# Patient Record
Sex: FEMALE | Race: WHITE | NOT HISPANIC OR LATINO
[De-identification: names, ages, dates, MRNs, and addresses within clinical notes are randomized per-mention and may not be internally consistent; named-entity substitution may affect disease eponyms.]

---

## 2017-01-05 ENCOUNTER — APPOINTMENT (OUTPATIENT)
Dept: CARDIOLOGY | Facility: CLINIC | Age: 72
End: 2017-01-05

## 2017-01-05 VITALS
DIASTOLIC BLOOD PRESSURE: 90 MMHG | RESPIRATION RATE: 14 BRPM | SYSTOLIC BLOOD PRESSURE: 150 MMHG | HEART RATE: 62 BPM | OXYGEN SATURATION: 98 %

## 2017-01-05 VITALS — RESPIRATION RATE: 14 BRPM | SYSTOLIC BLOOD PRESSURE: 138 MMHG | HEART RATE: 64 BPM | DIASTOLIC BLOOD PRESSURE: 82 MMHG

## 2017-01-09 ENCOUNTER — RX RENEWAL (OUTPATIENT)
Age: 72
End: 2017-01-09

## 2017-01-19 ENCOUNTER — APPOINTMENT (OUTPATIENT)
Dept: CARDIOLOGY | Facility: CLINIC | Age: 72
End: 2017-01-19

## 2017-01-19 VITALS — SYSTOLIC BLOOD PRESSURE: 128 MMHG | RESPIRATION RATE: 14 BRPM | HEART RATE: 70 BPM | DIASTOLIC BLOOD PRESSURE: 80 MMHG

## 2017-01-19 VITALS — HEART RATE: 71 BPM | DIASTOLIC BLOOD PRESSURE: 85 MMHG | SYSTOLIC BLOOD PRESSURE: 125 MMHG | OXYGEN SATURATION: 98 %

## 2017-01-23 ENCOUNTER — APPOINTMENT (OUTPATIENT)
Dept: CARDIOLOGY | Facility: CLINIC | Age: 72
End: 2017-01-23

## 2017-03-09 ENCOUNTER — NON-APPOINTMENT (OUTPATIENT)
Age: 72
End: 2017-03-09

## 2017-03-09 ENCOUNTER — APPOINTMENT (OUTPATIENT)
Dept: CARDIOLOGY | Facility: CLINIC | Age: 72
End: 2017-03-09

## 2017-03-09 ENCOUNTER — CLINICAL ADVICE (OUTPATIENT)
Age: 72
End: 2017-03-09

## 2017-03-09 VITALS
DIASTOLIC BLOOD PRESSURE: 89 MMHG | OXYGEN SATURATION: 97 % | HEART RATE: 69 BPM | BODY MASS INDEX: 39.27 KG/M2 | SYSTOLIC BLOOD PRESSURE: 151 MMHG | HEIGHT: 60 IN | WEIGHT: 200 LBS

## 2017-03-20 ENCOUNTER — APPOINTMENT (OUTPATIENT)
Dept: CARDIOLOGY | Facility: CLINIC | Age: 72
End: 2017-03-20

## 2017-03-23 ENCOUNTER — APPOINTMENT (OUTPATIENT)
Dept: CARDIOLOGY | Facility: CLINIC | Age: 72
End: 2017-03-23

## 2017-03-23 VITALS
SYSTOLIC BLOOD PRESSURE: 162 MMHG | BODY MASS INDEX: 40.05 KG/M2 | OXYGEN SATURATION: 98 % | DIASTOLIC BLOOD PRESSURE: 96 MMHG | HEART RATE: 65 BPM | WEIGHT: 204 LBS | HEIGHT: 60 IN

## 2017-03-28 ENCOUNTER — FORM ENCOUNTER (OUTPATIENT)
Age: 72
End: 2017-03-28

## 2017-03-28 LAB
ANION GAP SERPL CALC-SCNC: 14 MMOL/L
BUN SERPL-MCNC: 22 MG/DL
CALCIUM SERPL-MCNC: 9.9 MG/DL
CHLORIDE SERPL-SCNC: 103 MMOL/L
CO2 SERPL-SCNC: 27 MMOL/L
CREAT SERPL-MCNC: 0.8 MG/DL
GLUCOSE SERPL-MCNC: 150 MG/DL
POTASSIUM SERPL-SCNC: 4.4 MMOL/L
SODIUM SERPL-SCNC: 144 MMOL/L

## 2017-03-29 ENCOUNTER — OUTPATIENT (OUTPATIENT)
Dept: OUTPATIENT SERVICES | Facility: HOSPITAL | Age: 72
LOS: 1 days | End: 2017-03-29
Payer: MEDICARE

## 2017-03-29 ENCOUNTER — APPOINTMENT (OUTPATIENT)
Dept: MRI IMAGING | Facility: HOSPITAL | Age: 72
End: 2017-03-29

## 2017-03-29 DIAGNOSIS — Z90.5 ACQUIRED ABSENCE OF KIDNEY: Chronic | ICD-10-CM

## 2017-03-29 DIAGNOSIS — Z98.89 OTHER SPECIFIED POSTPROCEDURAL STATES: Chronic | ICD-10-CM

## 2017-03-29 DIAGNOSIS — C64.2 MALIGNANT NEOPLASM OF LEFT KIDNEY, EXCEPT RENAL PELVIS: ICD-10-CM

## 2017-03-29 DIAGNOSIS — R19.05 PERIUMBILIC SWELLING, MASS OR LUMP: ICD-10-CM

## 2017-03-29 DIAGNOSIS — Z90.721 ACQUIRED ABSENCE OF OVARIES, UNILATERAL: Chronic | ICD-10-CM

## 2017-03-29 PROCEDURE — 71046 X-RAY EXAM CHEST 2 VIEWS: CPT

## 2017-03-29 PROCEDURE — 74183 MRI ABD W/O CNTR FLWD CNTR: CPT

## 2017-03-29 PROCEDURE — 74183 MRI ABD W/O CNTR FLWD CNTR: CPT | Mod: 26

## 2017-03-29 PROCEDURE — 71020: CPT | Mod: 26

## 2017-03-30 ENCOUNTER — APPOINTMENT (OUTPATIENT)
Dept: CARDIOLOGY | Facility: CLINIC | Age: 72
End: 2017-03-30

## 2017-03-30 VITALS
RESPIRATION RATE: 14 BRPM | HEART RATE: 78 BPM | OXYGEN SATURATION: 100 % | DIASTOLIC BLOOD PRESSURE: 82 MMHG | SYSTOLIC BLOOD PRESSURE: 140 MMHG

## 2017-03-30 VITALS — HEART RATE: 82 BPM | OXYGEN SATURATION: 100 % | SYSTOLIC BLOOD PRESSURE: 160 MMHG | DIASTOLIC BLOOD PRESSURE: 80 MMHG

## 2017-04-03 ENCOUNTER — RX RENEWAL (OUTPATIENT)
Age: 72
End: 2017-04-03

## 2017-04-11 ENCOUNTER — MEDICATION RENEWAL (OUTPATIENT)
Age: 72
End: 2017-04-11

## 2017-04-20 ENCOUNTER — APPOINTMENT (OUTPATIENT)
Dept: CARDIOLOGY | Facility: CLINIC | Age: 72
End: 2017-04-20

## 2017-04-20 VITALS — DIASTOLIC BLOOD PRESSURE: 84 MMHG | OXYGEN SATURATION: 99 % | SYSTOLIC BLOOD PRESSURE: 144 MMHG | HEART RATE: 72 BPM

## 2017-04-20 VITALS
SYSTOLIC BLOOD PRESSURE: 140 MMHG | OXYGEN SATURATION: 99 % | DIASTOLIC BLOOD PRESSURE: 80 MMHG | RESPIRATION RATE: 14 BRPM | HEART RATE: 72 BPM

## 2017-05-23 ENCOUNTER — APPOINTMENT (OUTPATIENT)
Dept: UROLOGY | Facility: CLINIC | Age: 72
End: 2017-05-23

## 2017-05-30 ENCOUNTER — MEDICATION RENEWAL (OUTPATIENT)
Age: 72
End: 2017-05-30

## 2017-05-31 ENCOUNTER — RX RENEWAL (OUTPATIENT)
Age: 72
End: 2017-05-31

## 2017-06-15 ENCOUNTER — APPOINTMENT (OUTPATIENT)
Dept: CARDIOLOGY | Facility: CLINIC | Age: 72
End: 2017-06-15

## 2017-06-15 ENCOUNTER — NON-APPOINTMENT (OUTPATIENT)
Age: 72
End: 2017-06-15

## 2017-06-15 VITALS — SYSTOLIC BLOOD PRESSURE: 145 MMHG | DIASTOLIC BLOOD PRESSURE: 65 MMHG

## 2017-06-15 VITALS
HEIGHT: 60 IN | WEIGHT: 212 LBS | DIASTOLIC BLOOD PRESSURE: 97 MMHG | HEART RATE: 71 BPM | OXYGEN SATURATION: 98 % | SYSTOLIC BLOOD PRESSURE: 166 MMHG | BODY MASS INDEX: 41.62 KG/M2

## 2017-09-29 ENCOUNTER — APPOINTMENT (OUTPATIENT)
Dept: CARDIOLOGY | Facility: CLINIC | Age: 72
End: 2017-09-29
Payer: MEDICARE

## 2017-09-29 ENCOUNTER — NON-APPOINTMENT (OUTPATIENT)
Age: 72
End: 2017-09-29

## 2017-09-29 VITALS
OXYGEN SATURATION: 94 % | BODY MASS INDEX: 41.03 KG/M2 | HEIGHT: 60 IN | HEART RATE: 71 BPM | SYSTOLIC BLOOD PRESSURE: 164 MMHG | WEIGHT: 209 LBS | DIASTOLIC BLOOD PRESSURE: 85 MMHG

## 2017-09-29 PROCEDURE — 99215 OFFICE O/P EST HI 40 MIN: CPT

## 2017-09-29 PROCEDURE — 93000 ELECTROCARDIOGRAM COMPLETE: CPT

## 2017-11-03 ENCOUNTER — MEDICATION RENEWAL (OUTPATIENT)
Age: 72
End: 2017-11-03

## 2018-01-08 ENCOUNTER — NON-APPOINTMENT (OUTPATIENT)
Age: 73
End: 2018-01-08

## 2018-01-08 ENCOUNTER — APPOINTMENT (OUTPATIENT)
Dept: CARDIOLOGY | Facility: CLINIC | Age: 73
End: 2018-01-08
Payer: MEDICARE

## 2018-01-08 VITALS
WEIGHT: 205 LBS | DIASTOLIC BLOOD PRESSURE: 85 MMHG | OXYGEN SATURATION: 99 % | HEIGHT: 60 IN | HEART RATE: 71 BPM | BODY MASS INDEX: 40.25 KG/M2 | SYSTOLIC BLOOD PRESSURE: 151 MMHG

## 2018-01-08 PROCEDURE — 93000 ELECTROCARDIOGRAM COMPLETE: CPT

## 2018-01-08 PROCEDURE — 99215 OFFICE O/P EST HI 40 MIN: CPT

## 2018-03-26 ENCOUNTER — APPOINTMENT (OUTPATIENT)
Dept: CARDIOLOGY | Facility: CLINIC | Age: 73
End: 2018-03-26

## 2018-04-02 ENCOUNTER — APPOINTMENT (OUTPATIENT)
Dept: CARDIOLOGY | Facility: CLINIC | Age: 73
End: 2018-04-02

## 2018-04-03 ENCOUNTER — RX RENEWAL (OUTPATIENT)
Age: 73
End: 2018-04-03

## 2018-04-05 ENCOUNTER — RESULT REVIEW (OUTPATIENT)
Age: 73
End: 2018-04-05

## 2018-04-09 ENCOUNTER — APPOINTMENT (OUTPATIENT)
Dept: CARDIOLOGY | Facility: CLINIC | Age: 73
End: 2018-04-09
Payer: MEDICARE

## 2018-04-09 PROCEDURE — 93280 PM DEVICE PROGR EVAL DUAL: CPT

## 2018-04-25 ENCOUNTER — RX RENEWAL (OUTPATIENT)
Age: 73
End: 2018-04-25

## 2018-05-03 ENCOUNTER — APPOINTMENT (OUTPATIENT)
Dept: CARDIOLOGY | Facility: CLINIC | Age: 73
End: 2018-05-03
Payer: MEDICARE

## 2018-05-03 ENCOUNTER — NON-APPOINTMENT (OUTPATIENT)
Age: 73
End: 2018-05-03

## 2018-05-03 VITALS
HEIGHT: 60 IN | OXYGEN SATURATION: 98 % | DIASTOLIC BLOOD PRESSURE: 78 MMHG | HEART RATE: 80 BPM | WEIGHT: 206 LBS | BODY MASS INDEX: 40.44 KG/M2 | SYSTOLIC BLOOD PRESSURE: 148 MMHG

## 2018-05-03 PROCEDURE — 93000 ELECTROCARDIOGRAM COMPLETE: CPT

## 2018-05-03 PROCEDURE — 99214 OFFICE O/P EST MOD 30 MIN: CPT

## 2018-05-20 ENCOUNTER — FORM ENCOUNTER (OUTPATIENT)
Age: 73
End: 2018-05-20

## 2018-05-21 ENCOUNTER — APPOINTMENT (OUTPATIENT)
Dept: CT IMAGING | Facility: CLINIC | Age: 73
End: 2018-05-21
Payer: MEDICARE

## 2018-05-21 ENCOUNTER — OUTPATIENT (OUTPATIENT)
Dept: OUTPATIENT SERVICES | Facility: HOSPITAL | Age: 73
LOS: 1 days | End: 2018-05-21
Payer: MEDICARE

## 2018-05-21 DIAGNOSIS — Z90.721 ACQUIRED ABSENCE OF OVARIES, UNILATERAL: Chronic | ICD-10-CM

## 2018-05-21 DIAGNOSIS — Z90.5 ACQUIRED ABSENCE OF KIDNEY: Chronic | ICD-10-CM

## 2018-05-21 DIAGNOSIS — Z00.8 ENCOUNTER FOR OTHER GENERAL EXAMINATION: ICD-10-CM

## 2018-05-21 DIAGNOSIS — Z98.89 OTHER SPECIFIED POSTPROCEDURAL STATES: Chronic | ICD-10-CM

## 2018-05-21 PROCEDURE — 74170 CT ABD WO CNTRST FLWD CNTRST: CPT

## 2018-05-21 PROCEDURE — 74170 CT ABD WO CNTRST FLWD CNTRST: CPT | Mod: 26

## 2018-05-21 PROCEDURE — 71260 CT THORAX DX C+: CPT

## 2018-05-21 PROCEDURE — 82565 ASSAY OF CREATININE: CPT

## 2018-05-21 PROCEDURE — 71260 CT THORAX DX C+: CPT | Mod: 26

## 2018-05-25 ENCOUNTER — RECORD ABSTRACTING (OUTPATIENT)
Age: 73
End: 2018-05-25

## 2018-05-25 ENCOUNTER — APPOINTMENT (OUTPATIENT)
Dept: UROLOGY | Facility: CLINIC | Age: 73
End: 2018-05-25
Payer: MEDICARE

## 2018-05-25 PROCEDURE — 99213 OFFICE O/P EST LOW 20 MIN: CPT

## 2018-06-04 ENCOUNTER — OUTPATIENT (OUTPATIENT)
Dept: OUTPATIENT SERVICES | Facility: HOSPITAL | Age: 73
LOS: 1 days | End: 2018-06-04
Payer: MEDICARE

## 2018-06-04 VITALS
TEMPERATURE: 98 F | WEIGHT: 207.9 LBS | SYSTOLIC BLOOD PRESSURE: 144 MMHG | DIASTOLIC BLOOD PRESSURE: 69 MMHG | HEART RATE: 77 BPM | HEIGHT: 62 IN | RESPIRATION RATE: 15 BRPM

## 2018-06-04 DIAGNOSIS — Z01.818 ENCOUNTER FOR OTHER PREPROCEDURAL EXAMINATION: ICD-10-CM

## 2018-06-04 DIAGNOSIS — Z98.89 OTHER SPECIFIED POSTPROCEDURAL STATES: Chronic | ICD-10-CM

## 2018-06-04 DIAGNOSIS — D25.9 LEIOMYOMA OF UTERUS, UNSPECIFIED: ICD-10-CM

## 2018-06-04 DIAGNOSIS — N92.0 EXCESSIVE AND FREQUENT MENSTRUATION WITH REGULAR CYCLE: ICD-10-CM

## 2018-06-04 DIAGNOSIS — Z90.5 ACQUIRED ABSENCE OF KIDNEY: Chronic | ICD-10-CM

## 2018-06-04 DIAGNOSIS — Z95.2 PRESENCE OF PROSTHETIC HEART VALVE: Chronic | ICD-10-CM

## 2018-06-04 DIAGNOSIS — Z41.9 ENCOUNTER FOR PROCEDURE FOR PURPOSES OTHER THAN REMEDYING HEALTH STATE, UNSPECIFIED: Chronic | ICD-10-CM

## 2018-06-04 DIAGNOSIS — Z90.721 ACQUIRED ABSENCE OF OVARIES, UNILATERAL: Chronic | ICD-10-CM

## 2018-06-04 LAB
ALBUMIN SERPL ELPH-MCNC: 3.5 G/DL — SIGNIFICANT CHANGE UP (ref 3.3–5)
ALP SERPL-CCNC: 61 U/L — SIGNIFICANT CHANGE UP (ref 40–120)
ALT FLD-CCNC: 43 U/L — SIGNIFICANT CHANGE UP (ref 12–78)
ANION GAP SERPL CALC-SCNC: 9 MMOL/L — SIGNIFICANT CHANGE UP (ref 5–17)
AST SERPL-CCNC: 30 U/L — SIGNIFICANT CHANGE UP (ref 15–37)
BILIRUB SERPL-MCNC: 0.4 MG/DL — SIGNIFICANT CHANGE UP (ref 0.2–1.2)
BUN SERPL-MCNC: 21 MG/DL — SIGNIFICANT CHANGE UP (ref 7–23)
CALCIUM SERPL-MCNC: 9.8 MG/DL — SIGNIFICANT CHANGE UP (ref 8.5–10.1)
CHLORIDE SERPL-SCNC: 104 MMOL/L — SIGNIFICANT CHANGE UP (ref 96–108)
CO2 SERPL-SCNC: 30 MMOL/L — SIGNIFICANT CHANGE UP (ref 22–31)
CREAT SERPL-MCNC: 0.83 MG/DL — SIGNIFICANT CHANGE UP (ref 0.5–1.3)
GLUCOSE SERPL-MCNC: 162 MG/DL — HIGH (ref 70–99)
HBA1C BLD-MCNC: 6.3 % — HIGH (ref 4–5.6)
HCT VFR BLD CALC: 39.7 % — SIGNIFICANT CHANGE UP (ref 34.5–45)
HGB BLD-MCNC: 13.8 G/DL — SIGNIFICANT CHANGE UP (ref 11.5–15.5)
MCHC RBC-ENTMCNC: 29.1 PG — SIGNIFICANT CHANGE UP (ref 27–34)
MCHC RBC-ENTMCNC: 34.8 GM/DL — SIGNIFICANT CHANGE UP (ref 32–36)
MCV RBC AUTO: 83.6 FL — SIGNIFICANT CHANGE UP (ref 80–100)
NRBC # BLD: 0 /100 WBCS — SIGNIFICANT CHANGE UP (ref 0–0)
PLATELET # BLD AUTO: 302 K/UL — SIGNIFICANT CHANGE UP (ref 150–400)
POTASSIUM SERPL-MCNC: 3.9 MMOL/L — SIGNIFICANT CHANGE UP (ref 3.5–5.3)
POTASSIUM SERPL-SCNC: 3.9 MMOL/L — SIGNIFICANT CHANGE UP (ref 3.5–5.3)
PROT SERPL-MCNC: 7.7 G/DL — SIGNIFICANT CHANGE UP (ref 6–8.3)
RBC # BLD: 4.75 M/UL — SIGNIFICANT CHANGE UP (ref 3.8–5.2)
RBC # FLD: 13.8 % — SIGNIFICANT CHANGE UP (ref 10.3–14.5)
SODIUM SERPL-SCNC: 143 MMOL/L — SIGNIFICANT CHANGE UP (ref 135–145)
WBC # BLD: 7.02 K/UL — SIGNIFICANT CHANGE UP (ref 3.8–10.5)
WBC # FLD AUTO: 7.02 K/UL — SIGNIFICANT CHANGE UP (ref 3.8–10.5)

## 2018-06-04 PROCEDURE — 83036 HEMOGLOBIN GLYCOSYLATED A1C: CPT

## 2018-06-04 PROCEDURE — 86850 RBC ANTIBODY SCREEN: CPT

## 2018-06-04 PROCEDURE — 86900 BLOOD TYPING SEROLOGIC ABO: CPT

## 2018-06-04 PROCEDURE — G0463: CPT

## 2018-06-04 PROCEDURE — 36415 COLL VENOUS BLD VENIPUNCTURE: CPT

## 2018-06-04 PROCEDURE — 86901 BLOOD TYPING SEROLOGIC RH(D): CPT

## 2018-06-04 PROCEDURE — 80053 COMPREHEN METABOLIC PANEL: CPT

## 2018-06-04 PROCEDURE — 93005 ELECTROCARDIOGRAM TRACING: CPT

## 2018-06-04 PROCEDURE — 93010 ELECTROCARDIOGRAM REPORT: CPT | Mod: NC

## 2018-06-04 PROCEDURE — 85027 COMPLETE CBC AUTOMATED: CPT

## 2018-06-04 RX ORDER — DEXTROSE 50 % IN WATER 50 %
12.5 SYRINGE (ML) INTRAVENOUS ONCE
Qty: 0 | Refills: 0 | Status: DISCONTINUED | OUTPATIENT
Start: 2018-06-14 | End: 2018-06-29

## 2018-06-04 RX ORDER — DEXTROSE 50 % IN WATER 50 %
25 SYRINGE (ML) INTRAVENOUS ONCE
Qty: 0 | Refills: 0 | Status: DISCONTINUED | OUTPATIENT
Start: 2018-06-14 | End: 2018-06-29

## 2018-06-04 RX ORDER — GLUCAGON INJECTION, SOLUTION 0.5 MG/.1ML
1 INJECTION, SOLUTION SUBCUTANEOUS ONCE
Qty: 0 | Refills: 0 | Status: DISCONTINUED | OUTPATIENT
Start: 2018-06-14 | End: 2018-06-29

## 2018-06-04 RX ORDER — SODIUM CHLORIDE 9 MG/ML
1000 INJECTION, SOLUTION INTRAVENOUS
Qty: 0 | Refills: 0 | Status: DISCONTINUED | OUTPATIENT
Start: 2018-06-14 | End: 2018-06-29

## 2018-06-04 RX ORDER — DEXTROSE 50 % IN WATER 50 %
15 SYRINGE (ML) INTRAVENOUS ONCE
Qty: 0 | Refills: 0 | Status: DISCONTINUED | OUTPATIENT
Start: 2018-06-14 | End: 2018-06-29

## 2018-06-04 NOTE — H&P PST ADULT - HISTORY OF PRESENT ILLNESS
71 yo F for dilation and curettage   hysteroscopy  w Myosure  LMP 1992   Pt w post menopausal bleeding lasting 5-6 weeks  w cramping

## 2018-06-04 NOTE — H&P PST ADULT - PMH
Anemia    Aortic stenosis    Cancer of kidney, left    Dyslipidemia    Hiatal hernia    Hypertension  x10 years  Hypothyroidism    Right bundle branch block (RBBB)    Type II diabetes mellitus

## 2018-06-04 NOTE — H&P PST ADULT - PSH
Elective surgery  PPM  2016  H/O aortic valve replacement    H/O hand surgery    H/O partial nephrectomy  right [id number]  S/P T&A (status post tonsillectomy and adenoidectomy)    Status post unilateral salpingo-oophorectomy

## 2018-06-04 NOTE — H&P PST ADULT - ASSESSMENT
71 yo F for dilation and curettage hysteroscopy w Myosure     Cardiac cl pending    D/C  eliquis and ASA as per cardio

## 2018-06-13 ENCOUNTER — TRANSCRIPTION ENCOUNTER (OUTPATIENT)
Age: 73
End: 2018-06-13

## 2018-06-13 RX ORDER — SODIUM CHLORIDE 9 MG/ML
1000 INJECTION, SOLUTION INTRAVENOUS
Qty: 0 | Refills: 0 | Status: DISCONTINUED | OUTPATIENT
Start: 2018-06-14 | End: 2018-06-14

## 2018-06-14 ENCOUNTER — RESULT REVIEW (OUTPATIENT)
Age: 73
End: 2018-06-14

## 2018-06-14 ENCOUNTER — OUTPATIENT (OUTPATIENT)
Dept: OUTPATIENT SERVICES | Facility: HOSPITAL | Age: 73
LOS: 1 days | End: 2018-06-14
Payer: MEDICARE

## 2018-06-14 VITALS
TEMPERATURE: 98 F | SYSTOLIC BLOOD PRESSURE: 137 MMHG | HEART RATE: 66 BPM | DIASTOLIC BLOOD PRESSURE: 63 MMHG | RESPIRATION RATE: 14 BRPM | OXYGEN SATURATION: 100 %

## 2018-06-14 VITALS
HEART RATE: 70 BPM | OXYGEN SATURATION: 97 % | DIASTOLIC BLOOD PRESSURE: 71 MMHG | SYSTOLIC BLOOD PRESSURE: 162 MMHG | RESPIRATION RATE: 14 BRPM | HEIGHT: 62 IN | WEIGHT: 207.9 LBS | TEMPERATURE: 99 F

## 2018-06-14 DIAGNOSIS — Z41.9 ENCOUNTER FOR PROCEDURE FOR PURPOSES OTHER THAN REMEDYING HEALTH STATE, UNSPECIFIED: Chronic | ICD-10-CM

## 2018-06-14 DIAGNOSIS — Z98.89 OTHER SPECIFIED POSTPROCEDURAL STATES: Chronic | ICD-10-CM

## 2018-06-14 DIAGNOSIS — Z90.721 ACQUIRED ABSENCE OF OVARIES, UNILATERAL: Chronic | ICD-10-CM

## 2018-06-14 DIAGNOSIS — Z90.5 ACQUIRED ABSENCE OF KIDNEY: Chronic | ICD-10-CM

## 2018-06-14 DIAGNOSIS — Z95.2 PRESENCE OF PROSTHETIC HEART VALVE: Chronic | ICD-10-CM

## 2018-06-14 DIAGNOSIS — D25.9 LEIOMYOMA OF UTERUS, UNSPECIFIED: ICD-10-CM

## 2018-06-14 DIAGNOSIS — N92.0 EXCESSIVE AND FREQUENT MENSTRUATION WITH REGULAR CYCLE: ICD-10-CM

## 2018-06-14 PROCEDURE — 82962 GLUCOSE BLOOD TEST: CPT

## 2018-06-14 PROCEDURE — 88305 TISSUE EXAM BY PATHOLOGIST: CPT

## 2018-06-14 PROCEDURE — 58558 HYSTEROSCOPY BIOPSY: CPT

## 2018-06-14 PROCEDURE — 88305 TISSUE EXAM BY PATHOLOGIST: CPT | Mod: 26

## 2018-06-14 RX ORDER — HYDROMORPHONE HYDROCHLORIDE 2 MG/ML
0.5 INJECTION INTRAMUSCULAR; INTRAVENOUS; SUBCUTANEOUS
Qty: 0 | Refills: 0 | Status: DISCONTINUED | OUTPATIENT
Start: 2018-06-14 | End: 2018-06-14

## 2018-06-14 RX ORDER — OXYCODONE AND ACETAMINOPHEN 5; 325 MG/1; MG/1
1 TABLET ORAL EVERY 4 HOURS
Qty: 0 | Refills: 0 | Status: DISCONTINUED | OUTPATIENT
Start: 2018-06-14 | End: 2018-06-14

## 2018-06-14 RX ORDER — SODIUM CHLORIDE 9 MG/ML
1000 INJECTION, SOLUTION INTRAVENOUS
Qty: 0 | Refills: 0 | Status: DISCONTINUED | OUTPATIENT
Start: 2018-06-14 | End: 2018-06-14

## 2018-06-14 RX ORDER — ONDANSETRON 8 MG/1
4 TABLET, FILM COATED ORAL ONCE
Qty: 0 | Refills: 0 | Status: DISCONTINUED | OUTPATIENT
Start: 2018-06-14 | End: 2018-06-14

## 2018-06-14 RX ADMIN — SODIUM CHLORIDE 100 MILLILITER(S): 9 INJECTION, SOLUTION INTRAVENOUS at 08:38

## 2018-06-14 RX ADMIN — SODIUM CHLORIDE 100 MILLILITER(S): 9 INJECTION, SOLUTION INTRAVENOUS at 10:37

## 2018-06-14 NOTE — BRIEF OPERATIVE NOTE - OPERATION/FINDINGS
small fibroid uterus ~ 8week size anteverted, Cervix short, closed, posterior  no adnexal masses, Uterus sounding to 3.5 inches  vulva and vagina atrophic but wnl  Submucosal fibroid noted in the mid-anterior fundal wall with slightly hyperplastic endometrium covering it and   the left lateral side wall. The right ostium was visualized but the left was blocked from view due to the fibroid.  job # [id number]

## 2018-06-14 NOTE — BRIEF OPERATIVE NOTE - PRE-OP DX
Fibroid uterus  06/14/2018    Active  Mary Espinoza  PMB (postmenopausal bleeding)  06/14/2018    Active  Mary Espinoza

## 2018-06-14 NOTE — BRIEF OPERATIVE NOTE - PROCEDURE
<<-----Click on this checkbox to enter Procedure Myomectomy, uterine, hysteroscopic  06/14/2018    Active  MDIAZ  Hysteroscopy with dilation and curettage of uterus using MyoSure device  06/14/2018    Active  MDIAZ

## 2018-06-14 NOTE — ASU DISCHARGE PLAN (ADULT/PEDIATRIC). - MEDICATION SUMMARY - MEDICATIONS TO TAKE
I will START or STAY ON the medications listed below when I get home from the hospital:    acetaminophen 325 mg oral tablet  -- 2 tab(s) by mouth every 4 hours, As Needed  -- Indication: For pain    aspirin 81 mg oral tablet  -- 1 tab(s) by mouth once a day  -- Indication: For blood thinner    valsartan 320 mg oral tablet  -- 1 tab(s) by mouth once a day  -- Indication: For BP    Eliquis 5 mg oral tablet  -- 1 tab(s) by mouth 2 times a day  -- Indication: For blood thinner    metFORMIN 500 mg oral tablet  -- Indication: For DM    simvastatin 20 mg oral tablet  -- 1 tab(s) by mouth once a day (at bedtime)  -- Indication: For cholesterol    Metoprolol Succinate  mg oral tablet, extended release  -- 1 tab(s) by mouth once a day  -- Indication: For BP    chlorthalidone 25 mg oral tablet  -- 1 tab(s) by mouth once a day  -- Indication: For diuretics    ferrous sulfate 325 mg (65 mg elemental iron) oral tablet  -- 1 tab(s) by mouth 3 times a day  -- Indication: For iron    docusate sodium 100 mg oral capsule  -- 1 cap(s) by mouth 3 times a day  -- Indication: For stool softner    Fish Oil 1200 mg oral capsule  --  by mouth 2 times a day to stop as of 4/27/2015  -- Indication: For supplement    pantoprazole 40 mg oral delayed release tablet  -- 1 tab(s) by mouth once a day (before a meal)  -- Indication: For gastritis    levothyroxine 112 mcg (0.112 mg) oral capsule  -- 1 cap(s) by mouth once a day  -- Indication: For thyroid    Centrum Silver oral tablet  -- 1 tab(s) by mouth once a day to stop as of 4/27/2015  -- Indication: For vitamin    Vitamin B Compound Strong  --   once a day to stop as of 4/27/2015  -- Indication: For vitamin    Vitamin D3 2000 intl units oral capsule  -- 1 cap(s) by mouth once a day  -- Indication: For Menorrhagia with regular cycle    biotin  --  by mouth once a day to stop as of 4/27/2015  -- Indication: For supplement

## 2018-06-14 NOTE — BRIEF OPERATIVE NOTE - POST-OP DX
Intramural, submucous, and subserous leiomyoma of uterus  06/14/2018    Active  Mary Espinoza  PMB (postmenopausal bleeding)  06/14/2018  fibroid uterus  Active  Mary Espinoza

## 2018-06-15 LAB — SURGICAL PATHOLOGY FINAL REPORT - CH: SIGNIFICANT CHANGE UP

## 2018-08-03 ENCOUNTER — APPOINTMENT (OUTPATIENT)
Dept: CARDIOLOGY | Facility: CLINIC | Age: 73
End: 2018-08-03

## 2018-08-13 ENCOUNTER — RX RENEWAL (OUTPATIENT)
Age: 73
End: 2018-08-13

## 2018-08-14 ENCOUNTER — RX RENEWAL (OUTPATIENT)
Age: 73
End: 2018-08-14

## 2018-08-31 ENCOUNTER — APPOINTMENT (OUTPATIENT)
Dept: CARDIOLOGY | Facility: CLINIC | Age: 73
End: 2018-08-31
Payer: MEDICARE

## 2018-08-31 PROCEDURE — 93306 TTE W/DOPPLER COMPLETE: CPT

## 2018-10-02 ENCOUNTER — MEDICATION RENEWAL (OUTPATIENT)
Age: 73
End: 2018-10-02

## 2018-11-19 ENCOUNTER — NON-APPOINTMENT (OUTPATIENT)
Age: 73
End: 2018-11-19

## 2018-11-19 ENCOUNTER — APPOINTMENT (OUTPATIENT)
Dept: CARDIOLOGY | Facility: CLINIC | Age: 73
End: 2018-11-19
Payer: MEDICARE

## 2018-11-19 VITALS
SYSTOLIC BLOOD PRESSURE: 154 MMHG | BODY MASS INDEX: 40.44 KG/M2 | OXYGEN SATURATION: 98 % | DIASTOLIC BLOOD PRESSURE: 78 MMHG | HEART RATE: 77 BPM | WEIGHT: 206 LBS | HEIGHT: 60 IN

## 2018-11-19 PROCEDURE — 99214 OFFICE O/P EST MOD 30 MIN: CPT

## 2018-11-19 PROCEDURE — 93000 ELECTROCARDIOGRAM COMPLETE: CPT

## 2018-11-19 NOTE — HISTORY OF PRESENT ILLNESS
[FreeTextEntry1] : Yolette presents to the office today for a follow up cardiovascular evaluation for hypertension. I saw her in the office 6 months ago.\par \par She is now 73 years old, with a history of aortic stenosis, an intermittent right bundle branch block pattern on her electrocardiogram, hypertension, a dyslipidemia, a thyroid nodule, secondary hypothyroidism, iron deficiency anemia, and renal cell carcinoma (status post resection).  She has had bioprosthetic aortic valve replacement. Her postop course was complicated by complete heart block, for which a pacemaker was eventually implanted.  Routine interrogation of her pacemaker revealed asymptomatic atrial fibrillation. She has been anticoagulated.\par \par  She presents to the office today having been feeling reasonably well from a cardiovascular perspective. She reports no symptoms of angina. She denies orthopnea, PND and edema. She denies palpitations, dizziness and syncope.She reports that her blood pressure has been "a little high ", at home, and the various doctors offices.

## 2018-11-19 NOTE — PHYSICAL EXAM
[General Appearance - In No Acute Distress] : no acute distress [Normal Conjunctiva] : the conjunctiva exhibited no abnormalities [No Oral Pallor] : no oral pallor [Respiration, Rhythm And Depth] : normal respiratory rhythm and effort [Exaggerated Use Of Accessory Muscles For Inspiration] : no accessory muscle use [Auscultation Breath Sounds / Voice Sounds] : lungs were clear to auscultation bilaterally [Bowel Sounds] : normal bowel sounds [Abdomen Tenderness] : non-tender [Abnormal Walk] : normal gait [Cyanosis, Localized] : no localized cyanosis [] : no rash [Oriented To Time, Place, And Person] : oriented to person, place, and time [Not Palpable] : not palpable [No Precordial Heave] : no precordial heave was noted [Normal Rate] : normal [Rhythm Regular] : regular [Normal S1] : normal S1 [No Gallop] : no gallop heard [III] : a grade 3 [II] : a grade 2 [I] : a grade 1 [2+] : left 2+ [No Pitting Edema] : no pitting edema present [FreeTextEntry1] : no JVD is appreciated at a 45° angle [Apical Thrill] : no thrill palpable at the apex [Normal S2] : abnormal S2 [Click] : no click [Pericardial Rub] : no pericardial rub [Right Carotid Bruit] : no bruit heard over the right carotid [Left Carotid Bruit] : no bruit heard over the left carotid

## 2018-11-19 NOTE — DISCUSSION/SUMMARY
[FreeTextEntry1] : She does not have symptoms of angina, heart failure or arrhythmia. Her blood pressure is elevated today in the office as usual. I will start amlodipine 5 mg daily. Hopefully this will lower her blood pressure sufficiently. I have requested a followup in about one month to reevaluate her pressure. She will call with questions.

## 2018-11-19 NOTE — REASON FOR VISIT
[Follow-Up - Clinic] : a clinic follow-up of [Aortic Stenosis] : aortic stenosis [Atrial Fibrillation] : atrial fibrillation [Hypertension] : hypertension

## 2018-12-20 ENCOUNTER — APPOINTMENT (OUTPATIENT)
Dept: CARDIOLOGY | Facility: CLINIC | Age: 73
End: 2018-12-20

## 2019-01-11 ENCOUNTER — APPOINTMENT (OUTPATIENT)
Dept: CARDIOLOGY | Facility: CLINIC | Age: 74
End: 2019-01-11
Payer: MEDICARE

## 2019-01-11 ENCOUNTER — NON-APPOINTMENT (OUTPATIENT)
Age: 74
End: 2019-01-11

## 2019-01-11 VITALS
SYSTOLIC BLOOD PRESSURE: 153 MMHG | HEIGHT: 60 IN | WEIGHT: 202 LBS | BODY MASS INDEX: 39.66 KG/M2 | HEART RATE: 80 BPM | DIASTOLIC BLOOD PRESSURE: 82 MMHG | OXYGEN SATURATION: 96 %

## 2019-01-11 PROCEDURE — 99214 OFFICE O/P EST MOD 30 MIN: CPT

## 2019-01-11 PROCEDURE — 93000 ELECTROCARDIOGRAM COMPLETE: CPT

## 2019-01-11 NOTE — DISCUSSION/SUMMARY
[FreeTextEntry1] : She does not have symptoms of angina, heart failure or arrhythmia. Her blood pressure is elevated today in the office as usual. I will continue amlodipine 5 mg daily.  She will check her blood pressure regularly over the next 2 weeks, and send me a list of her readings. If her blood pressure remains elevated, we will make another adjustment.

## 2019-01-11 NOTE — HISTORY OF PRESENT ILLNESS
[FreeTextEntry1] : Yolette presents to the office today for a follow up cardiovascular evaluation for hypertension. I saw her in the office 6 months ago.\par \par She is now 73 years old, with a history of aortic stenosis, an intermittent right bundle branch block pattern on her electrocardiogram, hypertension, a dyslipidemia, a thyroid nodule, secondary hypothyroidism, iron deficiency anemia, and renal cell carcinoma (status post resection).  She has had bioprosthetic aortic valve replacement. Her postop course was complicated by complete heart block, for which a pacemaker was eventually implanted.  Routine interrogation of her pacemaker revealed asymptomatic atrial fibrillation. She has been anticoagulated.\par \par At the time of the last visit, her blood pressure was elevated here and at home, and it prescribed amlodipine 5 mg daily. Over the last month, she has not really been checked and her blood pressure. Here in the office today remains elevated. She continues to feel well overall, without symptoms of angina, heart failure or arrhythmia. A recent immigrant certification was unremarkable.

## 2019-03-22 ENCOUNTER — RX RENEWAL (OUTPATIENT)
Age: 74
End: 2019-03-22

## 2019-03-23 ENCOUNTER — TRANSCRIPTION ENCOUNTER (OUTPATIENT)
Age: 74
End: 2019-03-23

## 2019-04-15 ENCOUNTER — RX RENEWAL (OUTPATIENT)
Age: 74
End: 2019-04-15

## 2019-05-01 ENCOUNTER — RX RENEWAL (OUTPATIENT)
Age: 74
End: 2019-05-01

## 2019-05-02 ENCOUNTER — RX RENEWAL (OUTPATIENT)
Age: 74
End: 2019-05-02

## 2019-05-24 ENCOUNTER — APPOINTMENT (OUTPATIENT)
Dept: UROLOGY | Facility: CLINIC | Age: 74
End: 2019-05-24

## 2019-06-24 ENCOUNTER — APPOINTMENT (OUTPATIENT)
Dept: CARDIOLOGY | Facility: CLINIC | Age: 74
End: 2019-06-24
Payer: MEDICARE

## 2019-06-24 PROCEDURE — 93280 PM DEVICE PROGR EVAL DUAL: CPT

## 2019-07-16 ENCOUNTER — RX RENEWAL (OUTPATIENT)
Age: 74
End: 2019-07-16

## 2019-10-04 ENCOUNTER — APPOINTMENT (OUTPATIENT)
Dept: CARDIOLOGY | Facility: CLINIC | Age: 74
End: 2019-10-04
Payer: MEDICARE

## 2019-10-04 ENCOUNTER — NON-APPOINTMENT (OUTPATIENT)
Age: 74
End: 2019-10-04

## 2019-10-04 VITALS
SYSTOLIC BLOOD PRESSURE: 145 MMHG | OXYGEN SATURATION: 98 % | DIASTOLIC BLOOD PRESSURE: 85 MMHG | HEART RATE: 78 BPM | BODY MASS INDEX: 36.14 KG/M2 | HEIGHT: 63 IN | WEIGHT: 204 LBS

## 2019-10-04 PROCEDURE — 99214 OFFICE O/P EST MOD 30 MIN: CPT

## 2019-10-04 PROCEDURE — 93000 ELECTROCARDIOGRAM COMPLETE: CPT

## 2019-10-04 RX ORDER — DIAZEPAM 5 MG/1
5 TABLET ORAL
Qty: 2 | Refills: 0 | Status: DISCONTINUED | COMMUNITY
Start: 2018-10-03 | End: 2019-10-04

## 2019-10-04 NOTE — REASON FOR VISIT
[Follow-Up - Clinic] : a clinic follow-up of [Aortic Stenosis] : aortic stenosis [Hypertension] : hypertension [Atrial Fibrillation] : atrial fibrillation

## 2019-10-04 NOTE — PHYSICAL EXAM
[General Appearance - In No Acute Distress] : no acute distress [Normal Conjunctiva] : the conjunctiva exhibited no abnormalities [No Oral Pallor] : no oral pallor [Respiration, Rhythm And Depth] : normal respiratory rhythm and effort [Exaggerated Use Of Accessory Muscles For Inspiration] : no accessory muscle use [Bowel Sounds] : normal bowel sounds [Auscultation Breath Sounds / Voice Sounds] : lungs were clear to auscultation bilaterally [Abdomen Tenderness] : non-tender [Abnormal Walk] : normal gait [Cyanosis, Localized] : no localized cyanosis [] : no rash [Oriented To Time, Place, And Person] : oriented to person, place, and time [Not Palpable] : not palpable [No Precordial Heave] : no precordial heave was noted [Rhythm Regular] : regular [Normal Rate] : normal [Normal S1] : normal S1 [No Gallop] : no gallop heard [III] : a grade 3 [II] : a grade 2 [I] : a grade 1 [2+] : left 2+ [No Pitting Edema] : no pitting edema present [FreeTextEntry1] : no JVD is appreciated at a 45° angle [Apical Thrill] : no thrill palpable at the apex [Normal S2] : abnormal S2 [Click] : no click [Right Carotid Bruit] : no bruit heard over the right carotid [Pericardial Rub] : no pericardial rub [Left Carotid Bruit] : no bruit heard over the left carotid

## 2019-10-11 ENCOUNTER — APPOINTMENT (OUTPATIENT)
Dept: CARDIOLOGY | Facility: CLINIC | Age: 74
End: 2019-10-11
Payer: MEDICARE

## 2019-10-11 PROCEDURE — 93306 TTE W/DOPPLER COMPLETE: CPT

## 2019-12-21 ENCOUNTER — APPOINTMENT (OUTPATIENT)
Dept: CT IMAGING | Facility: CLINIC | Age: 74
End: 2019-12-21
Payer: MEDICARE

## 2019-12-21 ENCOUNTER — OUTPATIENT (OUTPATIENT)
Dept: OUTPATIENT SERVICES | Facility: HOSPITAL | Age: 74
LOS: 1 days | End: 2019-12-21
Payer: MEDICARE

## 2019-12-21 DIAGNOSIS — Z41.9 ENCOUNTER FOR PROCEDURE FOR PURPOSES OTHER THAN REMEDYING HEALTH STATE, UNSPECIFIED: Chronic | ICD-10-CM

## 2019-12-21 DIAGNOSIS — Z00.8 ENCOUNTER FOR OTHER GENERAL EXAMINATION: ICD-10-CM

## 2019-12-21 DIAGNOSIS — Z90.5 ACQUIRED ABSENCE OF KIDNEY: Chronic | ICD-10-CM

## 2019-12-21 DIAGNOSIS — Z98.89 OTHER SPECIFIED POSTPROCEDURAL STATES: Chronic | ICD-10-CM

## 2019-12-21 DIAGNOSIS — Z95.2 PRESENCE OF PROSTHETIC HEART VALVE: Chronic | ICD-10-CM

## 2019-12-21 DIAGNOSIS — Z90.721 ACQUIRED ABSENCE OF OVARIES, UNILATERAL: Chronic | ICD-10-CM

## 2019-12-21 PROCEDURE — 71260 CT THORAX DX C+: CPT

## 2019-12-21 PROCEDURE — 71260 CT THORAX DX C+: CPT | Mod: 26

## 2019-12-21 PROCEDURE — 74178 CT ABD&PLV WO CNTR FLWD CNTR: CPT

## 2019-12-21 PROCEDURE — 74178 CT ABD&PLV WO CNTR FLWD CNTR: CPT | Mod: 26

## 2019-12-21 PROCEDURE — 82565 ASSAY OF CREATININE: CPT

## 2020-01-03 ENCOUNTER — RX RENEWAL (OUTPATIENT)
Age: 75
End: 2020-01-03

## 2020-01-06 ENCOUNTER — RX RENEWAL (OUTPATIENT)
Age: 75
End: 2020-01-06

## 2020-01-31 ENCOUNTER — APPOINTMENT (OUTPATIENT)
Dept: CARDIOLOGY | Facility: CLINIC | Age: 75
End: 2020-01-31

## 2020-04-07 ENCOUNTER — RX RENEWAL (OUTPATIENT)
Age: 75
End: 2020-04-07

## 2020-09-23 ENCOUNTER — RX RENEWAL (OUTPATIENT)
Age: 75
End: 2020-09-23

## 2020-12-16 ENCOUNTER — RX RENEWAL (OUTPATIENT)
Age: 75
End: 2020-12-16

## 2020-12-18 ENCOUNTER — RX RENEWAL (OUTPATIENT)
Age: 75
End: 2020-12-18

## 2021-01-07 ENCOUNTER — RX RENEWAL (OUTPATIENT)
Age: 76
End: 2021-01-07

## 2021-01-12 ENCOUNTER — APPOINTMENT (OUTPATIENT)
Dept: CARDIOLOGY | Facility: CLINIC | Age: 76
End: 2021-01-12
Payer: MEDICARE

## 2021-01-12 ENCOUNTER — NON-APPOINTMENT (OUTPATIENT)
Age: 76
End: 2021-01-12

## 2021-01-12 PROCEDURE — 93296 REM INTERROG EVL PM/IDS: CPT

## 2021-01-12 PROCEDURE — 93294 REM INTERROG EVL PM/LDLS PM: CPT

## 2021-01-14 ENCOUNTER — APPOINTMENT (OUTPATIENT)
Dept: CARDIOLOGY | Facility: CLINIC | Age: 76
End: 2021-01-14
Payer: MEDICARE

## 2021-01-14 ENCOUNTER — NON-APPOINTMENT (OUTPATIENT)
Age: 76
End: 2021-01-14

## 2021-01-14 VITALS
HEIGHT: 63 IN | OXYGEN SATURATION: 100 % | BODY MASS INDEX: 37.21 KG/M2 | HEART RATE: 70 BPM | SYSTOLIC BLOOD PRESSURE: 131 MMHG | WEIGHT: 210 LBS | DIASTOLIC BLOOD PRESSURE: 81 MMHG

## 2021-01-14 PROCEDURE — 93000 ELECTROCARDIOGRAM COMPLETE: CPT

## 2021-01-14 PROCEDURE — 99214 OFFICE O/P EST MOD 30 MIN: CPT

## 2021-01-14 NOTE — PHYSICAL EXAM
[General Appearance - In No Acute Distress] : no acute distress [Normal Conjunctiva] : the conjunctiva exhibited no abnormalities [No Oral Pallor] : no oral pallor [Respiration, Rhythm And Depth] : normal respiratory rhythm and effort [Exaggerated Use Of Accessory Muscles For Inspiration] : no accessory muscle use [Bowel Sounds] : normal bowel sounds [Auscultation Breath Sounds / Voice Sounds] : lungs were clear to auscultation bilaterally [Abdomen Tenderness] : non-tender [Cyanosis, Localized] : no localized cyanosis [Abnormal Walk] : normal gait [] : no rash [Oriented To Time, Place, And Person] : oriented to person, place, and time [Not Palpable] : not palpable [No Precordial Heave] : no precordial heave was noted [Normal Rate] : normal [Rhythm Regular] : regular [Normal S1] : normal S1 [No Gallop] : no gallop heard [III] : a grade 3 [II] : a grade 2 [I] : a grade 1 [2+] : left 2+ [No Pitting Edema] : no pitting edema present [FreeTextEntry1] : no JVD is appreciated at a 45° angle [Apical Thrill] : no thrill palpable at the apex [Normal S2] : abnormal S2 [Click] : no click [Pericardial Rub] : no pericardial rub [Right Carotid Bruit] : no bruit heard over the right carotid [Left Carotid Bruit] : no bruit heard over the left carotid

## 2021-01-14 NOTE — HISTORY OF PRESENT ILLNESS
[FreeTextEntry1] : Yolette presents to the office today for a follow up cardiovascular evaluation for hypertension. I saw her in the office 1 year ago.\par \par She is now 75 years old, with a history of aortic stenosis, an intermittent right bundle branch block pattern on her electrocardiogram, hypertension, a dyslipidemia, a thyroid nodule, secondary hypothyroidism, iron deficiency anemia, and renal cell carcinoma (status post resection).  She has had bioprosthetic aortic valve replacement. Her postop course was complicated by complete heart block, for which a pacemaker was eventually implanted.  Routine interrogation of her pacemaker revealed asymptomatic atrial fibrillation. She has been anticoagulated.\par \par She continues to feel well overall, without symptoms of angina, heart failure or arrhythmia.  She has been watching her diet.  Her pacemaker was checked yesterday remotely.

## 2021-01-14 NOTE — DISCUSSION/SUMMARY
[FreeTextEntry1] : She does not have symptoms of angina, heart failure or arrhythmia.  Blood pressure is fairly well controlled.  I would not make any additional changes to her medications at this time.  Her pacemaker is functioning well, without any events, and with adequate battery life.\par \par She will see me in 6 months.

## 2021-03-12 ENCOUNTER — RX RENEWAL (OUTPATIENT)
Age: 76
End: 2021-03-12

## 2021-03-22 ENCOUNTER — RX RENEWAL (OUTPATIENT)
Age: 76
End: 2021-03-22

## 2021-03-22 ENCOUNTER — APPOINTMENT (OUTPATIENT)
Dept: CARDIOLOGY | Facility: CLINIC | Age: 76
End: 2021-03-22

## 2021-04-01 ENCOUNTER — RX RENEWAL (OUTPATIENT)
Age: 76
End: 2021-04-01

## 2021-05-11 ENCOUNTER — APPOINTMENT (OUTPATIENT)
Dept: ULTRASOUND IMAGING | Facility: CLINIC | Age: 76
End: 2021-05-11
Payer: MEDICARE

## 2021-05-11 ENCOUNTER — APPOINTMENT (OUTPATIENT)
Dept: MAMMOGRAPHY | Facility: CLINIC | Age: 76
End: 2021-05-11
Payer: MEDICARE

## 2021-05-11 PROCEDURE — 77063 BREAST TOMOSYNTHESIS BI: CPT

## 2021-05-11 PROCEDURE — 77067 SCR MAMMO BI INCL CAD: CPT

## 2021-05-11 PROCEDURE — 76641 ULTRASOUND BREAST COMPLETE: CPT | Mod: 50

## 2021-06-01 ENCOUNTER — NON-APPOINTMENT (OUTPATIENT)
Age: 76
End: 2021-06-01

## 2021-06-14 ENCOUNTER — RX RENEWAL (OUTPATIENT)
Age: 76
End: 2021-06-14

## 2021-06-22 ENCOUNTER — APPOINTMENT (OUTPATIENT)
Dept: CARDIOLOGY | Facility: CLINIC | Age: 76
End: 2021-06-22
Payer: MEDICARE

## 2021-06-22 PROCEDURE — 93296 REM INTERROG EVL PM/IDS: CPT

## 2021-06-22 PROCEDURE — 93294 REM INTERROG EVL PM/LDLS PM: CPT

## 2021-07-14 ENCOUNTER — NON-APPOINTMENT (OUTPATIENT)
Age: 76
End: 2021-07-14

## 2021-07-14 ENCOUNTER — APPOINTMENT (OUTPATIENT)
Dept: CARDIOLOGY | Facility: CLINIC | Age: 76
End: 2021-07-14
Payer: MEDICARE

## 2021-07-14 VITALS — DIASTOLIC BLOOD PRESSURE: 70 MMHG | SYSTOLIC BLOOD PRESSURE: 140 MMHG

## 2021-07-14 VITALS
SYSTOLIC BLOOD PRESSURE: 160 MMHG | DIASTOLIC BLOOD PRESSURE: 92 MMHG | HEART RATE: 75 BPM | OXYGEN SATURATION: 98 % | WEIGHT: 204 LBS | HEIGHT: 63 IN | BODY MASS INDEX: 36.14 KG/M2

## 2021-07-14 PROCEDURE — 93000 ELECTROCARDIOGRAM COMPLETE: CPT

## 2021-07-14 PROCEDURE — 99214 OFFICE O/P EST MOD 30 MIN: CPT

## 2021-07-14 NOTE — DISCUSSION/SUMMARY
[FreeTextEntry1] : She does not have symptoms of angina, heart failure or arrhythmia.  Blood pressure is not well controlled.  I will add hydralazine 25 mg twice daily.  She will check her blood pressure a few hours after her hydralazine doses, so that we can assess what her blood pressure control is.  If all is well, I will see her in 6 months.\par

## 2021-07-14 NOTE — HISTORY OF PRESENT ILLNESS
[FreeTextEntry1] : Yolette presents to the office today for a follow up cardiovascular evaluation for hypertension. I saw her in the office 6 months ago.\par \par She is now 75 years old, with a history of aortic stenosis, an intermittent right bundle branch block pattern on her electrocardiogram, hypertension, a dyslipidemia, a thyroid nodule, secondary hypothyroidism, iron deficiency anemia, and renal cell carcinoma (status post resection).  She has had bioprosthetic aortic valve replacement. Her postop course was complicated by complete heart block, for which a pacemaker was eventually implanted.  Routine interrogation of her pacemaker revealed asymptomatic atrial fibrillation. She has been anticoagulated.\par \par She continues to feel well overall, without symptoms of angina, heart failure or arrhythmia.  She has been watching her diet.  She has been under stress as one son was diagnosed with renal cell CA and required a nephrectomy, and another was diagnosed with PCKD and just got a transplant from a friend. She has been exercising and her weight is lower by 6 pounds.  Her BP at home has been ~135-140/85-90

## 2021-10-28 ENCOUNTER — NON-APPOINTMENT (OUTPATIENT)
Age: 76
End: 2021-10-28

## 2021-11-15 ENCOUNTER — APPOINTMENT (OUTPATIENT)
Dept: CARDIOLOGY | Facility: CLINIC | Age: 76
End: 2021-11-15
Payer: MEDICARE

## 2021-11-15 PROCEDURE — 93280 PM DEVICE PROGR EVAL DUAL: CPT

## 2021-12-06 ENCOUNTER — RX RENEWAL (OUTPATIENT)
Age: 76
End: 2021-12-06

## 2021-12-20 ENCOUNTER — APPOINTMENT (OUTPATIENT)
Dept: CARDIOLOGY | Facility: CLINIC | Age: 76
End: 2021-12-20
Payer: MEDICARE

## 2021-12-20 ENCOUNTER — NON-APPOINTMENT (OUTPATIENT)
Age: 76
End: 2021-12-20

## 2021-12-20 VITALS
DIASTOLIC BLOOD PRESSURE: 81 MMHG | WEIGHT: 208 LBS | SYSTOLIC BLOOD PRESSURE: 138 MMHG | HEART RATE: 73 BPM | HEIGHT: 63 IN | OXYGEN SATURATION: 100 % | BODY MASS INDEX: 36.86 KG/M2

## 2021-12-20 PROCEDURE — 93296 REM INTERROG EVL PM/IDS: CPT | Mod: NC

## 2021-12-20 PROCEDURE — 93000 ELECTROCARDIOGRAM COMPLETE: CPT

## 2021-12-20 PROCEDURE — 99215 OFFICE O/P EST HI 40 MIN: CPT

## 2021-12-20 PROCEDURE — 93294 REM INTERROG EVL PM/LDLS PM: CPT | Mod: NC

## 2021-12-20 NOTE — DISCUSSION/SUMMARY
[FreeTextEntry1] : I do not have a chest x-ray report to look at, but I am told that it was consistent with heart failure.  Her neck veins are not distended, her lungs are clear, her O2 sat is normal.  She is ever so slightly edematous, but I am not convinced that this is all heart failure.  For now, she will take 40 mg of Lasix daily for the next 3 days.  I have requested a copy of the chest x-ray report.  I wonder whether the reading of heart failure may be on the basis of what ever process is causing groundglass opacities on her CT scan.\par \par She has an appointment to follow-up next month already.  She will call us on Thursday and let us know how she is feeling, and we can determine whether additional Lasix will be required.

## 2021-12-20 NOTE — HISTORY OF PRESENT ILLNESS
[FreeTextEntry1] : Yolette presents to the office today for a follow up cardiovascular evaluation for hypertension. I saw her in the office in July.\par \par She is now 76 years old, with a history of aortic stenosis, an intermittent right bundle branch block pattern on her electrocardiogram, hypertension, a dyslipidemia, a thyroid nodule, secondary hypothyroidism, iron deficiency anemia, and renal cell carcinoma (status post resection).  She has had bioprosthetic aortic valve replacement. Her postop course was complicated by complete heart block, for which a pacemaker was eventually implanted.  Routine interrogation of her pacemaker revealed asymptomatic atrial fibrillation. She has been anticoagulated.\par \par She went to see Dr. Hayes for a physical in November, feeling well at that time.  Dr. Hayes suggested a number of scans, including a CT of the chest.  The CT was notable for GGO, for which a pulmonary evaluation is planned.\par \par She was feeling well until 1 week ago.  She started to have coughing, shallow breathing and a post-nasal drip.  We did a remote pacemaker check which was fine. She has had intermittent edema, and notes that it has been more of an issue over the past few days. She went for an xray this morning and she was told she had heart failure, and she was told to come here. She had family over recently and notes some dietary indiscretion.  She has not had symptoms of angina or arrhythmia.  Her blood pressure has been good.

## 2022-01-04 ENCOUNTER — RX RENEWAL (OUTPATIENT)
Age: 77
End: 2022-01-04

## 2022-02-11 ENCOUNTER — TRANSCRIPTION ENCOUNTER (OUTPATIENT)
Age: 77
End: 2022-02-11

## 2022-02-18 ENCOUNTER — APPOINTMENT (OUTPATIENT)
Dept: CARDIOLOGY | Facility: CLINIC | Age: 77
End: 2022-02-18

## 2022-02-28 ENCOUNTER — RX RENEWAL (OUTPATIENT)
Age: 77
End: 2022-02-28

## 2022-03-21 ENCOUNTER — APPOINTMENT (OUTPATIENT)
Dept: CARDIOLOGY | Facility: CLINIC | Age: 77
End: 2022-03-21
Payer: MEDICARE

## 2022-03-21 PROCEDURE — 93294 REM INTERROG EVL PM/LDLS PM: CPT

## 2022-03-21 PROCEDURE — 93296 REM INTERROG EVL PM/IDS: CPT

## 2022-04-18 ENCOUNTER — INPATIENT (INPATIENT)
Facility: HOSPITAL | Age: 77
LOS: 10 days | Discharge: HOME HEALTH | DRG: 291 | End: 2022-04-29
Attending: INTERNAL MEDICINE | Admitting: INTERNAL MEDICINE
Payer: MEDICARE

## 2022-04-18 ENCOUNTER — TRANSCRIPTION ENCOUNTER (OUTPATIENT)
Age: 77
End: 2022-04-18

## 2022-04-18 VITALS
HEART RATE: 74 BPM | DIASTOLIC BLOOD PRESSURE: 68 MMHG | WEIGHT: 205.91 LBS | TEMPERATURE: 98 F | SYSTOLIC BLOOD PRESSURE: 129 MMHG | OXYGEN SATURATION: 98 % | RESPIRATION RATE: 22 BRPM | HEIGHT: 64 IN

## 2022-04-18 DIAGNOSIS — I10 ESSENTIAL (PRIMARY) HYPERTENSION: ICD-10-CM

## 2022-04-18 DIAGNOSIS — E11.9 TYPE 2 DIABETES MELLITUS WITHOUT COMPLICATIONS: ICD-10-CM

## 2022-04-18 DIAGNOSIS — I50.9 HEART FAILURE, UNSPECIFIED: ICD-10-CM

## 2022-04-18 DIAGNOSIS — Z90.5 ACQUIRED ABSENCE OF KIDNEY: Chronic | ICD-10-CM

## 2022-04-18 DIAGNOSIS — M79.89 OTHER SPECIFIED SOFT TISSUE DISORDERS: ICD-10-CM

## 2022-04-18 DIAGNOSIS — Z98.89 OTHER SPECIFIED POSTPROCEDURAL STATES: Chronic | ICD-10-CM

## 2022-04-18 DIAGNOSIS — I25.10 ATHEROSCLEROTIC HEART DISEASE OF NATIVE CORONARY ARTERY WITHOUT ANGINA PECTORIS: ICD-10-CM

## 2022-04-18 DIAGNOSIS — Z95.2 PRESENCE OF PROSTHETIC HEART VALVE: Chronic | ICD-10-CM

## 2022-04-18 DIAGNOSIS — Z41.9 ENCOUNTER FOR PROCEDURE FOR PURPOSES OTHER THAN REMEDYING HEALTH STATE, UNSPECIFIED: Chronic | ICD-10-CM

## 2022-04-18 DIAGNOSIS — R09.89 OTHER SPECIFIED SYMPTOMS AND SIGNS INVOLVING THE CIRCULATORY AND RESPIRATORY SYSTEMS: ICD-10-CM

## 2022-04-18 DIAGNOSIS — R06.02 SHORTNESS OF BREATH: ICD-10-CM

## 2022-04-18 DIAGNOSIS — Z90.721 ACQUIRED ABSENCE OF OVARIES, UNILATERAL: Chronic | ICD-10-CM

## 2022-04-18 DIAGNOSIS — I48.20 CHRONIC ATRIAL FIBRILLATION, UNSPECIFIED: ICD-10-CM

## 2022-04-18 LAB
ALBUMIN SERPL ELPH-MCNC: 4.2 G/DL — SIGNIFICANT CHANGE UP (ref 3.3–5)
ALP SERPL-CCNC: 92 U/L — SIGNIFICANT CHANGE UP (ref 40–120)
ALT FLD-CCNC: 14 U/L — SIGNIFICANT CHANGE UP (ref 10–45)
ANION GAP SERPL CALC-SCNC: 16 MMOL/L — SIGNIFICANT CHANGE UP (ref 5–17)
APTT BLD: 36.3 SEC — HIGH (ref 27.5–35.5)
AST SERPL-CCNC: 30 U/L — SIGNIFICANT CHANGE UP (ref 10–40)
BASOPHILS # BLD AUTO: 0.06 K/UL — SIGNIFICANT CHANGE UP (ref 0–0.2)
BASOPHILS NFR BLD AUTO: 0.7 % — SIGNIFICANT CHANGE UP (ref 0–2)
BILIRUB SERPL-MCNC: 0.9 MG/DL — SIGNIFICANT CHANGE UP (ref 0.2–1.2)
BUN SERPL-MCNC: 21 MG/DL — SIGNIFICANT CHANGE UP (ref 7–23)
CALCIUM SERPL-MCNC: 10.3 MG/DL — SIGNIFICANT CHANGE UP (ref 8.4–10.5)
CHLORIDE SERPL-SCNC: 102 MMOL/L — SIGNIFICANT CHANGE UP (ref 96–108)
CK MB CFR SERPL CALC: 3.2 NG/ML — SIGNIFICANT CHANGE UP (ref 0–3.8)
CO2 SERPL-SCNC: 23 MMOL/L — SIGNIFICANT CHANGE UP (ref 22–31)
CREAT SERPL-MCNC: 0.81 MG/DL — SIGNIFICANT CHANGE UP (ref 0.5–1.3)
EGFR: 76 ML/MIN/1.73M2 — SIGNIFICANT CHANGE UP
EOSINOPHIL # BLD AUTO: 0.17 K/UL — SIGNIFICANT CHANGE UP (ref 0–0.5)
EOSINOPHIL NFR BLD AUTO: 2 % — SIGNIFICANT CHANGE UP (ref 0–6)
FLUAV AG NPH QL: SIGNIFICANT CHANGE UP
FLUBV AG NPH QL: SIGNIFICANT CHANGE UP
GAS PNL BLDV: SIGNIFICANT CHANGE UP
GLUCOSE SERPL-MCNC: 133 MG/DL — HIGH (ref 70–99)
HCT VFR BLD CALC: 40.3 % — SIGNIFICANT CHANGE UP (ref 34.5–45)
HGB BLD-MCNC: 12.6 G/DL — SIGNIFICANT CHANGE UP (ref 11.5–15.5)
IMM GRANULOCYTES NFR BLD AUTO: 0.5 % — SIGNIFICANT CHANGE UP (ref 0–1.5)
INR BLD: 1.82 RATIO — HIGH (ref 0.88–1.16)
LYMPHOCYTES # BLD AUTO: 1.49 K/UL — SIGNIFICANT CHANGE UP (ref 1–3.3)
LYMPHOCYTES # BLD AUTO: 17.7 % — SIGNIFICANT CHANGE UP (ref 13–44)
MCHC RBC-ENTMCNC: 26.5 PG — LOW (ref 27–34)
MCHC RBC-ENTMCNC: 31.3 GM/DL — LOW (ref 32–36)
MCV RBC AUTO: 84.7 FL — SIGNIFICANT CHANGE UP (ref 80–100)
MONOCYTES # BLD AUTO: 0.8 K/UL — SIGNIFICANT CHANGE UP (ref 0–0.9)
MONOCYTES NFR BLD AUTO: 9.5 % — SIGNIFICANT CHANGE UP (ref 2–14)
NEUTROPHILS # BLD AUTO: 5.86 K/UL — SIGNIFICANT CHANGE UP (ref 1.8–7.4)
NEUTROPHILS NFR BLD AUTO: 69.6 % — SIGNIFICANT CHANGE UP (ref 43–77)
NRBC # BLD: 0 /100 WBCS — SIGNIFICANT CHANGE UP (ref 0–0)
NT-PROBNP SERPL-SCNC: 839 PG/ML — HIGH (ref 0–300)
PLATELET # BLD AUTO: 175 K/UL — SIGNIFICANT CHANGE UP (ref 150–400)
POTASSIUM SERPL-MCNC: 4.1 MMOL/L — SIGNIFICANT CHANGE UP (ref 3.5–5.3)
POTASSIUM SERPL-SCNC: 4.1 MMOL/L — SIGNIFICANT CHANGE UP (ref 3.5–5.3)
PROT SERPL-MCNC: 7.2 G/DL — SIGNIFICANT CHANGE UP (ref 6–8.3)
PROTHROM AB SERPL-ACNC: 21 SEC — HIGH (ref 10.5–13.4)
RBC # BLD: 4.76 M/UL — SIGNIFICANT CHANGE UP (ref 3.8–5.2)
RBC # FLD: 17 % — HIGH (ref 10.3–14.5)
RSV RNA NPH QL NAA+NON-PROBE: SIGNIFICANT CHANGE UP
SARS-COV-2 RNA SPEC QL NAA+PROBE: SIGNIFICANT CHANGE UP
SODIUM SERPL-SCNC: 141 MMOL/L — SIGNIFICANT CHANGE UP (ref 135–145)
TROPONIN T, HIGH SENSITIVITY RESULT: 14 NG/L — SIGNIFICANT CHANGE UP (ref 0–51)
WBC # BLD: 8.42 K/UL — SIGNIFICANT CHANGE UP (ref 3.8–10.5)
WBC # FLD AUTO: 8.42 K/UL — SIGNIFICANT CHANGE UP (ref 3.8–10.5)

## 2022-04-18 PROCEDURE — 93010 ELECTROCARDIOGRAM REPORT: CPT | Mod: GC

## 2022-04-18 PROCEDURE — 99285 EMERGENCY DEPT VISIT HI MDM: CPT | Mod: CS,GC

## 2022-04-18 PROCEDURE — 99223 1ST HOSP IP/OBS HIGH 75: CPT

## 2022-04-18 PROCEDURE — 71046 X-RAY EXAM CHEST 2 VIEWS: CPT | Mod: 26

## 2022-04-18 PROCEDURE — 71275 CT ANGIOGRAPHY CHEST: CPT | Mod: 26,MA

## 2022-04-18 RX ORDER — ETHACRYNIC ACID 25 MG/1
50 TABLET ORAL ONCE
Refills: 0 | Status: DISCONTINUED | OUTPATIENT
Start: 2022-04-18 | End: 2022-04-18

## 2022-04-18 RX ORDER — FUROSEMIDE 40 MG
20 TABLET ORAL
Refills: 0 | Status: DISCONTINUED | OUTPATIENT
Start: 2022-04-18 | End: 2022-04-19

## 2022-04-18 RX ORDER — SIMVASTATIN 20 MG/1
20 TABLET, FILM COATED ORAL AT BEDTIME
Refills: 0 | Status: DISCONTINUED | OUTPATIENT
Start: 2022-04-18 | End: 2022-04-29

## 2022-04-18 RX ORDER — ASPIRIN/CALCIUM CARB/MAGNESIUM 324 MG
81 TABLET ORAL DAILY
Refills: 0 | Status: DISCONTINUED | OUTPATIENT
Start: 2022-04-18 | End: 2022-04-29

## 2022-04-18 RX ORDER — AMLODIPINE BESYLATE 2.5 MG/1
10 TABLET ORAL DAILY
Refills: 0 | Status: DISCONTINUED | OUTPATIENT
Start: 2022-04-18 | End: 2022-04-23

## 2022-04-18 RX ORDER — APIXABAN 2.5 MG/1
5 TABLET, FILM COATED ORAL EVERY 12 HOURS
Refills: 0 | Status: DISCONTINUED | OUTPATIENT
Start: 2022-04-18 | End: 2022-04-29

## 2022-04-18 RX ORDER — VALSARTAN 80 MG/1
320 TABLET ORAL DAILY
Refills: 0 | Status: DISCONTINUED | OUTPATIENT
Start: 2022-04-18 | End: 2022-04-29

## 2022-04-18 RX ORDER — METFORMIN HYDROCHLORIDE 850 MG/1
0 TABLET ORAL
Qty: 0 | Refills: 0 | DISCHARGE

## 2022-04-18 RX ORDER — LEVOTHYROXINE SODIUM 125 MCG
112 TABLET ORAL DAILY
Refills: 0 | Status: DISCONTINUED | OUTPATIENT
Start: 2022-04-18 | End: 2022-04-29

## 2022-04-18 RX ORDER — METOPROLOL TARTRATE 50 MG
100 TABLET ORAL DAILY
Refills: 0 | Status: DISCONTINUED | OUTPATIENT
Start: 2022-04-18 | End: 2022-04-29

## 2022-04-18 RX ADMIN — SIMVASTATIN 20 MILLIGRAM(S): 20 TABLET, FILM COATED ORAL at 22:41

## 2022-04-18 RX ADMIN — Medication 20 MILLIGRAM(S): at 22:41

## 2022-04-18 RX ADMIN — APIXABAN 5 MILLIGRAM(S): 2.5 TABLET, FILM COATED ORAL at 22:40

## 2022-04-18 NOTE — ED ADULT NURSE NOTE - OBJECTIVE STATEMENT
pt presented to ed with c/o SOB on exertion x worsening of symptoms past 2 wks along with chills, cough. pt denies nausea, vomiting, fever, chest pain, headache, dizziness. Will continue to monitor. pt presented to ed with c/o SOB on exertion x worsening of symptoms past 2 wks along with chills, cough. pt sent to r/o PE. pt denies nausea, vomiting, fever, chest pain, headache, dizziness. Will continue to monitor.

## 2022-04-18 NOTE — H&P ADULT - HISTORY OF PRESENT ILLNESS
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    Pt reports about 3 months of worsening SOB, initially intermittent and on exertion. Now the SOB is near constant with minimal exertion, worse with lying down. Denies CP, fevers, coughing. Pt also reports worsening leg swelling and abd bloating/swelling over the past 1 month. Reports coughing about 1 month ago that has since resolved. Pt states she's seen a cardiologist and pulm as outpt with ?unknown workup but reportedly normal. Pt is not sure if she has sulfa allergy, but has taken lasix in the past without adverse effect. Pt denies any relieving factors.

## 2022-04-18 NOTE — ED PROVIDER NOTE - CLINICAL SUMMARY MEDICAL DECISION MAKING FREE TEXT BOX
WIL Shelley. PGY-3: 77 y/o F presenting with progressively worsening SOB, dyspneic on examination even though at rest, decreased lower breath sounds but upper fields clear b/l. Considering PE, effusions, lower concern for CHF/overload. Labs, CTA, likely TBA for further evaluation

## 2022-04-18 NOTE — ED PROVIDER NOTE - ATTENDING CONTRIBUTION TO CARE
Attending MD Teresa:  I personally have seen and examined this patient. I have performed a substantive portion of the visit including all aspects of the medical decision making.  Resident note reviewed and agree on plan of care and except where noted.      76F with ho CAD sp CABG, AVR, afib on eliquis presenting with weeks of progressive GONZALES and b/l LE edema. NO chest pain, no fevers or chills. No orthopnea or PND.     Attending MD Teresa:    Gen: awake and alert, visibly dyspneic at times but speaking in full sentences   Neck: supple, no meningismus   CV: heart with reg rhythm, no obvious murmur appreciated. radial pulses 2+ bilaterally, upper and lower extremities warm to the touch   Resp: clear to auscultation anteriorly bilaterally, breathing comfortably  Abd: soft, NT, ND  Extremities: ankles warm to the touch, 3+ nonpitting edema b/l calves, no posterior calf ttp  Pysch: appropriate affect    Neuro: moves all extremities spontaneously       76F with ho CAD sp CABG, AVR, afib on eliquis presenting with weeks of progressive GONZALES and b/l LE edema. Exam notable for visibly dyspneic woman but no hypoxia, clear lungs, b/l LE edema. Ddx includes acute HF, ACS, less likely PE but should consider PE given clear lungs and GONZALES (though would be a bit unusual given patient on PO Eliquis). Plan for EKG, cardiac biomarkers, d-dimer, CXR. If initial diagnostics unrevealing, will require TTE for further assess of possible valvulopathy or pulmonary HTN.    *The above represents an initial assessment/impression. Please refer to progress notes for potential changes in patient clinical course*

## 2022-04-18 NOTE — ED ADULT NURSE NOTE - NSIMPLEMENTINTERV_GEN_ALL_ED
Implemented All Universal Safety Interventions:  [place] to call system. Call bell, personal items and telephone within reach. Instruct patient to call for assistance. Room bathroom lighting operational. Non-slip footwear when patient is off stretcher. Physically safe environment: no spills, clutter or unnecessary equipment. Stretcher in lowest position, wheels locked, appropriate side rails in place.

## 2022-04-18 NOTE — H&P ADULT - NSHPSOCIALHISTORY_GEN_ALL_CORE
Social History:    Marital Status: (  ) , (  ) Single, (  ) , ( x ) , (  )   # of Children: 2  Lives with: ( x ) alone, (  ) children, (  ) spouse, (  ) parents, (  ) siblings, (  ) friends, (  ) other:   Occupation:     Substance Use/Illicit Drugs: (  ) never used vs other:   Tobacco Usage: (  ) never smoked, ( x ) former smoker, (  ) current smoker and Total Pack-Years: 4 pk yrs  Last Alcohol Usage/Frequency/Amount/Withdrawal/Hx of Abuse:  passover  Foreign travel:   Animal exposure:

## 2022-04-18 NOTE — H&P ADULT - NSHPPHYSICALEXAM_GEN_ALL_CORE
PHYSICAL EXAM:   GENERAL: Alert. Not confused. No acute distress. Not thin. Not cachectic. Not obese.  HEAD:  Atraumatic. Normocephalic.  EYES: EOMI. PERRLA. Normal conjunctiva/sclera.  ENT: Neck supple. No JVD. Moist oral mucosa. Not edentulous. No thrush.  LYMPH: Normal supraclavicular/cervical lymph nodes.   CARDIAC: Not tachy, Not jayde. Regular rhythm. Not irregularly irregular. S1. S2. No murmur. No rub. No distant heart sounds.  LUNG/CHEST: CTAB. BS equal bilaterally. No wheezes. No rales. No rhonchi.  ABDOMEN: Soft. No tenderness. +distension. No fluid wave. Normal bowel sounds.  BACK: No midline/vertebral tenderness. No flank tenderness.  VASCULAR: +2 b/l radial or ulnar pulses. Palpable DP pulses.  EXTREMITIES:  No clubbing. No cyanosis. +2-3 b/l LE pitting edema. Moving all 4.  NEUROLOGY: A&Ox3. Non-focal exam. Cranial nerves intact. Normal speech. Sensation intact.  PSYCH: Normal behavior. Normal affect.  SKIN: No jaundice. No erythema. No rash/lesion.  Vascular Access:     ICU Vital Signs Last 24 Hrs  T(C): 36.8 (18 Apr 2022 22:14), Max: 37.1 (18 Apr 2022 16:31)  T(F): 98.2 (18 Apr 2022 22:14), Max: 98.7 (18 Apr 2022 16:31)  HR: 74 (18 Apr 2022 22:14) (60 - 74)  BP: 134/69 (18 Apr 2022 22:14) (110/66 - 144/63)  BP(mean): --  ABP: --  ABP(mean): --  RR: 18 (18 Apr 2022 22:14) (18 - 22)  SpO2: 97% (18 Apr 2022 22:14) (97% - 99%)      I&O's Summary

## 2022-04-18 NOTE — H&P ADULT - PROBLEM SELECTOR PLAN 1
- unclear etiol  - poss CHF vs OHS  - no hypoxia or hypercapneia at this time  - CTA neg for pe or lung disease

## 2022-04-18 NOTE — H&P ADULT - PROBLEM SELECTOR PLAN 2
- tte  - tele  - trend CE  - lasix 20 IV BID for now  - i&o, weights  - check abd ultrasound to eval for ascites

## 2022-04-18 NOTE — H&P ADULT - NSICDXPASTMEDICALHX_GEN_ALL_CORE_FT
PAST MEDICAL HISTORY:  Anemia     Aortic stenosis     Cancer of kidney, left     Dyslipidemia     Hiatal hernia     Hypertension x10 years    Hypothyroidism     Right bundle branch block (RBBB)     Type II diabetes mellitus

## 2022-04-18 NOTE — H&P ADULT - ASSESSMENT
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

## 2022-04-18 NOTE — ED PROVIDER NOTE - OBJECTIVE STATEMENT
77 y/o F with PMH of HTN, HLD, AS s/p AVR, hypothyroidism presenting with appx 1 month of progressively worsening SOB. Has also noted LE swelling, has at baseline but normally improves overnight, now more persistent. Patient had slight cough and sore throat few weeks ago which self resolved after few days, did not get covid tested at that time. Does not have a cough at this time. Patient states does not have associated CP, fevers, chills.  Has been seen by cards and pulm and has been told results wnl limit up.

## 2022-04-18 NOTE — H&P ADULT - NSICDXPASTSURGICALHX_GEN_ALL_CORE_FT
PAST SURGICAL HISTORY:  Elective surgery PPM  2016    H/O aortic valve replacement     H/O hand surgery     H/O partial nephrectomy right 20125    S/P T&A (status post tonsillectomy and adenoidectomy)     Status post unilateral salpingo-oophorectomy

## 2022-04-18 NOTE — PATIENT PROFILE ADULT - FALL HARM RISK - UNIVERSAL INTERVENTIONS
Bed in lowest position, wheels locked, appropriate side rails in place/Call bell, personal items and telephone in reach/Instruct patient to call for assistance before getting out of bed or chair/Non-slip footwear when patient is out of bed/[place] to call system/Physically safe environment - no spills, clutter or unnecessary equipment/Purposeful Proactive Rounding/Room/bathroom lighting operational, light cord in reach

## 2022-04-18 NOTE — H&P ADULT - NSHPLABSRESULTS_GEN_ALL_CORE
Personally reviewed old records.  Personally reviewed labs.  Personally reviewed imaging.  Personally reviewed EKG. vpaced rate 73.                           12.6   8.42  )-----------( 175      ( 18 Apr 2022 16:42 )             40.3       04-18    141  |  102  |  21  ----------------------------<  133<H>  4.1   |  23  |  0.81    Ca    10.3      18 Apr 2022 16:42    TPro  7.2  /  Alb  4.2  /  TBili  0.9  /  DBili  x   /  AST  30  /  ALT  14  /  AlkPhos  92  04-18      CARDIAC MARKERS ( 18 Apr 2022 16:42 )  x     / x     / x     / x     / 3.2 ng/mL        LIVER FUNCTIONS - ( 18 Apr 2022 16:42 )  Alb: 4.2 g/dL / Pro: 7.2 g/dL / ALK PHOS: 92 U/L / ALT: 14 U/L / AST: 30 U/L / GGT: x             PT/INR - ( 18 Apr 2022 16:42 )   PT: 21.0 sec;   INR: 1.82 ratio         PTT - ( 18 Apr 2022 16:42 )  PTT:36.3 sec

## 2022-04-18 NOTE — ED PROVIDER NOTE - PHYSICAL EXAMINATION
gen: well appearing  Mentation: AAO x 3  psych: mood appropriate  ENT: airway patent  Eyes: conjunctivae clear bilaterally  Cardio: RRR, no m/r/g  Resp: decreased BS in b/l lower lung fields  GI: s/nt/nd  Neuro: sensation and motor function intact, CN 2-12 intact  MSK: normal movement of all extremities  Lymph/Vasc: 2+ LE edema b/l

## 2022-04-18 NOTE — H&P ADULT - NSICDXFAMILYHX_GEN_ALL_CORE_FT
FAMILY HISTORY:  Child  Still living? Yes, Estimated age: Age Unknown  FH: renal cell carcinoma, Age at diagnosis: Age Unknown

## 2022-04-18 NOTE — H&P ADULT - NSHPREVIEWOFSYSTEMS_GEN_ALL_CORE
REVIEW OF SYSTEMS:  CONSTITUTIONAL: +weakness. No fevers. +chills. No rigors. No weight loss. No night sweats. +poor appetite.  EYES: No blurry or double vision. No eye pain.  ENT: No hearing difficulty. No vertigo. No dysphagia. No sore throat. No Sinusitis/rhinorrhea.   NECK: No pain. No stiffness/rigidity.  CARDIAC: No chest pain. No palpitations. No lightheadedness. No syncope.  RESPIRATORY: No cough. +SOB. No hemoptysis.  GASTROINTESTINAL: +abdominal discomfort. No nausea. No vomiting. No hematemesis. No diarrhea. No constipation.   GENITOURINARY: No dysuria. No frequency. No hesitancy.   NEUROLOGICAL: No numbness/tingling. No focal weakness. No urinary or fecal incontinence. No headache. No unsteady gait.  BACK: No back pain. No flank pain.  EXTREMITIES: +lower extremity edema. Full ROM. No joint pain.  SKIN: No rashes. No itching. No other lesions.  PSYCHIATRIC: No depression. +anxiety.   ALLERGIC: No lip swelling. No hives.  All other review of systems is negative unless indicated above.  Unless indicated above, unable to assess ROS 2/2

## 2022-04-19 LAB
A1C WITH ESTIMATED AVERAGE GLUCOSE RESULT: 6.8 % — HIGH (ref 4–5.6)
ALBUMIN SERPL ELPH-MCNC: 3.7 G/DL — SIGNIFICANT CHANGE UP (ref 3.3–5)
ALP SERPL-CCNC: 78 U/L — SIGNIFICANT CHANGE UP (ref 40–120)
ALT FLD-CCNC: 14 U/L — SIGNIFICANT CHANGE UP (ref 10–45)
ANION GAP SERPL CALC-SCNC: 18 MMOL/L — HIGH (ref 5–17)
APPEARANCE UR: CLEAR — SIGNIFICANT CHANGE UP
AST SERPL-CCNC: 24 U/L — SIGNIFICANT CHANGE UP (ref 10–40)
BASOPHILS # BLD AUTO: 0.03 K/UL — SIGNIFICANT CHANGE UP (ref 0–0.2)
BASOPHILS NFR BLD AUTO: 0.4 % — SIGNIFICANT CHANGE UP (ref 0–2)
BILIRUB SERPL-MCNC: 1 MG/DL — SIGNIFICANT CHANGE UP (ref 0.2–1.2)
BILIRUB UR-MCNC: NEGATIVE — SIGNIFICANT CHANGE UP
BUN SERPL-MCNC: 20 MG/DL — SIGNIFICANT CHANGE UP (ref 7–23)
CALCIUM SERPL-MCNC: 10.1 MG/DL — SIGNIFICANT CHANGE UP (ref 8.4–10.5)
CHLORIDE SERPL-SCNC: 104 MMOL/L — SIGNIFICANT CHANGE UP (ref 96–108)
CO2 SERPL-SCNC: 20 MMOL/L — LOW (ref 22–31)
COLOR SPEC: SIGNIFICANT CHANGE UP
CREAT SERPL-MCNC: 0.86 MG/DL — SIGNIFICANT CHANGE UP (ref 0.5–1.3)
DIFF PNL FLD: NEGATIVE — SIGNIFICANT CHANGE UP
EGFR: 70 ML/MIN/1.73M2 — SIGNIFICANT CHANGE UP
EOSINOPHIL # BLD AUTO: 0.17 K/UL — SIGNIFICANT CHANGE UP (ref 0–0.5)
EOSINOPHIL NFR BLD AUTO: 2.4 % — SIGNIFICANT CHANGE UP (ref 0–6)
ESTIMATED AVERAGE GLUCOSE: 148 MG/DL — HIGH (ref 68–114)
GLUCOSE SERPL-MCNC: 111 MG/DL — HIGH (ref 70–99)
GLUCOSE UR QL: NEGATIVE — SIGNIFICANT CHANGE UP
HCT VFR BLD CALC: 36.2 % — SIGNIFICANT CHANGE UP (ref 34.5–45)
HGB BLD-MCNC: 11.6 G/DL — SIGNIFICANT CHANGE UP (ref 11.5–15.5)
IMM GRANULOCYTES NFR BLD AUTO: 0.3 % — SIGNIFICANT CHANGE UP (ref 0–1.5)
KETONES UR-MCNC: NEGATIVE — SIGNIFICANT CHANGE UP
LEUKOCYTE ESTERASE UR-ACNC: ABNORMAL
LYMPHOCYTES # BLD AUTO: 1.41 K/UL — SIGNIFICANT CHANGE UP (ref 1–3.3)
LYMPHOCYTES # BLD AUTO: 19.9 % — SIGNIFICANT CHANGE UP (ref 13–44)
MAGNESIUM SERPL-MCNC: 1.6 MG/DL — SIGNIFICANT CHANGE UP (ref 1.6–2.6)
MCHC RBC-ENTMCNC: 26.9 PG — LOW (ref 27–34)
MCHC RBC-ENTMCNC: 32 GM/DL — SIGNIFICANT CHANGE UP (ref 32–36)
MCV RBC AUTO: 83.8 FL — SIGNIFICANT CHANGE UP (ref 80–100)
MONOCYTES # BLD AUTO: 0.85 K/UL — SIGNIFICANT CHANGE UP (ref 0–0.9)
MONOCYTES NFR BLD AUTO: 12 % — SIGNIFICANT CHANGE UP (ref 2–14)
NEUTROPHILS # BLD AUTO: 4.6 K/UL — SIGNIFICANT CHANGE UP (ref 1.8–7.4)
NEUTROPHILS NFR BLD AUTO: 65 % — SIGNIFICANT CHANGE UP (ref 43–77)
NITRITE UR-MCNC: NEGATIVE — SIGNIFICANT CHANGE UP
NRBC # BLD: 0 /100 WBCS — SIGNIFICANT CHANGE UP (ref 0–0)
PH UR: 5 — SIGNIFICANT CHANGE UP (ref 5–8)
PHOSPHATE SERPL-MCNC: 3.6 MG/DL — SIGNIFICANT CHANGE UP (ref 2.5–4.5)
PLATELET # BLD AUTO: 145 K/UL — LOW (ref 150–400)
POTASSIUM SERPL-MCNC: 4 MMOL/L — SIGNIFICANT CHANGE UP (ref 3.5–5.3)
POTASSIUM SERPL-SCNC: 4 MMOL/L — SIGNIFICANT CHANGE UP (ref 3.5–5.3)
PROT SERPL-MCNC: 6.6 G/DL — SIGNIFICANT CHANGE UP (ref 6–8.3)
PROT UR-MCNC: NEGATIVE — SIGNIFICANT CHANGE UP
RBC # BLD: 4.32 M/UL — SIGNIFICANT CHANGE UP (ref 3.8–5.2)
RBC # FLD: 16.7 % — HIGH (ref 10.3–14.5)
SODIUM SERPL-SCNC: 142 MMOL/L — SIGNIFICANT CHANGE UP (ref 135–145)
SP GR SPEC: 1.01 — SIGNIFICANT CHANGE UP (ref 1.01–1.02)
TROPONIN T, HIGH SENSITIVITY RESULT: 16 NG/L — SIGNIFICANT CHANGE UP (ref 0–51)
TSH SERPL-MCNC: 4.11 UIU/ML — SIGNIFICANT CHANGE UP (ref 0.27–4.2)
UROBILINOGEN FLD QL: NEGATIVE — SIGNIFICANT CHANGE UP
WBC # BLD: 7.08 K/UL — SIGNIFICANT CHANGE UP (ref 3.8–10.5)
WBC # FLD AUTO: 7.08 K/UL — SIGNIFICANT CHANGE UP (ref 3.8–10.5)

## 2022-04-19 PROCEDURE — 76705 ECHO EXAM OF ABDOMEN: CPT | Mod: 26

## 2022-04-19 PROCEDURE — 93306 TTE W/DOPPLER COMPLETE: CPT | Mod: 26

## 2022-04-19 PROCEDURE — 99223 1ST HOSP IP/OBS HIGH 75: CPT

## 2022-04-19 RX ORDER — MAGNESIUM SULFATE 500 MG/ML
1 VIAL (ML) INJECTION ONCE
Refills: 0 | Status: COMPLETED | OUTPATIENT
Start: 2022-04-19 | End: 2022-04-19

## 2022-04-19 RX ORDER — FUROSEMIDE 40 MG
40 TABLET ORAL DAILY
Refills: 0 | Status: DISCONTINUED | OUTPATIENT
Start: 2022-04-20 | End: 2022-04-20

## 2022-04-19 RX ORDER — FUROSEMIDE 40 MG
20 TABLET ORAL ONCE
Refills: 0 | Status: COMPLETED | OUTPATIENT
Start: 2022-04-19 | End: 2022-04-19

## 2022-04-19 RX ORDER — FUROSEMIDE 40 MG
40 TABLET ORAL DAILY
Refills: 0 | Status: DISCONTINUED | OUTPATIENT
Start: 2022-04-19 | End: 2022-04-19

## 2022-04-19 RX ORDER — FUROSEMIDE 40 MG
20 TABLET ORAL ONCE
Refills: 0 | Status: DISCONTINUED | OUTPATIENT
Start: 2022-04-19 | End: 2022-04-19

## 2022-04-19 RX ADMIN — VALSARTAN 320 MILLIGRAM(S): 80 TABLET ORAL at 06:23

## 2022-04-19 RX ADMIN — AMLODIPINE BESYLATE 10 MILLIGRAM(S): 2.5 TABLET ORAL at 06:21

## 2022-04-19 RX ADMIN — APIXABAN 5 MILLIGRAM(S): 2.5 TABLET, FILM COATED ORAL at 06:20

## 2022-04-19 RX ADMIN — APIXABAN 5 MILLIGRAM(S): 2.5 TABLET, FILM COATED ORAL at 17:55

## 2022-04-19 RX ADMIN — Medication 112 MICROGRAM(S): at 06:21

## 2022-04-19 RX ADMIN — Medication 100 GRAM(S): at 11:36

## 2022-04-19 RX ADMIN — Medication 20 MILLIGRAM(S): at 17:57

## 2022-04-19 RX ADMIN — Medication 100 MILLIGRAM(S): at 06:21

## 2022-04-19 RX ADMIN — Medication 81 MILLIGRAM(S): at 11:36

## 2022-04-19 RX ADMIN — Medication 20 MILLIGRAM(S): at 06:21

## 2022-04-19 RX ADMIN — SIMVASTATIN 20 MILLIGRAM(S): 20 TABLET, FILM COATED ORAL at 21:23

## 2022-04-19 NOTE — PROGRESS NOTE ADULT - ASSESSMENT
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   3 months of worsening SOB, initially intermittent and on exertion. Now the SOB is near constant with minimal exertion    acute  diastolic  chf  ct  angio , no PE  on iv lasix   e cho   card  to f/p   Afib, on eliquis, has  PPM   HTN/ HLD , on  asa. norvasc, toprol, valsartian. zocor     rad< from: CT Angio Chest PE Protocol w/ IV Cont (04.18.22 @ 17:40) >  FINDINGS:  LUNGS AND AIRWAYS: Patent central airways.  Lungs are clear.  PLEURA: Trace right pleural effusion/pleural thickening.  MEDIASTINUM AND YASMANI: No lymphadenopathy.  HEAR: Heart is enlarged in size. Patient is status post aortic valve   replacement. Calcification of the coronary arteries is noted. Pacemaker   is noted in place. No pericardial effusion is noted. Pulmonary arteries  are normal in caliber. No filling defects are noted.  CHEST WALL AND LOWER NECK: Within normal limits.  VISUALIZED UPPER ABDOMEN: Ascites.  BONES: Degenerative changes of the spine are noted.    IMPRESSION:  No pulmonary embolus is noted.  --- End of Report ---  < end of copied text >

## 2022-04-19 NOTE — PROGRESS NOTE ADULT - SUBJECTIVE AND OBJECTIVE BOX
afberile    REVIEW OF SYSTEMS:  GEN: no fever,    no chills  RESP: no SOB,   no cough  CVS: no chest pain,   no palpitations  GI: no abdominal pain,   no nausea,   no vomiting,   no constipation,   no diarrhea  : no dysuria,   no frequency  NEURO: no headache,   no dizziness  PSYCH: no depression,   not anxious  Derm : no rash    MEDICATIONS  (STANDING):  amLODIPine   Tablet 10 milliGRAM(s) Oral daily  apixaban 5 milliGRAM(s) Oral every 12 hours  aspirin enteric coated 81 milliGRAM(s) Oral daily  furosemide   Injectable 20 milliGRAM(s) IV Push two times a day  levothyroxine 112 MICROGram(s) Oral daily  metoprolol succinate  milliGRAM(s) Oral daily  simvastatin 20 milliGRAM(s) Oral at bedtime  valsartan 320 milliGRAM(s) Oral daily    MEDICATIONS  (PRN):      Vital Signs Last 24 Hrs  T(C): 36.4 (2022 04:41), Max: 37.1 (2022 16:31)  T(F): 97.6 (2022 04:41), Max: 98.7 (2022 16:31)  HR: 61 (2022 04:41) (57 - 74)  BP: 116/68 (2022 04:41) (110/66 - 144/63)  BP(mean): --  RR: 18 (2022 04:41) (18 - 22)  SpO2: 96% (2022 04:41) (96% - 99%)  CAPILLARY BLOOD GLUCOSE        I&O's Summary      PHYSICAL EXAM:  HEAD:  Atraumatic, Normocephalic  NECK: Supple, No   JVD  CHEST/LUNG:   no     rales,     no,    rhonchi  HEART: Regular rate and rhythm;         murmur  ABDOMEN: Soft, Nontender, ;   EXTREMITIES:        edema  NEUROLOGY:  alert    LABS:                        11.6   7.08  )-----------( 145      ( 2022 06:15 )             36.2     04-18    141  |  102  |  21  ----------------------------<  133<H>  4.1   |  23  |  0.81    Ca    10.3      2022 16:42    TPro  7.2  /  Alb  4.2  /  TBili  0.9  /  DBili  x   /  AST  30  /  ALT  14  /  AlkPhos  92  04-18    PT/INR - ( 2022 16:42 )   PT: 21.0 sec;   INR: 1.82 ratio         PTT - ( 2022 16:42 )  PTT:36.3 sec  CARDIAC MARKERS ( 2022 16:42 )  x     / x     / x     / x     / 3.2 ng/mL      Urinalysis Basic - ( 2022 02:37 )    Color: Light Yellow / Appearance: Clear / S.012 / pH: x  Gluc: x / Ketone: Negative  / Bili: Negative / Urobili: Negative   Blood: x / Protein: Negative / Nitrite: Negative   Leuk Esterase: Small / RBC: 0 /hpf / WBC 4 /HPF   Sq Epi: x / Non Sq Epi: 2 /hpf / Bacteria: Moderate           @ 16:42  4.0  36              Consultant(s) Notes Reviewed:      Care Discussed with Consultants/Other Providers:

## 2022-04-20 LAB
ANION GAP SERPL CALC-SCNC: 15 MMOL/L — SIGNIFICANT CHANGE UP (ref 5–17)
BUN SERPL-MCNC: 23 MG/DL — SIGNIFICANT CHANGE UP (ref 7–23)
CALCIUM SERPL-MCNC: 9.9 MG/DL — SIGNIFICANT CHANGE UP (ref 8.4–10.5)
CHLORIDE SERPL-SCNC: 102 MMOL/L — SIGNIFICANT CHANGE UP (ref 96–108)
CO2 SERPL-SCNC: 25 MMOL/L — SIGNIFICANT CHANGE UP (ref 22–31)
CREAT SERPL-MCNC: 0.89 MG/DL — SIGNIFICANT CHANGE UP (ref 0.5–1.3)
EGFR: 68 ML/MIN/1.73M2 — SIGNIFICANT CHANGE UP
GLUCOSE BLDC GLUCOMTR-MCNC: 125 MG/DL — HIGH (ref 70–99)
GLUCOSE BLDC GLUCOMTR-MCNC: 147 MG/DL — HIGH (ref 70–99)
GLUCOSE SERPL-MCNC: 130 MG/DL — HIGH (ref 70–99)
MAGNESIUM SERPL-MCNC: 1.6 MG/DL — SIGNIFICANT CHANGE UP (ref 1.6–2.6)
POTASSIUM SERPL-MCNC: 3.3 MMOL/L — LOW (ref 3.5–5.3)
POTASSIUM SERPL-SCNC: 3.3 MMOL/L — LOW (ref 3.5–5.3)
SODIUM SERPL-SCNC: 142 MMOL/L — SIGNIFICANT CHANGE UP (ref 135–145)

## 2022-04-20 PROCEDURE — 99233 SBSQ HOSP IP/OBS HIGH 50: CPT

## 2022-04-20 PROCEDURE — 93970 EXTREMITY STUDY: CPT | Mod: 26

## 2022-04-20 RX ORDER — DEXTROSE 50 % IN WATER 50 %
15 SYRINGE (ML) INTRAVENOUS ONCE
Refills: 0 | Status: DISCONTINUED | OUTPATIENT
Start: 2022-04-20 | End: 2022-04-29

## 2022-04-20 RX ORDER — INSULIN LISPRO 100/ML
VIAL (ML) SUBCUTANEOUS
Refills: 0 | Status: DISCONTINUED | OUTPATIENT
Start: 2022-04-20 | End: 2022-04-29

## 2022-04-20 RX ORDER — GLUCAGON INJECTION, SOLUTION 0.5 MG/.1ML
1 INJECTION, SOLUTION SUBCUTANEOUS ONCE
Refills: 0 | Status: DISCONTINUED | OUTPATIENT
Start: 2022-04-20 | End: 2022-04-29

## 2022-04-20 RX ORDER — DEXTROSE 50 % IN WATER 50 %
12.5 SYRINGE (ML) INTRAVENOUS ONCE
Refills: 0 | Status: DISCONTINUED | OUTPATIENT
Start: 2022-04-20 | End: 2022-04-29

## 2022-04-20 RX ORDER — DEXTROSE 50 % IN WATER 50 %
25 SYRINGE (ML) INTRAVENOUS ONCE
Refills: 0 | Status: DISCONTINUED | OUTPATIENT
Start: 2022-04-20 | End: 2022-04-29

## 2022-04-20 RX ORDER — MAGNESIUM SULFATE 500 MG/ML
1 VIAL (ML) INJECTION ONCE
Refills: 0 | Status: COMPLETED | OUTPATIENT
Start: 2022-04-20 | End: 2022-04-20

## 2022-04-20 RX ORDER — POTASSIUM CHLORIDE 20 MEQ
40 PACKET (EA) ORAL ONCE
Refills: 0 | Status: COMPLETED | OUTPATIENT
Start: 2022-04-20 | End: 2022-04-20

## 2022-04-20 RX ORDER — FUROSEMIDE 40 MG
40 TABLET ORAL EVERY 12 HOURS
Refills: 0 | Status: DISCONTINUED | OUTPATIENT
Start: 2022-04-21 | End: 2022-04-22

## 2022-04-20 RX ORDER — SODIUM CHLORIDE 9 MG/ML
1000 INJECTION, SOLUTION INTRAVENOUS
Refills: 0 | Status: DISCONTINUED | OUTPATIENT
Start: 2022-04-20 | End: 2022-04-29

## 2022-04-20 RX ORDER — INSULIN LISPRO 100/ML
VIAL (ML) SUBCUTANEOUS AT BEDTIME
Refills: 0 | Status: DISCONTINUED | OUTPATIENT
Start: 2022-04-20 | End: 2022-04-29

## 2022-04-20 RX ADMIN — Medication 100 GRAM(S): at 09:22

## 2022-04-20 RX ADMIN — SIMVASTATIN 20 MILLIGRAM(S): 20 TABLET, FILM COATED ORAL at 21:51

## 2022-04-20 RX ADMIN — AMLODIPINE BESYLATE 10 MILLIGRAM(S): 2.5 TABLET ORAL at 06:07

## 2022-04-20 RX ADMIN — APIXABAN 5 MILLIGRAM(S): 2.5 TABLET, FILM COATED ORAL at 17:36

## 2022-04-20 RX ADMIN — Medication 81 MILLIGRAM(S): at 13:08

## 2022-04-20 RX ADMIN — APIXABAN 5 MILLIGRAM(S): 2.5 TABLET, FILM COATED ORAL at 07:39

## 2022-04-20 RX ADMIN — Medication 100 MILLIGRAM(S): at 06:07

## 2022-04-20 RX ADMIN — Medication 112 MICROGRAM(S): at 06:07

## 2022-04-20 RX ADMIN — Medication 40 MILLIEQUIVALENT(S): at 09:22

## 2022-04-20 RX ADMIN — VALSARTAN 320 MILLIGRAM(S): 80 TABLET ORAL at 08:01

## 2022-04-20 RX ADMIN — Medication 40 MILLIGRAM(S): at 06:07

## 2022-04-20 NOTE — PROGRESS NOTE ADULT - ASSESSMENT
Yolette is a 76 year old female with HTN, AS s/p bio AVR with post-op HB requiring PPM, non obstructive cad on cath in 2016, AF on eliquis, clear cell RCC s/p partial nephrectomy, hypothyroidism, p/w sob, leg swelling, abd swelling.    - volume overloaded on exam,  - CT chest with only trace pleural effusions  - TTE 4/19 with normal LV fxn, rve and dec function, severe open TR from malcoaptation, mod MS noted    - It appears that she is in ADHF. likely has high filling pressure and PASP (likely underestimated on TTE)  - increase lasix to 40mg IV q12.   - Please continue to maintain strict I/Os, monitor daily weights, Cr, and K.   - cough is likely from vol ol. if does not improve with diuresis I would stop the valsartan  - there is a component of RHF likely from malcopated TV. Will likely need EP to assess pt in regards to the RV lead  - her last ppm interrogation was a remote check in 3/22. Her symptoms seem to have all began in 12/21, which is about the time she went into AF, and has remained in since/paced >98% of time.  - though at this time premature trying to restore SR without addressing volume issues    - cath on record in 2016 with non obstructive CAD  - daily weights and I/o's  - cont asa and statin    - history of af and has been on eliquis  - cont metoprolol and ac  - bp acceptable and would cont amlodipine and valsartan    - Further cardiac workup will depend on clinical course.   - will follow with you

## 2022-04-20 NOTE — PROGRESS NOTE ADULT - ASSESSMENT
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   3 months of worsening SOB, initially intermittent and on exertion    acute  diastolic  chf  ct  angio , no PE  on iv lasix   card   dr shoshana Funes, on eliquis, has  PPM   HTN/ HLD , on  asa. norvasc, toprol, valsartian. zocor  hypokalemia   Echo,  normal ef/ mod  MS. severe  TR  Abd  u/s,  mod  ascites, ?  IR  drainage     rad< from: CT Angio Chest PE Protocol w/ IV Cont (04.18.22 @ 17:40) >  FINDINGS:  LUNGS AND AIRWAYS: Patent central airways.  Lungs are clear.  PLEURA: Trace right pleural effusion/pleural thickening.  MEDIASTINUM AND YASMANI: No lymphadenopathy.  HEAR: Heart is enlarged in size. Patient is status post aortic valve   replacement. Calcification of the coronary arteries is noted. Pacemaker   is noted in place. No pericardial effusion is noted. Pulmonary arteries  are normal in caliber. No filling defects are noted.  CHEST WALL AND LOWER NECK: Within normal limits.  VISUALIZED UPPER ABDOMEN: Ascites.  BONES: Degenerative changes of the spine are noted.    IMPRESSION:  No pulmonary embolus is noted.  --- End of Report ---  < end of copied text >

## 2022-04-20 NOTE — PROGRESS NOTE ADULT - SUBJECTIVE AND OBJECTIVE BOX
[facility] -- John Ng, Robson Hopkins Pannella, Patel, Savella  Office # [phone]      Follow Up:  HF    Subjective/Observations: Patient seen and examined. Events noted. Resting comfortably in bed. No complaints of chest pain,  or palpitations reported. No signs of orthopnea or PND. Dyspnea midlly improved. Cough still present      REVIEW OF SYSTEMS: All other review of systems is negative unless indicated above    PAST MEDICAL & SURGICAL HISTORY:  Hypertension  x10 years    Dyslipidemia    Hiatal hernia    Type II diabetes mellitus    Aortic stenosis    Cancer of kidney, left    Right bundle branch block (RBBB)    Anemia    Hypothyroidism    Status post unilateral salpingo-oophorectomy    S/P T&amp;A (status post tonsillectomy and adenoidectomy)    H/O hand surgery    H/O partial nephrectomy  right 20125    H/O aortic valve replacement    Elective surgery  [facility]  2016        MEDICATIONS  (STANDING):  amLODIPine   Tablet 10 milliGRAM(s) Oral daily  apixaban 5 milliGRAM(s) Oral every 12 hours  aspirin enteric coated 81 milliGRAM(s) Oral daily  furosemide   Injectable 40 milliGRAM(s) IV Push daily  levothyroxine 112 MICROGram(s) Oral daily  metoprolol succinate  milliGRAM(s) Oral daily  potassium chloride    Tablet ER 40 milliEquivalent(s) Oral once  simvastatin 20 milliGRAM(s) Oral at bedtime  valsartan 320 milliGRAM(s) Oral daily    MEDICATIONS  (PRN):      Allergies    sulfa drugs (Short breath)    Intolerances            Vital Signs Last 24 Hrs  T(C): 36.8 (20 Apr 2022 04:03), Max: 36.8 (19 Apr 2022 20:32)  T(F): 98.3 (20 Apr 2022 04:03), Max: 98.3 (20 Apr 2022 04:03)  HR: 63 (20 Apr 2022 08:00) (53 - 63)  BP: 121/71 (20 Apr 2022 08:00) (100/59 - 121/71)  BP(mean): --  RR: 19 (20 Apr 2022 08:00) (18 - 19)  SpO2: 96% (20 Apr 2022 08:00) (94% - 96%)    I&O's Summary    19 Apr 2022 07:01  -  20 Apr 2022 07:00  --------------------------------------------------------  IN: 540 mL / OUT: 0 mL / NET: 540 mL          PHYSICAL EXAM:  TELE:   Constitutional: NAD, awake   HEENT: Moist Mucous Membranes, Anicteric  Pulmonary: Decreased breath sounds b/l. No rales, crackles or wheeze appreciated.   Cardiovascular: Regular, S1 and S2, No murmurs, rubs, gallops or clicks  Gastrointestinal: Bowel Sounds present, soft, nontender. mildly distended  Lymph: + peripheral edema. No lymphadenopathy.  Skin: No visible rashes or ulcers.  Psych:  Mood & affect appropriate for situation    LABS: All Labs Reviewed:                        11.6 7.08  )-----------( 145      ( 19 Apr 2022 06:15 )             36.2                         12.6   8.42  )-----------( 175      ( 18 Apr 2022 16:42 )             40.3     20 Apr 2022 06:43    142    |  102    |  23     ----------------------------<  130    3.3     |  25     |  0.89   19 Apr 2022 06:18    142    |  104    |  20     ----------------------------<  111    4.0     |  20     |  0.86   18 Apr 2022 16:42    141    |  102    |  21     ----------------------------<  133    4.1     |  23     |  0.81     Ca    9.9        20 Apr 2022 06:43  Ca    10.1       19 Apr 2022 06:18  Ca    10.3       18 Apr 2022 16:42  Phos  3.6       19 Apr 2022 06:18  Mg     1.6       20 Apr 2022 06:43  Mg     1.6       19 Apr 2022 06:18    TPro  6.6    /  Alb  3.7    /  TBili  1.0    /  DBili  x      /  AST  24     /  ALT  14     /  AlkPhos  78     19 Apr 2022 06:18  TPro  7.2    /  Alb  4.2    /  TBili  0.9    /  DBili  x      /  AST  30     /  ALT  14     /  AlkPhos  92     18 Apr 2022 16:42    PT/INR - ( 18 Apr 2022 16:42 )   PT: 21.0 sec;   INR: 1.82 ratio         PTT - ( 18 Apr 2022 16:42 )  PTT:36.3 sec  CARDIAC MARKERS ( 18 Apr 2022 16:42 )  x     / x     / x     / x     / 3.2 ng/mL       < from: Transthoracic Echocardiogram (04.19.22 @ 11:58) >    Patient name: NUNU HOLLIDAY  YOB: 1945   Age: 76 (F)   MR#: [id number]  Study Date: 4/19/2022  [facility]  Study quality: Technically fair  Referring Physician: Tr Urban MD  Blood Pressure: 116/68 mmHg  Height: 163 cm  Weight: 93 kg  BSA: 2 m2  ------------------------------------------------------------------------  PROCEDURE: Transthoracic echocardiogram with 2-D, M-Mode  and complete spectral and color flow Doppler.  INDICATION: Dyspnea, unspecified (R06.00)  ------------------------------------------------------------------------  Dimensions:    Normal Values:  LA:     6.5    2.0 - 4.0 cm  Ao:     2.9    2.0 - 3.8 cm  SEPTUM: 1.2    0.6 - 1.2 cm  PWT:    1.0    0.6 - 1.1 cm  LVIDd:  4.0    3.0 - 5.6 cm  LVIDs:  2.2    1.8 - 4.0 cm  Derived variables:  LVMI: 74 g/m2  RWT: 0.50  Fractional short: 45 %  EF (Shin Rule): 66 %Doppler Peak Velocity (m/sec):  AoV=3.5  ------------------------------------------------------------------------  Observations:  Mitral Valve: Mitral annular calcification and calcified  mitral leaflets with decreased diastolic opening. Mild  mitral regurgitation.  Peak mitral valve gradient equals 21  mm Hg, mean transmitral valve gradient equals 5 mm Hg,  consistent with moderate mitral stenosis.  Aortic Valve/Aorta: Bioprosthetic aortic valve. Peak  transaortic valve gradient equals 49 mm Hg, mean  transaortic valve gradient equals 31 mm Hg, which is  elevated even in the presence of a bioprosthetic aortic  valve. Peak left ventricular outflow tract gradient equals  6 mm Hg, mean gradient is equal to 3 mm Hg, LVOT velocity  time integral equals 28 cm.  Aortic Root: 2.9 cm. The proximal ascending aorta is not  well visualized.  Left Atrium:Severely dilated left atrium.  LA volume index  = 67 cc/m2.  Left Ventricle: Normal left ventricular systolic function.  No segmental wall motion abnormalities. Septal flattening  consistent with right ventricular overload. Unable to  determine diastolic functino due to severe mitral annular  calcification and mild mitral stenosis.  Right Heart: Severe right atrial enlargement. Right  ventricular enlargement with decreased right ventricular  systolic function. A device wire is noted in the right  heart. There is malcoaptation of the tricuspid valve with  severe (wide open) tricuspid regurgitation. Normal pulmonic  valve.  Pericardium/Pleura: No pericardial effusion seen.  Hemodynamic: Estimated right ventricular systolic pressure  equals 31 mm Hg, assuming right atrial pressure equals 15  mm Hg, consistent with normal pulmonary pressures.  ------------------------------------------------------------------------  Conclusions:  1. Mitral annular calcification and calcified mitral  leaflets with decreased diastolic opening. Mild mitral  regurgitation.  Peak mitral valve gradient equals 21 mm Hg,  mean transmitral valve gradient equals 5 mm Hg, consistent  with moderate mitral stenosis.  2. Severely dilated left atrium.  LA volume index =67  cc/m2.Severe right atrial enlargement.  4. Right ventricular enlargement with decreased right  ventricular systolic function. A device wire is noted in  the right heart.  5. There is malcoaptation of the tricuspid valve with  severe (wide open) tricuspid regurgitation.  6. Estimated pulmonary artery systolic pressure equals 31  mm Hg, assuming right atrial pressure equals 15  mm Hg,  consistent with normal pulmonary pressures. The RVSP is  probably underestimated due to severe TR.  7. No pericardial effusion seen.  *** Compared with echocardiogram of 7/5/2016, no  significant changes noted.  ------------------------------------------------------------------------  Confirmed on  4/19/2022 - 16:13:24 by Yesy Langford M.D.  ------------------------------------------------------------------------    < end of copied text >

## 2022-04-20 NOTE — PROGRESS NOTE ADULT - SUBJECTIVE AND OBJECTIVE BOX
afberile    REVIEW OF SYSTEMS:  GEN: no fever,    no chills  RESP: no SOB,   no cough  CVS: no chest pain,   no palpitations  GI: no abdominal pain,   no nausea,   no vomiting,   no constipation,   no diarrhea  : no dysuria,   no frequency  NEURO: no headache,   no dizziness  PSYCH: no depression,   not anxious  Derm : no rash    MEDICATIONS  (STANDING):  amLODIPine   Tablet 10 milliGRAM(s) Oral daily  apixaban 5 milliGRAM(s) Oral every 12 hours  aspirin enteric coated 81 milliGRAM(s) Oral daily  furosemide   Injectable 40 milliGRAM(s) IV Push daily  levothyroxine 112 MICROGram(s) Oral daily  metoprolol succinate  milliGRAM(s) Oral daily  simvastatin 20 milliGRAM(s) Oral at bedtime  valsartan 320 milliGRAM(s) Oral daily    MEDICATIONS  (PRN):      Vital Signs Last 24 Hrs  T(C): 36.8 (2022 04:03), Max: 36.8 (2022 20:32)  T(F): 98.3 (2022 04:03), Max: 98.3 (2022 04:03)  HR: 60 (2022 04:03) (53 - 60)  BP: 119/73 (2022 04:03) (100/59 - 119/73)  BP(mean): --  RR: 18 (2022 04:03) (18 - 18)  SpO2: 96% (2022 04:03) (94% - 96%)  CAPILLARY BLOOD GLUCOSE        I&O's Summary    2022 07:01  -  2022 06:28  --------------------------------------------------------  IN: 540 mL / OUT: 0 mL / NET: 540 mL        PHYSICAL EXAM:  HEAD:  Atraumatic, Normocephalic  NECK: Supple, No   JVD  CHEST/LUNG:   no     rales,     no,    rhonchi  HEART: Regular rate and rhythm;         murmur  ABDOMEN: Soft, Nontender, ;   EXTREMITIES:    less    edema  NEUROLOGY:  alert    LABS:                        11.6   7.08  )-----------( 145      ( 2022 06:15 )             36.2         142  |  104  |  20  ----------------------------<  111<H>  4.0   |  20<L>  |  0.86    Ca    10.1      2022 06:18  Phos  3.6       Mg     1.6         TPro  6.6  /  Alb  3.7  /  TBili  1.0  /  DBili  x   /  AST  24  /  ALT  14  /  AlkPhos  78      PT/INR - ( 2022 16:42 )   PT: 21.0 sec;   INR: 1.82 ratio         PTT - ( 2022 16:42 )  PTT:36.3 sec  CARDIAC MARKERS ( 2022 16:42 )  x     / x     / x     / x     / 3.2 ng/mL      Urinalysis Basic - ( 2022 02:37 )    Color: Light Yellow / Appearance: Clear / S.012 / pH: x  Gluc: x / Ketone: Negative  / Bili: Negative / Urobili: Negative   Blood: x / Protein: Negative / Nitrite: Negative   Leuk Esterase: Small / RBC: 0 /hpf / WBC 4 /HPF   Sq Epi: x / Non Sq Epi: 2 /hpf / Bacteria: Moderate           @ 16:42  4.0  36      Thyroid Stimulating Hormone, Serum: 4.11 uIU/mL ( @ 09:06)          Consultant(s) Notes Reviewed:      Care Discussed with Consultants/Other Providers:

## 2022-04-21 LAB
ANION GAP SERPL CALC-SCNC: 14 MMOL/L — SIGNIFICANT CHANGE UP (ref 5–17)
BUN SERPL-MCNC: 23 MG/DL — SIGNIFICANT CHANGE UP (ref 7–23)
CALCIUM SERPL-MCNC: 9.4 MG/DL — SIGNIFICANT CHANGE UP (ref 8.4–10.5)
CHLORIDE SERPL-SCNC: 102 MMOL/L — SIGNIFICANT CHANGE UP (ref 96–108)
CO2 SERPL-SCNC: 24 MMOL/L — SIGNIFICANT CHANGE UP (ref 22–31)
CREAT SERPL-MCNC: 0.82 MG/DL — SIGNIFICANT CHANGE UP (ref 0.5–1.3)
EGFR: 75 ML/MIN/1.73M2 — SIGNIFICANT CHANGE UP
GLUCOSE BLDC GLUCOMTR-MCNC: 128 MG/DL — HIGH (ref 70–99)
GLUCOSE BLDC GLUCOMTR-MCNC: 129 MG/DL — HIGH (ref 70–99)
GLUCOSE BLDC GLUCOMTR-MCNC: 153 MG/DL — HIGH (ref 70–99)
GLUCOSE BLDC GLUCOMTR-MCNC: 154 MG/DL — HIGH (ref 70–99)
GLUCOSE SERPL-MCNC: 126 MG/DL — HIGH (ref 70–99)
MAGNESIUM SERPL-MCNC: 1.6 MG/DL — SIGNIFICANT CHANGE UP (ref 1.6–2.6)
POTASSIUM SERPL-MCNC: 3.4 MMOL/L — LOW (ref 3.5–5.3)
POTASSIUM SERPL-SCNC: 3.4 MMOL/L — LOW (ref 3.5–5.3)
SODIUM SERPL-SCNC: 140 MMOL/L — SIGNIFICANT CHANGE UP (ref 135–145)

## 2022-04-21 PROCEDURE — 99233 SBSQ HOSP IP/OBS HIGH 50: CPT

## 2022-04-21 PROCEDURE — 99221 1ST HOSP IP/OBS SF/LOW 40: CPT

## 2022-04-21 RX ORDER — POTASSIUM CHLORIDE 20 MEQ
40 PACKET (EA) ORAL ONCE
Refills: 0 | Status: COMPLETED | OUTPATIENT
Start: 2022-04-21 | End: 2022-04-21

## 2022-04-21 RX ADMIN — Medication 112 MICROGRAM(S): at 05:15

## 2022-04-21 RX ADMIN — Medication 100 MILLIGRAM(S): at 05:15

## 2022-04-21 RX ADMIN — AMLODIPINE BESYLATE 10 MILLIGRAM(S): 2.5 TABLET ORAL at 11:06

## 2022-04-21 RX ADMIN — APIXABAN 5 MILLIGRAM(S): 2.5 TABLET, FILM COATED ORAL at 17:05

## 2022-04-21 RX ADMIN — VALSARTAN 320 MILLIGRAM(S): 80 TABLET ORAL at 11:04

## 2022-04-21 RX ADMIN — Medication 40 MILLIGRAM(S): at 17:04

## 2022-04-21 RX ADMIN — SIMVASTATIN 20 MILLIGRAM(S): 20 TABLET, FILM COATED ORAL at 21:14

## 2022-04-21 RX ADMIN — Medication 40 MILLIGRAM(S): at 05:15

## 2022-04-21 RX ADMIN — Medication 40 MILLIEQUIVALENT(S): at 12:06

## 2022-04-21 RX ADMIN — Medication 1: at 17:03

## 2022-04-21 RX ADMIN — Medication 81 MILLIGRAM(S): at 11:04

## 2022-04-21 RX ADMIN — APIXABAN 5 MILLIGRAM(S): 2.5 TABLET, FILM COATED ORAL at 05:15

## 2022-04-21 NOTE — CONSULT NOTE ADULT - ATTENDING COMMENTS
Seen and agree  AF may be contributing to heart failure, therefore TIM/CV  would get heart failure involved as well for management of right sided failure due to TR  Sturctural heart (Margaret) contacted  Also evaluate TV and lead during TIM tomorrow to access role of lead in causing TR

## 2022-04-21 NOTE — PROGRESS NOTE ADULT - SUBJECTIVE AND OBJECTIVE BOX
[facility] - John Ng, Sandeep, Robson, Jacinto, Karen Wong  Office Number:  [phone]    Patient resting comfortably in bed in NAD.  Laying flat with no respiratory distress.  No complaints of chest pain, palpitations, PND, or orthopnea.  continues to appears volume overloaded, though feeling better overall    ROS: negative unless otherwise mentioned.    Telemetry:  af    MEDICATIONS  (STANDING):  amLODIPine   Tablet 10 milliGRAM(s) Oral daily  apixaban 5 milliGRAM(s) Oral every 12 hours  aspirin enteric coated 81 milliGRAM(s) Oral daily  dextrose 5%. 1000 milliLiter(s) (50 mL/Hr) IV Continuous <Continuous>  dextrose 5%. 1000 milliLiter(s) (100 mL/Hr) IV Continuous <Continuous>  dextrose 50% Injectable 25 Gram(s) IV Push once  dextrose 50% Injectable 12.5 Gram(s) IV Push once  dextrose 50% Injectable 25 Gram(s) IV Push once  furosemide   Injectable 40 milliGRAM(s) IV Push every 12 hours  glucagon  Injectable 1 milliGRAM(s) IntraMuscular once  insulin lispro (ADMELOG) corrective regimen sliding scale   SubCutaneous three times a day before meals  insulin lispro (ADMELOG) corrective regimen sliding scale   SubCutaneous at bedtime  levothyroxine 112 MICROGram(s) Oral daily  metoprolol succinate  milliGRAM(s) Oral daily  simvastatin 20 milliGRAM(s) Oral at bedtime  valsartan 320 milliGRAM(s) Oral daily    MEDICATIONS  (PRN):  dextrose Oral Gel 15 Gram(s) Oral once PRN Blood Glucose LESS THAN 70 milliGRAM(s)/deciliter      Allergies    sulfa drugs (Short breath)    Intolerances        Vital Signs Last 24 Hrs  T(C): 36.8 (21 Apr 2022 04:03), Max: 36.9 (20 Apr 2022 15:09)  T(F): 98.3 (21 Apr 2022 04:03), Max: 98.4 (20 Apr 2022 15:09)  HR: 61 (21 Apr 2022 04:03) (59 - 86)  BP: 106/67 (21 Apr 2022 04:03) (106/67 - 138/87)  BP(mean): --  RR: 18 (21 Apr 2022 04:03) (18 - 19)  SpO2: 96% (21 Apr 2022 04:03) (93% - 99%)    I&O's Summary    20 Apr 2022 07:01  -  21 Apr 2022 07:00  --------------------------------------------------------  IN: 1060 mL / OUT: 0 mL / NET: 1060 mL        ON EXAM:    Constitutional: NAD, awake   HEENT: Moist Mucous Membranes, Anicteric  Pulmonary: Decreased breath sounds b/l. No rales, crackles or wheeze appreciated.   Cardiovascular: Regular, S1 and S2, No murmurs, rubs, gallops or clicks  Gastrointestinal: Bowel Sounds present, soft, nontender. mildly distended  Lymph: + peripheral edema. No lymphadenopathy.  Skin: No visible rashes or ulcers.  Psych:  Mood & affect appropriate for situation    LABS: All Labs Reviewed:                        11.6   7.08  )-----------( 145      ( 19 Apr 2022 06:15 )             36.2                         12.6   8.42  )-----------( 175      ( 18 Apr 2022 16:42 )             40.3     21 Apr 2022 05:50    140    |  102    |  23     ----------------------------<  126    3.4     |  24     |  0.82   20 Apr 2022 06:43    142    |  102    |  23     ----------------------------<  130    3.3     |  25     |  0.89   19 Apr 2022 06:18    142    |  104    |  20     ----------------------------<  111    4.0     |  20     |  0.86     Ca    9.4        21 Apr 2022 05:50  Ca    9.9        20 Apr 2022 06:43  Ca    10.1       19 Apr 2022 06:18  Phos  3.6       19 Apr 2022 06:18  Mg     1.6       21 Apr 2022 05:50  Mg     1.6       20 Apr 2022 06:43  Mg     1.6       19 Apr 2022 06:18    TPro  6.6    /  Alb  3.7    /  TBili  1.0    /  DBili  x      /  AST  24     /  ALT  14     /  AlkPhos  78     19 Apr 2022 06:18  TPro  7.2    /  Alb  4.2    /  TBili  0.9    /  DBili  x      /  AST  30     /  ALT  14     /  AlkPhos  92     18 Apr 2022 16:42          Blood Culture:   04-18 @ 16:42  Pro Bnp 839    04-19 @ 09:06  TSH: 4.11

## 2022-04-21 NOTE — PROGRESS NOTE ADULT - ASSESSMENT
Yolette is a 76 year old female with HTN, AS s/p bio AVR with post-op HB requiring PPM, non obstructive cad on cath in 2016, AF on eliquis, clear cell RCC s/p partial nephrectomy, hypothyroidism, p/w sob, leg swelling, abd swelling.    - volume overloaded on exam,  - CT chest with only trace pleural effusions  - TTE 4/19 with normal LV fxn, rve and dec function, severe open TR from malcoaptation, mod MS noted    - It appears that she is in ADHF. likely has high filling pressure and PASP (likely underestimated on TTE)  - cont lasix to 40mg IV q12.   - Please continue to maintain strict I/Os, monitor daily weights, Cr, and K. (I/o/s from yesterday are inaccurate  - there is a component of RHF likely from malcopated TV. Mitral gradients are elevated, though this may end up being volume related.  - her last ppm interrogation was a remote check in 3/22. Her symptoms seem to have all began in 12/21, which is about the time she went into AF, and has remained in since/paced >98% of time.  - I have called for EP evaluation to evaluate the PPM lead/severe TR, and to see if repositioning is warranted given her RH failure. If a TIM is needed I would also consider trying a DCCV to restore SR (once closer to euvolemic), as her hf symptoms started when she went into AF.    - cath on record in 2016 with non obstructive CAD  - daily weights and I/o's  - cont asa and statin    - history of af and has been on eliquis  - cont metoprolol and ac  - bp acceptable and would cont amlodipine and valsartan    - Further cardiac workup will depend on clinical course.   - will follow with you

## 2022-04-21 NOTE — PROGRESS NOTE ADULT - SUBJECTIVE AND OBJECTIVE BOX
INTERVAL HPI/OVERNIGHT EVENTS:  Pt seen and examined at bedside.     Allergies/Intolerance: sulfa drugs (Short breath)      MEDICATIONS  (STANDING):  amLODIPine   Tablet 10 milliGRAM(s) Oral daily  apixaban 5 milliGRAM(s) Oral every 12 hours  aspirin enteric coated 81 milliGRAM(s) Oral daily  dextrose 5%. 1000 milliLiter(s) (50 mL/Hr) IV Continuous <Continuous>  dextrose 5%. 1000 milliLiter(s) (100 mL/Hr) IV Continuous <Continuous>  dextrose 50% Injectable 25 Gram(s) IV Push once  dextrose 50% Injectable 12.5 Gram(s) IV Push once  dextrose 50% Injectable 25 Gram(s) IV Push once  furosemide   Injectable 40 milliGRAM(s) IV Push every 12 hours  glucagon  Injectable 1 milliGRAM(s) IntraMuscular once  insulin lispro (ADMELOG) corrective regimen sliding scale   SubCutaneous three times a day before meals  insulin lispro (ADMELOG) corrective regimen sliding scale   SubCutaneous at bedtime  levothyroxine 112 MICROGram(s) Oral daily  metoprolol succinate  milliGRAM(s) Oral daily  simvastatin 20 milliGRAM(s) Oral at bedtime  valsartan 320 milliGRAM(s) Oral daily    MEDICATIONS  (PRN):  dextrose Oral Gel 15 Gram(s) Oral once PRN Blood Glucose LESS THAN 70 milliGRAM(s)/deciliter        ROS: all systems reviewed and wnl      PHYSICAL EXAMINATION:  Vital Signs Last 24 Hrs  T(C): 36.8 (21 Apr 2022 04:03), Max: 36.9 (20 Apr 2022 15:09)  T(F): 98.3 (21 Apr 2022 04:03), Max: 98.4 (20 Apr 2022 15:09)  HR: 61 (21 Apr 2022 04:03) (59 - 86)  BP: 106/67 (21 Apr 2022 04:03) (106/67 - 138/87)  BP(mean): --  RR: 18 (21 Apr 2022 04:03) (18 - 19)  SpO2: 96% (21 Apr 2022 04:03) (93% - 99%)  CAPILLARY BLOOD GLUCOSE      POCT Blood Glucose.: 128 mg/dL (21 Apr 2022 07:35)  POCT Blood Glucose.: 125 mg/dL (20 Apr 2022 17:33)  POCT Blood Glucose.: 147 mg/dL (20 Apr 2022 11:28)      04-20 @ 07:01  -  04-21 @ 07:00  --------------------------------------------------------  IN: 1060 mL / OUT: 0 mL / NET: 1060 mL        GENERAL:   NECK: supple, No JVD  CHEST/LUNG: clear to auscultation bilaterally; no rales, rhonchi, or wheezing b/l  HEART: normal S1, S2  ABDOMEN: BS+, soft, ND, NT   EXTREMITIES:  pulses palpable; no clubbing, cyanosis, or edema b/l LEs  SKIN: no rashes or lesions      LABS:    04-21    140  |  102  |  23  ----------------------------<  126<H>  3.4<L>   |  24  |  0.82    Ca    9.4      21 Apr 2022 05:50  Mg     1.6     04-21                 INTERVAL HPI/OVERNIGHT EVENTS:  Pt seen and examined at bedside.     Allergies/Intolerance: sulfa drugs (Short breath)      MEDICATIONS  (STANDING):  amLODIPine   Tablet 10 milliGRAM(s) Oral daily  apixaban 5 milliGRAM(s) Oral every 12 hours  aspirin enteric coated 81 milliGRAM(s) Oral daily  dextrose 5%. 1000 milliLiter(s) (50 mL/Hr) IV Continuous <Continuous>  dextrose 5%. 1000 milliLiter(s) (100 mL/Hr) IV Continuous <Continuous>  dextrose 50% Injectable 25 Gram(s) IV Push once  dextrose 50% Injectable 12.5 Gram(s) IV Push once  dextrose 50% Injectable 25 Gram(s) IV Push once  furosemide   Injectable 40 milliGRAM(s) IV Push every 12 hours  glucagon  Injectable 1 milliGRAM(s) IntraMuscular once  insulin lispro (ADMELOG) corrective regimen sliding scale   SubCutaneous three times a day before meals  insulin lispro (ADMELOG) corrective regimen sliding scale   SubCutaneous at bedtime  levothyroxine 112 MICROGram(s) Oral daily  metoprolol succinate  milliGRAM(s) Oral daily  simvastatin 20 milliGRAM(s) Oral at bedtime  valsartan 320 milliGRAM(s) Oral daily    MEDICATIONS  (PRN):  dextrose Oral Gel 15 Gram(s) Oral once PRN Blood Glucose LESS THAN 70 milliGRAM(s)/deciliter        ROS: all systems reviewed and wnl      PHYSICAL EXAMINATION:  Vital Signs Last 24 Hrs  T(C): 36.8 (21 Apr 2022 04:03), Max: 36.9 (20 Apr 2022 15:09)  T(F): 98.3 (21 Apr 2022 04:03), Max: 98.4 (20 Apr 2022 15:09)  HR: 61 (21 Apr 2022 04:03) (59 - 86)  BP: 106/67 (21 Apr 2022 04:03) (106/67 - 138/87)  BP(mean): --  RR: 18 (21 Apr 2022 04:03) (18 - 19)  SpO2: 96% (21 Apr 2022 04:03) (93% - 99%)  CAPILLARY BLOOD GLUCOSE      POCT Blood Glucose.: 128 mg/dL (21 Apr 2022 07:35)  POCT Blood Glucose.: 125 mg/dL (20 Apr 2022 17:33)  POCT Blood Glucose.: 147 mg/dL (20 Apr 2022 11:28)      04-20 @ 07:01  -  04-21 @ 07:00  --------------------------------------------------------  IN: 1060 mL / OUT: 0 mL / NET: 1060 mL        GENERAL: stable in bed, RA sat 96 %.   NECK: supple, No JVD  CHEST/LUNG: clear to auscultation bilaterally; no rales, rhonchi, or wheezing b/l  HEART: normal S1, S2  ABDOMEN: BS+, soft, ND, NT   EXTREMITIES:  pulses palpable; no clubbing, cyanosis, or edema b/l LEs  SKIN: no rashes or lesions      LABS:    04-21    140  |  102  |  23  ----------------------------<  126<H>  3.4<L>   |  24  |  0.82    Ca    9.4      21 Apr 2022 05:50  Mg     1.6     04-21

## 2022-04-21 NOTE — PROGRESS NOTE ADULT - ASSESSMENT
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         Plan: now with SOB, leg swelling, abd swelling. 3 months of worsening SOB, initially intermittent and on exertion. Likely has acute  diastolic  CHF.   CT angio , no PE.     Continue IV Lasix 40 mg bid. Afib, on Eliquis, has  PPM.  Leg dopplers negative for DVT.     Continue asa, norvasc, toprol, valsartian. zocor. Echo,  normal ef/ mod  MS. severe  TR, no change to 2016. Abd  u/s,  mod  ascites, ?  IR  drainage.     FS stable at 128, A1C at goal 6.8.     Continue diuresis, discharge home when euvolemic, daily SMA-7.

## 2022-04-22 LAB
ANION GAP SERPL CALC-SCNC: 15 MMOL/L — SIGNIFICANT CHANGE UP (ref 5–17)
ANION GAP SERPL CALC-SCNC: 16 MMOL/L — SIGNIFICANT CHANGE UP (ref 5–17)
BUN SERPL-MCNC: 22 MG/DL — SIGNIFICANT CHANGE UP (ref 7–23)
BUN SERPL-MCNC: 23 MG/DL — SIGNIFICANT CHANGE UP (ref 7–23)
CALCIUM SERPL-MCNC: 10.8 MG/DL — HIGH (ref 8.4–10.5)
CALCIUM SERPL-MCNC: 9.7 MG/DL — SIGNIFICANT CHANGE UP (ref 8.4–10.5)
CHLORIDE SERPL-SCNC: 100 MMOL/L — SIGNIFICANT CHANGE UP (ref 96–108)
CHLORIDE SERPL-SCNC: 101 MMOL/L — SIGNIFICANT CHANGE UP (ref 96–108)
CO2 SERPL-SCNC: 24 MMOL/L — SIGNIFICANT CHANGE UP (ref 22–31)
CO2 SERPL-SCNC: 28 MMOL/L — SIGNIFICANT CHANGE UP (ref 22–31)
CREAT SERPL-MCNC: 0.82 MG/DL — SIGNIFICANT CHANGE UP (ref 0.5–1.3)
CREAT SERPL-MCNC: 0.92 MG/DL — SIGNIFICANT CHANGE UP (ref 0.5–1.3)
EGFR: 65 ML/MIN/1.73M2 — SIGNIFICANT CHANGE UP
EGFR: 75 ML/MIN/1.73M2 — SIGNIFICANT CHANGE UP
GLUCOSE BLDC GLUCOMTR-MCNC: 101 MG/DL — HIGH (ref 70–99)
GLUCOSE BLDC GLUCOMTR-MCNC: 134 MG/DL — HIGH (ref 70–99)
GLUCOSE BLDC GLUCOMTR-MCNC: 147 MG/DL — HIGH (ref 70–99)
GLUCOSE SERPL-MCNC: 124 MG/DL — HIGH (ref 70–99)
GLUCOSE SERPL-MCNC: 160 MG/DL — HIGH (ref 70–99)
MAGNESIUM SERPL-MCNC: 2 MG/DL — SIGNIFICANT CHANGE UP (ref 1.6–2.6)
POTASSIUM SERPL-MCNC: 3.5 MMOL/L — SIGNIFICANT CHANGE UP (ref 3.5–5.3)
POTASSIUM SERPL-MCNC: 4.6 MMOL/L — SIGNIFICANT CHANGE UP (ref 3.5–5.3)
POTASSIUM SERPL-SCNC: 3.5 MMOL/L — SIGNIFICANT CHANGE UP (ref 3.5–5.3)
POTASSIUM SERPL-SCNC: 4.6 MMOL/L — SIGNIFICANT CHANGE UP (ref 3.5–5.3)
SODIUM SERPL-SCNC: 140 MMOL/L — SIGNIFICANT CHANGE UP (ref 135–145)
SODIUM SERPL-SCNC: 144 MMOL/L — SIGNIFICANT CHANGE UP (ref 135–145)

## 2022-04-22 PROCEDURE — 93010 ELECTROCARDIOGRAM REPORT: CPT

## 2022-04-22 PROCEDURE — 92960 CARDIOVERSION ELECTRIC EXT: CPT

## 2022-04-22 PROCEDURE — 99233 SBSQ HOSP IP/OBS HIGH 50: CPT | Mod: 25

## 2022-04-22 PROCEDURE — 93320 DOPPLER ECHO COMPLETE: CPT | Mod: 26

## 2022-04-22 PROCEDURE — 93312 ECHO TRANSESOPHAGEAL: CPT | Mod: 26

## 2022-04-22 PROCEDURE — 76377 3D RENDER W/INTRP POSTPROCES: CPT | Mod: 26

## 2022-04-22 PROCEDURE — 99233 SBSQ HOSP IP/OBS HIGH 50: CPT

## 2022-04-22 PROCEDURE — 93010 ELECTROCARDIOGRAM REPORT: CPT | Mod: 77

## 2022-04-22 PROCEDURE — 93325 DOPPLER ECHO COLOR FLOW MAPG: CPT | Mod: 26

## 2022-04-22 RX ORDER — FUROSEMIDE 40 MG
5 TABLET ORAL
Qty: 500 | Refills: 0 | Status: DISCONTINUED | OUTPATIENT
Start: 2022-04-22 | End: 2022-04-27

## 2022-04-22 RX ORDER — AMIODARONE HYDROCHLORIDE 400 MG/1
200 TABLET ORAL DAILY
Refills: 0 | Status: CANCELLED | OUTPATIENT
Start: 2022-05-28 | End: 2022-04-29

## 2022-04-22 RX ORDER — AMIODARONE HYDROCHLORIDE 400 MG/1
400 TABLET ORAL
Refills: 0 | Status: COMPLETED | OUTPATIENT
Start: 2022-04-22 | End: 2022-04-29

## 2022-04-22 RX ORDER — POTASSIUM CHLORIDE 20 MEQ
40 PACKET (EA) ORAL ONCE
Refills: 0 | Status: COMPLETED | OUTPATIENT
Start: 2022-04-22 | End: 2022-04-22

## 2022-04-22 RX ORDER — MAGNESIUM SULFATE 500 MG/ML
2 VIAL (ML) INJECTION ONCE
Refills: 0 | Status: COMPLETED | OUTPATIENT
Start: 2022-04-22 | End: 2022-04-22

## 2022-04-22 RX ORDER — AMIODARONE HYDROCHLORIDE 400 MG/1
400 TABLET ORAL DAILY
Refills: 0 | Status: DISCONTINUED | OUTPATIENT
Start: 2022-04-29 | End: 2022-04-29

## 2022-04-22 RX ORDER — AMIODARONE HYDROCHLORIDE 400 MG/1
TABLET ORAL
Refills: 0 | Status: DISCONTINUED | OUTPATIENT
Start: 2022-04-22 | End: 2022-04-29

## 2022-04-22 RX ADMIN — Medication 100 MILLIGRAM(S): at 07:04

## 2022-04-22 RX ADMIN — Medication 81 MILLIGRAM(S): at 14:45

## 2022-04-22 RX ADMIN — Medication 112 MICROGRAM(S): at 07:04

## 2022-04-22 RX ADMIN — AMIODARONE HYDROCHLORIDE 400 MILLIGRAM(S): 400 TABLET ORAL at 18:31

## 2022-04-22 RX ADMIN — AMLODIPINE BESYLATE 10 MILLIGRAM(S): 2.5 TABLET ORAL at 07:05

## 2022-04-22 RX ADMIN — Medication 25 GRAM(S): at 14:46

## 2022-04-22 RX ADMIN — APIXABAN 5 MILLIGRAM(S): 2.5 TABLET, FILM COATED ORAL at 18:31

## 2022-04-22 RX ADMIN — Medication 40 MILLIGRAM(S): at 07:04

## 2022-04-22 RX ADMIN — Medication 40 MILLIEQUIVALENT(S): at 14:46

## 2022-04-22 RX ADMIN — SIMVASTATIN 20 MILLIGRAM(S): 20 TABLET, FILM COATED ORAL at 22:04

## 2022-04-22 RX ADMIN — Medication 2.5 MG/HR: at 19:13

## 2022-04-22 RX ADMIN — VALSARTAN 320 MILLIGRAM(S): 80 TABLET ORAL at 07:51

## 2022-04-22 RX ADMIN — APIXABAN 5 MILLIGRAM(S): 2.5 TABLET, FILM COATED ORAL at 07:04

## 2022-04-22 NOTE — PROGRESS NOTE ADULT - ASSESSMENT
Yolette is a 76 year old female with HTN, AS s/p bio AVR with post-op HB requiring PPM, non obstructive cad on cath in 2016, AF on eliquis, clear cell RCC s/p partial nephrectomy, hypothyroidism, p/w sob, leg swelling, abd swelling.    - volume overloaded on exam,  - CT chest with only trace pleural effusions  - TTE 4/19 with normal LV fxn, rve and dec function, severe open TR from malcoaptation, mod MS noted    - It appears that she is in ADHF. likely has high filling pressure and PASP (likely underestimated on TTE)  - I would transition her to a Lasix gtt at 10mg/hr   - Please continue to maintain strict I/Os, monitor daily weights, Cr, and K. (I/o/s from yesterday are inaccurate  - there is a component of RHF likely from malcopated TV. Mitral gradients are elevated, though this may end up being volume related.  - her last ppm interrogation was a remote check in 3/22. Her symptoms seem to have all began in 12/21, which is about the time she went into AF, and has remained in since/paced >98% of time.  - EP eval noted. At this point will try to TIM/DCCV  - i would start on Amio po load post DCCV to help maintain SR  - she will eventually need eval for removal for PPM lead  - cont ac for now     - cath on record in 2016 with non obstructive CAD  - daily weights and I/o's  - cont asa and statin    - history of af and has been on eliquis  - cont metoprolol and ac  - bp acceptable and would cont valsartan  - dc norvasc to allow bp room for diuresis    - Monitor and replete electrolytes. Keep K>4.0 and Mg>2.0.     - Further cardiac workup will depend on clinical course.   - will follow with you

## 2022-04-22 NOTE — PROGRESS NOTE ADULT - ATTENDING COMMENTS
seen and agree  S/P CV with plan for amiodarone load  will review with structural fo future potential tricuspid valve intervention

## 2022-04-22 NOTE — PROGRESS NOTE ADULT - ASSESSMENT
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   3 months of worsening SOB, initially intermittent and on exertion    acute  diastolic  chf  ct  angio , no PE  on iv lasix   card   dr shoshana Funes, on eliquis, has  PPM   HTN/ HLD , on  asa. norvasc, toprol, valsartian. zocor   Echo,  normal ef/ mod  MS. severe  TR  Abd  u/s,  mod  ascites,   seen by ep/  await struc herat  for  severe TR     rad< from: CT Angio Chest PE Protocol w/ IV Cont (04.18.22 @ 17:40) >  FINDINGS:  LUNGS AND AIRWAYS: Patent central airways.  Lungs are clear.  PLEURA: Trace right pleural effusion/pleural thickening.  MEDIASTINUM AND YASMANI: No lymphadenopathy.  HEAR: Heart is enlarged in size. Patient is status post aortic valve   replacement. Calcification of the coronary arteries is noted. Pacemaker   is noted in place. No pericardial effusion is noted. Pulmonary arteries  are normal in caliber. No filling defects are noted.  CHEST WALL AND LOWER NECK: Within normal limits.  VISUALIZED UPPER ABDOMEN: Ascites.  BONES: Degenerative changes of the spine are noted.    IMPRESSION:  No pulmonary embolus is noted.  --- End of Report ---  < end of copied text >

## 2022-04-22 NOTE — PROGRESS NOTE ADULT - SUBJECTIVE AND OBJECTIVE BOX
[facility] -- John Ng, Sandeep, Robson, Marvin Casey Savella  Office # [phone]      Follow Up:  CHF, AF    Subjective/Observations: Patient seen and examined. Events noted. Resting comfortably in bed.  Feeling improved though still very orthopneic, dyspneic and with lower ext edema + cough       REVIEW OF SYSTEMS: All other review of systems is negative unless indicated above    PAST MEDICAL & SURGICAL HISTORY:  Hypertension  x10 years    Dyslipidemia    Hiatal hernia    Type II diabetes mellitus    Aortic stenosis    Cancer of kidney, left    Right bundle branch block (RBBB)    Anemia    Hypothyroidism    Status post unilateral salpingo-oophorectomy    S/P T&amp;A (status post tonsillectomy and adenoidectomy)    H/O hand surgery    H/O partial nephrectomy  right [id number]    H/O aortic valve replacement    Elective surgery  [facility]  2016        MEDICATIONS  (STANDING):  amLODIPine   Tablet 10 milliGRAM(s) Oral daily  apixaban 5 milliGRAM(s) Oral every 12 hours  aspirin enteric coated 81 milliGRAM(s) Oral daily  dextrose 5%. 1000 milliLiter(s) (50 mL/Hr) IV Continuous <Continuous>  dextrose 5%. 1000 milliLiter(s) (100 mL/Hr) IV Continuous <Continuous>  dextrose 50% Injectable 12.5 Gram(s) IV Push once  dextrose 50% Injectable 25 Gram(s) IV Push once  dextrose 50% Injectable 25 Gram(s) IV Push once  furosemide   Injectable 40 milliGRAM(s) IV Push every 12 hours  glucagon  Injectable 1 milliGRAM(s) IntraMuscular once  insulin lispro (ADMELOG) corrective regimen sliding scale   SubCutaneous three times a day before meals  insulin lispro (ADMELOG) corrective regimen sliding scale   SubCutaneous at bedtime  levothyroxine 112 MICROGram(s) Oral daily  magnesium sulfate  IVPB 2 Gram(s) IV Intermittent once  metoprolol succinate  milliGRAM(s) Oral daily  potassium chloride   Powder 40 milliEquivalent(s) Oral once  simvastatin 20 milliGRAM(s) Oral at bedtime  valsartan 320 milliGRAM(s) Oral daily    MEDICATIONS  (PRN):  dextrose Oral Gel 15 Gram(s) Oral once PRN Blood Glucose LESS THAN 70 milliGRAM(s)/deciliter      Allergies    sulfa drugs (Short breath)    Intolerances            Vital Signs Last 24 Hrs  T(C): 36.7 (22 Apr 2022 07:49), Max: 37.3 (21 Apr 2022 19:48)  T(F): 98.1 (22 Apr 2022 07:49), Max: 99.1 (21 Apr 2022 19:48)  HR: 65 (22 Apr 2022 07:49) (53 - 73)  BP: 132/75 (22 Apr 2022 07:49) (106/62 - 143/77)  BP(mean): --  RR: 19 (22 Apr 2022 07:49) (18 - 19)  SpO2: 94% (22 Apr 2022 07:49) (93% - 98%)    I&O's Summary    21 Apr 2022 07:01  -  22 Apr 2022 07:00  --------------------------------------------------------  IN: 800 mL / OUT: 2375 mL / NET: -1575 mL    22 Apr 2022 07:01  -  22 Apr 2022 10:21  --------------------------------------------------------  IN: 200 mL / OUT: 0 mL / NET: 200 mL          PHYSICAL EXAM:  TELE: AV   Constitutional: NAD, awake    HEENT: Moist Mucous Membranes, Anicteric  Pulmonary: Decreased breath sounds b/l. No rales, crackles or wheeze appreciated.   Cardiovascular: Regular, S1 and S2, 2/6 SM   Gastrointestinal: Bowel Sounds present, soft, nontender.   Lymph: ++ peripheral edema. No lymphadenopathy.  Skin: No visible rashes or ulcers.  Psych:  Mood & affect appropriate for situation    LABS: All Labs Reviewed:    22 Apr 2022 05:57    140    |  101    |  23     ----------------------------<  124    3.5     |  24     |  0.82   21 Apr 2022 05:50    140    |  102    |  23     ----------------------------<  126    3.4     |  24     |  0.82   20 Apr 2022 06:43    142    |  102    |  23     ----------------------------<  130    3.3     |  25     |  0.89     Ca    9.7        22 Apr 2022 05:57  Ca    9.4        21 Apr 2022 05:50  Ca    9.9        20 Apr 2022 06:43  Mg     1.6       21 Apr 2022 05:50  Mg     1.6       20 Apr 2022 06:43

## 2022-04-22 NOTE — PROGRESS NOTE ADULT - SUBJECTIVE AND OBJECTIVE BOX
afberile    REVIEW OF SYSTEMS:  GEN: no fever,    no chills  RESP: no SOB,   no cough  CVS: no chest pain,   no palpitations  GI: no abdominal pain,   no nausea,   no vomiting,   no constipation,   no diarrhea  : no dysuria,   no frequency  NEURO: no headache,   no dizziness  PSYCH: no depression,   not anxious  Derm : no rash    MEDICATIONS  (STANDING):  amLODIPine   Tablet 10 milliGRAM(s) Oral daily  apixaban 5 milliGRAM(s) Oral every 12 hours  aspirin enteric coated 81 milliGRAM(s) Oral daily  dextrose 5%. 1000 milliLiter(s) (50 mL/Hr) IV Continuous <Continuous>  dextrose 5%. 1000 milliLiter(s) (100 mL/Hr) IV Continuous <Continuous>  dextrose 50% Injectable 12.5 Gram(s) IV Push once  dextrose 50% Injectable 25 Gram(s) IV Push once  dextrose 50% Injectable 25 Gram(s) IV Push once  furosemide   Injectable 40 milliGRAM(s) IV Push every 12 hours  glucagon  Injectable 1 milliGRAM(s) IntraMuscular once  insulin lispro (ADMELOG) corrective regimen sliding scale   SubCutaneous three times a day before meals  insulin lispro (ADMELOG) corrective regimen sliding scale   SubCutaneous at bedtime  levothyroxine 112 MICROGram(s) Oral daily  metoprolol succinate  milliGRAM(s) Oral daily  simvastatin 20 milliGRAM(s) Oral at bedtime  valsartan 320 milliGRAM(s) Oral daily    MEDICATIONS  (PRN):  dextrose Oral Gel 15 Gram(s) Oral once PRN Blood Glucose LESS THAN 70 milliGRAM(s)/deciliter      Vital Signs Last 24 Hrs  T(C): 36.7 (22 Apr 2022 07:49), Max: 37.3 (21 Apr 2022 19:48)  T(F): 98.1 (22 Apr 2022 07:49), Max: 99.1 (21 Apr 2022 19:48)  HR: 65 (22 Apr 2022 07:49) (53 - 73)  BP: 132/75 (22 Apr 2022 07:49) (106/62 - 143/77)  BP(mean): --  RR: 19 (22 Apr 2022 07:49) (18 - 19)  SpO2: 94% (22 Apr 2022 07:49) (93% - 98%)  CAPILLARY BLOOD GLUCOSE      POCT Blood Glucose.: 153 mg/dL (21 Apr 2022 21:11)  POCT Blood Glucose.: 154 mg/dL (21 Apr 2022 16:08)  POCT Blood Glucose.: 129 mg/dL (21 Apr 2022 11:27)    I&O's Summary    21 Apr 2022 07:01  -  22 Apr 2022 07:00  --------------------------------------------------------  IN: 700 mL / OUT: 2375 mL / NET: -1675 mL        PHYSICAL EXAM:  HEAD:  Atraumatic, Normocephalic  NECK: Supple, No   JVD  CHEST/LUNG:   no     rales,     no,    rhonchi  HEART: Regular rate and rhythm;         murmur  ABDOMEN: Soft, Nontender, ;   EXTREMITIES:   no     edema  NEUROLOGY:  alert    LABS:    04-22    140  |  101  |  23  ----------------------------<  124<H>  3.5   |  24  |  0.82    Ca    9.7      22 Apr 2022 05:57  Mg     1.6     04-21                      Thyroid Stimulating Hormone, Serum: 4.11 uIU/mL (04-19 @ 09:06)          Consultant(s) Notes Reviewed:      Care Discussed with Consultants/Other Providers:

## 2022-04-22 NOTE — PROGRESS NOTE ADULT - SUBJECTIVE AND OBJECTIVE BOX
Patient seen and examined at bedside.    Overnight Events: afib HR 45-90s.    Review Of Systems: No chest pain, shortness of breath, or palpitations            Current Meds:  amLODIPine   Tablet 10 milliGRAM(s) Oral daily  apixaban 5 milliGRAM(s) Oral every 12 hours  aspirin enteric coated 81 milliGRAM(s) Oral daily  dextrose 5%. 1000 milliLiter(s) IV Continuous <Continuous>  dextrose 5%. 1000 milliLiter(s) IV Continuous <Continuous>  dextrose 50% Injectable 12.5 Gram(s) IV Push once  dextrose 50% Injectable 25 Gram(s) IV Push once  dextrose 50% Injectable 25 Gram(s) IV Push once  dextrose Oral Gel 15 Gram(s) Oral once PRN  furosemide   Injectable 40 milliGRAM(s) IV Push every 12 hours  glucagon  Injectable 1 milliGRAM(s) IntraMuscular once  insulin lispro (ADMELOG) corrective regimen sliding scale   SubCutaneous three times a day before meals  insulin lispro (ADMELOG) corrective regimen sliding scale   SubCutaneous at bedtime  levothyroxine 112 MICROGram(s) Oral daily  magnesium sulfate  IVPB 2 Gram(s) IV Intermittent once  metoprolol succinate  milliGRAM(s) Oral daily  potassium chloride   Powder 40 milliEquivalent(s) Oral once  simvastatin 20 milliGRAM(s) Oral at bedtime  valsartan 320 milliGRAM(s) Oral daily      Vitals:  T(F): 98.1 (04-22), Max: 99.1 (04-21)  HR: 65 (04-22) (53 - 73)  BP: 132/75 (04-22) (106/62 - 143/77)  RR: 19 (04-22)  SpO2: 94% (04-22)  I&O's Summary    21 Apr 2022 07:01  -  22 Apr 2022 07:00  --------------------------------------------------------  IN: 800 mL / OUT: 2375 mL / NET: -1575 mL    22 Apr 2022 07:01  -  22 Apr 2022 09:44  --------------------------------------------------------  IN: 200 mL / OUT: 0 mL / NET: 200 mL        Physical Exam:  Patient at TIM this morning, was not examined.      04-22    140  |  101  |  23  ----------------------------<  124<H>  3.5   |  24  |  0.82    Ca    9.7      22 Apr 2022 05:57  Mg     1.6     04-21        CARDIAC MARKERS ( 19 Apr 2022 06:18 )  16 ng/L / x     / x     / x     / x     / x      CARDIAC MARKERS ( 18 Apr 2022 16:42 )  14 ng/L / x     / x     / x     / x     / 3.2 ng/mL      Serum Pro-Brain Natriuretic Peptide: 839 pg/mL (04-18 @ 16:42)    New ECG(s): Personally reviewed    < from: Transthoracic Echocardiogram (04.19.22 @ 11:58) >  Conclusions:  1. Mitral annular calcification and calcified mitral  leaflets with decreased diastolic opening. Mild mitral  regurgitation.  Peak mitral valve gradient equals 21 mm Hg,  mean transmitral valve gradient equals 5 mm Hg, consistent  with moderate mitral stenosis.  2. Severely dilated left atrium.  LA volume index =67  cc/m2.Severe right atrial enlargement.  4. Right ventricular enlargement with decreased right  ventricular systolic function. A device wire is noted in  the right heart.  5. There is malcoaptation of the tricuspid valve with  severe (wide open) tricuspid regurgitation.  6. Estimated pulmonary artery systolic pressure equals 31  mm Hg, assuming right atrial pressure equals 15  mm Hg,  consistent with normal pulmonary pressures. The RVSP is  probably underestimated due to severe TR.  7. No pericardial effusion seen.  *** Compared with echocardiogram of 7/5/2016, no  significant changes noted.    < end of copied text >

## 2022-04-22 NOTE — PROGRESS NOTE ADULT - ASSESSMENT
76 year old female with HTN, AS s/p bio AVR with post-op HB requiring PPM, non obstructive cad on cath in 2016, AF on eliquis, clear cell RCC s/p partial nephrectomy, hypothyroidism, p/w sob, leg swelling, abd swelling. EP consulted for evaluation of chronic afib that started in 12/2022.     #afib  - rate 45-90s on home metoprolol succinate 100 daily  - at TIM/DCCV today  - will assess pacer wire through TIM, although on TTE, does not appear to be causing TR  - please get evaluation for TR from structural team  - c/w Eliquis 5 BID   76 year old female with HTN, AS s/p bio AVR with post-op HB requiring PPM, non obstructive cad on cath in 2016, AF on eliquis, clear cell RCC s/p partial nephrectomy, hypothyroidism, p/w sob, leg swelling, abd swelling. EP consulted for evaluation of chronic afib that started in 12/2022.     #afib  - rate 45-90s on home metoprolol succinate 100 daily  - TIM reviewed with echo attending, no significant pacer wire impingement on the septal leaflet  - pt s/p DCCV now in NSR  - please get evaluation for TR from structural team  - c/w Eliquis 5 BID   76 year old female with HTN, AS s/p bio AVR with post-op HB requiring PPM, non obstructive cad on cath in 2016, AF on eliquis, clear cell RCC s/p partial nephrectomy, hypothyroidism, p/w sob, leg swelling, abd swelling. EP consulted for evaluation of chronic afib that started in 12/2022.     #afib  - rate 45-90s on home metoprolol succinate 100 daily  - TIM reviewed with echo attending, no significant pacer wire impingement on the septal leaflet  - pt s/p DCCV now in NSR, amiodarone loading ordered  - please get evaluation for TR from structural team  - c/w Eliquis 5 BID

## 2022-04-23 LAB
ANION GAP SERPL CALC-SCNC: 15 MMOL/L — SIGNIFICANT CHANGE UP (ref 5–17)
ANION GAP SERPL CALC-SCNC: 17 MMOL/L — SIGNIFICANT CHANGE UP (ref 5–17)
BUN SERPL-MCNC: 22 MG/DL — SIGNIFICANT CHANGE UP (ref 7–23)
BUN SERPL-MCNC: 22 MG/DL — SIGNIFICANT CHANGE UP (ref 7–23)
CALCIUM SERPL-MCNC: 10.4 MG/DL — SIGNIFICANT CHANGE UP (ref 8.4–10.5)
CALCIUM SERPL-MCNC: 9.9 MG/DL — SIGNIFICANT CHANGE UP (ref 8.4–10.5)
CHLORIDE SERPL-SCNC: 97 MMOL/L — SIGNIFICANT CHANGE UP (ref 96–108)
CHLORIDE SERPL-SCNC: 99 MMOL/L — SIGNIFICANT CHANGE UP (ref 96–108)
CO2 SERPL-SCNC: 26 MMOL/L — SIGNIFICANT CHANGE UP (ref 22–31)
CO2 SERPL-SCNC: 27 MMOL/L — SIGNIFICANT CHANGE UP (ref 22–31)
CREAT SERPL-MCNC: 0.84 MG/DL — SIGNIFICANT CHANGE UP (ref 0.5–1.3)
CREAT SERPL-MCNC: 1.04 MG/DL — SIGNIFICANT CHANGE UP (ref 0.5–1.3)
EGFR: 56 ML/MIN/1.73M2 — LOW
EGFR: 72 ML/MIN/1.73M2 — SIGNIFICANT CHANGE UP
GLUCOSE BLDC GLUCOMTR-MCNC: 134 MG/DL — HIGH (ref 70–99)
GLUCOSE BLDC GLUCOMTR-MCNC: 142 MG/DL — HIGH (ref 70–99)
GLUCOSE BLDC GLUCOMTR-MCNC: 142 MG/DL — HIGH (ref 70–99)
GLUCOSE BLDC GLUCOMTR-MCNC: 159 MG/DL — HIGH (ref 70–99)
GLUCOSE SERPL-MCNC: 125 MG/DL — HIGH (ref 70–99)
GLUCOSE SERPL-MCNC: 146 MG/DL — HIGH (ref 70–99)
HCT VFR BLD CALC: 36.3 % — SIGNIFICANT CHANGE UP (ref 34.5–45)
HGB BLD-MCNC: 11.8 G/DL — SIGNIFICANT CHANGE UP (ref 11.5–15.5)
MAGNESIUM SERPL-MCNC: 1.6 MG/DL — SIGNIFICANT CHANGE UP (ref 1.6–2.6)
MCHC RBC-ENTMCNC: 26.9 PG — LOW (ref 27–34)
MCHC RBC-ENTMCNC: 32.5 GM/DL — SIGNIFICANT CHANGE UP (ref 32–36)
MCV RBC AUTO: 82.9 FL — SIGNIFICANT CHANGE UP (ref 80–100)
NRBC # BLD: 0 /100 WBCS — SIGNIFICANT CHANGE UP (ref 0–0)
PHOSPHATE SERPL-MCNC: 3.4 MG/DL — SIGNIFICANT CHANGE UP (ref 2.5–4.5)
PLATELET # BLD AUTO: 134 K/UL — LOW (ref 150–400)
POTASSIUM SERPL-MCNC: 3.4 MMOL/L — LOW (ref 3.5–5.3)
POTASSIUM SERPL-MCNC: 3.7 MMOL/L — SIGNIFICANT CHANGE UP (ref 3.5–5.3)
POTASSIUM SERPL-SCNC: 3.4 MMOL/L — LOW (ref 3.5–5.3)
POTASSIUM SERPL-SCNC: 3.7 MMOL/L — SIGNIFICANT CHANGE UP (ref 3.5–5.3)
RBC # BLD: 4.38 M/UL — SIGNIFICANT CHANGE UP (ref 3.8–5.2)
RBC # FLD: 16.6 % — HIGH (ref 10.3–14.5)
SODIUM SERPL-SCNC: 140 MMOL/L — SIGNIFICANT CHANGE UP (ref 135–145)
SODIUM SERPL-SCNC: 141 MMOL/L — SIGNIFICANT CHANGE UP (ref 135–145)
WBC # BLD: 5.78 K/UL — SIGNIFICANT CHANGE UP (ref 3.8–10.5)
WBC # FLD AUTO: 5.78 K/UL — SIGNIFICANT CHANGE UP (ref 3.8–10.5)

## 2022-04-23 PROCEDURE — 99233 SBSQ HOSP IP/OBS HIGH 50: CPT

## 2022-04-23 RX ORDER — POTASSIUM CHLORIDE 20 MEQ
40 PACKET (EA) ORAL ONCE
Refills: 0 | Status: COMPLETED | OUTPATIENT
Start: 2022-04-23 | End: 2022-04-23

## 2022-04-23 RX ORDER — POTASSIUM CHLORIDE 20 MEQ
10 PACKET (EA) ORAL
Refills: 0 | Status: COMPLETED | OUTPATIENT
Start: 2022-04-23 | End: 2022-04-23

## 2022-04-23 RX ORDER — MAGNESIUM SULFATE 500 MG/ML
2 VIAL (ML) INJECTION ONCE
Refills: 0 | Status: COMPLETED | OUTPATIENT
Start: 2022-04-23 | End: 2022-04-23

## 2022-04-23 RX ADMIN — Medication 100 MILLIEQUIVALENT(S): at 13:55

## 2022-04-23 RX ADMIN — AMLODIPINE BESYLATE 10 MILLIGRAM(S): 2.5 TABLET ORAL at 05:45

## 2022-04-23 RX ADMIN — Medication 81 MILLIGRAM(S): at 12:00

## 2022-04-23 RX ADMIN — SIMVASTATIN 20 MILLIGRAM(S): 20 TABLET, FILM COATED ORAL at 20:43

## 2022-04-23 RX ADMIN — Medication 2.5 MG/HR: at 19:35

## 2022-04-23 RX ADMIN — AMIODARONE HYDROCHLORIDE 400 MILLIGRAM(S): 400 TABLET ORAL at 05:50

## 2022-04-23 RX ADMIN — Medication 1: at 17:01

## 2022-04-23 RX ADMIN — APIXABAN 5 MILLIGRAM(S): 2.5 TABLET, FILM COATED ORAL at 17:01

## 2022-04-23 RX ADMIN — Medication 40 MILLIEQUIVALENT(S): at 20:43

## 2022-04-23 RX ADMIN — Medication 2.5 MG/HR: at 07:18

## 2022-04-23 RX ADMIN — Medication 100 MILLIGRAM(S): at 05:45

## 2022-04-23 RX ADMIN — Medication 100 MILLIEQUIVALENT(S): at 10:50

## 2022-04-23 RX ADMIN — AMIODARONE HYDROCHLORIDE 400 MILLIGRAM(S): 400 TABLET ORAL at 17:01

## 2022-04-23 RX ADMIN — Medication 25 GRAM(S): at 10:49

## 2022-04-23 RX ADMIN — VALSARTAN 320 MILLIGRAM(S): 80 TABLET ORAL at 05:45

## 2022-04-23 RX ADMIN — Medication 40 MILLIEQUIVALENT(S): at 10:50

## 2022-04-23 RX ADMIN — Medication 112 MICROGRAM(S): at 05:45

## 2022-04-23 RX ADMIN — APIXABAN 5 MILLIGRAM(S): 2.5 TABLET, FILM COATED ORAL at 05:45

## 2022-04-23 NOTE — PROGRESS NOTE ADULT - SUBJECTIVE AND OBJECTIVE BOX
[facility] - John Ng, Sandeep, Robson, Jacinto, Karen Wong  Office Number:  [phone]    Patient resting comfortably in bed in NAD.  Laying flat with no respiratory distress.  No complaints of chest pain, dyspnea, palpitations, PND, or orthopnea.  feeling better this morning   s/p mik/dccv and in sr    ROS: negative unless otherwise mentioned.    Telemetry:  sr    MEDICATIONS  (STANDING):  aMIOdarone    Tablet   Oral   aMIOdarone    Tablet 400 milliGRAM(s) Oral two times a day  amLODIPine   Tablet 10 milliGRAM(s) Oral daily  apixaban 5 milliGRAM(s) Oral every 12 hours  aspirin enteric coated 81 milliGRAM(s) Oral daily  dextrose 5%. 1000 milliLiter(s) (50 mL/Hr) IV Continuous <Continuous>  dextrose 5%. 1000 milliLiter(s) (100 mL/Hr) IV Continuous <Continuous>  dextrose 50% Injectable 25 Gram(s) IV Push once  dextrose 50% Injectable 12.5 Gram(s) IV Push once  dextrose 50% Injectable 25 Gram(s) IV Push once  furosemide Infusion 5 mG/Hr (2.5 mL/Hr) IV Continuous <Continuous>  glucagon  Injectable 1 milliGRAM(s) IntraMuscular once  insulin lispro (ADMELOG) corrective regimen sliding scale   SubCutaneous three times a day before meals  insulin lispro (ADMELOG) corrective regimen sliding scale   SubCutaneous at bedtime  levothyroxine 112 MICROGram(s) Oral daily  metoprolol succinate  milliGRAM(s) Oral daily  potassium chloride  10 mEq/100 mL IVPB 10 milliEquivalent(s) IV Intermittent every 1 hour  simvastatin 20 milliGRAM(s) Oral at bedtime  valsartan 320 milliGRAM(s) Oral daily    MEDICATIONS  (PRN):  dextrose Oral Gel 15 Gram(s) Oral once PRN Blood Glucose LESS THAN 70 milliGRAM(s)/deciliter      Allergies    sulfa drugs (Short breath)    Intolerances        Vital Signs Last 24 Hrs  T(C): 36.7 (23 Apr 2022 11:55), Max: 36.7 (23 Apr 2022 11:55)  T(F): 98 (23 Apr 2022 11:55), Max: 98 (23 Apr 2022 11:55)  HR: 69 (23 Apr 2022 11:55) (66 - 75)  BP: 101/63 (23 Apr 2022 11:55) (96/53 - 116/61)  BP(mean): --  RR: 18 (23 Apr 2022 11:55) (16 - 19)  SpO2: 98% (23 Apr 2022 11:55) (94% - 99%)    I&O's Summary    22 Apr 2022 07:01  -  23 Apr 2022 07:00  --------------------------------------------------------  IN: 215 mL / OUT: 2300 mL / NET: -2085 mL    23 Apr 2022 07:01  -  23 Apr 2022 11:57  --------------------------------------------------------  IN: 262.5 mL / OUT: 0 mL / NET: 262.5 mL        ON EXAM:  Constitutional: NAD, awake    HEENT: Moist Mucous Membranes, Anicteric  Pulmonary: Decreased breath sounds b/l. No rales, crackles or wheeze appreciated.   Cardiovascular: Regular, S1 and S2, 2/6 SM   Gastrointestinal: Bowel Sounds present, soft, nontender.   Lymph: ++ peripheral edema. No lymphadenopathy.  Skin: No visible rashes or ulcers.  Psych:  Mood & affect appropriate for situation    LABS: All Labs Reviewed:                        11.8   5.78  )-----------( 134      ( 23 Apr 2022 07:13 )             36.3     23 Apr 2022 07:13    140    |  99     |  22     ----------------------------<  125    3.4     |  26     |  0.84   22 Apr 2022 20:49    144    |  100    |  22     ----------------------------<  160    4.6     |  28     |  0.92   22 Apr 2022 05:57    140    |  101    |  23     ----------------------------<  124    3.5     |  24     |  0.82     Ca    9.9        23 Apr 2022 07:13  Ca    10.8       22 Apr 2022 20:49  Ca    9.7        22 Apr 2022 05:57  Phos  3.4       23 Apr 2022 07:13  Mg     1.6       23 Apr 2022 07:13  Mg     2.0       22 Apr 2022 20:49  Mg     1.6       21 Apr 2022 05:50            Blood Culture:

## 2022-04-23 NOTE — PROGRESS NOTE ADULT - SUBJECTIVE AND OBJECTIVE BOX
24H hour events: Pt without complaint, no acute events overnight, s/p TIM/DCCV yesterday. Tele: SR at 60-80's    MEDICATIONS:  aMIOdarone    Tablet 400 milliGRAM(s) Oral two times a day  apixaban 5 milliGRAM(s) Oral every 12 hours  aspirin enteric coated 81 milliGRAM(s) Oral daily  furosemide Infusion 5 mG/Hr IV Continuous <Continuous>  metoprolol succinate  milliGRAM(s) Oral daily  valsartan 320 milliGRAM(s) Oral daily  dextrose 50% Injectable 25 Gram(s) IV Push once  dextrose 50% Injectable 12.5 Gram(s) IV Push once  dextrose 50% Injectable 25 Gram(s) IV Push once  dextrose Oral Gel 15 Gram(s) Oral once PRN  glucagon  Injectable 1 milliGRAM(s) IntraMuscular once  insulin lispro (ADMELOG) corrective regimen sliding scale   SubCutaneous three times a day before meals  insulin lispro (ADMELOG) corrective regimen sliding scale   SubCutaneous at bedtime  levothyroxine 112 MICROGram(s) Oral daily  simvastatin 20 milliGRAM(s) Oral at bedtime  dextrose 5%. 1000 milliLiter(s) IV Continuous <Continuous>  dextrose 5%. 1000 milliLiter(s) IV Continuous <Continuous>  potassium chloride  10 mEq/100 mL IVPB 10 milliEquivalent(s) IV Intermittent every 1 hour      REVIEW OF SYSTEMS:  Complete 10point ROS negative.    PHYSICAL EXAM:  T(C): 36.7 (04-23-22 @ 11:55), Max: 36.7 (04-23-22 @ 11:55)  HR: 69 (04-23-22 @ 11:55) (66 - 75)  BP: 101/63 (04-23-22 @ 11:55) (100/53 - 116/61)  RR: 18 (04-23-22 @ 11:55) (16 - 19)  SpO2: 98% (04-23-22 @ 11:55) (94% - 99%)  Wt(kg): --  I&O's Summary    22 Apr 2022 07:01  -  23 Apr 2022 07:00  --------------------------------------------------------  IN: 215 mL / OUT: 2300 mL / NET: -2085 mL    23 Apr 2022 07:01  -  23 Apr 2022 13:14  --------------------------------------------------------  IN: 262.5 mL / OUT: 0 mL / NET: 262.5 mL        Appearance: Normal	  Cardiovascular: Normal S1 S2, RRR, + systolic murmur  Respiratory: Crackles on b/l bases  Psychiatry: A & O x 3, Mood & affect appropriate  Gastrointestinal: Soft, Non-tender, + BS	  Skin: No rashes  Extremities: No clubbing or cyanosis. B/L +3 edema  Vascular: Peripheral pulses palpable 2+ bilaterally        LABS:	 	    CBC Full  -  ( 23 Apr 2022 07:13 )  WBC Count : 5.78 K/uL  Hemoglobin : 11.8 g/dL  Hematocrit : 36.3 %  Platelet Count - Automated : 134 K/uL  Mean Cell Volume : 82.9 fl  Mean Cell Hemoglobin : 26.9 pg  Mean Cell Hemoglobin Concentration : 32.5 gm/dL      04-23    140  |  99  |  22  ----------------------------<  125<H>  3.4<L>   |  26  |  0.84  04-22    144  |  100  |  22  ----------------------------<  160<H>  4.6   |  28  |  0.92    Ca    9.9      23 Apr 2022 07:13  Ca    10.8<H>      22 Apr 2022 20:49  Phos  3.4     04-23  Mg     1.6     04-23  Mg     2.0     04-22    proBNP: Serum Pro-Brain Natriuretic Peptide: 839 pg/mL (04-18 @ 16:42)    Thyroid Stimulating Hormone, Serum (04.19.22 @ 09:06)    Thyroid Stimulating Hormone, Serum: 4.11 uIU/mL      TELEMETRY: SR at 60-80's	      < from: Transesophageal Echocardiogram w/o TTE (04.22.22 @ 16:20) >  Dimensions:    Normal Values:  LA:            2.0 - 4.0 cm  Ao:            2.0 - 3.8 cm  SEPTUM:        0.6 - 1.2 cm  PWT:           0.6 - 1.1 cm  LVIDd:         3.0 - 5.6 cm  LVIDs:         1.8 - 4.0 cm  EF (Visual Estimate): 60-65 %  Doppler Peak Velocity (m/sec): AoV=3.4  ------------------------------------------------------------------------  Observations:  Mitral Valve: Mitral annular calcification and calcified  mitral leaflets with decreased diastolic opening.  Mild-moderate mitral regurgitation. Systolic Blunting at  PV. Peak mitral valve gradient equals 11 mm Hg, mean  transmitral valve gradient equals 3 mm Hg, estimated mitral  valve area equals 2 sqcm (by 3D evaluation and planimetry),  consistent with mild mitral stenosis. HR 50  Aortic Valve/Aorta: Bioprosthetic aortic valve. Leaflets  are not well seen, Peak transaortic valve gradient equals  46 mm Hg, mean transaortic valve gradient equals 26 mm Hg,  which is elevated even in the presence of a bioprosthetic  aortic valve. DVI 0.31; Acceleration time 70mm Hg. LVOT VTI  25.4cm , Transaortic VTI 82cm, LVOT diameter 2.0 cm, BSA  1.98  EOA 1sqcm, EOAi 0.51. Suggestive of patient  prosthesis mismatch.  Noaortic valve regurgitation seen.  LVOT diameter: 2 cm.  Left Atrium: Severely dilated left atrium.  LA volume index  = 61 cc/m2. No left atrial or left atrial appendage  thrombus. Decreased left atrial appendage velocities noted.  Left Ventricle: Normal left ventricular systolic function.Increased relative wall thickness with normal left  ventricular mass index, consistent with concentric left  ventricular remodeling. Unable to evaluate diastology due  to MAC  Right Heart: Severe right atrial enlargement. Device wires  are noted in the right heart. Right ventricular enlargement  with decreased right ventricular systolic function. RV BASE  7.5cm (normal <4.1cm) ; RV mid 4.9cm (normal <3.5)  Torrential Tricuspid regurgitation. The 3D Vena contracta  area was 1.15sqcm, the vena contracta 2.1cm. Tethered  tricuspid valve with malcoaptation of the tricuspid valve  leaflets due to a severely dilated annulus (>7cm). There is  a RV wire crossing the TV valve and is predominantly  located near the septal leaflet. The RV wire is not seen be  scarred to or to impinge the septal leaflet;  however.  buy  the nature of the location of the RV wire near the septal  leaflet of the TV, the RV wire may be slightly contributory  to the tricuspid regurgitation as it may prevent the septal  leaflet to rise ("close") to its full potential  (albeit  tethered ) at end diastole.  Pulmonic valve not well  visualized, probably normal.  Pericardium/Pleura: Normal pericardium with no pericardial  effusion.  Hemodynamic: Incomplete tricuspid regurgitation Doppler  envelopes in this study may underestimate RVSP. Agitated  saline injection and color flow Doppler demonstrates no  evidence of a patent foramen ovale.  ------------------------------------------------------------------------  Conclusions:  1. Mitral annular calcification and calcified mitral  leaflets with decreased diastolic opening. Mild-moderatemitral regurgitation. Systolic Blunting at PV. Peak mitral  valve gradient equals 11 mm Hg, mean transmitral valve  gradient equals 3 mm Hg, estimated mitral valve area equals  2 sqcm (by 3D evaluation and planimetry), consistent with  mild mitral stenosis. HR 50  2. Bioprosthetic aortic valve. Leaflets are not well seen,  Peak transaortic valve gradient equals 46 mm Hg, mean  transaortic valve gradient equals 26 mm Hg, which is  elevated even in the presence of a bioprosthetic aortic  valve. DVI 0.31; Acceleration time 70mm Hg. LVOT VTI 25.4cm  , Transaortic VTI 82cm, LVOT diameter 2.0 cm, BSA 1.98  EOA  1sqcm, EOAi 0.51. Suggestive of patient prosthesis  mismatch.  No aortic valve regurgitation seen.  3. Severely dilated left atrium.  LA volume index = 61  cc/m2. No left atrial or left atrial appendage thrombus.  Decreased left atrial appendage velocities noted.  4. Increased relative wall thickness with normal left  ventricular mass index, consistent with concentric left  ventricular remodeling.  5. Normal left ventricular systolic function.  6. Right ventricular enlargement with decreased right  ventricular systolic function. RV BASE 7.5cm (normal  <4.1cm) ; RV mid 4.9cm (normal <3.5)  7. Incomplete tricuspid regurgitation Doppler envelopes in  this study may underestimate RVSP.  8. Torrential Tricuspid regurgitation. The 3D Vena  contracta area was 1.15sqcm, the vena contracta 2.1cm.  Tethered tricuspid valve with malcoaptation of the  tricuspid valve leaflets due to a severely dilated annulus(>7cm). There is a RV wire crossing the TV valve and is  predominantly located near the septal leaflet. The RV wire  is not seen be scarred to or to impinge the septal leaflet;   however.  buy the nature of the location of the RV wire  near the septal leaflet of the TV, the RV wire may be  slightly contributory to the tricuspid regurgitation as it  may prevent the septal leaflet to rise ("close") to its  full potential  (albeit tethered ) at end diastole.    < end of copied text >

## 2022-04-23 NOTE — PROGRESS NOTE ADULT - SUBJECTIVE AND OBJECTIVE BOX
INTERVAL HPI/OVERNIGHT EVENTS:  Pt seen and examined at bedside.     Allergies/Intolerance: sulfa drugs (Short breath)      MEDICATIONS  (STANDING):  aMIOdarone    Tablet   Oral   aMIOdarone    Tablet 400 milliGRAM(s) Oral two times a day  amLODIPine   Tablet 10 milliGRAM(s) Oral daily  apixaban 5 milliGRAM(s) Oral every 12 hours  aspirin enteric coated 81 milliGRAM(s) Oral daily  dextrose 5%. 1000 milliLiter(s) (50 mL/Hr) IV Continuous <Continuous>  dextrose 5%. 1000 milliLiter(s) (100 mL/Hr) IV Continuous <Continuous>  dextrose 50% Injectable 25 Gram(s) IV Push once  dextrose 50% Injectable 12.5 Gram(s) IV Push once  dextrose 50% Injectable 25 Gram(s) IV Push once  furosemide Infusion 5 mG/Hr (2.5 mL/Hr) IV Continuous <Continuous>  glucagon  Injectable 1 milliGRAM(s) IntraMuscular once  insulin lispro (ADMELOG) corrective regimen sliding scale   SubCutaneous three times a day before meals  insulin lispro (ADMELOG) corrective regimen sliding scale   SubCutaneous at bedtime  levothyroxine 112 MICROGram(s) Oral daily  metoprolol succinate  milliGRAM(s) Oral daily  potassium chloride  10 mEq/100 mL IVPB 10 milliEquivalent(s) IV Intermittent every 1 hour  simvastatin 20 milliGRAM(s) Oral at bedtime  valsartan 320 milliGRAM(s) Oral daily    MEDICATIONS  (PRN):  dextrose Oral Gel 15 Gram(s) Oral once PRN Blood Glucose LESS THAN 70 milliGRAM(s)/deciliter        ROS: all systems reviewed and wnl      PHYSICAL EXAMINATION:  Vital Signs Last 24 Hrs  T(C): 36.6 (23 Apr 2022 04:58), Max: 36.6 (22 Apr 2022 12:55)  T(F): 97.9 (23 Apr 2022 04:58), Max: 97.9 (22 Apr 2022 12:55)  HR: 75 (23 Apr 2022 04:58) (66 - 75)  BP: 112/75 (23 Apr 2022 04:58) (96/53 - 116/61)  BP(mean): --  RR: 18 (23 Apr 2022 04:58) (16 - 19)  SpO2: 97% (23 Apr 2022 04:58) (94% - 99%)  CAPILLARY BLOOD GLUCOSE      POCT Blood Glucose.: 134 mg/dL (23 Apr 2022 07:25)  POCT Blood Glucose.: 147 mg/dL (22 Apr 2022 21:23)  POCT Blood Glucose.: 101 mg/dL (22 Apr 2022 16:08)      04-22 @ 07:01  -  04-23 @ 07:00  --------------------------------------------------------  IN: 215 mL / OUT: 2300 mL / NET: -2085 mL    04-23 @ 07:01  -  04-23 @ 11:11  --------------------------------------------------------  IN: 10 mL / OUT: 0 mL / NET: 10 mL        GENERAL: stable, in chair answers all questions, no cough, still some leg edema.   NECK: supple, No JVD  CHEST/LUNG: clear to auscultation bilaterally; no rales, rhonchi, or wheezing b/l  HEART: normal S1, S2  ABDOMEN: BS+, soft, ND, NT   EXTREMITIES:  pulses palpable; no clubbing, cyanosis, 2 plus edema b/l LEs  SKIN: no rashes or lesions      LABS:                        11.8   5.78  )-----------( 134      ( 23 Apr 2022 07:13 )             36.3     04-23    140  |  99  |  22  ----------------------------<  125<H>  3.4<L>   |  26  |  0.84    Ca    9.9      23 Apr 2022 07:13  Phos  3.4     04-23  Mg     1.6     04-23

## 2022-04-23 NOTE — PROGRESS NOTE ADULT - ASSESSMENT
Yolette is a 76 year old female with HTN, AS s/p bio AVR with post-op HB requiring PPM, non obstructive cad on cath in 2016, AF on eliquis, clear cell RCC s/p partial nephrectomy, hypothyroidism, p/w sob, leg swelling, abd swelling.    - initially quite volume overloaded on exam, though improving gradually  - RHF likely from malcoapted TV, w/ severe TR  - diuresing nicely on a Lasix gtt 5 mg/hr and would continue this today  - Please continue to maintain strict I/Os, monitor daily weights, Cr, and K.  - TIM with mild-mod MR, mild MS, mildly elevated bio AVR gradients, RVE/RV dysfunction and severe TR  - Tethered tricuspid valve with malcoaptation of the tricuspid valve leaflets due to a severely dilated annulus, without obvious impingement from the ppm lead  - will speak to structural regarding options    - s/p dccv yesterday  - feels better, and has maintained sr for now  - cont with amiodarone load  - cont with ac and metoprolol    - cath on record in 2016 with non obstructive CAD  - daily weights and I/o's  - cont asa and statin    - bp acceptable, though on lower side  - would cont valsartan  - dc norvasc to allow bp room for diuresis    - Monitor and replete electrolytes. Keep K>4.0 and Mg>2.0.     - Further cardiac workup will depend on clinical course.   - will follow with you

## 2022-04-23 NOTE — PROGRESS NOTE ADULT - ASSESSMENT
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         Plan: Admitted with SOB, leg swelling, abd swelling. 3 months of worsening SOB, initially intermittent and on exertion. Likely has acute  diastolic  CHF.   CT angio , no PE.     Continue IV Lasix 40 mg bid. Afib, on Eliquis, has  PPM.  Leg dopplers negative for DVT.     Continue asa, norvasc, toprol, valsartian. zocor. Echo,  normal ef/ mod  MS. severe  TR, no change to 2016. Abd  u/s,  mod  ascites from CHF.       FS stable at 128, A1C at goal 6.8.     Continue diuresis, daily SMA-7.

## 2022-04-24 LAB
ANION GAP SERPL CALC-SCNC: 16 MMOL/L — SIGNIFICANT CHANGE UP (ref 5–17)
ANION GAP SERPL CALC-SCNC: 17 MMOL/L — SIGNIFICANT CHANGE UP (ref 5–17)
BUN SERPL-MCNC: 24 MG/DL — HIGH (ref 7–23)
BUN SERPL-MCNC: 28 MG/DL — HIGH (ref 7–23)
CALCIUM SERPL-MCNC: 10.9 MG/DL — HIGH (ref 8.4–10.5)
CALCIUM SERPL-MCNC: 9.9 MG/DL — SIGNIFICANT CHANGE UP (ref 8.4–10.5)
CHLORIDE SERPL-SCNC: 97 MMOL/L — SIGNIFICANT CHANGE UP (ref 96–108)
CHLORIDE SERPL-SCNC: 99 MMOL/L — SIGNIFICANT CHANGE UP (ref 96–108)
CO2 SERPL-SCNC: 25 MMOL/L — SIGNIFICANT CHANGE UP (ref 22–31)
CO2 SERPL-SCNC: 29 MMOL/L — SIGNIFICANT CHANGE UP (ref 22–31)
CREAT SERPL-MCNC: 0.93 MG/DL — SIGNIFICANT CHANGE UP (ref 0.5–1.3)
CREAT SERPL-MCNC: 1.13 MG/DL — SIGNIFICANT CHANGE UP (ref 0.5–1.3)
EGFR: 51 ML/MIN/1.73M2 — LOW
EGFR: 64 ML/MIN/1.73M2 — SIGNIFICANT CHANGE UP
GLUCOSE BLDC GLUCOMTR-MCNC: 130 MG/DL — HIGH (ref 70–99)
GLUCOSE BLDC GLUCOMTR-MCNC: 134 MG/DL — HIGH (ref 70–99)
GLUCOSE BLDC GLUCOMTR-MCNC: 148 MG/DL — HIGH (ref 70–99)
GLUCOSE BLDC GLUCOMTR-MCNC: 158 MG/DL — HIGH (ref 70–99)
GLUCOSE SERPL-MCNC: 132 MG/DL — HIGH (ref 70–99)
GLUCOSE SERPL-MCNC: 133 MG/DL — HIGH (ref 70–99)
HCT VFR BLD CALC: 35.8 % — SIGNIFICANT CHANGE UP (ref 34.5–45)
HGB BLD-MCNC: 11.5 G/DL — SIGNIFICANT CHANGE UP (ref 11.5–15.5)
MAGNESIUM SERPL-MCNC: 1.7 MG/DL — SIGNIFICANT CHANGE UP (ref 1.6–2.6)
MAGNESIUM SERPL-MCNC: 2 MG/DL — SIGNIFICANT CHANGE UP (ref 1.6–2.6)
MCHC RBC-ENTMCNC: 26.8 PG — LOW (ref 27–34)
MCHC RBC-ENTMCNC: 32.1 GM/DL — SIGNIFICANT CHANGE UP (ref 32–36)
MCV RBC AUTO: 83.4 FL — SIGNIFICANT CHANGE UP (ref 80–100)
NRBC # BLD: 0 /100 WBCS — SIGNIFICANT CHANGE UP (ref 0–0)
PHOSPHATE SERPL-MCNC: 3.5 MG/DL — SIGNIFICANT CHANGE UP (ref 2.5–4.5)
PLATELET # BLD AUTO: 127 K/UL — LOW (ref 150–400)
POTASSIUM SERPL-MCNC: 3.3 MMOL/L — LOW (ref 3.5–5.3)
POTASSIUM SERPL-MCNC: 3.5 MMOL/L — SIGNIFICANT CHANGE UP (ref 3.5–5.3)
POTASSIUM SERPL-SCNC: 3.3 MMOL/L — LOW (ref 3.5–5.3)
POTASSIUM SERPL-SCNC: 3.5 MMOL/L — SIGNIFICANT CHANGE UP (ref 3.5–5.3)
RBC # BLD: 4.29 M/UL — SIGNIFICANT CHANGE UP (ref 3.8–5.2)
RBC # FLD: 16.5 % — HIGH (ref 10.3–14.5)
SODIUM SERPL-SCNC: 140 MMOL/L — SIGNIFICANT CHANGE UP (ref 135–145)
SODIUM SERPL-SCNC: 143 MMOL/L — SIGNIFICANT CHANGE UP (ref 135–145)
WBC # BLD: 5.92 K/UL — SIGNIFICANT CHANGE UP (ref 3.8–10.5)
WBC # FLD AUTO: 5.92 K/UL — SIGNIFICANT CHANGE UP (ref 3.8–10.5)

## 2022-04-24 PROCEDURE — 99233 SBSQ HOSP IP/OBS HIGH 50: CPT

## 2022-04-24 RX ORDER — POTASSIUM CHLORIDE 20 MEQ
40 PACKET (EA) ORAL EVERY 4 HOURS
Refills: 0 | Status: COMPLETED | OUTPATIENT
Start: 2022-04-24 | End: 2022-04-25

## 2022-04-24 RX ORDER — MAGNESIUM SULFATE 500 MG/ML
1 VIAL (ML) INJECTION ONCE
Refills: 0 | Status: COMPLETED | OUTPATIENT
Start: 2022-04-24 | End: 2022-04-24

## 2022-04-24 RX ORDER — POTASSIUM CHLORIDE 20 MEQ
40 PACKET (EA) ORAL ONCE
Refills: 0 | Status: COMPLETED | OUTPATIENT
Start: 2022-04-24 | End: 2022-04-24

## 2022-04-24 RX ADMIN — Medication 40 MILLIEQUIVALENT(S): at 10:54

## 2022-04-24 RX ADMIN — SIMVASTATIN 20 MILLIGRAM(S): 20 TABLET, FILM COATED ORAL at 21:18

## 2022-04-24 RX ADMIN — Medication 1: at 16:33

## 2022-04-24 RX ADMIN — APIXABAN 5 MILLIGRAM(S): 2.5 TABLET, FILM COATED ORAL at 05:33

## 2022-04-24 RX ADMIN — VALSARTAN 320 MILLIGRAM(S): 80 TABLET ORAL at 05:33

## 2022-04-24 RX ADMIN — Medication 81 MILLIGRAM(S): at 11:43

## 2022-04-24 RX ADMIN — Medication 100 GRAM(S): at 10:54

## 2022-04-24 RX ADMIN — Medication 40 MILLIEQUIVALENT(S): at 21:18

## 2022-04-24 RX ADMIN — Medication 2.5 MG/HR: at 21:21

## 2022-04-24 RX ADMIN — Medication 112 MICROGRAM(S): at 05:33

## 2022-04-24 RX ADMIN — AMIODARONE HYDROCHLORIDE 400 MILLIGRAM(S): 400 TABLET ORAL at 05:33

## 2022-04-24 RX ADMIN — AMIODARONE HYDROCHLORIDE 400 MILLIGRAM(S): 400 TABLET ORAL at 17:07

## 2022-04-24 RX ADMIN — Medication 100 MILLIGRAM(S): at 05:33

## 2022-04-24 RX ADMIN — Medication 2.5 MG/HR: at 07:23

## 2022-04-24 RX ADMIN — APIXABAN 5 MILLIGRAM(S): 2.5 TABLET, FILM COATED ORAL at 17:07

## 2022-04-24 NOTE — PROGRESS NOTE ADULT - SUBJECTIVE AND OBJECTIVE BOX
afberile  REVIEW OF SYSTEMS:  GEN: no fever,    no chills  RESP: no SOB,   no cough  CVS: no chest pain,   no palpitations  GI: no abdominal pain,   no nausea,   no vomiting,   no constipation,   no diarrhea  : no dysuria,   no frequency  NEURO: no headache,   no dizziness  PSYCH: no depression,   not anxious  Derm : no rash    MEDICATIONS  (STANDING):  aMIOdarone    Tablet   Oral   aMIOdarone    Tablet 400 milliGRAM(s) Oral two times a day  apixaban 5 milliGRAM(s) Oral every 12 hours  aspirin enteric coated 81 milliGRAM(s) Oral daily  dextrose 5%. 1000 milliLiter(s) (50 mL/Hr) IV Continuous <Continuous>  dextrose 5%. 1000 milliLiter(s) (100 mL/Hr) IV Continuous <Continuous>  dextrose 50% Injectable 25 Gram(s) IV Push once  dextrose 50% Injectable 12.5 Gram(s) IV Push once  dextrose 50% Injectable 25 Gram(s) IV Push once  furosemide Infusion 5 mG/Hr (2.5 mL/Hr) IV Continuous <Continuous>  glucagon  Injectable 1 milliGRAM(s) IntraMuscular once  insulin lispro (ADMELOG) corrective regimen sliding scale   SubCutaneous three times a day before meals  insulin lispro (ADMELOG) corrective regimen sliding scale   SubCutaneous at bedtime  levothyroxine 112 MICROGram(s) Oral daily  metoprolol succinate  milliGRAM(s) Oral daily  simvastatin 20 milliGRAM(s) Oral at bedtime  valsartan 320 milliGRAM(s) Oral daily    MEDICATIONS  (PRN):  dextrose Oral Gel 15 Gram(s) Oral once PRN Blood Glucose LESS THAN 70 milliGRAM(s)/deciliter      Vital Signs Last 24 Hrs  T(C): 36.4 (24 Apr 2022 04:57), Max: 36.7 (23 Apr 2022 11:55)  T(F): 97.5 (24 Apr 2022 04:57), Max: 98 (23 Apr 2022 11:55)  HR: 82 (24 Apr 2022 04:57) (65 - 82)  BP: 122/68 (24 Apr 2022 04:57) (101/63 - 122/68)  BP(mean): --  RR: 18 (24 Apr 2022 04:57) (18 - 19)  SpO2: 99% (24 Apr 2022 04:57) (98% - 99%)  CAPILLARY BLOOD GLUCOSE      POCT Blood Glucose.: 142 mg/dL (23 Apr 2022 21:07)  POCT Blood Glucose.: 159 mg/dL (23 Apr 2022 16:05)  POCT Blood Glucose.: 142 mg/dL (23 Apr 2022 11:27)  POCT Blood Glucose.: 134 mg/dL (23 Apr 2022 07:25)    I&O's Summary    22 Apr 2022 07:01  -  23 Apr 2022 07:00  --------------------------------------------------------  IN: 215 mL / OUT: 2300 mL / NET: -2085 mL    23 Apr 2022 07:01  -  24 Apr 2022 06:53  --------------------------------------------------------  IN: 1335 mL / OUT: 3000 mL / NET: -1665 mL        PHYSICAL EXAM:  HEAD:  Atraumatic, Normocephalic  NECK: Supple, No   JVD  CHEST/LUNG:   no     rales,     no,    rhonchi  HEART: Regular rate and rhythm;         murmur  ABDOMEN: Soft, Nontender, ;   EXTREMITIES:   no     edema  NEUROLOGY:  alert    LABS:                        11.8   5.78  )-----------( 134      ( 23 Apr 2022 07:13 )             36.3     04-23    141  |  97  |  22  ----------------------------<  146<H>  3.7   |  27  |  1.04    Ca    10.4      23 Apr 2022 18:21  Phos  3.4     04-23  Mg     1.6     04-23                      Thyroid Stimulating Hormone, Serum: 4.11 uIU/mL (04-19 @ 09:06)          Consultant(s) Notes Reviewed:      Care Discussed with Consultants/Other Providers:

## 2022-04-24 NOTE — PROGRESS NOTE ADULT - SUBJECTIVE AND OBJECTIVE BOX
[facility] - John Ng, Sandeep, Robson, Jacinto, Karen Wnog  Office Number:  [phone]    Patient resting comfortably in bed in NAD.  Laying flat with no respiratory distress.  No complaints of chest pain, dyspnea, palpitations, PND, or orthopnea.  feeling much better    ROS: negative unless otherwise mentioned.    Telemetry:  sr    MEDICATIONS  (STANDING):  aMIOdarone    Tablet   Oral   aMIOdarone    Tablet 400 milliGRAM(s) Oral two times a day  apixaban 5 milliGRAM(s) Oral every 12 hours  aspirin enteric coated 81 milliGRAM(s) Oral daily  dextrose 5%. 1000 milliLiter(s) (50 mL/Hr) IV Continuous <Continuous>  dextrose 5%. 1000 milliLiter(s) (100 mL/Hr) IV Continuous <Continuous>  dextrose 50% Injectable 25 Gram(s) IV Push once  dextrose 50% Injectable 12.5 Gram(s) IV Push once  dextrose 50% Injectable 25 Gram(s) IV Push once  furosemide Infusion 5 mG/Hr (2.5 mL/Hr) IV Continuous <Continuous>  glucagon  Injectable 1 milliGRAM(s) IntraMuscular once  insulin lispro (ADMELOG) corrective regimen sliding scale   SubCutaneous three times a day before meals  insulin lispro (ADMELOG) corrective regimen sliding scale   SubCutaneous at bedtime  levothyroxine 112 MICROGram(s) Oral daily  metoprolol succinate  milliGRAM(s) Oral daily  simvastatin 20 milliGRAM(s) Oral at bedtime  valsartan 320 milliGRAM(s) Oral daily    MEDICATIONS  (PRN):  dextrose Oral Gel 15 Gram(s) Oral once PRN Blood Glucose LESS THAN 70 milliGRAM(s)/deciliter      Allergies    sulfa drugs (Short breath)    Intolerances        Vital Signs Last 24 Hrs  T(C): 36.8 (24 Apr 2022 11:48), Max: 36.8 (24 Apr 2022 11:48)  T(F): 98.3 (24 Apr 2022 11:48), Max: 98.3 (24 Apr 2022 11:48)  HR: 64 (24 Apr 2022 11:48) (64 - 82)  BP: 122/68 (24 Apr 2022 04:57) (101/63 - 122/68)  BP(mean): --  RR: 18 (24 Apr 2022 11:48) (18 - 19)  SpO2: 99% (24 Apr 2022 11:48) (99% - 99%)    I&O's Summary    23 Apr 2022 07:01  -  24 Apr 2022 07:00  --------------------------------------------------------  IN: 1335 mL / OUT: 3000 mL / NET: -1665 mL    24 Apr 2022 07:01  -  24 Apr 2022 13:50  --------------------------------------------------------  IN: 117.5 mL / OUT: 0 mL / NET: 117.5 mL        ON EXAM:  Constitutional: NAD, awake    HEENT: Moist Mucous Membranes, Anicteric  Pulmonary: Decreased breath sounds b/l. No rales, crackles or wheeze appreciated.   Cardiovascular: Regular, S1 and S2, 2/6 SM   Gastrointestinal: Bowel Sounds present, soft, nontender.   Lymph: ++ peripheral edema. No lymphadenopathy.  Skin: No visible rashes or ulcers.  Psych:  Mood & affect appropriate for situation      LABS: All Labs Reviewed:                        11.5   5.92  )-----------( 127      ( 24 Apr 2022 06:38 )             35.8                         11.8   5.78  )-----------( 134      ( 23 Apr 2022 07:13 )             36.3     24 Apr 2022 06:37    140    |  99     |  24     ----------------------------<  132    3.5     |  25     |  0.93   23 Apr 2022 18:21    141    |  97     |  22     ----------------------------<  146    3.7     |  27     |  1.04   23 Apr 2022 07:13    140    |  99     |  22     ----------------------------<  125    3.4     |  26     |  0.84     Ca    9.9        24 Apr 2022 06:37  Ca    10.4       23 Apr 2022 18:21  Ca    9.9        23 Apr 2022 07:13  Phos  3.5       24 Apr 2022 06:37  Phos  3.4       23 Apr 2022 07:13  Mg     1.7       24 Apr 2022 06:37  Mg     1.6       23 Apr 2022 07:13  Mg     2.0       22 Apr 2022 20:49            Blood Culture:

## 2022-04-24 NOTE — PROGRESS NOTE ADULT - ASSESSMENT
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   3 months of worsening SOB, initially intermittent and on exertion    acute  diastolic  chf  ct  angio , no PE  on iv lasix   card   dr shoshana Funes, on eliquis, has  PPM   HTN/ HLD , on  asa. , toprol, valsartian. zocor  DM, follow  fs   Echo,  normal ef/ mod  MS. severe  TR  Abd  u/s,  mod  ascites,   seen by ep/  await struc herat  for  severe TR  on amio  loading     rad< from: CT Angio Chest PE Protocol w/ IV Cont (04.18.22 @ 17:40) >  FINDINGS:  LUNGS AND AIRWAYS: Patent central airways.  Lungs are clear.  PLEURA: Trace right pleural effusion/pleural thickening.  MEDIASTINUM AND YASMANI: No lymphadenopathy.  HEAR: Heart is enlarged in size. Patient is status post aortic valve   replacement. Calcification of the coronary arteries is noted. Pacemaker   is noted in place. No pericardial effusion is noted. Pulmonary arteries  are normal in caliber. No filling defects are noted.  CHEST WALL AND LOWER NECK: Within normal limits.  VISUALIZED UPPER ABDOMEN: Ascites.  BONES: Degenerative changes of the spine are noted.    IMPRESSION:  No pulmonary embolus is noted.  --- End of Report ---  < end of copied text >

## 2022-04-24 NOTE — PROGRESS NOTE ADULT - ASSESSMENT
Yolette is a 76 year old female with HTN, AS s/p bio AVR with post-op HB requiring PPM, non obstructive cad on cath in 2016, AF on eliquis, clear cell RCC s/p partial nephrectomy, hypothyroidism, p/w sob, leg swelling, abd swelling.    - initially quite volume overloaded on exam, though improving gradually  - RHF likely from malcoapted TV, w/ severe TR  - diuresing nicely on a Lasix gtt 5 mg/hr and would continue this today (she is -1.6 L over last 24 hours). Will probably change back to bid dosing in next 24 hours.  - Please continue to maintain strict I/Os, monitor daily weights, Cr, and K.  - TIM with mild-mod MR, mild MS, mildly elevated bio AVR gradients, RVE/RV dysfunction and severe TR  - Tethered tricuspid valve with malcoaptation of the tricuspid valve leaflets due to a severely dilated annulus, without obvious impingement from the ppm lead  - will speak to structural regarding options    - s/p dccv yesterday  - feels better, and has maintained sr for now  - cont with amiodarone load  - cont with ac and metoprolol    - cath on record in 2016 with non obstructive CAD  - daily weights and I/o's  - cont asa and statin    - bp acceptable, though on lower side  - would cont valsartan  - dc norvasc to allow bp room for diuresis    - Monitor and replete electrolytes. Keep K>4.0 and Mg>2.0.     - Further cardiac workup will depend on clinical course.   - will follow with you

## 2022-04-25 DIAGNOSIS — I50.813 ACUTE ON CHRONIC RIGHT HEART FAILURE: ICD-10-CM

## 2022-04-25 DIAGNOSIS — I48.19 OTHER PERSISTENT ATRIAL FIBRILLATION: ICD-10-CM

## 2022-04-25 DIAGNOSIS — I07.1 RHEUMATIC TRICUSPID INSUFFICIENCY: ICD-10-CM

## 2022-04-25 LAB
ALBUMIN SERPL ELPH-MCNC: 4.1 G/DL — SIGNIFICANT CHANGE UP (ref 3.3–5)
ALP SERPL-CCNC: 87 U/L — SIGNIFICANT CHANGE UP (ref 40–120)
ALT FLD-CCNC: 26 U/L — SIGNIFICANT CHANGE UP (ref 10–45)
ANION GAP SERPL CALC-SCNC: 16 MMOL/L — SIGNIFICANT CHANGE UP (ref 5–17)
ANION GAP SERPL CALC-SCNC: 18 MMOL/L — HIGH (ref 5–17)
AST SERPL-CCNC: 54 U/L — HIGH (ref 10–40)
BILIRUB SERPL-MCNC: 1.3 MG/DL — HIGH (ref 0.2–1.2)
BUN SERPL-MCNC: 28 MG/DL — HIGH (ref 7–23)
BUN SERPL-MCNC: 33 MG/DL — HIGH (ref 7–23)
CALCIUM SERPL-MCNC: 10.4 MG/DL — SIGNIFICANT CHANGE UP (ref 8.4–10.5)
CALCIUM SERPL-MCNC: 10.7 MG/DL — HIGH (ref 8.4–10.5)
CHLORIDE SERPL-SCNC: 96 MMOL/L — SIGNIFICANT CHANGE UP (ref 96–108)
CHLORIDE SERPL-SCNC: 96 MMOL/L — SIGNIFICANT CHANGE UP (ref 96–108)
CO2 SERPL-SCNC: 28 MMOL/L — SIGNIFICANT CHANGE UP (ref 22–31)
CO2 SERPL-SCNC: 28 MMOL/L — SIGNIFICANT CHANGE UP (ref 22–31)
CREAT SERPL-MCNC: 1.05 MG/DL — SIGNIFICANT CHANGE UP (ref 0.5–1.3)
CREAT SERPL-MCNC: 1.14 MG/DL — SIGNIFICANT CHANGE UP (ref 0.5–1.3)
EGFR: 50 ML/MIN/1.73M2 — LOW
EGFR: 55 ML/MIN/1.73M2 — LOW
GLUCOSE BLDC GLUCOMTR-MCNC: 123 MG/DL — HIGH (ref 70–99)
GLUCOSE BLDC GLUCOMTR-MCNC: 139 MG/DL — HIGH (ref 70–99)
GLUCOSE BLDC GLUCOMTR-MCNC: 149 MG/DL — HIGH (ref 70–99)
GLUCOSE BLDC GLUCOMTR-MCNC: 156 MG/DL — HIGH (ref 70–99)
GLUCOSE SERPL-MCNC: 130 MG/DL — HIGH (ref 70–99)
GLUCOSE SERPL-MCNC: 167 MG/DL — HIGH (ref 70–99)
MAGNESIUM SERPL-MCNC: 1.8 MG/DL — SIGNIFICANT CHANGE UP (ref 1.6–2.6)
MAGNESIUM SERPL-MCNC: 1.9 MG/DL — SIGNIFICANT CHANGE UP (ref 1.6–2.6)
POTASSIUM SERPL-MCNC: 3.4 MMOL/L — LOW (ref 3.5–5.3)
POTASSIUM SERPL-MCNC: 3.9 MMOL/L — SIGNIFICANT CHANGE UP (ref 3.5–5.3)
POTASSIUM SERPL-SCNC: 3.4 MMOL/L — LOW (ref 3.5–5.3)
POTASSIUM SERPL-SCNC: 3.9 MMOL/L — SIGNIFICANT CHANGE UP (ref 3.5–5.3)
PROT SERPL-MCNC: 7.3 G/DL — SIGNIFICANT CHANGE UP (ref 6–8.3)
SARS-COV-2 RNA SPEC QL NAA+PROBE: SIGNIFICANT CHANGE UP
SODIUM SERPL-SCNC: 140 MMOL/L — SIGNIFICANT CHANGE UP (ref 135–145)
SODIUM SERPL-SCNC: 142 MMOL/L — SIGNIFICANT CHANGE UP (ref 135–145)

## 2022-04-25 PROCEDURE — 99233 SBSQ HOSP IP/OBS HIGH 50: CPT

## 2022-04-25 RX ORDER — MAGNESIUM SULFATE 500 MG/ML
1 VIAL (ML) INJECTION ONCE
Refills: 0 | Status: COMPLETED | OUTPATIENT
Start: 2022-04-25 | End: 2022-04-25

## 2022-04-25 RX ORDER — POTASSIUM CHLORIDE 20 MEQ
40 PACKET (EA) ORAL ONCE
Refills: 0 | Status: COMPLETED | OUTPATIENT
Start: 2022-04-25 | End: 2022-04-25

## 2022-04-25 RX ADMIN — Medication 0: at 17:00

## 2022-04-25 RX ADMIN — APIXABAN 5 MILLIGRAM(S): 2.5 TABLET, FILM COATED ORAL at 17:43

## 2022-04-25 RX ADMIN — Medication 40 MILLIEQUIVALENT(S): at 11:18

## 2022-04-25 RX ADMIN — Medication 100 GRAM(S): at 20:35

## 2022-04-25 RX ADMIN — Medication 100 GRAM(S): at 11:19

## 2022-04-25 RX ADMIN — SIMVASTATIN 20 MILLIGRAM(S): 20 TABLET, FILM COATED ORAL at 21:48

## 2022-04-25 RX ADMIN — VALSARTAN 320 MILLIGRAM(S): 80 TABLET ORAL at 05:17

## 2022-04-25 RX ADMIN — Medication 0: at 12:06

## 2022-04-25 RX ADMIN — AMIODARONE HYDROCHLORIDE 400 MILLIGRAM(S): 400 TABLET ORAL at 05:17

## 2022-04-25 RX ADMIN — Medication 81 MILLIGRAM(S): at 11:19

## 2022-04-25 RX ADMIN — Medication 40 MILLIEQUIVALENT(S): at 05:16

## 2022-04-25 RX ADMIN — APIXABAN 5 MILLIGRAM(S): 2.5 TABLET, FILM COATED ORAL at 05:38

## 2022-04-25 RX ADMIN — Medication 2.5 MG/HR: at 19:43

## 2022-04-25 RX ADMIN — Medication 112 MICROGRAM(S): at 05:17

## 2022-04-25 RX ADMIN — Medication 100 MILLIGRAM(S): at 05:17

## 2022-04-25 RX ADMIN — AMIODARONE HYDROCHLORIDE 400 MILLIGRAM(S): 400 TABLET ORAL at 17:43

## 2022-04-25 NOTE — PROGRESS NOTE ADULT - SUBJECTIVE AND OBJECTIVE BOX
[facility] - John Ng, Sandeep, Robson, Jacinto, Karen Wong  Office Number:  [phone]    Patient resting comfortably in bed in NAD.  Laying flat with no respiratory distress.  No complaints of chest pain, dyspnea, palpitations, PND, or orthopnea.  says she is feeling better  still with edema    ROS: negative unless otherwise mentioned.    Telemetry:  sr 1st degree    MEDICATIONS  (STANDING):  aMIOdarone    Tablet   Oral   aMIOdarone    Tablet 400 milliGRAM(s) Oral two times a day  apixaban 5 milliGRAM(s) Oral every 12 hours  aspirin enteric coated 81 milliGRAM(s) Oral daily  dextrose 5%. 1000 milliLiter(s) (100 mL/Hr) IV Continuous <Continuous>  dextrose 5%. 1000 milliLiter(s) (50 mL/Hr) IV Continuous <Continuous>  dextrose 50% Injectable 25 Gram(s) IV Push once  dextrose 50% Injectable 12.5 Gram(s) IV Push once  dextrose 50% Injectable 25 Gram(s) IV Push once  furosemide Infusion 5 mG/Hr (2.5 mL/Hr) IV Continuous <Continuous>  glucagon  Injectable 1 milliGRAM(s) IntraMuscular once  insulin lispro (ADMELOG) corrective regimen sliding scale   SubCutaneous three times a day before meals  insulin lispro (ADMELOG) corrective regimen sliding scale   SubCutaneous at bedtime  levothyroxine 112 MICROGram(s) Oral daily  metoprolol succinate  milliGRAM(s) Oral daily  simvastatin 20 milliGRAM(s) Oral at bedtime  valsartan 320 milliGRAM(s) Oral daily    MEDICATIONS  (PRN):  dextrose Oral Gel 15 Gram(s) Oral once PRN Blood Glucose LESS THAN 70 milliGRAM(s)/deciliter      Allergies    sulfa drugs (Short breath)    Intolerances        Vital Signs Last 24 Hrs  T(C): 36.6 (25 Apr 2022 04:57), Max: 36.8 (24 Apr 2022 11:48)  T(F): 97.9 (25 Apr 2022 04:57), Max: 98.3 (24 Apr 2022 11:48)  HR: 85 (25 Apr 2022 04:57) (64 - 85)  BP: 122/74 (25 Apr 2022 04:57) (102/63 - 122/74)  BP(mean): --  RR: 18 (25 Apr 2022 04:57) (18 - 19)  SpO2: 99% (25 Apr 2022 04:57) (97% - 99%)    I&O's Summary    24 Apr 2022 07:01  -  25 Apr 2022 07:00  --------------------------------------------------------  IN: 1120 mL / OUT: 1700 mL / NET: -580 mL        ON EXAM:    Constitutional: NAD, awake    HEENT: Moist Mucous Membranes, Anicteric  Pulmonary: Decreased breath sounds b/l. No rales, crackles or wheeze appreciated.   Cardiovascular: Regular, S1 and S2, 2/6 SM   Gastrointestinal: Bowel Sounds present, soft, nontender.   Lymph: ++ peripheral edema, with erythema of feet. No lymphadenopathy.  Skin: No visible rashes or ulcers.  Psych:  Mood & affect appropriate for situation    LABS: All Labs Reviewed:                        11.5   5.92  )-----------( 127      ( 24 Apr 2022 06:38 )             35.8                         11.8   5.78  )-----------( 134      ( 23 Apr 2022 07:13 )             36.3     24 Apr 2022 20:09    143    |  97     |  28     ----------------------------<  133    3.3     |  29     |  1.13   24 Apr 2022 06:37    140    |  99     |  24     ----------------------------<  132    3.5     |  25     |  0.93   23 Apr 2022 18:21    141    |  97     |  22     ----------------------------<  146    3.7     |  27     |  1.04     Ca    10.9       24 Apr 2022 20:09  Ca    9.9        24 Apr 2022 06:37  Ca    10.4       23 Apr 2022 18:21  Phos  3.5       24 Apr 2022 06:37  Phos  3.4       23 Apr 2022 07:13  Mg     2.0       24 Apr 2022 20:09  Mg     1.7       24 Apr 2022 06:37  Mg     1.6       23 Apr 2022 07:13            Blood Culture:

## 2022-04-25 NOTE — CONSULT NOTE ADULT - ASSESSMENT
Yolette is a 76 year old female with HTN, AS s/p bio AVR with post-op HB requiring PPM, non obstructive cad on cath in 2016, AF on eliquis, clear cell RCC s/p partial nephrectomy, hypothyroidism, p/w sob, leg swelling, abd swelling.    - mildly volume overloaded on exam, and feels better with iv lasix  - CT chest with only trace pleural effusions  - cont iv lasix for now and trend creatinine and electrolytes.   - await echocardiogram  - cath on record in 2016 with non obstructive CAD  - daily weights and I/o's  - cont asa and statin    - history of af and has been on eliquis  - cont metoprolol and ac  - her last ppm interrogation was a remote check in 3/22. Her symptoms seem to have all began in 12/21, which is about the time she went into AF, and has remained in since/paced >98% of time.  - will call for [employer] ppm interrogation    - bp acceptable and would cont amlodipine and valsartan    - will follow with you
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
76 year old female with HTN, AS s/p bio AVR with post-op HB requiring PPM, non obstructive cad on cath in 2016, AF on eliquis, clear cell RCC s/p partial nephrectomy, hypothyroidism, p/w sob, leg swelling, abd swelling. EP consulted for evaluation of chronic afib that started in 12/2022.     #afib  - rate 60-80s on home metoprolol succinate 100 daily  - will get TIM/DCCV tomorrow  - will assess pacer wire through TIM, although on TTE, does not appear to be causing TR  - please get evaluation for TR from structural team  - c/w Eliquis 5 BID  - NPO after MN, active T&S, active COVID within 48 hrs of tomorrow am  - consent placed in chart

## 2022-04-25 NOTE — PROGRESS NOTE ADULT - ASSESSMENT
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   3 months of worsening SOB, initially intermittent and on exertion    acute  diastolic  chf  ct  angio , no PE  on iv lasix   card   dr shoshana Funes, on eliquis, has  PPM   HTN/ HLD , on  asa. , toprol, valsartian. zocor  DM, follow  fs   Echo,  normal ef/ mod  MS. severe  TR  Abd  u/s,  mod  ascites,   seen by ep/  await struc herat  for  severe TR  on amio  loading/  doing well/ in NSR now     rad< from: CT Angio Chest PE Protocol w/ IV Cont (04.18.22 @ 17:40) >  FINDINGS:  LUNGS AND AIRWAYS: Patent central airways.  Lungs are clear.  PLEURA: Trace right pleural effusion/pleural thickening.  MEDIASTINUM AND YASMANI: No lymphadenopathy.  HEAR: Heart is enlarged in size. Patient is status post aortic valve   replacement. Calcification of the coronary arteries is noted. Pacemaker   is noted in place. No pericardial effusion is noted. Pulmonary arteries  are normal in caliber. No filling defects are noted.  CHEST WALL AND LOWER NECK: Within normal limits.  VISUALIZED UPPER ABDOMEN: Ascites.  BONES: Degenerative changes of the spine are noted.    IMPRESSION:  No pulmonary embolus is noted.  --- End of Report ---  < end of copied text >

## 2022-04-25 NOTE — CONSULT NOTE ADULT - PROBLEM SELECTOR RECOMMENDATION 2
Patient currently remains on IV Furosemide, and is diuresing well. She states that she still has some mild SOB at times, but is feeling much better. She has been walking around her room without any issues, and I encouraged her to walk around the unit with assistance as tolerated. Her edema is reportedly less than when she came into the hospital. Would transition to oral furosemide when able.  Continue with metoprolol and valsartan.

## 2022-04-25 NOTE — DIETITIAN INITIAL EVALUATION ADULT - PERTINENT LABORATORY DATA
04-25    142  |  96  |  28<H>  ----------------------------<  130<H>  3.4<L>   |  28  |  1.05    Ca    10.4      25 Apr 2022 07:29  Phos  3.5     04-24  Mg     1.8     04-25    TPro  7.3  /  Alb  4.1  /  TBili  1.3<H>  /  DBili  x   /  AST  54<H>  /  ALT  26  /  AlkPhos  87  04-25  POCT Blood Glucose.: 139 mg/dL (04-25-22 @ 11:34)  A1C with Estimated Average Glucose Result: 6.8 % (04-19-22 @ 09:15)

## 2022-04-25 NOTE — CONSULT NOTE ADULT - SUBJECTIVE AND OBJECTIVE BOX
[facility] - John Ng, Robson Hopkins, Marvin Casey Savella  Office Number: [phone]    Initial Consult Note    CHIEF COMPLAINT: Patient is a 76y old  Female who presents with a chief complaint of sob, leg swelling, abd swelling (19 Apr 2022 07:04)      HPI:  76F c hx HTN, AS s/p bioAVR, AFib s/p medtronic PPM on eliquis,, left clear cell RCC s/p partial nephrectomy, hypothyroidism, pw sob, leg swelling, abd swelling.    Pt reports about 3 months of worsening SOB, initially intermittent and on exertion. Now the SOB is near constant with minimal exertion, worse with lying down. Denies CP, fevers, coughing. Pt also reports worsening leg swelling and abd bloating/swelling over the past 1 month. Reports coughing about 1 month ago that has since resolved. Pt states she's seen a cardiologist and pulm as outpt with ?unknown workup but reportedly normal. Pt is not sure if she has sulfa allergy, but has taken lasix in the past without adverse effect. Pt denies any relieving factors.  (18 Apr 2022 22:26)    She was last seen in our office in 12/21.  She reports worsening exertional dyspnea over the last few months  no exertional angina or palpitations.     PAST MEDICAL & SURGICAL HISTORY:  Hypertension  x10 years    Dyslipidemia    Hiatal hernia    Type II diabetes mellitus    Aortic stenosis    Cancer of kidney, left    Right bundle branch block (RBBB)    Anemia    Hypothyroidism    Status post unilateral salpingo-oophorectomy    S/P T&amp;A (status post tonsillectomy and adenoidectomy)    H/O hand surgery    H/O partial nephrectomy  right 20125    H/O aortic valve replacement    Elective surgery  [facility]  2016        SOCIAL HISTORY:  No tobacco, ethanol, or drug abuse.    FAMILY HISTORY:  FH: renal cell carcinoma (Child)      No family history of acute MI or sudden cardiac death.    MEDICATIONS  (STANDING):  amLODIPine   Tablet 10 milliGRAM(s) Oral daily  apixaban 5 milliGRAM(s) Oral every 12 hours  aspirin enteric coated 81 milliGRAM(s) Oral daily  furosemide   Injectable 20 milliGRAM(s) IV Push two times a day  levothyroxine 112 MICROGram(s) Oral daily  magnesium sulfate  IVPB 1 Gram(s) IV Intermittent once  metoprolol succinate  milliGRAM(s) Oral daily  simvastatin 20 milliGRAM(s) Oral at bedtime  valsartan 320 milliGRAM(s) Oral daily    MEDICATIONS  (PRN):      Allergies    sulfa drugs (Short breath)    Intolerances        REVIEW OF SYSTEMS:    CONSTITUTIONAL: No weakness, fevers or chills  EYES/ENT: No visual changes;  No vertigo or throat pain   NECK: No pain or stiffness  RESPIRATORY: No cough, wheezing, hemoptysis; reports shortness of breath  CARDIOVASCULAR: No chest pain or palpitations  GASTROINTESTINAL: No abdominal pain. No nausea, vomiting, or hematemesis; No diarrhea or constipation. No melena or hematochezia.  GENITOURINARY: No dysuria, frequency or hematuria  NEUROLOGICAL: No numbness or weakness  SKIN: No itching or rash  All other review of systems is negative unless indicated above    VITAL SIGNS:   Vital Signs Last 24 Hrs  T(C): 36.4 (19 Apr 2022 04:41), Max: 37.1 (18 Apr 2022 16:31)  T(F): 97.6 (19 Apr 2022 04:41), Max: 98.7 (18 Apr 2022 16:31)  HR: 61 (19 Apr 2022 04:41) (57 - 74)  BP: 116/68 (19 Apr 2022 04:41) (110/66 - 144/63)  BP(mean): --  RR: 18 (19 Apr 2022 04:41) (18 - 22)  SpO2: 96% (19 Apr 2022 04:41) (96% - 99%)    I&O's Summary      On Exam:    Constitutional: NAD, alert and oriented x 3  Lungs:  Non-labored, breath sounds are decreased bilaterally, No wheezing, rales or rhonchi  Cardiovascular: RRR.  S1 and S2 positive.  No murmurs, rubs, gallops or clicks  Gastrointestinal: Bowel Sounds present, soft, nontender.   Lymph: 1+ peripheral edema. No cervical lymphadenopathy.  Neurological: Alert, no focal deficits  Skin: No rashes or ulcers   Psych:  Mood & affect appropriate.    LABS: All Labs Reviewed:                        11.6   7.08  )-----------( 145      ( 19 Apr 2022 06:15 )             36.2                         12.6   8.42  )-----------( 175      ( 18 Apr 2022 16:42 )             40.3     19 Apr 2022 06:18    142    |  104    |  20     ----------------------------<  111    4.0     |  20     |  0.86   18 Apr 2022 16:42    141    |  102    |  21     ----------------------------<  133    4.1     |  23     |  0.81     Ca    10.1       19 Apr 2022 06:18  Ca    10.3       18 Apr 2022 16:42  Phos  3.6       19 Apr 2022 06:18  Mg     1.6       19 Apr 2022 06:18    TPro  6.6    /  Alb  3.7    /  TBili  1.0    /  DBili  x      /  AST  24     /  ALT  14     /  AlkPhos  78     19 Apr 2022 06:18  TPro  7.2    /  Alb  4.2    /  TBili  0.9    /  DBili  x      /  AST  30     /  ALT  14     /  AlkPhos  92     18 Apr 2022 16:42    PT/INR - ( 18 Apr 2022 16:42 )   PT: 21.0 sec;   INR: 1.82 ratio         PTT - ( 18 Apr 2022 16:42 )  PTT:36.3 sec  CARDIAC MARKERS ( 18 Apr 2022 16:42 )  x     / x     / x     / x     / 3.2 ng/mL      Blood Culture:   04-18 @ 16:42  Pro Bnp [id number]        RADIOLOGY:    EKG:     
Structural Heart Team    HPI:  76F c hx HTN, AS s/p bioAVR, CAD s/p CABG, AFib s/p medtronic PPM on eliquis,, left clear cell RCC s/p partial nephrectomy, hypothyroidism, pw sob, leg swelling, abd swelling.    Pt reports about 3 months of worsening SOB, initially intermittent and on exertion. Now the SOB is near constant with minimal exertion, worse with lying down. Denies CP, fevers, coughing. Pt also reports worsening leg swelling and abd bloating/swelling over the past 1 month. Reports coughing about 1 month ago that has since resolved. Pt states she's seen a cardiologist and pulm as outpt with ?unknown workup but reportedly normal. Pt is not sure if she has sulfa allergy, but has taken lasix in the past without adverse effect. Pt denies any relieving factors.       Today, she was resting comfortably in a chair with her feet elevated, stating she is feeling much better than when she came in to the hospital. She notes that her symptoms began this past December, where she states she had a cough that was not relieved with medication or other therapies. Then over the past 3 months, she notes that her symptoms began getting progressively worse. They got to the point where rest would not relieve her SOB, and her feet were so swollen she could not put on her shoes. She went to a Urgent care that sent her to the ED for CHF. Since being in the hospital, she was placed on a lasix drip and diuresing well. She also underwent a cardioversion this past Friday, and remains in a SR with a first degree AVB.  She denied any CP or dizziness, only the SOB and pedal edema. She is a former smoker, and lives on her own and is independent in her ADL's. She is normally very active, however her heart failure has slowed her down over the past few months.    She currently remains on a lasix drip.            04-24 @ 07:01  -  04-25 @ 07:00  --------------------------------------------------------  IN: 1120 mL / OUT: 1700 mL / NET: -580 mL        PAST MEDICAL & SURGICAL HISTORY:  Hypertension  x10 years    Dyslipidemia    Hiatal hernia    Type II diabetes mellitus    Aortic stenosis    Cancer of kidney, left    Right bundle branch block (RBBB)    Anemia    Hypothyroidism    Status post unilateral salpingo-oophorectomy    S/P T&amp;A (status post tonsillectomy and adenoidectomy)    H/O hand surgery    H/O partial nephrectomy  right 20125    H/O aortic valve replacement    Elective surgery  [facility]  2016        FAMILY HISTORY:  FH: renal cell carcinoma (Child)              sulfa drugs (Short breath)    aMIOdarone    Tablet   Oral   aMIOdarone    Tablet 400 milliGRAM(s) Oral two times a day  apixaban 5 milliGRAM(s) Oral every 12 hours  aspirin enteric coated 81 milliGRAM(s) Oral daily  furosemide Infusion 5 mG/Hr IV Continuous <Continuous>  glucagon  Injectable 1 milliGRAM(s) IntraMuscular once  insulin lispro (ADMELOG) corrective regimen sliding scale   SubCutaneous three times a day before meals  insulin lispro (ADMELOG) corrective regimen sliding scale   SubCutaneous at bedtime  levothyroxine 112 MICROGram(s) Oral daily  metoprolol succinate  milliGRAM(s) Oral daily  simvastatin 20 milliGRAM(s) Oral at bedtime  valsartan 320 milliGRAM(s) Oral daily      T(C): 36.6 (04-25-22 @ 04:57), Max: 36.8 (04-24-22 @ 11:48)  HR: 85 (04-25-22 @ 04:57) (64 - 85)  BP: 122/74 (04-25-22 @ 04:57) (102/63 - 122/74)  RR: 18 (04-25-22 @ 04:57) (18 - 19)  SpO2: 99% (04-25-22 @ 04:57) (97% - 99%)  Wt(kg): --      Review of Symptoms:  General: Alert, Follows commands  Respiratory:  + SOB, + GONZALES, + Cough  Cardiac: Denies CP, Denies Palpitations  Gastrointestinal: Denies Pain, Denies N/V  Extremities: + Pedal Edema, No Joint pain  Vascular: Negative  Neurological: Negative  Endocrine: negative      Physical Exam:  Gen: A/Ox3, NAD  HEENT: Mild JVD, Neck Supple, Trachea midline, No masses  Pulmonary: CTAB, Diminished at the bases, No accessory muscle use for respiration  Cardiac: S1S2, RRR, II/VI ROSETTA Murmur   No Gallops/Rubs  ECG: SR with 1st degree AVB, some VPacing  Gastrointestinal: Soft, NT/ND, + Bowel Sounds  Extremities:  3+ Edema,  No joint pain or swelling +PMSx4, Right toes ecchymotic  Vascular: 1+ pulses B/L, pedal pulses not palpable, lower extremety cap refill less than 2 seconds, warm, No Bruits  Neurological: Non Focal, = motor and sensory      Laboratory:                        11.5   5.92  )-----------( 127      ( 24 Apr 2022 06:38 )             35.8    04-25    142  |  96  |  28<H>  ----------------------------<  130<H>  3.4<L>   |  28  |  1.05    Ca    10.4      25 Apr 2022 07:29  Phos  3.5     04-24  Mg     1.8     04-25    TPro  7.3  /  Alb  4.1  /  TBili  1.3<H>  /  DBili  x   /  AST  54<H>  /  ALT  26  /  AlkPhos  87  04-25       Pro-BNP:  Serum Pro-Brain Natriuretic Peptide (04.18.22 @ 16:42)    Serum Pro-Brain Natriuretic Peptide: 839 pg/mL    Transthoracic Echo:  < from: Transthoracic Echocardiogram (04.19.22 @ 11:58) >  EF (Shin Rule): 66 %Doppler Peak Velocity (m/sec):  AoV=3.5  ------------------------------------------------------------------------  Observations:  Mitral Valve: Mitral annular calcification and calcified  mitral leaflets with decreased diastolic opening. Mild  mitral regurgitation.  Peak mitral valve gradient equals 21  mm Hg, mean transmitral valve gradient equals 5 mm Hg,  consistent with moderate mitral stenosis.  Aortic Valve/Aorta: Bioprosthetic aortic valve. Peak  transaortic valve gradient equals 49 mm Hg, mean  transaortic valve gradient equals 31 mm Hg, which is  elevated even in the presence of a bioprosthetic aortic  valve. Peak left ventricular outflow tract gradient equals  6 mm Hg, mean gradient is equal to 3 mm Hg, LVOT velocity  time integral equals 28 cm.  Aortic Root: 2.9 cm. The proximal ascending aorta is not  well visualized.  Left Atrium:Severely dilated left atrium.  LA volume index  = 67 cc/m2.  Left Ventricle: Normal left ventricular systolic function.  No segmental wall motion abnormalities. Septal flattening  consistent with right ventricular overload. Unable to  determine diastolic functino due to severe mitral annular  calcification and mild mitral stenosis.  Right Heart: Severe right atrial enlargement. Right  ventricular enlargement with decreased right ventricular  systolic function. A device wire is noted in the right  heart. There is malcoaptation of the tricuspid valve with  severe (wide open) tricuspid regurgitation. Normal pulmonic  valve.  Pericardium/Pleura: No pericardial effusion seen.  Hemodynamic: Estimated right ventricular systolic pressure  equals 31 mm Hg, assuming right atrial pressure equals 15  mm Hg, consistent with normal pulmonary pressures.  ------------------------------------------------------------------------  Conclusions:  1. Mitral annular calcification and calcified mitral  leaflets with decreased diastolic opening. Mild mitral  regurgitation.  Peak mitral valve gradient equals 21 mm Hg,  mean transmitral valve gradient equals 5 mm Hg, consistent  with moderate mitral stenosis.  2. Severely dilated left atrium.  LA volume index =67  cc/m2.Severe right atrial enlargement.  4. Right ventricular enlargement with decreased right  ventricular systolic function. A device wire is noted in  the right heart.  5. There is malcoaptation of the tricuspid valve with  severe (wide open) tricuspid regurgitation.  6. Estimated pulmonary artery systolic pressure equals 31  mm Hg, assuming right atrial pressure equals 15  mm Hg,  consistent with normal pulmonary pressures. The RVSP is  probably underestimated due to severe TR.  7. No pericardial effusion seen.  *** Compared with echocardiogram of 7/5/2016, no  significant changes noted.    < end of copied text >      TransEsophageal Echo:  < from: Transesophageal Echocardiogram w/o TTE (04.22.22 @ 16:20) >  EF (Visual Estimate): 60-65 %  Doppler Peak Velocity (m/sec): AoV=3.4  ------------------------------------------------------------------------  Observations:  Mitral Valve: Mitral annular calcification and calcified  mitral leaflets with decreased diastolic opening.  Mild-moderate mitral regurgitation. Systolic Blunting at  PV. Peak mitral valve gradient equals 11 mm Hg, mean  transmitral valve gradient equals 3 mm Hg, estimated mitral  valve area equals 2 sqcm (by 3D evaluation and planimetry),  consistent with mild mitral stenosis. HR 50  Aortic Valve/Aorta: Bioprosthetic aortic valve. Leaflets  are not well seen, Peak transaortic valve gradient equals  46 mm Hg, mean transaortic valve gradient equals 26 mm Hg,  which is elevated even in the presence of a bioprosthetic  aortic valve. DVI 0.31; Acceleration time 70mm Hg. LVOT VTI  25.4cm , Transaortic VTI 82cm, LVOT diameter 2.0 cm, BSA  1.98  EOA 1sqcm, EOAi 0.51. Suggestive of patient  prosthesis mismatch.  Noaortic valve regurgitation seen.  LVOT diameter: 2 cm.  Left Atrium: Severely dilated left atrium.  LA volume index  = 61 cc/m2. No left atrial or left atrial appendage  thrombus. Decreased left atrial appendage velocities noted.  Left Ventricle: Normal left ventricular systolic function.  Increased relative wall thickness with normal left  ventricular mass index, consistent with concentric left  ventricular remodeling. Unable to evaluate diastology due  to MAC  Right Heart: Severe right atrial enlargement. Device wires  are noted in the right heart. Right ventricular enlargement  with decreased right ventricular systolic function. RV BASE  7.5cm (normal <4.1cm) ; RV mid 4.9cm (normal <3.5)  Torrential Tricuspid regurgitation. The 3D Vena contracta  area was 1.15sqcm, the vena contracta 2.1cm. Tethered  tricuspid valve with malcoaptation of the tricuspid valve  leaflets due to a severely dilated annulus (>7cm). There is  a RV wire crossing the TV valve and is predominantly  located near the septal leaflet. The RV wire is not seen be  scarred to or to impinge the septal leaflet;  however.  buy  the nature of the location of the RV wire near the septal  leaflet of the TV, the RV wire may be slightly contributory  to the tricuspid regurgitation as it may prevent the septal  leaflet to rise ("close") to its full potential  (albeit  tethered ) at end diastole.  Pulmonic valve not well  visualized, probably normal.  Pericardium/Pleura: Normal pericardium with no pericardial  effusion.  Hemodynamic: Incomplete tricuspid regurgitation Doppler  envelopes in this study may underestimate RVSP. Agitated  saline injection and color flow Doppler demonstrates no  evidence of a patent foramen ovale.  ------------------------------------------------------------------------  Conclusions:  1. Mitral annular calcification and calcified mitral  leaflets with decreased diastolic opening. Mild-moderate  mitral regurgitation. Systolic Blunting at PV. Peak mitral  valve gradient equals 11 mm Hg, mean transmitral valve  gradient equals 3 mm Hg, estimated mitral valve area equals  2 sqcm (by 3D evaluation and planimetry), consistent with  mild mitral stenosis. HR 50  2. Bioprosthetic aortic valve. Leaflets are not well seen,  Peak transaortic valve gradient equals 46 mm Hg, mean  transaortic valve gradient equals 26 mm Hg, which is  elevated even in the presence of a bioprosthetic aortic  valve. DVI 0.31; Acceleration time 70mm Hg. LVOT VTI 25.4cm  , Transaortic VTI 82cm, LVOT diameter 2.0 cm, BSA 1.98  EOA  1sqcm, EOAi 0.51. Suggestive of patient prosthesis  mismatch.  No aortic valve regurgitation seen.  3. Severely dilated left atrium.  LA volume index = 61  cc/m2. No left atrial or left atrial appendage thrombus.  Decreased left atrial appendage velocities noted.  4. Increased relative wall thickness with normal left  ventricular mass index, consistent with concentric left  ventricular remodeling.  5. Normal left ventricular systolic function.  6. Right ventricular enlargement with decreased right  ventricular systolic function. RV BASE 7.5cm (normal  <4.1cm) ; RV mid 4.9cm (normal <3.5)  7. Incomplete tricuspid regurgitation Doppler envelopes in  this study may underestimate RVSP.  8. Torrential Tricuspid regurgitation. The 3D Vena  contracta area was 1.15sqcm, the vena contracta 2.1cm.  Tethered tricuspid valve with malcoaptation of the  tricuspid valve leaflets due to a severely dilated annulus  (>7cm). There is a RV wire crossing the TV valve and is  predominantly located near the septal leaflet. The RV wire  is not seen be scarred to or to impinge the septal leaflet;   however.  buy the nature of the location of the RV wire  near the septal leaflet of the TV, the RV wire may be  slightly contributory to the tricuspid regurgitation as it  may prevent the septal leaflet to rise ("close") to its  full potential  (albeit tethered ) at end diastole.    < end of copied text >    Cardiac Cath:  
Patient seen and evaluated at bedside    Chief Complaint: dyspnea    HPI:  76F c hx HTN, AS s/p bioAVR, CAD s/p CABG, AFib s/p medtronic PPM on eliquis,, left clear cell RCC s/p partial nephrectomy, hypothyroidism, pw sob, leg swelling, abd swelling.    Pt reports about 3 months of worsening SOB, initially intermittent and on exertion. Now the SOB is near constant with minimal exertion, worse with lying down. Denies CP, fevers, coughing. Pt also reports worsening leg swelling and abd bloating/swelling over the past 1 month. Reports coughing about 1 month ago that has since resolved. Pt states she's seen a cardiologist and pulm as outpt with ?unknown workup but reportedly normal. Pt is not sure if she has sulfa allergy, but has taken lasix in the past without adverse effect. Pt denies any relieving factors.  (18 Apr 2022 22:26)    PMHx:   Hypertension  Dyslipidemia  Prediabetes  Hypothyroid  Hiatal hernia  Type II diabetes mellitus  Heart murmur  Aortic stenosis  Cancer of kidney, left  Right bundle branch block (RBBB)  Anemia  Hypothyroidism    PSHx:   Status post unilateral salpingo-oophorectomy  S/P T&amp;A (status post tonsillectomy and adenoidectomy)  H/O hand surgery  H/O partial nephrectomy  H/O aortic valve replacement  Elective surgery    Allergies:  sulfa drugs (Short breath)    Current Medications:   amLODIPine   Tablet 10 milliGRAM(s) Oral daily  apixaban 5 milliGRAM(s) Oral every 12 hours  aspirin enteric coated 81 milliGRAM(s) Oral daily  dextrose 5%. 1000 milliLiter(s) IV Continuous <Continuous>  dextrose 5%. 1000 milliLiter(s) IV Continuous <Continuous>  dextrose 50% Injectable 25 Gram(s) IV Push once  dextrose 50% Injectable 12.5 Gram(s) IV Push once  dextrose 50% Injectable 25 Gram(s) IV Push once  dextrose Oral Gel 15 Gram(s) Oral once PRN  furosemide   Injectable 40 milliGRAM(s) IV Push every 12 hours  glucagon  Injectable 1 milliGRAM(s) IntraMuscular once  insulin lispro (ADMELOG) corrective regimen sliding scale   SubCutaneous three times a day before meals  insulin lispro (ADMELOG) corrective regimen sliding scale   SubCutaneous at bedtime  levothyroxine 112 MICROGram(s) Oral daily  metoprolol succinate  milliGRAM(s) Oral daily  simvastatin 20 milliGRAM(s) Oral at bedtime  valsartan 320 milliGRAM(s) Oral daily      FAMILY HISTORY:  FH: renal cell carcinoma (Child)    REVIEW OF SYSTEMS:  Constitutional:     [x ] negative [ ] fevers [ ] chills [ ] weight loss [ ] weight gain  HEENT:                  [x ] negative [ ] dry eyes [ ] eye irritation [ ] postnasal drip [ ] nasal congestion  CV:                         [ ] negative  [ ] chest pain [x ] orthopnea [ ] palpitations [ ] murmur  Resp:                     [x ] negative [ ] cough [x ] shortness of breath [ ] dyspnea [ ] wheezing [ ] sputum [ ]hemoptysis  GI:                          [ x] negative [ ] nausea [ ] vomiting [ ] diarrhea [ ] constipation [ ] abd pain [ ] dysphagia   :                        [ x] negative [ ] dysuria [ ] nocturia [ ] hematuria [ ] increased urinary frequency  Musculoskeletal: [x ] negative [ ] back pain [ ] myalgias [ ] arthralgias [ ] fracture  Skin:                       [ x] negative [ ] rash [ ] itch  Neurological:        [ x] negative [ ] headache [ ] dizziness [ ] syncope [ ] weakness [ ] numbness  Psychiatric:           [ x] negative [ ] anxiety [ ] depression  Endocrine:            [ x] negative [ ] diabetes [ ] thyroid problem  Heme/Lymph:      [ x] negative [ ] anemia [ ] bleeding problem  Allergic/Immune: [ x] negative [ ] itchy eyes [ ] nasal discharge [ ] hives [ ] angioedema    [ x] All other systems negative  [ ] Unable to assess ROS due to      Physical Exam:  T(F): 98.3 (04-21), Max: 98.4 (04-20)  HR: 61 (04-21) (59 - 86)  BP: 106/67 (04-21) (106/67 - 138/87)  RR: 18 (04-21)  SpO2: 96% (04-21)  GENERAL: No acute distress, well-developed  HEAD:  Atraumatic, Normocephalic  ENT: EOMI, PERRLA, conjunctiva and sclera clear, Neck supple, No JVD, moist mucosa  CHEST/LUNG: Clear to auscultation bilaterally; No wheeze, equal breath sounds bilaterally   BACK: No spinal tenderness  HEART: Regular rate and rhythm; No murmurs, rubs, or gallops  ABDOMEN: Soft, Nontender, Nondistended; Bowel sounds present  EXTREMITIES:  No clubbing, cyanosis, or edema  PSYCH: Nl behavior, nl affect  NEUROLOGY: AAOx3, non-focal, cranial nerves intact  SKIN: Normal color, No rashes or lesions  LINES:    Cardiovascular Diagnostic Testing:    ECG: Personally reviewed:    < from: Transthoracic Echocardiogram (04.19.22 @ 11:58) >  Conclusions:  1. Mitral annular calcification and calcified mitral  leaflets with decreased diastolic opening. Mild mitral  regurgitation.  Peak mitral valve gradient equals 21 mm Hg,  mean transmitral valve gradient equals 5 mm Hg, consistent  with moderate mitral stenosis.  2. Severely dilated left atrium.  LA volume index =67  cc/m2.Severe right atrial enlargement.  4. Right ventricular enlargement with decreased right  ventricular systolic function. A device wire is noted in  the right heart.  5. There is malcoaptation of the tricuspid valve with  severe (wide open) tricuspid regurgitation.  6. Estimated pulmonary artery systolic pressure equals 31  mm Hg, assuming right atrial pressure equals 15  mm Hg,  consistent with normal pulmonary pressures. The RVSP is  probably underestimated due to severe TR.  7. No pericardial effusion seen.  *** Compared with echocardiogram of 7/5/2016, no  significant changes noted.    < end of copied text >    < from: Cardiac Cath Lab - Adult (07.15.16 @ 15:31) >  CORONARY VESSELS: The coronary circulation is right dominant.  LM:   --  LM: Normal.  LAD:   --  Proximal LAD: There was a discrete 40 % stenosis. Lesion  severity was assessed by quantitative coronary angiography (QCA).  CX:   --  Circumflex: Angiography showed mild atherosclerosis with no flow  limiting lesions.  RCA:   --  RCA: Angiography showed mild atherosclerosis with no flow  limiting lesions.  COMPLICATIONS: There were no complications.    < end of copied text >    Imaging:    CXR: Personally reviewed    Labs: Personally reviewed    04-21    140  |  102  |  23  ----------------------------<  126<H>  3.4<L>   |  24  |  0.82    Ca    9.4      21 Apr 2022 05:50  Mg     1.6     04-21        Serum Pro-Brain Natriuretic Peptide: 839 pg/mL (04-18 @ 16:42)        Thyroid Stimulating Hormone, Serum: 4.11 uIU/mL (04-19 @ 09:06)

## 2022-04-25 NOTE — DIETITIAN INITIAL EVALUATION ADULT - LITERATURE/VIDEOS GIVEN
Heart Healthy Consistent CHO Nutrition Therapy   CHO counting, diabetes label reading tips  Heart Healthy Diabetes Nutrition Therapy

## 2022-04-25 NOTE — CONSULT NOTE ADULT - PROBLEM SELECTOR RECOMMENDATION 9
We were asked to evaluate the patient for a possible Tricuspid intervention. Currently, there is no commercially available device, but she may qualify for inclusion in the Abbott Triluminate Clinical Trial for Tricuspid SCAR. Being that she is currently in the hospital, we would need to see her as an outpatient in 30 days to follow up how she is doing after treatment. We will continue to follow her while she is in the hospital to see how she is progressing. I have explained the process to the patient, and she is happy and in agreement. I will review all of her imaging with Dr. Robles and Dr. JI Fischer.

## 2022-04-25 NOTE — CONSULT NOTE ADULT - PROBLEM/RECOMMENDATION-3
[id number] [facility]  History and Physical    Pt Name: Uzair Quiroz  MRN: [id number]  YOB: 1951  Date of evaluation: 4/6/2022  Primary Care Physician: Gilford Meth, MD  Patient evaluated at the request of  Dr. Phani Perdomo  Reason for evaluation: CAD/AS/[facility] [address] 02/10/2018    BACON (dyspnea on exertion)     Esophageal DISPLAY PLAN FREE TEXT 

## 2022-04-25 NOTE — CONSULT NOTE ADULT - PROBLEM SELECTOR RECOMMENDATION 3
Patient had persistent AFib since December, now in SR after a cardioversion this past Friday. Rate controlled. Continue with metoprolol and amiodarone load. She will remain on apixiban. Will be followed by EP Service.    Discussed with Patricia Jones and Margaret, as well as Medicine NP Pr[place]  [id number]

## 2022-04-25 NOTE — DIETITIAN INITIAL EVALUATION ADULT - PERSON TAUGHT/METHOD
verbal instruction/written material/teach back - (Patient repeats in own words)/patient instructed/daughter instructed

## 2022-04-25 NOTE — DIETITIAN INITIAL EVALUATION ADULT - ORAL INTAKE PTA/DIET HISTORY
oatmeal, fruit and coffee for breakfast, salad, yogurt, fruit, tuna eggs for lunch. starch, protein, fruit for dinner. desserts and snacking at night

## 2022-04-25 NOTE — PROGRESS NOTE ADULT - ASSESSMENT
Yolette is a 76 year old female with HTN, AS s/p bio AVR with post-op HB requiring PPM, non obstructive cad on cath in 2016, AF on eliquis, clear cell RCC s/p partial nephrectomy, hypothyroidism, p/w sob, leg swelling, abd swelling.    - initially quite volume overloaded on exam, though improving gradually  - RHF likely from malcoapted TV, w/ severe TR  - diuresing nicely on a Lasix gtt 5 mg/hr and output less. She continues to have edema. If creatinine and BUN trend up, will switch back to Lasix 40 iv bid.  - Please continue to maintain strict I/Os, monitor daily weights, Cr, and K.  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 Keep K>4.0 and Mg>2.0.     - Further cardiac workup will depend on clinical course.   - will follow with you

## 2022-04-25 NOTE — DIETITIAN INITIAL EVALUATION ADULT - PERTINENT MEDS FT
MEDICATIONS  (STANDING):  aMIOdarone    Tablet   Oral   aMIOdarone    Tablet 400 milliGRAM(s) Oral two times a day  apixaban 5 milliGRAM(s) Oral every 12 hours  aspirin enteric coated 81 milliGRAM(s) Oral daily  dextrose 5%. 1000 milliLiter(s) (50 mL/Hr) IV Continuous <Continuous>  dextrose 5%. 1000 milliLiter(s) (100 mL/Hr) IV Continuous <Continuous>  dextrose 50% Injectable 25 Gram(s) IV Push once  dextrose 50% Injectable 12.5 Gram(s) IV Push once  dextrose 50% Injectable 25 Gram(s) IV Push once  furosemide Infusion 5 mG/Hr (2.5 mL/Hr) IV Continuous <Continuous>  glucagon  Injectable 1 milliGRAM(s) IntraMuscular once  insulin lispro (ADMELOG) corrective regimen sliding scale   SubCutaneous three times a day before meals  insulin lispro (ADMELOG) corrective regimen sliding scale   SubCutaneous at bedtime  levothyroxine 112 MICROGram(s) Oral daily  metoprolol succinate  milliGRAM(s) Oral daily  simvastatin 20 milliGRAM(s) Oral at bedtime  valsartan 320 milliGRAM(s) Oral daily    MEDICATIONS  (PRN):  dextrose Oral Gel 15 Gram(s) Oral once PRN Blood Glucose LESS THAN 70 milliGRAM(s)/deciliter

## 2022-04-25 NOTE — PROGRESS NOTE ADULT - SUBJECTIVE AND OBJECTIVE BOX
afebrile    REVIEW OF SYSTEMS:  GEN: no fever,    no chills  RESP: no SOB,   no cough  CVS: no chest pain,   no palpitations  GI: no abdominal pain,   no nausea,   no vomiting,   no constipation,   no diarrhea  : no dysuria,   no frequency  NEURO: no headache,   no dizziness  PSYCH: no depression,   not anxious  Derm : no rash    MEDICATIONS  (STANDING):  aMIOdarone    Tablet   Oral   aMIOdarone    Tablet 400 milliGRAM(s) Oral two times a day  apixaban 5 milliGRAM(s) Oral every 12 hours  aspirin enteric coated 81 milliGRAM(s) Oral daily  dextrose 5%. 1000 milliLiter(s) (50 mL/Hr) IV Continuous <Continuous>  dextrose 5%. 1000 milliLiter(s) (100 mL/Hr) IV Continuous <Continuous>  dextrose 50% Injectable 12.5 Gram(s) IV Push once  dextrose 50% Injectable 25 Gram(s) IV Push once  dextrose 50% Injectable 25 Gram(s) IV Push once  furosemide Infusion 5 mG/Hr (2.5 mL/Hr) IV Continuous <Continuous>  glucagon  Injectable 1 milliGRAM(s) IntraMuscular once  insulin lispro (ADMELOG) corrective regimen sliding scale   SubCutaneous three times a day before meals  insulin lispro (ADMELOG) corrective regimen sliding scale   SubCutaneous at bedtime  levothyroxine 112 MICROGram(s) Oral daily  metoprolol succinate  milliGRAM(s) Oral daily  simvastatin 20 milliGRAM(s) Oral at bedtime  valsartan 320 milliGRAM(s) Oral daily    MEDICATIONS  (PRN):  dextrose Oral Gel 15 Gram(s) Oral once PRN Blood Glucose LESS THAN 70 milliGRAM(s)/deciliter      Vital Signs Last 24 Hrs  T(C): 36.6 (25 Apr 2022 04:57), Max: 36.8 (24 Apr 2022 11:48)  T(F): 97.9 (25 Apr 2022 04:57), Max: 98.3 (24 Apr 2022 11:48)  HR: 85 (25 Apr 2022 04:57) (64 - 85)  BP: 122/74 (25 Apr 2022 04:57) (102/63 - 122/74)  BP(mean): --  RR: 18 (25 Apr 2022 04:57) (18 - 19)  SpO2: 99% (25 Apr 2022 04:57) (97% - 99%)  CAPILLARY BLOOD GLUCOSE      POCT Blood Glucose.: 149 mg/dL (25 Apr 2022 07:48)  POCT Blood Glucose.: 130 mg/dL (24 Apr 2022 21:13)  POCT Blood Glucose.: 158 mg/dL (24 Apr 2022 16:03)  POCT Blood Glucose.: 134 mg/dL (24 Apr 2022 11:45)    I&O's Summary    24 Apr 2022 07:01  -  25 Apr 2022 07:00  --------------------------------------------------------  IN: 1120 mL / OUT: 1700 mL / NET: -580 mL        PHYSICAL EXAM:  HEAD:  Atraumatic, Normocephalic  NECK: Supple, No   JVD  CHEST/LUNG:   no     rales,     no,    rhonchi  HEART: Regular rate and rhythm;         murmur  ABDOMEN: Soft, Nontender, ;   EXTREMITIES:   less     edema  NEUROLOGY:  alert    LABS:                        11.5   5.92  )-----------( 127      ( 24 Apr 2022 06:38 )             35.8     04-24    143  |  97  |  28<H>  ----------------------------<  133<H>  3.3<L>   |  29  |  1.13    Ca    10.9<H>      24 Apr 2022 20:09  Phos  3.5     04-24  Mg     2.0     04-24                      Thyroid Stimulating Hormone, Serum: 4.11 uIU/mL (04-19 @ 09:06)          Consultant(s) Notes Reviewed:      Care Discussed with Consultants/Other Providers:

## 2022-04-26 LAB
ANION GAP SERPL CALC-SCNC: 16 MMOL/L — SIGNIFICANT CHANGE UP (ref 5–17)
ANION GAP SERPL CALC-SCNC: 16 MMOL/L — SIGNIFICANT CHANGE UP (ref 5–17)
BUN SERPL-MCNC: 32 MG/DL — HIGH (ref 7–23)
BUN SERPL-MCNC: 35 MG/DL — HIGH (ref 7–23)
CALCIUM SERPL-MCNC: 10.2 MG/DL — SIGNIFICANT CHANGE UP (ref 8.4–10.5)
CALCIUM SERPL-MCNC: 11 MG/DL — HIGH (ref 8.4–10.5)
CHLORIDE SERPL-SCNC: 100 MMOL/L — SIGNIFICANT CHANGE UP (ref 96–108)
CHLORIDE SERPL-SCNC: 94 MMOL/L — LOW (ref 96–108)
CO2 SERPL-SCNC: 27 MMOL/L — SIGNIFICANT CHANGE UP (ref 22–31)
CO2 SERPL-SCNC: 30 MMOL/L — SIGNIFICANT CHANGE UP (ref 22–31)
CREAT SERPL-MCNC: 1.01 MG/DL — SIGNIFICANT CHANGE UP (ref 0.5–1.3)
CREAT SERPL-MCNC: 1.27 MG/DL — SIGNIFICANT CHANGE UP (ref 0.5–1.3)
EGFR: 44 ML/MIN/1.73M2 — LOW
EGFR: 58 ML/MIN/1.73M2 — LOW
GLUCOSE BLDC GLUCOMTR-MCNC: 134 MG/DL — HIGH (ref 70–99)
GLUCOSE BLDC GLUCOMTR-MCNC: 137 MG/DL — HIGH (ref 70–99)
GLUCOSE BLDC GLUCOMTR-MCNC: 139 MG/DL — HIGH (ref 70–99)
GLUCOSE BLDC GLUCOMTR-MCNC: 169 MG/DL — HIGH (ref 70–99)
GLUCOSE SERPL-MCNC: 143 MG/DL — HIGH (ref 70–99)
GLUCOSE SERPL-MCNC: 158 MG/DL — HIGH (ref 70–99)
MAGNESIUM SERPL-MCNC: 2 MG/DL — SIGNIFICANT CHANGE UP (ref 1.6–2.6)
MAGNESIUM SERPL-MCNC: 2 MG/DL — SIGNIFICANT CHANGE UP (ref 1.6–2.6)
POTASSIUM SERPL-MCNC: 3.2 MMOL/L — LOW (ref 3.5–5.3)
POTASSIUM SERPL-MCNC: 3.6 MMOL/L — SIGNIFICANT CHANGE UP (ref 3.5–5.3)
POTASSIUM SERPL-SCNC: 3.2 MMOL/L — LOW (ref 3.5–5.3)
POTASSIUM SERPL-SCNC: 3.6 MMOL/L — SIGNIFICANT CHANGE UP (ref 3.5–5.3)
SODIUM SERPL-SCNC: 140 MMOL/L — SIGNIFICANT CHANGE UP (ref 135–145)
SODIUM SERPL-SCNC: 143 MMOL/L — SIGNIFICANT CHANGE UP (ref 135–145)

## 2022-04-26 PROCEDURE — 99233 SBSQ HOSP IP/OBS HIGH 50: CPT

## 2022-04-26 RX ADMIN — APIXABAN 5 MILLIGRAM(S): 2.5 TABLET, FILM COATED ORAL at 06:16

## 2022-04-26 RX ADMIN — Medication 2.5 MG/HR: at 10:32

## 2022-04-26 RX ADMIN — AMIODARONE HYDROCHLORIDE 400 MILLIGRAM(S): 400 TABLET ORAL at 06:17

## 2022-04-26 RX ADMIN — Medication 2.5 MG/HR: at 19:43

## 2022-04-26 RX ADMIN — Medication 81 MILLIGRAM(S): at 11:57

## 2022-04-26 RX ADMIN — VALSARTAN 320 MILLIGRAM(S): 80 TABLET ORAL at 06:17

## 2022-04-26 RX ADMIN — Medication 112 MICROGRAM(S): at 06:16

## 2022-04-26 RX ADMIN — Medication 100 MILLIGRAM(S): at 06:17

## 2022-04-26 RX ADMIN — AMIODARONE HYDROCHLORIDE 400 MILLIGRAM(S): 400 TABLET ORAL at 17:06

## 2022-04-26 RX ADMIN — APIXABAN 5 MILLIGRAM(S): 2.5 TABLET, FILM COATED ORAL at 17:06

## 2022-04-26 RX ADMIN — SIMVASTATIN 20 MILLIGRAM(S): 20 TABLET, FILM COATED ORAL at 21:25

## 2022-04-26 RX ADMIN — Medication 1: at 11:57

## 2022-04-26 NOTE — PROGRESS NOTE ADULT - ASSESSMENT
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   3 months of worsening SOB, initially intermittent and on exertion    acute  diastolic  chf  ct  angio , no PE  on iv lasix   card   dr shoshana Funes, on eliquis, has  PPM   HTN/ HLD , on  asa. , toprol, valsartian. zocor  DM, follow  fs   Echo,  normal ef/ mod  MS. severe  TR  Abd  u/s,  mod  ascites,   seen by ep/  await struc herat  for  severe TR  on amio  loading/  doing well/ in NSR now  seen by struc  heart/ f/p as an out  pt  on iv lasix infusion     rad< from: CT Angio Chest PE Protocol w/ IV Cont (04.18.22 @ 17:40) >  FINDINGS:  LUNGS AND AIRWAYS: Patent central airways.  Lungs are clear.  PLEURA: Trace right pleural effusion/pleural thickening.  MEDIASTINUM AND YASMANI: No lymphadenopathy.  HEAR: Heart is enlarged in size. Patient is status post aortic valve   replacement. Calcification of the coronary arteries is noted. Pacemaker   is noted in place. No pericardial effusion is noted. Pulmonary arteries  are normal in caliber. No filling defects are noted.  CHEST WALL AND LOWER NECK: Within normal limits.  VISUALIZED UPPER ABDOMEN: Ascites.  BONES: Degenerative changes of the spine are noted.    IMPRESSION:  No pulmonary embolus is noted.  --- End of Report ---  < end of copied text >

## 2022-04-26 NOTE — PROGRESS NOTE ADULT - ASSESSMENT
Yolette is a 76 year old female with HTN, AS s/p bio AVR with post-op HB requiring PPM, non obstructive cad on cath in 2016, AF on eliquis, clear cell RCC s/p partial nephrectomy, hypothyroidism, p/w sob, leg swelling, abd swelling.    - initially quite volume overloaded on exam, though improving gradually  - RHF likely from malcoapted TV, w/ severe TR  - diuresing nicely on a Lasix gtt 5 mg/hr and output less. She continues to have edema. If creatinine and BUN trend up, will switch back to Lasix 40 iv bid.  -for now her creat is stable and would defer this decision until tomorrow am  - Please continue to maintain strict I/Os, monitor daily weights, Cr, and K.  - TIM with mild-mod MR, mild MS, mildly elevated bio AVR gradients, RVE/RV dysfunction and severe TR  - Tethered tricuspid valve with malcoaptation of the tricuspid valve leaflets due to a severely dilated annulus, without obvious impingement from the ppm lead  -may be a candidate for tv intervention depending on the course of her tr and vol issues after diuresis    - s/p dccv on friday  - feels better, and has maintained sr for now  - cont with amiodarone load  - cont with ac and metoprolol    - cath on record in 2016 with non obstructive CAD  - daily weights and I/o's  - cont asa and statin    - bp acceptable, though on lower side at times  - would cont valsartan  - off norvasc to allow bp room for diuresis    - Monitor and replete electrolytes. Keep K>4.0 and Mg>2.0.     - Further cardiac workup will depend on clinical course.   - will follow with you

## 2022-04-26 NOTE — PHARMACOTHERAPY INTERVENTION NOTE - COMMENTS
[facility] Medication Education     Heart failure education provided to patient/caregiver.  -Reviewed doses and possible side effects for current heart failure medications.    Medication adherence review:  -Who administers the patient’s medications outpatient? Patient  -Is the patient able to name/identify his/her heart failure medications? Always  -Is the patient able to identify the dosing frequency of his/her heart failure medications? Always  -How often does the patient report missing a dose of heart failure medication? None  -Is the patient able to afford his/her heart failure medications? Yes    Diuretic review:  -Does the patient take a loop diuretic outpatient? Yes  -Is the diuretic prescribed as a standing dose or as needed? Standing dose  -Does the patient take the diuretic as prescribed? Yes    Additional information reviewed:  Low salt diet/fluid restriction  Blood pressure control  Dyslipidemia control  Diabetes control  Smoking cessation  Anticoagulant medication education  Antiplatelet medication education  Etc.    Answered all of the patient’s questions to the best of my ability. Patient exhibited understanding of heart failure medication regimen and management.    María Sesay PharmD   [phone]/Teams [facility] Medication Education     Heart failure education provided to patient/caregiver.  -Reviewed doses and possible side effects for current heart failure medications.    Medication adherence review:  -Who administers the patient’s medications outpatient? Patient  -Is the patient able to name/identify his/her heart failure medications? Always  -Is the patient able to identify the dosing frequency of his/her heart failure medications? Always  -How often does the patient report missing a dose of heart failure medication? None  -Is the patient able to afford his/her heart failure medications? Yes    Additional information reviewed:  Low salt diet/fluid restriction  Blood pressure control  Dyslipidemia control  Diabetes control  Smoking cessation  Anticoagulant medication education  Antiplatelet medication education  Etc.    Answered all of the patient’s questions to the best of my ability. Patient exhibited understanding of heart failure medication regimen and management.    María Sesay, PharmD   [phone]/Teams [facility] Medication Education     Heart failure education provided to patient.  -Reviewed doses and possible side effects for current heart failure medications.    Medication adherence review:  -Who administers the patient’s medications outpatient? Patient  -Is the patient able to name/identify his/her heart failure medications? Always  -Is the patient able to identify the dosing frequency of his/her heart failure medications? Always  -How often does the patient report missing a dose of heart failure medication? None  -Is the patient able to afford his/her heart failure medications? Yes    Additional information reviewed:  Low salt diet/fluid restriction  Blood pressure control  Dyslipidemia control  Diabetes control  Smoking cessation  Anticoagulant medication education  Antiplatelet medication education  Etc.    Answered all of the patient’s questions to the best of my ability. Patient exhibited understanding of heart failure medication regimen and management.    María Sesay, PharmD   [phone]/Teams

## 2022-04-26 NOTE — PROGRESS NOTE ADULT - SUBJECTIVE AND OBJECTIVE BOX
[facility]    John Ng, Sandeep, Robson, Jacinto, Marvin, Karen      [phone]    CHIEF COMPLAINT: Patient is a 76y old  Female who presents with a chief complaint of sob, leg swelling, abd swelling (26 Apr 2022 07:51)      Follow Up: af, vol ol, tr    Interim history: The patient reports no new symptoms.  Denies chest discomfort and shortness of breath.  No abdominal pain.  No new neurologic symptoms. Edema improved.      MEDICATIONS  (STANDING):  aMIOdarone    Tablet   Oral   aMIOdarone    Tablet 400 milliGRAM(s) Oral two times a day  apixaban 5 milliGRAM(s) Oral every 12 hours  aspirin enteric coated 81 milliGRAM(s) Oral daily  dextrose 5%. 1000 milliLiter(s) (50 mL/Hr) IV Continuous <Continuous>  dextrose 5%. 1000 milliLiter(s) (100 mL/Hr) IV Continuous <Continuous>  dextrose 50% Injectable 25 Gram(s) IV Push once  dextrose 50% Injectable 12.5 Gram(s) IV Push once  dextrose 50% Injectable 25 Gram(s) IV Push once  furosemide Infusion 5 mG/Hr (2.5 mL/Hr) IV Continuous <Continuous>  glucagon  Injectable 1 milliGRAM(s) IntraMuscular once  insulin lispro (ADMELOG) corrective regimen sliding scale   SubCutaneous three times a day before meals  insulin lispro (ADMELOG) corrective regimen sliding scale   SubCutaneous at bedtime  levothyroxine 112 MICROGram(s) Oral daily  metoprolol succinate  milliGRAM(s) Oral daily  simvastatin 20 milliGRAM(s) Oral at bedtime  valsartan 320 milliGRAM(s) Oral daily    MEDICATIONS  (PRN):  dextrose Oral Gel 15 Gram(s) Oral once PRN Blood Glucose LESS THAN 70 milliGRAM(s)/deciliter      REVIEW OF SYSTEMS:  eye, ent, GI, , allergic, dermatologic, musculoskeletal and neurologic are negative except as described above    Vital Signs Last 24 Hrs  T(C): 36.6 (26 Apr 2022 11:18), Max: 37 (26 Apr 2022 04:00)  T(F): 97.8 (26 Apr 2022 11:18), Max: 98.6 (26 Apr 2022 04:00)  HR: 59 (26 Apr 2022 11:18) (59 - 75)  BP: 107/58 (26 Apr 2022 11:18) (107/58 - 121/72)  BP(mean): --  RR: 18 (26 Apr 2022 11:18) (17 - 18)  SpO2: 99% (26 Apr 2022 11:18) (95% - 99%)    I&O's Summary    25 Apr 2022 07:01  -  26 Apr 2022 07:00  --------------------------------------------------------  IN: 1100 mL / OUT: 1600 mL / NET: -500 mL    26 Apr 2022 07:01  -  26 Apr 2022 12:03  --------------------------------------------------------  IN: 310 mL / OUT: 0 mL / NET: 310 mL        Telemetry past 24h: sr pacs, pvcs    PHYSICAL EXAM:    Constitutional: well-nourished, well-developed, NAD   HEENT:  MMM, sclerae anicteric, conjunctivae clear, no oral cyanosis.  Pulmonary: Non-labored, breath sounds are clear bilaterally, No wheezing, rales or rhonchi  Cardiovascular: Regular, S1 and S2.  No murmur.  No rubs, gallops or clicks  Gastrointestinal: Bowel Sounds present, soft, nontender.   Lymph: mild peripheral edema.   Neurological: Alert, no focal deficits  Skin: No rashes.  Psych:  Mood & affect appropriate    LABS: All Labs Reviewed:                        11.5   5.92  )-----------( 127      ( 24 Apr 2022 06:38 )             35.8     26 Apr 2022 05:07    143    |  100    |  32     ----------------------------<  143    3.2     |  27     |  1.01   25 Apr 2022 18:44    140    |  96     |  33     ----------------------------<  167    3.9     |  28     |  1.14   25 Apr 2022 07:29    142    |  96     |  28     ----------------------------<  130    3.4     |  28     |  1.05     Ca    10.2       26 Apr 2022 05:07  Ca    10.7       25 Apr 2022 18:44  Ca    10.4       25 Apr 2022 07:29  Phos  3.5       24 Apr 2022 06:37  Mg     2.0       26 Apr 2022 05:07  Mg     1.9       25 Apr 2022 18:44  Mg     1.8       25 Apr 2022 07:29    TPro  7.3    /  Alb  4.1    /  TBili  1.3    /  DBili  x      /  AST  54     /  ALT  26     /  AlkPhos  87     25 Apr 2022 07:29          Blood Culture:         RADIOLOGY:    EKG:    Echo:  < from: Transesophageal Echocardiogram w/o TTE (04.22.22 @ 16:20) >    Patient name: NUNU HOLLIDAY  YOB: 1945   Age: 76 (F)   MR#: [id number]  Study Date: 4/22/2022  Location: [address] [place], [place] [id number]: Refugio Cary M.D.  Study quality: Technically good  Referring Physician: Tr Urban MD  Blood Pressure: 109/70 mmHg  Height: 163 cm  Weight: 93 kg  BSA: 2 m2  ------------------------------------------------------------------------  PROCEDURE: Transesophageal (TIM) was performed.  Informed  consent was first obtained for TIM. The patient was sedated  - see anesthesia record.  The procedure was monitored with  automatic blood pressure monitoring, ECG tracings and pulse  oximetry. The transesophageal probe was placed in the  esophagus posterior to the heart without complications.  Real-time and reconstructed 3-dimensional imaging was  performed with Workstation. Color Doppler analysis was  carried out using both 2D and 3D mapping. Patient was  injected with 10 cc's of aerosolized saline. Patient was  injected with 10 cc's of aerosolized saline.  INDICATION: Nonrheumatic tricuspid (valve) insufficiency  (136.1), Unspecified atrial fibrillation (I48.91),  Unspecified atrial flutter (I48.92), Nonrheumatic mitral  (valve) insufficiency (I34.0), Nonrheumatic mitral (valve)  stenosis (I34.2)  ------------------------------------------------------------------------  Dimensions:    Normal Values:  LA:            2.0 - 4.0 cm  Ao:            2.0 - 3.8 cm  SEPTUM:        0.6 - 1.2 cm  PWT:           0.6 - 1.1 cm  LVIDd:         3.0 - 5.6 cm  LVIDs:         1.8 - 4.0 cm  EF (Visual Estimate): 60-65 %  Doppler Peak Velocity (m/sec): AoV=3.4  ------------------------------------------------------------------------  Observations:  Mitral Valve: Mitral annular calcification and calcified  mitral leaflets with decreased diastolic opening.  Mild-moderate mitral regurgitation. Systolic Blunting at  PV. Peak mitral valve gradient equals 11 mm Hg, mean  transmitral valve gradient equals 3 mm Hg, estimated mitral  valve area equals 2 sqcm (by 3D evaluation and planimetry),  consistent with mild mitral stenosis. HR 50  Aortic Valve/Aorta: Bioprosthetic aortic valve. Leaflets  are not well seen, Peak transaortic valve gradient equals  46 mm Hg, mean transaortic valve gradient equals 26 mm Hg,  which is elevated even in the presence of a bioprosthetic  aortic valve. DVI 0.31; Acceleration time 70mm Hg. LVOT VTI  25.4cm , Transaortic VTI 82cm, LVOT diameter 2.0 cm, BSA  1.98  EOA 1sqcm, EOAi 0.51. Suggestive of patient  prosthesis mismatch.  Noaortic valve regurgitation seen.  LVOT diameter: 2 cm.  Left Atrium: Severely dilated left atrium.  LA volume index  = 61 cc/m2. No left atrial or left atrial appendage  thrombus. Decreased left atrial appendage velocities noted.  Left Ventricle: Normal left ventricular systolic function.  Increased relative wall thickness with normal left  ventricular mass index, consistent with concentric left  ventricular remodeling. Unable to evaluate diastology due  to MAC  Right Heart: Severe right atrial enlargement. Device wires  are noted in the right heart. Right ventricular enlargement  with decreased right ventricular systolic function. RV BASE  7.5cm (normal <4.1cm) ; RV mid 4.9cm (normal <3.5)  Torrential Tricuspid regurgitation. The 3D Vena contracta  area was 1.15sqcm, the vena contracta 2.1cm. Tethered  tricuspid valve with malcoaptation of the tricuspid valve  leaflets due to a severely dilated annulus (>7cm). There is  a RV wire crossing the TV valve and is predominantly  located near the septal leaflet. The RV wire is not seen be  scarred to or to impinge the septal leaflet;  however.  buy  the nature of the location of the RV wire near the septal  leaflet of the TV, the RV wire may be slightly contributory  to the tricuspid regurgitation as it may prevent the septal  leaflet to rise ("close") to its full potential  (albeit  tethered ) at end diastole.  Pulmonic valve not well  visualized, probably normal.  Pericardium/Pleura: Normal pericardium with no pericardial  effusion.  Hemodynamic: Incomplete tricuspid regurgitation Doppler  envelopes in this study may underestimate RVSP. Agitated  saline injection and color flow Doppler demonstrates no  evidence of a patent foramen ovale.  ------------------------------------------------------------------------  Conclusions:  1. Mitral annular calcification and calcified mitral  leaflets with decreased diastolic opening. Mild-moderate  mitral regurgitation. Systolic Blunting at PV. Peak mitral  valve gradient equals 11 mm Hg, mean transmitral valve  gradient equals 3 mm Hg, estimated mitral valve area equals  2 sqcm (by 3D evaluation and planimetry), consistent with  mild mitral stenosis. HR 50  2. Bioprosthetic aortic valve. Leaflets are not well seen,  Peak transaortic valve gradient equals 46 mm Hg, mean  transaortic valve gradient equals 26 mm Hg, which is  elevated even in the presence of a bioprosthetic aortic  valve. DVI 0.31; Acceleration time 70mm Hg. LVOT VTI 25.4cm  , Transaortic VTI 82cm, LVOT diameter 2.0 cm, BSA 1.98  EOA  1sqcm, EOAi 0.51. Suggestive of patient prosthesis  mismatch.  No aortic valve regurgitation seen.  3. Severely dilated left atrium.  LA volume index = 61  cc/m2. No left atrial or left atrial appendage thrombus.  Decreased left atrial appendage velocities noted.  4. Increased relative wall thickness with normal left  ventricular mass index, consistent with concentric left  ventricular remodeling.  5. Normal left ventricular systolic function.  6. Right ventricular enlargement with decreased right  ventricular systolic function. RV BASE 7.5cm (normal  <4.1cm) ; RV mid 4.9cm (normal <3.5)  7. Incomplete tricuspid regurgitation Doppler envelopes in  this study may underestimate RVSP.  8. Torrential Tricuspid regurgitation. The 3D Vena  contracta area was 1.15sqcm, the vena contracta 2.1cm.  Tethered tricuspid valve with malcoaptation of the  tricuspid valve leaflets due to a severely dilated annulus  (>7cm). There is a RV wire crossing the TV valve and is  predominantly located near the septal leaflet. The RV wire  is not seen be scarred to or to impinge the septal leaflet;   however.  buy the nature of the location of the RV wire  near the septal leaflet of the TV, the RV wire may be  slightly contributory to the tricuspid regurgitation as it  may prevent the septal leaflet to rise ("close") to its  full potential  (albeit tethered ) at end diastole.  ------------------------------------------------------------------------  Confirmed on  4/22/2022 - 18:02:13 by FRANDY Hernandez  ------------------------------------------------------------------------    < end of copied text >

## 2022-04-26 NOTE — PROGRESS NOTE ADULT - SUBJECTIVE AND OBJECTIVE BOX
[facility]    REVIEW OF SYSTEMS:  GEN: no fever,    no chills  RESP: no SOB,   no cough  CVS: no chest pain,   no palpitations  GI: no abdominal pain,   no nausea,   no vomiting,   no constipation,   no diarrhea  : no dysuria,   no frequency  NEURO: no headache,   no dizziness  PSYCH: no depression,   not anxious  Derm : no rash    MEDICATIONS  (STANDING):  aMIOdarone    Tablet 400 milliGRAM(s) Oral two times a day  aMIOdarone    Tablet   Oral   apixaban 5 milliGRAM(s) Oral every 12 hours  aspirin enteric coated 81 milliGRAM(s) Oral daily  dextrose 5%. 1000 milliLiter(s) (50 mL/Hr) IV Continuous <Continuous>  dextrose 5%. 1000 milliLiter(s) (100 mL/Hr) IV Continuous <Continuous>  dextrose 50% Injectable 25 Gram(s) IV Push once  dextrose 50% Injectable 12.5 Gram(s) IV Push once  dextrose 50% Injectable 25 Gram(s) IV Push once  furosemide Infusion 5 mG/Hr (2.5 mL/Hr) IV Continuous <Continuous>  glucagon  Injectable 1 milliGRAM(s) IntraMuscular once  insulin lispro (ADMELOG) corrective regimen sliding scale   SubCutaneous three times a day before meals  insulin lispro (ADMELOG) corrective regimen sliding scale   SubCutaneous at bedtime  levothyroxine 112 MICROGram(s) Oral daily  metoprolol succinate  milliGRAM(s) Oral daily  simvastatin 20 milliGRAM(s) Oral at bedtime  valsartan 320 milliGRAM(s) Oral daily    MEDICATIONS  (PRN):  dextrose Oral Gel 15 Gram(s) Oral once PRN Blood Glucose LESS THAN 70 milliGRAM(s)/deciliter      Vital Signs Last 24 Hrs  T(C): 37 (26 Apr 2022 04:00), Max: 37 (26 Apr 2022 04:00)  T(F): 98.6 (26 Apr 2022 04:00), Max: 98.6 (26 Apr 2022 04:00)  HR: 75 (26 Apr 2022 04:00) (62 - 75)  BP: 121/72 (26 Apr 2022 04:00) (116/74 - 121/72)  BP(mean): --  RR: 17 (26 Apr 2022 04:00) (17 - 18)  SpO2: 95% (26 Apr 2022 04:00) (95% - 98%)  CAPILLARY BLOOD GLUCOSE      POCT Blood Glucose.: 139 mg/dL (26 Apr 2022 07:27)  POCT Blood Glucose.: 156 mg/dL (25 Apr 2022 21:11)  POCT Blood Glucose.: 123 mg/dL (25 Apr 2022 16:48)  POCT Blood Glucose.: 139 mg/dL (25 Apr 2022 11:34)    I&O's Summary    25 Apr 2022 07:01  -  26 Apr 2022 07:00  --------------------------------------------------------  IN: 1100 mL / OUT: 1600 mL / NET: -500 mL        PHYSICAL EXAM:  HEAD:  Atraumatic, Normocephalic  NECK: Supple, No   JVD  CHEST/LUNG:   no     rales,     no,    rhonchi  HEART: Regular rate and rhythm;         murmur  ABDOMEN: Soft, Nontender, ;   EXTREMITIES:     no   edema  NEUROLOGY:  alert    LABS:    04-26    143  |  100  |  32<H>  ----------------------------<  143<H>  3.2<L>   |  27  |  1.01    Ca    10.2      26 Apr 2022 05:07  Mg     2.0     04-26    TPro  7.3  /  Alb  4.1  /  TBili  1.3<H>  /  DBili  x   /  AST  54<H>  /  ALT  26  /  AlkPhos  87  04-25                    Thyroid Stimulating Hormone, Serum: 4.11 uIU/mL (04-19 @ 09:06)          Consultant(s) Notes Reviewed:      Care Discussed with Consultants/Other Providers:

## 2022-04-27 LAB
ANION GAP SERPL CALC-SCNC: 17 MMOL/L — SIGNIFICANT CHANGE UP (ref 5–17)
ANION GAP SERPL CALC-SCNC: 17 MMOL/L — SIGNIFICANT CHANGE UP (ref 5–17)
BUN SERPL-MCNC: 39 MG/DL — HIGH (ref 7–23)
BUN SERPL-MCNC: 46 MG/DL — HIGH (ref 7–23)
CALCIUM SERPL-MCNC: 10.3 MG/DL — SIGNIFICANT CHANGE UP (ref 8.4–10.5)
CALCIUM SERPL-MCNC: 10.7 MG/DL — HIGH (ref 8.4–10.5)
CHLORIDE SERPL-SCNC: 96 MMOL/L — SIGNIFICANT CHANGE UP (ref 96–108)
CHLORIDE SERPL-SCNC: 98 MMOL/L — SIGNIFICANT CHANGE UP (ref 96–108)
CO2 SERPL-SCNC: 27 MMOL/L — SIGNIFICANT CHANGE UP (ref 22–31)
CO2 SERPL-SCNC: 29 MMOL/L — SIGNIFICANT CHANGE UP (ref 22–31)
CREAT SERPL-MCNC: 1.11 MG/DL — SIGNIFICANT CHANGE UP (ref 0.5–1.3)
CREAT SERPL-MCNC: 1.46 MG/DL — HIGH (ref 0.5–1.3)
EGFR: 37 ML/MIN/1.73M2 — LOW
EGFR: 52 ML/MIN/1.73M2 — LOW
GLUCOSE BLDC GLUCOMTR-MCNC: 127 MG/DL — HIGH (ref 70–99)
GLUCOSE BLDC GLUCOMTR-MCNC: 131 MG/DL — HIGH (ref 70–99)
GLUCOSE BLDC GLUCOMTR-MCNC: 144 MG/DL — HIGH (ref 70–99)
GLUCOSE BLDC GLUCOMTR-MCNC: 157 MG/DL — HIGH (ref 70–99)
GLUCOSE SERPL-MCNC: 140 MG/DL — HIGH (ref 70–99)
GLUCOSE SERPL-MCNC: 161 MG/DL — HIGH (ref 70–99)
HCT VFR BLD CALC: 40.9 % — SIGNIFICANT CHANGE UP (ref 34.5–45)
HGB BLD-MCNC: 13.2 G/DL — SIGNIFICANT CHANGE UP (ref 11.5–15.5)
MAGNESIUM SERPL-MCNC: 2.1 MG/DL — SIGNIFICANT CHANGE UP (ref 1.6–2.6)
MCHC RBC-ENTMCNC: 27 PG — SIGNIFICANT CHANGE UP (ref 27–34)
MCHC RBC-ENTMCNC: 32.3 GM/DL — SIGNIFICANT CHANGE UP (ref 32–36)
MCV RBC AUTO: 83.6 FL — SIGNIFICANT CHANGE UP (ref 80–100)
NRBC # BLD: 0 /100 WBCS — SIGNIFICANT CHANGE UP (ref 0–0)
PLATELET # BLD AUTO: 178 K/UL — SIGNIFICANT CHANGE UP (ref 150–400)
POTASSIUM SERPL-MCNC: 3.1 MMOL/L — LOW (ref 3.5–5.3)
POTASSIUM SERPL-MCNC: 3.9 MMOL/L — SIGNIFICANT CHANGE UP (ref 3.5–5.3)
POTASSIUM SERPL-SCNC: 3.1 MMOL/L — LOW (ref 3.5–5.3)
POTASSIUM SERPL-SCNC: 3.9 MMOL/L — SIGNIFICANT CHANGE UP (ref 3.5–5.3)
RBC # BLD: 4.89 M/UL — SIGNIFICANT CHANGE UP (ref 3.8–5.2)
RBC # FLD: 15.8 % — HIGH (ref 10.3–14.5)
SODIUM SERPL-SCNC: 142 MMOL/L — SIGNIFICANT CHANGE UP (ref 135–145)
SODIUM SERPL-SCNC: 142 MMOL/L — SIGNIFICANT CHANGE UP (ref 135–145)
WBC # BLD: 6.22 K/UL — SIGNIFICANT CHANGE UP (ref 3.8–10.5)
WBC # FLD AUTO: 6.22 K/UL — SIGNIFICANT CHANGE UP (ref 3.8–10.5)

## 2022-04-27 PROCEDURE — 99231 SBSQ HOSP IP/OBS SF/LOW 25: CPT

## 2022-04-27 PROCEDURE — 99233 SBSQ HOSP IP/OBS HIGH 50: CPT

## 2022-04-27 RX ORDER — FUROSEMIDE 40 MG
40 TABLET ORAL EVERY 12 HOURS
Refills: 0 | Status: DISCONTINUED | OUTPATIENT
Start: 2022-04-28 | End: 2022-04-28

## 2022-04-27 RX ORDER — FUROSEMIDE 40 MG
40 TABLET ORAL
Refills: 0 | Status: COMPLETED | OUTPATIENT
Start: 2022-04-27 | End: 2022-04-27

## 2022-04-27 RX ORDER — POTASSIUM CHLORIDE 20 MEQ
40 PACKET (EA) ORAL ONCE
Refills: 0 | Status: COMPLETED | OUTPATIENT
Start: 2022-04-27 | End: 2022-04-27

## 2022-04-27 RX ORDER — FUROSEMIDE 40 MG
40 TABLET ORAL EVERY 12 HOURS
Refills: 0 | Status: DISCONTINUED | OUTPATIENT
Start: 2022-04-27 | End: 2022-04-27

## 2022-04-27 RX ADMIN — Medication 112 MICROGRAM(S): at 06:25

## 2022-04-27 RX ADMIN — Medication 81 MILLIGRAM(S): at 12:03

## 2022-04-27 RX ADMIN — SIMVASTATIN 20 MILLIGRAM(S): 20 TABLET, FILM COATED ORAL at 22:21

## 2022-04-27 RX ADMIN — Medication 100 MILLIGRAM(S): at 08:25

## 2022-04-27 RX ADMIN — APIXABAN 5 MILLIGRAM(S): 2.5 TABLET, FILM COATED ORAL at 06:25

## 2022-04-27 RX ADMIN — Medication 40 MILLIGRAM(S): at 17:17

## 2022-04-27 RX ADMIN — AMIODARONE HYDROCHLORIDE 400 MILLIGRAM(S): 400 TABLET ORAL at 17:17

## 2022-04-27 RX ADMIN — Medication 40 MILLIEQUIVALENT(S): at 12:03

## 2022-04-27 RX ADMIN — VALSARTAN 320 MILLIGRAM(S): 80 TABLET ORAL at 08:25

## 2022-04-27 RX ADMIN — Medication 1: at 12:03

## 2022-04-27 RX ADMIN — AMIODARONE HYDROCHLORIDE 400 MILLIGRAM(S): 400 TABLET ORAL at 06:25

## 2022-04-27 RX ADMIN — APIXABAN 5 MILLIGRAM(S): 2.5 TABLET, FILM COATED ORAL at 17:17

## 2022-04-27 NOTE — PROGRESS NOTE ADULT - ASSESSMENT
Yolette is a 76 year old female with HTN, AS s/p bio AVR with post-op HB requiring PPM, non obstructive cad on cath in 2016, AF on eliquis, clear cell RCC s/p partial nephrectomy, hypothyroidism, p/w sob, leg swelling, abd swelling.    - initially quite volume overloaded on exam, though improving gradually  - RHF likely from malcoapted TV, w/ severe TR  - diuresed nicely on a Lasix gtt 5 mg/hr and output less. Edema less, and would switch to lasix 40 iv bid today. Will hopefully switch to torsemide in next 24-48 hours.  - Please continue to maintain strict I/Os, monitor daily weights, Cr, and K.  - TIM with mild-mod MR, mild MS, mildly elevated bio AVR gradients, RVE/RV dysfunction and severe TR  - Tethered tricuspid valve with malcoaptation of the tricuspid valve leaflets due to a severely dilated annulus, without obvious impingement from the ppm lead  - may be a candidate for tv intervention depending on the course of her tr and vol issues after diuresis    - s/p dccv on friday  - feels better, and has maintained sr for now  - cont with amiodarone load  - cont with ac and metoprolol    - cath on record in 2016 with non obstructive CAD  - daily weights and I/o's  - cont asa and statin    - bp acceptable, though on lower side at times  - would cont valsartan as tolerated  - off norvasc to allow bp room for diuresis    - Monitor and replete electrolytes. Keep K>4.0 and Mg>2.0.     - Further cardiac workup will depend on clinical course.   - will follow with you

## 2022-04-27 NOTE — CHART NOTE - NSCHARTNOTEFT_GEN_A_CORE
Advanced Care Planning:  I have had goals of care discussion, advanced directive and code status with patient/family    and  in  coordinating   patient care.     all questions answered and expressed understanding of the plan.    Pt is full code

## 2022-04-27 NOTE — PROGRESS NOTE ADULT - SUBJECTIVE AND OBJECTIVE BOX
[facility] - John Ng, Sandeep, Robson, Jacinto, Karen Wong  Office Number:  [phone]    Patient resting comfortably in bed in NAD.  Laying flat with no respiratory distress.  No complaints of chest pain, dyspnea, palpitations, PND, or orthopnea.  she is happy because her edema is better, and she can finally see her feet    ROS: negative unless otherwise mentioned.    Telemetry:  sr    MEDICATIONS  (STANDING):  aMIOdarone    Tablet   Oral   aMIOdarone    Tablet 400 milliGRAM(s) Oral two times a day  apixaban 5 milliGRAM(s) Oral every 12 hours  aspirin enteric coated 81 milliGRAM(s) Oral daily  dextrose 5%. 1000 milliLiter(s) (50 mL/Hr) IV Continuous <Continuous>  dextrose 5%. 1000 milliLiter(s) (100 mL/Hr) IV Continuous <Continuous>  dextrose 50% Injectable 25 Gram(s) IV Push once  dextrose 50% Injectable 12.5 Gram(s) IV Push once  dextrose 50% Injectable 25 Gram(s) IV Push once  glucagon  Injectable 1 milliGRAM(s) IntraMuscular once  insulin lispro (ADMELOG) corrective regimen sliding scale   SubCutaneous three times a day before meals  insulin lispro (ADMELOG) corrective regimen sliding scale   SubCutaneous at bedtime  levothyroxine 112 MICROGram(s) Oral daily  metoprolol succinate  milliGRAM(s) Oral daily  potassium chloride    Tablet ER 40 milliEquivalent(s) Oral once  simvastatin 20 milliGRAM(s) Oral at bedtime  valsartan 320 milliGRAM(s) Oral daily    MEDICATIONS  (PRN):  dextrose Oral Gel 15 Gram(s) Oral once PRN Blood Glucose LESS THAN 70 milliGRAM(s)/deciliter      Allergies    sulfa drugs (Short breath)    Intolerances        Vital Signs Last 24 Hrs  T(C): 36.9 (27 Apr 2022 04:25), Max: 36.9 (27 Apr 2022 04:25)  T(F): 98.4 (27 Apr 2022 04:25), Max: 98.4 (27 Apr 2022 04:25)  HR: 66 (27 Apr 2022 08:24) (59 - 74)  BP: 114/72 (27 Apr 2022 08:24) (90/53 - 114/72)  BP(mean): --  RR: 18 (27 Apr 2022 04:25) (18 - 18)  SpO2: 97% (27 Apr 2022 04:25) (96% - 99%)    I&O's Summary    26 Apr 2022 07:01  -  27 Apr 2022 07:00  --------------------------------------------------------  IN: 880 mL / OUT: 1600 mL / NET: -720 mL        ON EXAM:  Constitutional: well-nourished, well-developed, NAD   HEENT:  MMM, sclerae anicteric, conjunctivae clear, no oral cyanosis.  Pulmonary: Non-labored, breath sounds are clear bilaterally, No wheezing, rales or rhonchi  Cardiovascular: Regular, S1 and S2.  No murmur.  No rubs, gallops or clicks  Gastrointestinal: Bowel Sounds present, soft, nontender.   Lymph: mild peripheral edema.   Neurological: Alert, no focal deficits  Skin: No rashes.  Psych:  Mood & affect appropriate      LABS: All Labs Reviewed:                        13.2   6.22  )-----------( 178      ( 27 Apr 2022 07:16 )             40.9     27 Apr 2022 07:17    142    |  98     |  39     ----------------------------<  140    3.1     |  27     |  1.11   26 Apr 2022 18:24    140    |  94     |  35     ----------------------------<  158    3.6     |  30     |  1.27   26 Apr 2022 05:07    143    |  100    |  32     ----------------------------<  143    3.2     |  27     |  1.01     Ca    10.3       27 Apr 2022 07:17  Ca    11.0       26 Apr 2022 18:24  Ca    10.2       26 Apr 2022 05:07  Mg     2.0       26 Apr 2022 18:24  Mg     2.0       26 Apr 2022 05:07  Mg     1.9       25 Apr 2022 18:44    TPro  7.3    /  Alb  4.1    /  TBili  1.3    /  DBili  x      /  AST  54     /  ALT  26     /  AlkPhos  87     25 Apr 2022 07:29          Blood Culture:

## 2022-04-27 NOTE — PROGRESS NOTE ADULT - ASSESSMENT
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   3 months of worsening SOB, initially intermittent and on exertion    acute  diastolic  chf  ct  angio , no PE  on iv lasix   card   dr shoshana Funes, on eliquis, has  PPM   HTN/ HLD , on  asa. , toprol, valsartian. zocor  DM, follow  fs   Echo,  normal ef/ mod  MS. severe  TR  Abd  u/s,  mod  ascites,   seen by ep/  struc herat  for  severe TR  on amio  loading/  doing well/ in NSR now  seen by struc  heart/ f/p as an out    on v  lasux per  card     rad< from: CT Angio Chest PE Protocol w/ IV Cont (04.18.22 @ 17:40) >  FINDINGS:  LUNGS AND AIRWAYS: Patent central airways.  Lungs are clear.  PLEURA: Trace right pleural effusion/pleural thickening.  MEDIASTINUM AND YASMANI: No lymphadenopathy.  HEAR: Heart is enlarged in size. Patient is status post aortic valve   replacement. Calcification of the coronary arteries is noted. Pacemaker   is noted in place. No pericardial effusion is noted. Pulmonary arteries  are normal in caliber. No filling defects are noted.  CHEST WALL AND LOWER NECK: Within normal limits.  VISUALIZED UPPER ABDOMEN: Ascites.  BONES: Degenerative changes of the spine are noted.    IMPRESSION:  No pulmonary embolus is noted.  --- End of Report ---  < end of copied text >

## 2022-04-27 NOTE — PROGRESS NOTE ADULT - SUBJECTIVE AND OBJECTIVE BOX
*****Structural Heart Team*****    Subjective:    The patient was found walking around the unit, stating she feels good.      PAST MEDICAL & SURGICAL HISTORY:  Hypertension  x10 years    Dyslipidemia    Hiatal hernia    Type II diabetes mellitus    Aortic stenosis    Cancer of kidney, left    Right bundle branch block (RBBB)    Anemia    Hypothyroidism    Status post unilateral salpingo-oophorectomy    S/P T&amp;A (status post tonsillectomy and adenoidectomy)    H/O hand surgery    H/O partial nephrectomy  right 20125    H/O aortic valve replacement    Elective surgery  PPM  2016          T(C): 36.9 (04-27-22 @ 04:25), Max: 36.9 (04-27-22 @ 04:25)  HR: 66 (04-27-22 @ 08:24) (63 - 74)  BP: 114/72 (04-27-22 @ 08:24) (90/53 - 114/72)  RR: 18 (04-27-22 @ 04:25) (18 - 18)  SpO2: 97% (04-27-22 @ 04:25) (96% - 97%)  Wt(kg): --  04-26 @ 07:01  -  04-27 @ 07:00  --------------------------------------------------------  IN: 880 mL / OUT: 1600 mL / NET: -720 mL    04-27 @ 07:01  -  04-27 @ 11:36  --------------------------------------------------------  IN: 310 mL / OUT: 400 mL / NET: -90 mL      MEDICATIONS  (STANDING):  aMIOdarone    Tablet   Oral   aMIOdarone    Tablet 400 milliGRAM(s) Oral two times a day  apixaban 5 milliGRAM(s) Oral every 12 hours  aspirin enteric coated 81 milliGRAM(s) Oral daily  furosemide   Injectable 40 milliGRAM(s) IV Push <User Schedule>  glucagon  Injectable 1 milliGRAM(s) IntraMuscular once  insulin lispro (ADMELOG) corrective regimen sliding scale   SubCutaneous three times a day before meals  insulin lispro (ADMELOG) corrective regimen sliding scale   SubCutaneous at bedtime  levothyroxine 112 MICROGram(s) Oral daily  metoprolol succinate  milliGRAM(s) Oral daily  potassium chloride    Tablet ER 40 milliEquivalent(s) Oral once  simvastatin 20 milliGRAM(s) Oral at bedtime  valsartan 320 milliGRAM(s) Oral daily    MEDICATIONS  (PRN):  dextrose Oral Gel 15 Gram(s) Oral once PRN Blood Glucose LESS THAN 70 milliGRAM(s)/deciliter      Review of Symptoms:  Constitutional: Awake, Alert, Follows commands  Respiratory: + SOB  Cardiac: Denies CP, Denies Palpitations  Gastrointestinal: Denies Pain, Denies N/V, tolerating po intake  Vascular: Negative  Extremities: + Edema, No joint pain or swelling  Neurological: Negative  Endocrine: No heat or cold intolerance, No excessive thirst  Heme/Onc: Negative    Exam:  General: A/Ox3, TRACY, NAD  HEENT: Supple, No JVD, Trachea midline, no masses  Pulmonary: CTAB, = Chest Excursion, no accessory muscle use  Cor: S1S2, RRR, II/VI ROSETTA  ECG: SR with 1st degree AVB  Gastrointestinal: Soft, NT/ND, + Bowel Sounds  Neuro: = motor and sensory B/L, No focal deficits  Vascular: 2+ b/l pitting pedal edema  Extremities: No joint pain or swelling  Skin: Warm/Dry/Normal color, Normal turgor, no rashes                          13.2   6.22  )-----------( 178      ( 27 Apr 2022 07:16 )             40.9   04-27    142  |  98  |  39<H>  ----------------------------<  140<H>  3.1<L>   |  27  |  1.11    Ca    10.3      27 Apr 2022 07:17  Mg     2.0     04-26        Imaging Reviewed:    Echocardiogram:    Cardiac Catheterization:        Assesment/Plan:  76 female with severe Tricuspid Regurgitation, Acute on Chronic Heart failure due to RV Dysfunction, AFib s/p cardioversion to SR     Problem/Recommendation - 1:  ·  Problem: Severe tricuspid regurgitation.   ·  Recommendation: We were asked to evaluate the patient for a possible Tricuspid intervention. Currently, there is no commercially available device, but she may qualify for inclusion in the Abbott Triluminate Clinical Trial for Tricuspid SCAR. Being that she is currently in the hospital, we would need to see her as an outpatient in 30 days to follow up how she is doing after treatment. We will continue to follow her while she is in the hospital to see how she is progressing. I have explained the process to the patient, and she is happy and in agreement. I will review all of her imaging with Dr. Robles and Dr. JI Fischer.     Problem/Recommendation - 2:  ·  Problem: Acute on chronic right-sided congestive heart failure.   ·  Recommendation: Patient diuresing well, states she feels much better. She also commented how she was able to put on her sneakers because they fit again. She was transitioned from a furosemide drip to daily IV injection, and will likely be transitioned in the next 24-48 hours to oral furosemide. Continue with current GDMT     Problem/Recommendation - 3:  ·  Problem: Other persistent atrial fibrillation.   ·  Recommendation: Patient had persistent AFib since December, now in SR after a cardioversion this past Friday. Rate controlled. Continue with metoprolol and amiodarone load. She will remain on apixiban. Will be followed by EP Service.        IRENE Verdin  [id number]

## 2022-04-27 NOTE — PROGRESS NOTE ADULT - SUBJECTIVE AND OBJECTIVE BOX
[facility]    REVIEW OF SYSTEMS:  GEN: no fever,    no chills  RESP: no SOB,   no cough  CVS: no chest pain,   no palpitations  GI: no abdominal pain,   no nausea,   no vomiting,   no constipation,   no diarrhea  : no dysuria,   no frequency  NEURO: no headache,   no dizziness  PSYCH: no depression,   not anxious  Derm : no rash    MEDICATIONS  (STANDING):  aMIOdarone    Tablet   Oral   aMIOdarone    Tablet 400 milliGRAM(s) Oral two times a day  apixaban 5 milliGRAM(s) Oral every 12 hours  aspirin enteric coated 81 milliGRAM(s) Oral daily  dextrose 5%. 1000 milliLiter(s) (50 mL/Hr) IV Continuous <Continuous>  dextrose 5%. 1000 milliLiter(s) (100 mL/Hr) IV Continuous <Continuous>  dextrose 50% Injectable 25 Gram(s) IV Push once  dextrose 50% Injectable 12.5 Gram(s) IV Push once  dextrose 50% Injectable 25 Gram(s) IV Push once  furosemide   Injectable 40 milliGRAM(s) IV Push every 12 hours  glucagon  Injectable 1 milliGRAM(s) IntraMuscular once  insulin lispro (ADMELOG) corrective regimen sliding scale   SubCutaneous three times a day before meals  insulin lispro (ADMELOG) corrective regimen sliding scale   SubCutaneous at bedtime  levothyroxine 112 MICROGram(s) Oral daily  metoprolol succinate  milliGRAM(s) Oral daily  potassium chloride    Tablet ER 40 milliEquivalent(s) Oral once  simvastatin 20 milliGRAM(s) Oral at bedtime  valsartan 320 milliGRAM(s) Oral daily    MEDICATIONS  (PRN):  dextrose Oral Gel 15 Gram(s) Oral once PRN Blood Glucose LESS THAN 70 milliGRAM(s)/deciliter      Vital Signs Last 24 Hrs  T(C): 36.9 (27 Apr 2022 04:25), Max: 36.9 (27 Apr 2022 04:25)  T(F): 98.4 (27 Apr 2022 04:25), Max: 98.4 (27 Apr 2022 04:25)  HR: 66 (27 Apr 2022 08:24) (59 - 74)  BP: 114/72 (27 Apr 2022 08:24) (90/53 - 114/72)  BP(mean): --  RR: 18 (27 Apr 2022 04:25) (18 - 18)  SpO2: 97% (27 Apr 2022 04:25) (96% - 99%)  CAPILLARY BLOOD GLUCOSE      POCT Blood Glucose.: 144 mg/dL (27 Apr 2022 07:42)  POCT Blood Glucose.: 134 mg/dL (26 Apr 2022 21:09)  POCT Blood Glucose.: 137 mg/dL (26 Apr 2022 16:09)  POCT Blood Glucose.: 169 mg/dL (26 Apr 2022 11:36)    I&O's Summary    26 Apr 2022 07:01  -  27 Apr 2022 07:00  --------------------------------------------------------  IN: 880 mL / OUT: 1600 mL / NET: -720 mL        PHYSICAL EXAM:  HEAD:  Atraumatic, Normocephalic  NECK: Supple, No   JVD  CHEST/LUNG:   no     rales,     no,    rhonchi  HEART: Regular rate and rhythm;         murmur  ABDOMEN: Soft, Nontender, ;   EXTREMITIES:  no      edema  NEUROLOGY:  alert    LABS:                        13.2   6.22  )-----------( 178      ( 27 Apr 2022 07:16 )             40.9     04-27    142  |  98  |  39<H>  ----------------------------<  140<H>  3.1<L>   |  27  |  1.11    Ca    10.3      27 Apr 2022 07:17  Mg     2.0     04-26                      Thyroid Stimulating Hormone, Serum: 4.11 uIU/mL (04-19 @ 09:06)          Consultant(s) Notes Reviewed:      Care Discussed with Consultants/Other Providers:

## 2022-04-28 LAB
ANION GAP SERPL CALC-SCNC: 17 MMOL/L — SIGNIFICANT CHANGE UP (ref 5–17)
BUN SERPL-MCNC: 41 MG/DL — HIGH (ref 7–23)
CALCIUM SERPL-MCNC: 10.2 MG/DL — SIGNIFICANT CHANGE UP (ref 8.4–10.5)
CHLORIDE SERPL-SCNC: 100 MMOL/L — SIGNIFICANT CHANGE UP (ref 96–108)
CO2 SERPL-SCNC: 25 MMOL/L — SIGNIFICANT CHANGE UP (ref 22–31)
CREAT SERPL-MCNC: 1.04 MG/DL — SIGNIFICANT CHANGE UP (ref 0.5–1.3)
EGFR: 56 ML/MIN/1.73M2 — LOW
GLUCOSE BLDC GLUCOMTR-MCNC: 124 MG/DL — HIGH (ref 70–99)
GLUCOSE BLDC GLUCOMTR-MCNC: 127 MG/DL — HIGH (ref 70–99)
GLUCOSE BLDC GLUCOMTR-MCNC: 127 MG/DL — HIGH (ref 70–99)
GLUCOSE BLDC GLUCOMTR-MCNC: 148 MG/DL — HIGH (ref 70–99)
GLUCOSE SERPL-MCNC: 176 MG/DL — HIGH (ref 70–99)
HCT VFR BLD CALC: 43.8 % — SIGNIFICANT CHANGE UP (ref 34.5–45)
HGB BLD-MCNC: 14.1 G/DL — SIGNIFICANT CHANGE UP (ref 11.5–15.5)
MCHC RBC-ENTMCNC: 27 PG — SIGNIFICANT CHANGE UP (ref 27–34)
MCHC RBC-ENTMCNC: 32.2 GM/DL — SIGNIFICANT CHANGE UP (ref 32–36)
MCV RBC AUTO: 83.9 FL — SIGNIFICANT CHANGE UP (ref 80–100)
NRBC # BLD: 0 /100 WBCS — SIGNIFICANT CHANGE UP (ref 0–0)
PLATELET # BLD AUTO: 194 K/UL — SIGNIFICANT CHANGE UP (ref 150–400)
POTASSIUM SERPL-MCNC: 3.7 MMOL/L — SIGNIFICANT CHANGE UP (ref 3.5–5.3)
POTASSIUM SERPL-SCNC: 3.7 MMOL/L — SIGNIFICANT CHANGE UP (ref 3.5–5.3)
RBC # BLD: 5.22 M/UL — HIGH (ref 3.8–5.2)
RBC # FLD: 16 % — HIGH (ref 10.3–14.5)
SODIUM SERPL-SCNC: 142 MMOL/L — SIGNIFICANT CHANGE UP (ref 135–145)
WBC # BLD: 6.76 K/UL — SIGNIFICANT CHANGE UP (ref 3.8–10.5)
WBC # FLD AUTO: 6.76 K/UL — SIGNIFICANT CHANGE UP (ref 3.8–10.5)

## 2022-04-28 PROCEDURE — 99232 SBSQ HOSP IP/OBS MODERATE 35: CPT

## 2022-04-28 RX ADMIN — Medication 0: at 08:13

## 2022-04-28 RX ADMIN — AMIODARONE HYDROCHLORIDE 400 MILLIGRAM(S): 400 TABLET ORAL at 06:47

## 2022-04-28 RX ADMIN — Medication 20 MILLIGRAM(S): at 18:43

## 2022-04-28 RX ADMIN — Medication 112 MICROGRAM(S): at 06:47

## 2022-04-28 RX ADMIN — APIXABAN 5 MILLIGRAM(S): 2.5 TABLET, FILM COATED ORAL at 06:47

## 2022-04-28 RX ADMIN — Medication 100 MILLIGRAM(S): at 06:47

## 2022-04-28 RX ADMIN — SIMVASTATIN 20 MILLIGRAM(S): 20 TABLET, FILM COATED ORAL at 22:13

## 2022-04-28 RX ADMIN — Medication 0: at 11:40

## 2022-04-28 RX ADMIN — APIXABAN 5 MILLIGRAM(S): 2.5 TABLET, FILM COATED ORAL at 17:29

## 2022-04-28 RX ADMIN — AMIODARONE HYDROCHLORIDE 400 MILLIGRAM(S): 400 TABLET ORAL at 17:29

## 2022-04-28 RX ADMIN — Medication 0: at 16:41

## 2022-04-28 RX ADMIN — VALSARTAN 320 MILLIGRAM(S): 80 TABLET ORAL at 06:47

## 2022-04-28 RX ADMIN — Medication 40 MILLIGRAM(S): at 06:47

## 2022-04-28 RX ADMIN — Medication 81 MILLIGRAM(S): at 12:53

## 2022-04-28 NOTE — PROGRESS NOTE ADULT - ASSESSMENT
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         Plan: Admitted with SOB, leg swelling, abd swelling. 3 months of worsening SOB, initially intermittent and on exertion. Likely has acute  diastolic  CHF.   CT angio , no PE.     Change Lasix drip to Torsemide 20 mg bid. Continue Eliquis, has  PPM.  Leg dopplers negative for DVT. Had DCCV, on oral Amiodarone load.     Continue asa, toprol, valsartian. zocor. Echo,  normal ef/ mod  MS. severe  TR, no change to 2016. Abd  u/s,  mod  ascites from CHF.       FS stable at 128, A1C at goal 6.8.     Continue diuresis, daily SMA-7.     Discharge home AM Friday.          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         Plan: Admitted with SOB, leg swelling, abd swelling. 3 months of worsening SOB, initially intermittent and on exertion. Likely has acute  diastolic  CHF.   CT angio , no PE.     Change Lasix drip to Torsemide 20 mg bid. Continue Eliquis, has  PPM.  Leg dopplers negative for DVT. Had DCCV, on oral Amiodarone load.     Continue asa, toprol, valsartian. zocor. Echo,  normal ef/ mod  MS. severe  TR, no change to 2016. Abd  u/s,  mod  ascites from CHF.       FS stable at 128, A1C at goal 6.8.     Continue diuresis with bid Torsemide. SMA-7 in AM.      Discharge home AM Friday.

## 2022-04-28 NOTE — PROGRESS NOTE ADULT - SUBJECTIVE AND OBJECTIVE BOX
[facility] -- John Ng, Sandeep, Jacinto Chance Patel, Savella  Office # [phone]      Follow Up:  CHF    Subjective/Observations: Patient seen and examined. Events noted. Resting comfortably in bed. No complaints of chest pain, dyspnea, or palpitations reported. No signs of orthopnea or PND.       REVIEW OF SYSTEMS: All other review of systems is negative unless indicated above    PAST MEDICAL & SURGICAL HISTORY:  Hypertension  x10 years    Dyslipidemia    Hiatal hernia    Type II diabetes mellitus    Aortic stenosis    Cancer of kidney, left    Right bundle branch block (RBBB)    Anemia    Hypothyroidism    Status post unilateral salpingo-oophorectomy    S/P T&amp;A (status post tonsillectomy and adenoidectomy)    H/O hand surgery    H/O partial nephrectomy  right 20125    H/O aortic valve replacement    Elective surgery  [facility]  2016        MEDICATIONS  (STANDING):  aMIOdarone    Tablet   Oral   aMIOdarone    Tablet 400 milliGRAM(s) Oral two times a day  apixaban 5 milliGRAM(s) Oral every 12 hours  aspirin enteric coated 81 milliGRAM(s) Oral daily  dextrose 5%. 1000 milliLiter(s) (50 mL/Hr) IV Continuous <Continuous>  dextrose 5%. 1000 milliLiter(s) (100 mL/Hr) IV Continuous <Continuous>  dextrose 50% Injectable 25 Gram(s) IV Push once  dextrose 50% Injectable 12.5 Gram(s) IV Push once  dextrose 50% Injectable 25 Gram(s) IV Push once  furosemide   Injectable 40 milliGRAM(s) IV Push every 12 hours  glucagon  Injectable 1 milliGRAM(s) IntraMuscular once  insulin lispro (ADMELOG) corrective regimen sliding scale   SubCutaneous three times a day before meals  insulin lispro (ADMELOG) corrective regimen sliding scale   SubCutaneous at bedtime  levothyroxine 112 MICROGram(s) Oral daily  metoprolol succinate  milliGRAM(s) Oral daily  simvastatin 20 milliGRAM(s) Oral at bedtime  valsartan 320 milliGRAM(s) Oral daily    MEDICATIONS  (PRN):  dextrose Oral Gel 15 Gram(s) Oral once PRN Blood Glucose LESS THAN 70 milliGRAM(s)/deciliter      Allergies    sulfa drugs (Short breath)    Intolerances            Vital Signs Last 24 Hrs  T(C): 36.7 (28 Apr 2022 06:44), Max: 36.7 (28 Apr 2022 06:44)  T(F): 98.1 (28 Apr 2022 06:44), Max: 98.1 (28 Apr 2022 06:44)  HR: 66 (28 Apr 2022 06:44) (59 - 66)  BP: 115/73 (28 Apr 2022 06:44) (94/56 - 115/73)  BP(mean): --  RR: 18 (28 Apr 2022 06:44) (17 - 18)  SpO2: 96% (28 Apr 2022 06:44) (96% - 97%)    I&O's Summary    27 Apr 2022 07:01  -  28 Apr 2022 07:00  --------------------------------------------------------  IN: 620 mL / OUT: 1300 mL / NET: -680 mL    28 Apr 2022 07:01  -  28 Apr 2022 10:07  --------------------------------------------------------  IN: 310 mL / OUT: 100 mL / NET: 210 mL          PHYSICAL EXAM:  TELE:   Constitutional: NAD, awake and alert, well-developed  HEENT: Moist Mucous Membranes, Anicteric  Pulmonary: Decreased breath sounds b/l. No rales, crackles or wheeze appreciated.   Cardiovascular: Regular, S1 and S2, No murmurs, rubs, gallops or clicks  Gastrointestinal: Bowel Sounds present, soft, nontender.   Lymph: No peripheral edema. No lymphadenopathy.  Skin: No visible rashes or ulcers.  Psych:  Mood & affect appropriate for situation    LABS: All Labs Reviewed:                        13.2   6.22  )-----------( 178      ( 27 Apr 2022 07:16 )             40.9     27 Apr 2022 20:18    142    |  96     |  46     ----------------------------<  161    3.9     |  29     |  1.46   27 Apr 2022 07:17    142    |  98     |  39     ----------------------------<  140    3.1     |  27     |  1.11   26 Apr 2022 18:24    140    |  94     |  35     ----------------------------<  158    3.6     |  30     |  1.27     Ca    10.7       27 Apr 2022 20:18  Ca    10.3       27 Apr 2022 07:17  Ca    11.0       26 Apr 2022 18:24  Mg     2.1       27 Apr 2022 20:18  Mg     2.0       26 Apr 2022 18:24  Mg     2.0       26 Apr 2022 05:07

## 2022-04-28 NOTE — PROGRESS NOTE ADULT - SUBJECTIVE AND OBJECTIVE BOX
INTERVAL HPI/OVERNIGHT EVENTS:  Pt seen and examined at bedside.     Allergies/Intolerance: sulfa drugs (Short breath)      MEDICATIONS  (STANDING):  aMIOdarone    Tablet   Oral   aMIOdarone    Tablet 400 milliGRAM(s) Oral two times a day  apixaban 5 milliGRAM(s) Oral every 12 hours  aspirin enteric coated 81 milliGRAM(s) Oral daily  dextrose 5%. 1000 milliLiter(s) (50 mL/Hr) IV Continuous <Continuous>  dextrose 5%. 1000 milliLiter(s) (100 mL/Hr) IV Continuous <Continuous>  dextrose 50% Injectable 25 Gram(s) IV Push once  dextrose 50% Injectable 12.5 Gram(s) IV Push once  dextrose 50% Injectable 25 Gram(s) IV Push once  furosemide   Injectable 40 milliGRAM(s) IV Push every 12 hours  glucagon  Injectable 1 milliGRAM(s) IntraMuscular once  insulin lispro (ADMELOG) corrective regimen sliding scale   SubCutaneous three times a day before meals  insulin lispro (ADMELOG) corrective regimen sliding scale   SubCutaneous at bedtime  levothyroxine 112 MICROGram(s) Oral daily  metoprolol succinate  milliGRAM(s) Oral daily  simvastatin 20 milliGRAM(s) Oral at bedtime  valsartan 320 milliGRAM(s) Oral daily    MEDICATIONS  (PRN):  dextrose Oral Gel 15 Gram(s) Oral once PRN Blood Glucose LESS THAN 70 milliGRAM(s)/deciliter        ROS: all systems reviewed and wnl      PHYSICAL EXAMINATION:  Vital Signs Last 24 Hrs  T(C): 36.7 (28 Apr 2022 06:44), Max: 36.7 (28 Apr 2022 06:44)  T(F): 98.1 (28 Apr 2022 06:44), Max: 98.1 (28 Apr 2022 06:44)  HR: 66 (28 Apr 2022 06:44) (59 - 66)  BP: 115/73 (28 Apr 2022 06:44) (94/56 - 115/73)  BP(mean): --  RR: 18 (28 Apr 2022 06:44) (17 - 18)  SpO2: 96% (28 Apr 2022 06:44) (96% - 97%)  CAPILLARY BLOOD GLUCOSE      POCT Blood Glucose.: 148 mg/dL (28 Apr 2022 07:39)  POCT Blood Glucose.: 131 mg/dL (27 Apr 2022 21:09)  POCT Blood Glucose.: 127 mg/dL (27 Apr 2022 16:06)  POCT Blood Glucose.: 157 mg/dL (27 Apr 2022 11:40)      04-27 @ 07:01  -  04-28 @ 07:00  --------------------------------------------------------  IN: 620 mL / OUT: 1300 mL / NET: -680 mL        GENERAL:   NECK: supple, No JVD  CHEST/LUNG: clear to auscultation bilaterally; no rales, rhonchi, or wheezing b/l  HEART: normal S1, S2  ABDOMEN: BS+, soft, ND, NT   EXTREMITIES:  pulses palpable; no clubbing, cyanosis, or edema b/l LEs  SKIN: no rashes or lesions      LABS:                        13.2   6.22  )-----------( 178      ( 27 Apr 2022 07:16 )             40.9     04-27    142  |  96  |  46<H>  ----------------------------<  161<H>  3.9   |  29  |  1.46<H>    Ca    10.7<H>      27 Apr 2022 20:18  Mg     2.1     04-27                 INTERVAL HPI/OVERNIGHT EVENTS:  Pt seen and examined at bedside.     Allergies/Intolerance: sulfa drugs (Short breath)      MEDICATIONS  (STANDING):  aMIOdarone    Tablet   Oral   aMIOdarone    Tablet 400 milliGRAM(s) Oral two times a day  apixaban 5 milliGRAM(s) Oral every 12 hours  aspirin enteric coated 81 milliGRAM(s) Oral daily  dextrose 5%. 1000 milliLiter(s) (50 mL/Hr) IV Continuous <Continuous>  dextrose 5%. 1000 milliLiter(s) (100 mL/Hr) IV Continuous <Continuous>  dextrose 50% Injectable 25 Gram(s) IV Push once  dextrose 50% Injectable 12.5 Gram(s) IV Push once  dextrose 50% Injectable 25 Gram(s) IV Push once  furosemide   Injectable 40 milliGRAM(s) IV Push every 12 hours  glucagon  Injectable 1 milliGRAM(s) IntraMuscular once  insulin lispro (ADMELOG) corrective regimen sliding scale   SubCutaneous three times a day before meals  insulin lispro (ADMELOG) corrective regimen sliding scale   SubCutaneous at bedtime  levothyroxine 112 MICROGram(s) Oral daily  metoprolol succinate  milliGRAM(s) Oral daily  simvastatin 20 milliGRAM(s) Oral at bedtime  valsartan 320 milliGRAM(s) Oral daily    MEDICATIONS  (PRN):  dextrose Oral Gel 15 Gram(s) Oral once PRN Blood Glucose LESS THAN 70 milliGRAM(s)/deciliter        ROS: all systems reviewed and wnl      PHYSICAL EXAMINATION:  Vital Signs Last 24 Hrs  T(C): 36.7 (28 Apr 2022 06:44), Max: 36.7 (28 Apr 2022 06:44)  T(F): 98.1 (28 Apr 2022 06:44), Max: 98.1 (28 Apr 2022 06:44)  HR: 66 (28 Apr 2022 06:44) (59 - 66)  BP: 115/73 (28 Apr 2022 06:44) (94/56 - 115/73)  BP(mean): --  RR: 18 (28 Apr 2022 06:44) (17 - 18)  SpO2: 96% (28 Apr 2022 06:44) (96% - 97%)  CAPILLARY BLOOD GLUCOSE      POCT Blood Glucose.: 148 mg/dL (28 Apr 2022 07:39)  POCT Blood Glucose.: 131 mg/dL (27 Apr 2022 21:09)  POCT Blood Glucose.: 127 mg/dL (27 Apr 2022 16:06)  POCT Blood Glucose.: 157 mg/dL (27 Apr 2022 11:40)      04-27 @ 07:01  -  04-28 @ 07:00  --------------------------------------------------------  IN: 620 mL / OUT: 1300 mL / NET: -680 mL        GENERAL: stable, less SOB, no fevers.   NECK: supple, No JVD  CHEST/LUNG: clear to auscultation bilaterally; no rales, rhonchi, or wheezing b/l  HEART: normal S1, S2  ABDOMEN: BS+, soft, ND, NT   EXTREMITIES:  pulses palpable; no clubbing, cyanosis, less edema b/l LEs  SKIN: no rashes or lesions      LABS:                        13.2   6.22  )-----------( 178      ( 27 Apr 2022 07:16 )             40.9     04-27    142  |  96  |  46<H>  ----------------------------<  161<H>  3.9   |  29  |  1.46<H>    Ca    10.7<H>      27 Apr 2022 20:18  Mg     2.1     04-27

## 2022-04-28 NOTE — PROGRESS NOTE ADULT - ASSESSMENT
Yolette is a 76 year old female with HTN, AS s/p bio AVR with post-op HB requiring PPM, non obstructive cad on cath in 2016, AF on eliquis, clear cell RCC s/p partial nephrectomy, hypothyroidism, p/w sob, leg swelling, abd swelling.    - initially quite volume overloaded on exam, though improving gradually  - RHF likely from malcoapted TV, w/ severe TR  - diuresed nicely on a Lasix gtt 5 mg/hr and output less. Edema less. Cr now uptrending. I would dc lasix IV and start on Torsemide 20mg q12 PO.   - Please continue to maintain strict I/Os, monitor daily weights, Cr, and K.  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     - s/p dccv on friday  - feels better, and has maintained sr for now  - cont with amiodarone load  - cont with ac and metoprolol    - cath on record in 2016 with non obstructive CAD  - daily weights and I/o's  - cont asa and statin    - bp acceptable, though on lower side at times  - would cont valsartan as tolerated  - off norvasc to allow bp room for diuresis    - Monitor and replete electrolytes. Keep K>4.0 and Mg>2.0.     - Further cardiac workup will depend on clinical course.   - will follow with you

## 2022-04-29 ENCOUNTER — TRANSCRIPTION ENCOUNTER (OUTPATIENT)
Age: 77
End: 2022-04-29

## 2022-04-29 VITALS
DIASTOLIC BLOOD PRESSURE: 61 MMHG | OXYGEN SATURATION: 99 % | TEMPERATURE: 98 F | SYSTOLIC BLOOD PRESSURE: 97 MMHG | RESPIRATION RATE: 18 BRPM | HEART RATE: 60 BPM

## 2022-04-29 LAB
ANION GAP SERPL CALC-SCNC: 17 MMOL/L — SIGNIFICANT CHANGE UP (ref 5–17)
BUN SERPL-MCNC: 44 MG/DL — HIGH (ref 7–23)
CALCIUM SERPL-MCNC: 9.8 MG/DL — SIGNIFICANT CHANGE UP (ref 8.4–10.5)
CHLORIDE SERPL-SCNC: 99 MMOL/L — SIGNIFICANT CHANGE UP (ref 96–108)
CO2 SERPL-SCNC: 24 MMOL/L — SIGNIFICANT CHANGE UP (ref 22–31)
CREAT SERPL-MCNC: 1.29 MG/DL — SIGNIFICANT CHANGE UP (ref 0.5–1.3)
EGFR: 43 ML/MIN/1.73M2 — LOW
GLUCOSE BLDC GLUCOMTR-MCNC: 139 MG/DL — HIGH (ref 70–99)
GLUCOSE BLDC GLUCOMTR-MCNC: 147 MG/DL — HIGH (ref 70–99)
GLUCOSE SERPL-MCNC: 149 MG/DL — HIGH (ref 70–99)
POTASSIUM SERPL-MCNC: 3.2 MMOL/L — LOW (ref 3.5–5.3)
POTASSIUM SERPL-SCNC: 3.2 MMOL/L — LOW (ref 3.5–5.3)
SODIUM SERPL-SCNC: 140 MMOL/L — SIGNIFICANT CHANGE UP (ref 135–145)

## 2022-04-29 PROCEDURE — 93306 TTE W/DOPPLER COMPLETE: CPT

## 2022-04-29 PROCEDURE — 82962 GLUCOSE BLOOD TEST: CPT

## 2022-04-29 PROCEDURE — 82553 CREATINE MB FRACTION: CPT

## 2022-04-29 PROCEDURE — 84443 ASSAY THYROID STIM HORMONE: CPT

## 2022-04-29 PROCEDURE — 84100 ASSAY OF PHOSPHORUS: CPT

## 2022-04-29 PROCEDURE — 82435 ASSAY OF BLOOD CHLORIDE: CPT

## 2022-04-29 PROCEDURE — 81001 URINALYSIS AUTO W/SCOPE: CPT

## 2022-04-29 PROCEDURE — 93005 ELECTROCARDIOGRAM TRACING: CPT

## 2022-04-29 PROCEDURE — 36415 COLL VENOUS BLD VENIPUNCTURE: CPT

## 2022-04-29 PROCEDURE — 82803 BLOOD GASES ANY COMBINATION: CPT

## 2022-04-29 PROCEDURE — 80053 COMPREHEN METABOLIC PANEL: CPT

## 2022-04-29 PROCEDURE — 84295 ASSAY OF SERUM SODIUM: CPT

## 2022-04-29 PROCEDURE — 85014 HEMATOCRIT: CPT

## 2022-04-29 PROCEDURE — 85610 PROTHROMBIN TIME: CPT

## 2022-04-29 PROCEDURE — U0003: CPT

## 2022-04-29 PROCEDURE — 82330 ASSAY OF CALCIUM: CPT

## 2022-04-29 PROCEDURE — 84484 ASSAY OF TROPONIN QUANT: CPT

## 2022-04-29 PROCEDURE — 82947 ASSAY GLUCOSE BLOOD QUANT: CPT

## 2022-04-29 PROCEDURE — 76377 3D RENDER W/INTRP POSTPROCES: CPT

## 2022-04-29 PROCEDURE — 87637 SARSCOV2&INF A&B&RSV AMP PRB: CPT

## 2022-04-29 PROCEDURE — 85018 HEMOGLOBIN: CPT

## 2022-04-29 PROCEDURE — 83605 ASSAY OF LACTIC ACID: CPT

## 2022-04-29 PROCEDURE — 85027 COMPLETE CBC AUTOMATED: CPT

## 2022-04-29 PROCEDURE — 83735 ASSAY OF MAGNESIUM: CPT

## 2022-04-29 PROCEDURE — 92960 CARDIOVERSION ELECTRIC EXT: CPT

## 2022-04-29 PROCEDURE — 93312 ECHO TRANSESOPHAGEAL: CPT

## 2022-04-29 PROCEDURE — 83880 ASSAY OF NATRIURETIC PEPTIDE: CPT

## 2022-04-29 PROCEDURE — 93970 EXTREMITY STUDY: CPT

## 2022-04-29 PROCEDURE — 93325 DOPPLER ECHO COLOR FLOW MAPG: CPT

## 2022-04-29 PROCEDURE — 99232 SBSQ HOSP IP/OBS MODERATE 35: CPT

## 2022-04-29 PROCEDURE — 85730 THROMBOPLASTIN TIME PARTIAL: CPT

## 2022-04-29 PROCEDURE — 76705 ECHO EXAM OF ABDOMEN: CPT

## 2022-04-29 PROCEDURE — 85025 COMPLETE CBC W/AUTO DIFF WBC: CPT

## 2022-04-29 PROCEDURE — 83036 HEMOGLOBIN GLYCOSYLATED A1C: CPT

## 2022-04-29 PROCEDURE — U0005: CPT

## 2022-04-29 PROCEDURE — 80048 BASIC METABOLIC PNL TOTAL CA: CPT

## 2022-04-29 PROCEDURE — 84132 ASSAY OF SERUM POTASSIUM: CPT

## 2022-04-29 PROCEDURE — 71275 CT ANGIOGRAPHY CHEST: CPT | Mod: MA

## 2022-04-29 PROCEDURE — 99285 EMERGENCY DEPT VISIT HI MDM: CPT

## 2022-04-29 PROCEDURE — 71046 X-RAY EXAM CHEST 2 VIEWS: CPT

## 2022-04-29 PROCEDURE — 93320 DOPPLER ECHO COMPLETE: CPT

## 2022-04-29 RX ORDER — CHLORTHALIDONE 50 MG
1 TABLET ORAL
Qty: 0 | Refills: 0 | DISCHARGE

## 2022-04-29 RX ORDER — AMIODARONE HYDROCHLORIDE 400 MG/1
2 TABLET ORAL
Qty: 0 | Refills: 0 | DISCHARGE
Start: 2022-04-29

## 2022-04-29 RX ORDER — HYDRALAZINE HCL 50 MG
1 TABLET ORAL
Qty: 0 | Refills: 0 | DISCHARGE

## 2022-04-29 RX ORDER — POTASSIUM CHLORIDE 20 MEQ
40 PACKET (EA) ORAL EVERY 4 HOURS
Refills: 0 | Status: COMPLETED | OUTPATIENT
Start: 2022-04-29 | End: 2022-04-29

## 2022-04-29 RX ORDER — POTASSIUM CHLORIDE 20 MEQ
1 PACKET (EA) ORAL
Qty: 60 | Refills: 0
Start: 2022-04-29 | End: 2022-05-28

## 2022-04-29 RX ORDER — AMIODARONE HYDROCHLORIDE 400 MG/1
2 TABLET ORAL
Qty: 56 | Refills: 0
Start: 2022-04-29 | End: 2022-05-26

## 2022-04-29 RX ORDER — AMLODIPINE BESYLATE 2.5 MG/1
1 TABLET ORAL
Qty: 0 | Refills: 0 | DISCHARGE

## 2022-04-29 RX ADMIN — Medication 100 MILLIGRAM(S): at 05:17

## 2022-04-29 RX ADMIN — VALSARTAN 320 MILLIGRAM(S): 80 TABLET ORAL at 05:17

## 2022-04-29 RX ADMIN — Medication 40 MILLIEQUIVALENT(S): at 13:30

## 2022-04-29 RX ADMIN — Medication 20 MILLIGRAM(S): at 05:17

## 2022-04-29 RX ADMIN — Medication 40 MILLIEQUIVALENT(S): at 11:31

## 2022-04-29 RX ADMIN — AMIODARONE HYDROCHLORIDE 400 MILLIGRAM(S): 400 TABLET ORAL at 05:17

## 2022-04-29 RX ADMIN — APIXABAN 5 MILLIGRAM(S): 2.5 TABLET, FILM COATED ORAL at 05:17

## 2022-04-29 RX ADMIN — Medication 112 MICROGRAM(S): at 05:17

## 2022-04-29 RX ADMIN — Medication 81 MILLIGRAM(S): at 11:31

## 2022-04-29 NOTE — DISCHARGE NOTE PROVIDER - PROVIDER TOKENS
PROVIDER:[TOKEN:[[id number]]],PROVIDER:[TOKEN:[[id number]]],PROVIDER:[TOKEN:[[id number]]]

## 2022-04-29 NOTE — DISCHARGE NOTE PROVIDER - CARE PROVIDERS DIRECT ADDRESSES
[email or web address]

## 2022-04-29 NOTE — PROGRESS NOTE ADULT - ASSESSMENT
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         Plan: Admitted with SOB, leg swelling, abd swelling. 3 months of worsening SOB, initially intermittent and on exertion. Likely has acute  diastolic  CHF.   CT angio , no PE.     Change Lasix drip to Torsemide 20 mg bid. Continue Eliquis, has  PPM.  Leg dopplers negative for DVT. Had DCCV, on oral Amiodarone load.     Continue asa, toprol, valsartian. zocor. Echo,  normal ef/ mod  MS. severe  TR, no change to 2016. Abd  u/s,  mod  ascites from CHF.       FS stable at 128, A1C at goal 6.8.     Continue diuresis with bid Torsemide. SMA-7 in AM.      Discharge home AM Friday.

## 2022-04-29 NOTE — DISCHARGE NOTE NURSING/CASE MANAGEMENT/SOCIAL WORK - PATIENT PORTAL LINK FT
You can access the FollowMyHealth Patient Portal offered by [facility] by registering at the following website: [email or web address]. By joining [facility]’s FollowMyHealth portal, you will also be able to view your health information using other applications (apps) compatible with our system.

## 2022-04-29 NOTE — DISCHARGE NOTE NURSING/CASE MANAGEMENT/SOCIAL WORK - NSSCNAMETXT_GEN_ALL_CORE
A Nurse From [facility] Will Contact You Within 1 Business Day To Schedule A Visit

## 2022-04-29 NOTE — DISCHARGE NOTE PROVIDER - NSDCMRMEDTOKEN_GEN_ALL_CORE_FT
amiodarone 200 mg oral tablet: 2 tab(s) orally once a day for 28 days  aspirin 81 mg oral tablet: 1 tab(s) orally once a day  Eliquis 5 mg oral tablet: 1 tab(s) orally 2 times a day  ferrous sulfate 325 mg (65 mg elemental iron) oral tablet: 1 tab(s) orally 3 times a day  levothyroxine 112 mcg (0.112 mg) oral capsule: 1 cap(s) orally once a day  metFORMIN 500 mg oral tablet: 1 tab(s) orally 2 times a day  Metoprolol Succinate  mg oral tablet, extended release: 1 tab(s) orally once a day  simvastatin 20 mg oral tablet: 1 tab(s) orally once a day (at bedtime)  torsemide 20 mg oral tablet: 1 tab(s) orally 2 times a day  valsartan 320 mg oral tablet: 1 tab(s) orally once a day   amiodarone 200 mg oral tablet: 2 tab(s) orally once a day for 28 days  aspirin 81 mg oral tablet: 1 tab(s) orally once a day  Eliquis 5 mg oral tablet: 1 tab(s) orally 2 times a day  ferrous sulfate 325 mg (65 mg elemental iron) oral tablet: 1 tab(s) orally 3 times a day  K-Tab 20 mEq oral tablet, extended release: 1 tab(s) orally 2 times a day   levothyroxine 112 mcg (0.112 mg) oral capsule: 1 cap(s) orally once a day  metFORMIN 500 mg oral tablet: 1 tab(s) orally 2 times a day  Metoprolol Succinate  mg oral tablet, extended release: 1 tab(s) orally once a day  simvastatin 20 mg oral tablet: 1 tab(s) orally once a day (at bedtime)  torsemide 20 mg oral tablet: 1 tab(s) orally 2 times a day  valsartan 320 mg oral tablet: 1 tab(s) orally once a day

## 2022-04-29 NOTE — PROGRESS NOTE ADULT - SUBJECTIVE AND OBJECTIVE BOX
INTERVAL HPI/OVERNIGHT EVENTS:  Pt seen and examined at bedside.     Allergies/Intolerance: sulfa drugs (Short breath)      MEDICATIONS  (STANDING):  aMIOdarone    Tablet   Oral   aMIOdarone    Tablet 400 milliGRAM(s) Oral daily  apixaban 5 milliGRAM(s) Oral every 12 hours  aspirin enteric coated 81 milliGRAM(s) Oral daily  dextrose 5%. 1000 milliLiter(s) (100 mL/Hr) IV Continuous <Continuous>  dextrose 5%. 1000 milliLiter(s) (50 mL/Hr) IV Continuous <Continuous>  dextrose 50% Injectable 25 Gram(s) IV Push once  dextrose 50% Injectable 12.5 Gram(s) IV Push once  dextrose 50% Injectable 25 Gram(s) IV Push once  glucagon  Injectable 1 milliGRAM(s) IntraMuscular once  insulin lispro (ADMELOG) corrective regimen sliding scale   SubCutaneous three times a day before meals  insulin lispro (ADMELOG) corrective regimen sliding scale   SubCutaneous at bedtime  levothyroxine 112 MICROGram(s) Oral daily  metoprolol succinate  milliGRAM(s) Oral daily  potassium chloride    Tablet ER 40 milliEquivalent(s) Oral every 4 hours  simvastatin 20 milliGRAM(s) Oral at bedtime  torsemide 20 milliGRAM(s) Oral two times a day  valsartan 320 milliGRAM(s) Oral daily    MEDICATIONS  (PRN):  dextrose Oral Gel 15 Gram(s) Oral once PRN Blood Glucose LESS THAN 70 milliGRAM(s)/deciliter        ROS: all systems reviewed and wnl      PHYSICAL EXAMINATION:  Vital Signs Last 24 Hrs  T(C): 36.9 (29 Apr 2022 03:58), Max: 36.9 (29 Apr 2022 03:58)  T(F): 98.4 (29 Apr 2022 03:58), Max: 98.4 (29 Apr 2022 03:58)  HR: 64 (29 Apr 2022 03:58) (63 - 71)  BP: 108/64 (29 Apr 2022 03:58) (108/64 - 126/70)  BP(mean): --  RR: 18 (29 Apr 2022 03:58) (18 - 18)  SpO2: 92% (29 Apr 2022 03:58) (92% - 100%)  CAPILLARY BLOOD GLUCOSE      POCT Blood Glucose.: 147 mg/dL (29 Apr 2022 07:38)  POCT Blood Glucose.: 127 mg/dL (28 Apr 2022 21:19)  POCT Blood Glucose.: 124 mg/dL (28 Apr 2022 16:14)  POCT Blood Glucose.: 127 mg/dL (28 Apr 2022 11:35)      04-28 @ 07:01 - 04-29 @ 07:00  --------------------------------------------------------  IN: 620 mL / OUT: 1105 mL / NET: -485 mL    04-29 @ 07:01 - 04-29 @ 11:00  --------------------------------------------------------  IN: 280 mL / OUT: 400 mL / NET: -120 mL        GENERAL:   NECK: supple, No JVD  CHEST/LUNG: clear to auscultation bilaterally; no rales, rhonchi, or wheezing b/l  HEART: normal S1, S2  ABDOMEN: BS+, soft, ND, NT   EXTREMITIES:  pulses palpable; no clubbing, cyanosis, or edema b/l LEs  SKIN: no rashes or lesions      LABS:                        14.1   6.76  )-----------( 194      ( 28 Apr 2022 10:48 )             43.8     04-29    140  |  99  |  44<H>  ----------------------------<  149<H>  3.2<L>   |  24  |  1.29    Ca    9.8      29 Apr 2022 07:10  Mg     2.1     04-27                 INTERVAL HPI/OVERNIGHT EVENTS:  Pt seen and examined at bedside.     Allergies/Intolerance: sulfa drugs (Short breath)      MEDICATIONS  (STANDING):  aMIOdarone    Tablet   Oral   aMIOdarone    Tablet 400 milliGRAM(s) Oral daily  apixaban 5 milliGRAM(s) Oral every 12 hours  aspirin enteric coated 81 milliGRAM(s) Oral daily  dextrose 5%. 1000 milliLiter(s) (100 mL/Hr) IV Continuous <Continuous>  dextrose 5%. 1000 milliLiter(s) (50 mL/Hr) IV Continuous <Continuous>  dextrose 50% Injectable 25 Gram(s) IV Push once  dextrose 50% Injectable 12.5 Gram(s) IV Push once  dextrose 50% Injectable 25 Gram(s) IV Push once  glucagon  Injectable 1 milliGRAM(s) IntraMuscular once  insulin lispro (ADMELOG) corrective regimen sliding scale   SubCutaneous three times a day before meals  insulin lispro (ADMELOG) corrective regimen sliding scale   SubCutaneous at bedtime  levothyroxine 112 MICROGram(s) Oral daily  metoprolol succinate  milliGRAM(s) Oral daily  potassium chloride    Tablet ER 40 milliEquivalent(s) Oral every 4 hours  simvastatin 20 milliGRAM(s) Oral at bedtime  torsemide 20 milliGRAM(s) Oral two times a day  valsartan 320 milliGRAM(s) Oral daily    MEDICATIONS  (PRN):  dextrose Oral Gel 15 Gram(s) Oral once PRN Blood Glucose LESS THAN 70 milliGRAM(s)/deciliter        ROS: all systems reviewed and wnl      PHYSICAL EXAMINATION:  Vital Signs Last 24 Hrs  T(C): 36.9 (29 Apr 2022 03:58), Max: 36.9 (29 Apr 2022 03:58)  T(F): 98.4 (29 Apr 2022 03:58), Max: 98.4 (29 Apr 2022 03:58)  HR: 64 (29 Apr 2022 03:58) (63 - 71)  BP: 108/64 (29 Apr 2022 03:58) (108/64 - 126/70)  BP(mean): --  RR: 18 (29 Apr 2022 03:58) (18 - 18)  SpO2: 92% (29 Apr 2022 03:58) (92% - 100%)  CAPILLARY BLOOD GLUCOSE      POCT Blood Glucose.: 147 mg/dL (29 Apr 2022 07:38)  POCT Blood Glucose.: 127 mg/dL (28 Apr 2022 21:19)  POCT Blood Glucose.: 124 mg/dL (28 Apr 2022 16:14)  POCT Blood Glucose.: 127 mg/dL (28 Apr 2022 11:35)      04-28 @ 07:01 - 04-29 @ 07:00  --------------------------------------------------------  IN: 620 mL / OUT: 1105 mL / NET: -485 mL    04-29 @ 07:01 - 04-29 @ 11:00  --------------------------------------------------------  IN: 280 mL / OUT: 400 mL / NET: -120 mL        GENERAL: stable, eating lunch, comfortable  NECK: supple, No JVD  CHEST/LUNG: clear to auscultation bilaterally; no rales, rhonchi, or wheezing b/l  HEART: normal S1, S2  ABDOMEN: BS+, soft, ND, NT   EXTREMITIES:  pulses palpable; no clubbing, cyanosis, or edema b/l LEs  SKIN: no rashes or lesions      LABS:                        14.1   6.76  )-----------( 194      ( 28 Apr 2022 10:48 )             43.8     04-29    140  |  99  |  44<H>  ----------------------------<  149<H>  3.2<L>   |  24  |  1.29    Ca    9.8      29 Apr 2022 07:10  Mg     2.1     04-27

## 2022-04-29 NOTE — DISCHARGE NOTE PROVIDER - NSDCCPCAREPLAN_GEN_ALL_CORE_FT
PRINCIPAL DISCHARGE DIAGNOSIS  Diagnosis: CHF exacerbation  Assessment and Plan of Treatment: Weigh yourself daily.  If you gain 3lbs in 3 days, or 5lbs in a week call your Health Care Provider.  Do not eat or drink foods containing more than 2000mg of salt (sodium) in your diet every day.  Call your Health Care Provider if you have any swelling or increased swelling in your feet, ankles, and/or stomach.  Take all of your medication as directed.  If you become dizzy call your Health Care Provider.        SECONDARY DISCHARGE DIAGNOSES  Diagnosis: HTN (hypertension)  Assessment and Plan of Treatment: Follow up with your medical doctor to establish long term blood pressure treatment goals.      Diagnosis: CAD (coronary artery disease)  Assessment and Plan of Treatment: Coronary artery disease is a condition where the arteries the supply the heart muscle get clogges with fatty deposits & puts you at risk for a heart attack  Call your doctor if you have any new pain, pressure, or discomfort in the center of your chest, pain, tingling or discomfort in arms, back, neck, jaw, or stomach, shortness of breath, nausea, vomiting, burping or heartburn, sweating, cold and clammy skin, racing or abnormal heartbeat for more than 10 minutes or if they keep coming & going.  Call 911 and do not tr to get to hospital by care  You can help yourself with lefestyle changes (quitting smoking if you smoke), eat lots of fruits & vegetables & low fat dairy products, not a lot of meat & fatty foods, walk or some form of physical activity most days of the week, lose weight if you are overweight  Take your cardiac medication as prescribed to lower cholesterol, to lower blood pressure, aspirin to prevent blood clots, and diabetes control  Make sure to keep appointments with doctor for cardiac follow up care      Diagnosis: Chronic atrial fibrillation  Assessment and Plan of Treatment: Atrial fibrillation is the most common heart rhythm problem.  The condition puts you at risk for has stroke and heart attack  It helps if you control your blood pressure, not drink more than 1-2 alcohol drinks per day, cut down on caffeine, getting treatment for over active thyroid gland, and get regular exercise  Call your doctor if you feel your heart racing or beating unusually, chest tightness or pain, lightheaded, faint, shortness of breath especially with exercise  It is important to take your heart medication as prescribed  You may be on anticoagulation which is very important to take as directed - you may need blood work to monitor drug levels

## 2022-04-29 NOTE — DISCHARGE NOTE NURSING/CASE MANAGEMENT/SOCIAL WORK - NSDCPEFALRISK_GEN_ALL_CORE
For information on Fall & Injury Prevention, visit: [email or web address] OR  [email or web address] OR  https://www.cdc.gov/steadi/patient.html

## 2022-04-29 NOTE — PROGRESS NOTE ADULT - SUBJECTIVE AND OBJECTIVE BOX
[facility] - John Ng, Sandeep, Robson, Jacinto, Karen Wong  Office Number:  [phone]    Patient resting comfortably in bed in NAD.  Laying flat with no respiratory distress.  No complaints of chest pain, dyspnea, palpitations, PND, or orthopnea.    F/U for:  CHF    Telemetry:  SR    MEDICATIONS  (STANDING):  aMIOdarone    Tablet   Oral   aMIOdarone    Tablet 400 milliGRAM(s) Oral daily  apixaban 5 milliGRAM(s) Oral every 12 hours  aspirin enteric coated 81 milliGRAM(s) Oral daily  dextrose 5%. 1000 milliLiter(s) (100 mL/Hr) IV Continuous <Continuous>  dextrose 5%. 1000 milliLiter(s) (50 mL/Hr) IV Continuous <Continuous>  dextrose 50% Injectable 25 Gram(s) IV Push once  dextrose 50% Injectable 12.5 Gram(s) IV Push once  dextrose 50% Injectable 25 Gram(s) IV Push once  glucagon  Injectable 1 milliGRAM(s) IntraMuscular once  insulin lispro (ADMELOG) corrective regimen sliding scale   SubCutaneous three times a day before meals  insulin lispro (ADMELOG) corrective regimen sliding scale   SubCutaneous at bedtime  levothyroxine 112 MICROGram(s) Oral daily  metoprolol succinate  milliGRAM(s) Oral daily  simvastatin 20 milliGRAM(s) Oral at bedtime  torsemide 20 milliGRAM(s) Oral two times a day  valsartan 320 milliGRAM(s) Oral daily    MEDICATIONS  (PRN):  dextrose Oral Gel 15 Gram(s) Oral once PRN Blood Glucose LESS THAN 70 milliGRAM(s)/deciliter      Allergies    sulfa drugs (Short breath)         Vital Signs Last 24 Hrs  T(C): 36.9 (29 Apr 2022 03:58), Max: 36.9 (29 Apr 2022 03:58)  T(F): 98.4 (29 Apr 2022 03:58), Max: 98.4 (29 Apr 2022 03:58)  HR: 64 (29 Apr 2022 03:58) (63 - 71)  BP: 108/64 (29 Apr 2022 03:58) (108/64 - 126/70)  BP(mean): --  RR: 18 (29 Apr 2022 03:58) (18 - 18)  SpO2: 92% (29 Apr 2022 03:58) (92% - 100%)    I&O's Summary    28 Apr 2022 07:01  -  29 Apr 2022 07:00  --------------------------------------------------------  IN: 620 mL / OUT: 1105 mL / NET: -485 mL    29 Apr 2022 07:01  -  29 Apr 2022 08:11  --------------------------------------------------------  IN: 0 mL / OUT: 400 mL / NET: -400 mL        ON EXAM:    Constitutional: NAD, awake and alert, well-developed  HEENT: Moist Mucous Membranes, Anicteric  Pulmonary: Decreased breath sounds b/l. No rales, crackles or wheeze appreciated.   Cardiovascular: Regular, S1 and S2, No murmurs, rubs, gallops or clicks  Gastrointestinal: Bowel Sounds present, soft, nontender.   Lymph: No peripheral edema. No lymphadenopathy.  Skin: No visible rashes or ulcers.  Psych:  Mood & affect appropriate for situation      LABS: All Labs Reviewed:                        14.1   6.76  )-----------( 194      ( 28 Apr 2022 10:48 )             43.8                         13.2   6.22  )-----------( 178      ( 27 Apr 2022 07:16 )             40.9     29 Apr 2022 07:10    140    |  99     |  44     ----------------------------<  149    3.2     |  24     |  1.29   28 Apr 2022 10:47    142    |  100    |  41     ----------------------------<  176    3.7     |  25     |  1.04   27 Apr 2022 20:18    142    |  96     |  46     ----------------------------<  161    3.9     |  29     |  1.46     Ca    9.8        29 Apr 2022 07:10  Ca    10.2       28 Apr 2022 10:47  Ca    10.7       27 Apr 2022 20:18  Mg     2.1       27 Apr 2022 20:18  Mg     2.0       26 Apr 2022 18:24

## 2022-04-29 NOTE — DISCHARGE NOTE NURSING/CASE MANAGEMENT/SOCIAL WORK - NSDCPEELIQUIS_GEN_ALL_CORE
Spoke with patient and relayed VISHNU message below--patient verbalizes understanding and agreement and will  Rx today.     VISHNU--med pended and pharmacy verified    please advise on interaction with Tizanidine Apixaban/Eliquis - Compliance/Apixaban/Eliquis - Dietary Advice/Apixaban/Eliquis - Follow up monitoring/Apixaban/Eliquis - Potential for adverse drug reactions and interactions

## 2022-04-29 NOTE — DISCHARGE NOTE PROVIDER - CARE PROVIDER_API CALL
Caleb Robles)  Interventional Cardiology  [address] [place] [address]  Phone: [phone]  Fax: [phone]  Follow Up Time:     Gregory Casey)  Cardio [facility]  [address] [place] [address]  Phone: [phone]  Fax: [phone]  Follow Up Time:     Chelita Carvajal)  Adv Heart Fail [facility]nspl[facility] Cardio; Cardiology; Internal Medicine  [address] [place] [address]  Phone: [phone]  Fax: [phone]  Follow Up Time:

## 2022-04-29 NOTE — DISCHARGE NOTE PROVIDER - NSDCFUADDAPPT_GEN_ALL_CORE_FT
follow up potassium level within 1 week. follow up potassium level within 1 week.  APPTS ARE READY TO BE MADE: [ ] YES    Best Family or Patient Contact (if needed):    Additional Information about above appointments (if needed):    1: mario for K level  2:   3:     Other comments or requests:

## 2022-04-29 NOTE — DISCHARGE NOTE PROVIDER - NSDCFUSCHEDAPPT_GEN_ALL_CORE_FT
Harry Chance  [facility]  [address]io [address]  Scheduled Appointment: 05/12/2022    Lillian Bergman  [facility]  Cardio [address]  Scheduled Appointment: 06/06/2022    [facility]  Cardio [address] [facility]  Scheduled Appointment: 06/20/2022     Radha Woods  [facility]  [employer] Patient [place]  Scheduled Appointment: 05/03/2022    Harry Chance  [facility]  [address]io [address]  Scheduled Appointment: 05/12/2022    Lillian Bergman  [facility]  Cardio [address]  Scheduled Appointment: 06/06/2022    [facility]  Cardio [address]  Scheduled Appointment: 06/20/2022

## 2022-04-29 NOTE — PROGRESS NOTE ADULT - REASON FOR ADMISSION
sob, leg swelling, abd swelling

## 2022-04-29 NOTE — PROGRESS NOTE ADULT - ASSESSMENT
Yolette is a 76 year old female with HTN, AS s/p bio AVR with post-op HB requiring PPM, non obstructive cad on cath in 2016, AF on eliquis, clear cell RCC s/p partial nephrectomy, hypothyroidism, p/w sob, leg swelling, abd swelling.    - initially quite volume overloaded on exam, though has improved  - RHF likely from malcoapted TV, w/ severe TR  - diuresed nicely on a Lasix gtt 5 mg/hr and output less. Edema less. Cr now uptrending. Now on po diuretics  - Please continue to maintain strict I/Os, monitor daily weights, Cr, and K.  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  To follow [facility] Dr. Robles    - s/p dccv on friday  - feels better, and has maintained sr for now  - cont with amiodarone  - cont with ac and metoprolol    - cath on record in 2016 with non obstructive CAD  - daily weights and I/o's  - cont asa and statin    - bp acceptable, though on lower side at times  - would cont valsartan as tolerated  - off norvasc to allow bp room for diuresis    - Monitor and replete electrolytes. Keep K>4.0 and Mg>2.0.     - Further cardiac workup will depend on clinical course.   - will follow with you

## 2022-04-29 NOTE — PROGRESS NOTE ADULT - PROVIDER SPECIALTY LIST ADULT
Structural Heart
Cardiology
Cardiology
Electrophysiology
Electrophysiology
Hospitalist
Internal Medicine
Internal Medicine
Cardiology
Hospitalist
Internal Medicine
Cardiology
Internal Medicine
Internal Medicine

## 2022-04-29 NOTE — DISCHARGE NOTE PROVIDER - HOSPITAL COURSE
Yolette is a 76 year old female with HTN, AS s/p bio AVR with post-op HB requiring PPM, non obstructive cad on cath in 2016, AF on eliquis, clear cell RCC s/p partial nephrectomy, hypothyroidism, p/w sob, leg swelling, abd swelling.    - initially quite volume overloaded on exam, though has improved  - RHF likely from malcoapted TV, w/ severe TR  - diuresed nicely on a Lasix gtt 5 mg/hr and output less. Edema less. Cr now uptrending. Now on po diuretics  - Please continue to maintain strict I/Os, monitor daily weights, Cr, and K.  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  To follow [facility] Dr. Robles  - s/p dccv on friday  - feels better, and has maintained sr for now  - cont with amiodarone  - cont with ac and metoprolol    - cath on record in 2016 with non obstructive CAD  - daily weights and I/o's  - cont asa and statin    - bp acceptable, though on lower side at times  - would cont valsartan as tolerated  - off norvasc to allow bp room for diuresis Patient 76 year old female PMH AS s/p bio AVR with post-op HB requiring PPM, non obstructive cad on cath in 2016, chronic AF on Eliquis, clear cell RCC s/p partial nephrectomy, hypothyroidism, p/w sob, leg swelling, abd swelling from acute systolic CHF.     Plan: Initially quite volume overloaded on exam, though has improved significantly by discharge. Has RHF likely from severe TR. See attached Echo.   Diuresed nicely on a Lasix gtt 5 mg/hr. Leg edema less by discharge.  Creatinine mild increase on Lasix drip, stopped and changed to po diuretics.  Creatinine  1.29 on discharge. Monitor daily weights.     TIM with mild-mod MR, mild MS, mildly elevated bio AVR gradients, RVE/RV dysfunction and severe TR. 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 May be a candidate for TV intervention depending on the course of her course. Will need to follow with Dr. Robles.     Had DCCV Friday, converted to NSR and loaded on Amiodarone. Has maintained NSR for several days. Cont with Amiodarone load, AC and Metoprolol.    - cath on record in 2016 with non obstructive CAD  - daily weights and I/o's  - cont asa and statin    - bp acceptable, though on lower side at times  - would cont valsartan as tolerated  - off norvasc to allow bp room for diuresis Patient 76 year old female PMH AS s/p bio AVR with post-op HB requiring PPM, non obstructive cad on cath in 2016, chronic AF on Eliquis, clear cell RCC s/p partial nephrectomy, hypothyroidism, p/w sob, leg swelling, abd swelling from acute systolic CHF.     Plan: Initially quite volume overloaded on exam, though has improved significantly by discharge. Has RHF likely from severe TR. See attached Echo. Diuresed nicely on a Lasix gtt 5 mg/hr. Leg edema less by discharge.  Creatinine mild increase on Lasix drip, stopped and changed to po diuretics.  Creatinine  1.29 on discharge. Monitor daily weights.     TIM with mild-mod MR, mild MS, mildly elevated bio AVR gradients, RVE/RV dysfunction and severe TR. 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 May be a candidate for TV intervention depending on the course of her course. Will need to follow with Dr. Robles.     Had DCCV Friday, converted to NSR and loaded on Amiodarone. Has maintained NSR for several days. Cont with Amiodarone load, AC and Metoprolol.    Cath on  2016 with non-obstructive CAD. Cont asa and statin. Off Norvasc to allow bp room for diuresis. See attached med list. Discharged home. Patient 76 year old female PMH AS s/p bio AVR with post-op HB requiring PPM, non obstructive cad on cath in 2016, chronic AF on Eliquis, clear cell RCC s/p partial nephrectomy, hypothyroidism, p/w sob, leg swelling, abd swelling from acute diastolic CHF.     Plan: Initially quite volume overloaded on exam, though has improved significantly by discharge. CXR on arrival notes PVC. CTA negative for PE, CBC was normal. Has RHF likely from severe TR. See attached Echo. Diuresed nicely on a Lasix gtt 5 mg/hr. Leg edema less by discharge.  Creatinine mild increase on Lasix drip, stopped and changed to po diuretics. Abdominal sonogram noted mild ascites from CHF.   Creatinine  1.29 on discharge. Monitor daily weights.     TIM with mild-mod MR, mild MS, mildly elevated bio AVR gradients, RVE/RV dysfunction and severe TR. 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 May be a candidate for TV intervention depending on the clinical course. Will need to follow with Dr. Robles from structural heart service.      Had DCCV Friday, converted to NSR and loaded on Amiodarone. Has maintained NSR for several days. Cont with Amiodarone load, AC and Metoprolol.    Cath on  2016 with non-obstructive CAD. Cont asa and statin. Off Norvasc to allow bp room for diuresis. See attached med list. Discharged home. No infections. Patient 76 year old female PMH AS s/p bio AVR with post-op HB requiring PPM, non obstructive cad on cath in 2016, chronic AF on Eliquis, clear cell RCC s/p partial nephrectomy, hypothyroidism, p/w sob, leg swelling, abd swelling from acute diastolic CHF.     Plan: Initially quite volume overloaded on exam, though has improved significantly by discharge. CXR on arrival notes PVC. CTA negative for PE, CBC was normal. Has RHF likely from severe TR. See attached Echo. Diuresed nicely on a Lasix gtt 5 mg/hr. Leg edema less by discharge.  Creatinine mild increase on Lasix drip, stopped and changed to po diuretics. Abdominal sonogram noted mild ascites from CHF.  Creatinine  1.29 on discharge. Monitor daily weights.     TIM with mild-mod MR, mild MS, mildly elevated bio AVR gradients, RVE/RV dysfunction and severe TR. 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 May be a candidate for TV intervention depending on the clinical course. Will need to follow with Dr. Robles from structural heart service.      Had DCCV Friday, converted to NSR and loaded on Amiodarone. Has maintained NSR for several days. Cont with Amiodarone load, AC and Metoprolol.    Cath on  2016 with non-obstructive CAD. Cont asa and statin. Off Norvasc to allow bp room for diuresis. See attached med list. Discharged home. No infections.

## 2022-05-03 ENCOUNTER — APPOINTMENT (OUTPATIENT)
Dept: CARE COORDINATION | Facility: HOME HEALTH | Age: 77
End: 2022-05-03
Payer: MEDICARE

## 2022-05-03 VITALS
WEIGHT: 174 LBS | OXYGEN SATURATION: 97 % | DIASTOLIC BLOOD PRESSURE: 65 MMHG | BODY MASS INDEX: 30.82 KG/M2 | RESPIRATION RATE: 14 BRPM | HEART RATE: 68 BPM | SYSTOLIC BLOOD PRESSURE: 95 MMHG

## 2022-05-03 PROCEDURE — 99496 TRANSJ CARE MGMT HIGH F2F 7D: CPT

## 2022-05-03 RX ORDER — FUROSEMIDE 40 MG/1
40 TABLET ORAL DAILY
Qty: 30 | Refills: 3 | Status: DISCONTINUED | COMMUNITY
Start: 2021-12-20 | End: 2022-05-03

## 2022-05-03 RX ORDER — MONTELUKAST 10 MG/1
10 TABLET, FILM COATED ORAL
Refills: 0 | Status: DISCONTINUED | COMMUNITY
Start: 2022-04-06 | End: 2022-05-03

## 2022-05-03 RX ORDER — METFORMIN HYDROCHLORIDE 500 MG/1
500 TABLET, COATED ORAL
Refills: 0 | Status: ACTIVE | COMMUNITY
Start: 2022-05-03

## 2022-05-03 NOTE — HISTORY OF PRESENT ILLNESS
[Post-hospitalization from ___ Hospital] : Post-hospitalization from [unfilled] Hospital [Admitted on: ___] : The patient was admitted on [unfilled] [Discharged on ___] : discharged on [unfilled] [Discharge Summary] : discharge summary [Discharge Med List] : discharge medication list [Med Reconciliation] : medication reconciliation has been completed [Patient Contacted By: ____] : and contacted by [unfilled] [FreeTextEntry2] : Patient 76 year old female PMH AS s/p bio AVR with post-op HB requiring PPM, non obstructive cad on cath in 2016, chronic AF on Eliquis, clear cell RCC s/p partial nephrectomy, hypothyroidism, p/w sob, leg swelling, abd swelling from acute diastolic CHF. Had DCCV Friday, converted to NSR and loaded on Amiodarone.\par \par Since dc, she has been feeling well overall.  She denies any cp/sob/malave.  Just feels fatigued at times.  She has been very strict with her diet and fluid intake, and has steadily lost weight since dc.  This AM she was concerned because she had a 0.6 lb weight gain with very trace BLLE edema that appears dependent, improves with elevation.  I advised her that if the swelling continues she can take an additional half of torsemide in addition to her regular dosing.  If that does not help, she has to call Dr. Chance's office. \par \par She is on a PO amiodarone taper and only received the first 56 pills.  While at the home I called Dr Bergman's office and left a message and the RN returned the call and sent the script from Dr. Bergman for the additional amio doses from 5/20 onward.  She is scheduled for EP on 6/6 and is aware of the dosage change after her 56 supply runs out to switch to only amio 200mg daily.  Homecare has had SOC, refused PT, and TCM numbers provided for any questions/concerns.

## 2022-05-03 NOTE — PHYSICAL EXAM

## 2022-05-11 ENCOUNTER — APPOINTMENT (OUTPATIENT)
Dept: CARDIOLOGY | Facility: CLINIC | Age: 77
End: 2022-05-11
Payer: MEDICARE

## 2022-05-11 ENCOUNTER — NON-APPOINTMENT (OUTPATIENT)
Age: 77
End: 2022-05-11

## 2022-05-11 VITALS
OXYGEN SATURATION: 98 % | SYSTOLIC BLOOD PRESSURE: 100 MMHG | HEIGHT: 63 IN | HEART RATE: 69 BPM | WEIGHT: 172 LBS | BODY MASS INDEX: 30.48 KG/M2 | DIASTOLIC BLOOD PRESSURE: 66 MMHG

## 2022-05-11 LAB
25(OH)D3 SERPL-MCNC: 64.9 NG/ML
ALBUMIN SERPL ELPH-MCNC: 5 G/DL
ALP BLD-CCNC: 89 U/L
ALT SERPL-CCNC: 35 U/L
ANION GAP SERPL CALC-SCNC: 19 MMOL/L
AST SERPL-CCNC: 40 U/L
BASOPHILS # BLD AUTO: 0.09 K/UL
BASOPHILS NFR BLD AUTO: 1.1 %
BILIRUB SERPL-MCNC: 0.8 MG/DL
BUN SERPL-MCNC: 117 MG/DL
CALCIUM SERPL-MCNC: 11.5 MG/DL
CHLORIDE SERPL-SCNC: 96 MMOL/L
CHOLEST SERPL-MCNC: 178 MG/DL
CO2 SERPL-SCNC: 26 MMOL/L
CREAT SERPL-MCNC: 3.57 MG/DL
EGFR: 13 ML/MIN/1.73M2
EOSINOPHIL # BLD AUTO: 0.21 K/UL
EOSINOPHIL NFR BLD AUTO: 2.5 %
FERRITIN SERPL-MCNC: 193 NG/ML
GLUCOSE SERPL-MCNC: 151 MG/DL
HCT VFR BLD CALC: 45.4 %
HDLC SERPL-MCNC: 29 MG/DL
HGB BLD-MCNC: 14.7 G/DL
IMM GRANULOCYTES NFR BLD AUTO: 0.2 %
IRON SATN MFR SERPL: 23 %
IRON SERPL-MCNC: 103 UG/DL
LDLC SERPL CALC-MCNC: 91 MG/DL
LYMPHOCYTES # BLD AUTO: 2.05 K/UL
LYMPHOCYTES NFR BLD AUTO: 24 %
MAGNESIUM SERPL-MCNC: 2.3 MG/DL
MAN DIFF?: NORMAL
MCHC RBC-ENTMCNC: 27.3 PG
MCHC RBC-ENTMCNC: 32.4 GM/DL
MCV RBC AUTO: 84.2 FL
MONOCYTES # BLD AUTO: 0.94 K/UL
MONOCYTES NFR BLD AUTO: 11 %
NEUTROPHILS # BLD AUTO: 5.22 K/UL
NEUTROPHILS NFR BLD AUTO: 61.2 %
NONHDLC SERPL-MCNC: 149 MG/DL
NT-PROBNP SERPL-MCNC: 529 PG/ML
PHOSPHATE SERPL-MCNC: 4.3 MG/DL
PLATELET # BLD AUTO: 255 K/UL
POTASSIUM SERPL-SCNC: 5.5 MMOL/L
PROT SERPL-MCNC: 8.1 G/DL
RBC # BLD: 5.39 M/UL
RBC # FLD: 15.7 %
SODIUM SERPL-SCNC: 141 MMOL/L
TIBC SERPL-MCNC: 449 UG/DL
TRIGL SERPL-MCNC: 289 MG/DL
TSH SERPL-ACNC: 4.08 UIU/ML
UIBC SERPL-MCNC: 345 UG/DL
WBC # FLD AUTO: 8.53 K/UL

## 2022-05-11 PROCEDURE — 99214 OFFICE O/P EST MOD 30 MIN: CPT

## 2022-05-11 PROCEDURE — 93000 ELECTROCARDIOGRAM COMPLETE: CPT | Mod: PD

## 2022-05-11 NOTE — HISTORY OF PRESENT ILLNESS
[FreeTextEntry1] : Yolette presents to the office today for a follow up cardiovascular evaluation for hypertension. She was last seen by Dr. Chance in 12/21.\par \par She is now 76 years old, with a history of aortic stenosis, an intermittent right bundle branch block pattern on her electrocardiogram, hypertension, a dyslipidemia, a thyroid nodule, secondary hypothyroidism, iron deficiency anemia, and renal cell carcinoma (status post resection).  She has had bioprosthetic aortic valve replacement. Her postop course was complicated by complete heart block, for which a pacemaker was eventually implanted.  Routine interrogation of her pacemaker revealed asymptomatic atrial fibrillation. She has been anticoagulated.\par \par She went to see Dr. Hayes for a physical in November, feeling well at that time.  Dr. Hayes suggested a number of scans, including a CT of the chest.  The CT was notable for GGO, for which a pulmonary evaluation is planned.\par \par She has not felt 100% since December 2021.  She presented to the emergency room on 4/18/2022, with shortness of breath, and acute diastolic heart failure.  She was diuresed successfully with intravenous Lasix.  A transesophageal echocardiogram demonstrated mild to moderate mitral vegetation, mild mitral stenosis, mildly elevated bio AVR gradients, RV enlargement and RV dysfunction, with severe tricuspid regurgitation.  Tricuspid valve appeared tethered with mall coaptation of the tricuspid valve leaflets due to a severely dilated annulus, without obvious impingement from the pacemaker lead.  She was evaluated by Dr. Robles, and has outpatient follow-up scheduled for intervention regarding her tricuspid valve.\par Her symptoms have also correlated with the presence of atrial fibrillation.  She is now status post cardioversion as well.  She was discharged home on 4/29.  Her cardiac medications at discharge were amiodarone 200, aspirin 81, Eliquis 5 twice daily, Toprol , simvastatin 20, torsemide 20 twice daily, and valsartan 320.\par \par Today, she is here for follow-up.  She is doing much better today.  Her lower extremity swelling has completely resolved.  Her breathing is better, and she has no chest pain.  She does report dizziness with positional change, and when she bends down or stands up.  Her blood pressure has generally been in the 100-120 range at home.

## 2022-05-11 NOTE — DISCUSSION/SUMMARY
[FreeTextEntry1] : Yolette was recently admitted with acute decompensated right heart failure, and was found to have severe tricuspid regurgitation (with a dilated annulus) and atrial fibrillation.  She is now status post cardioversion, and remains in a sinus rhythm with ventricular pacing today.\par \par She is doing quite well, and her edema has resolved.  She does report dizziness with positional change, and is orthostatic today.  I am halving her valsartan to 160 mg.  I am sending a full set of blood work today to evaluate her renal function, potassium, and BNP.  She will remain on her current dose of torsemide 20 twice daily, though this will likely need to be decreased.  She will stay on Toprol and Eliquis.\par \par She has an appointment to follow-up with the structural heart team next month, to evaluate less invasive options for tricuspid regurgitation.  She will continue to monitor her blood pressure and daily weights at home.  If no issues, I will see her again in 6 weeks.

## 2022-05-12 ENCOUNTER — APPOINTMENT (OUTPATIENT)
Dept: CARDIOLOGY | Facility: CLINIC | Age: 77
End: 2022-05-12

## 2022-05-12 ENCOUNTER — INPATIENT (INPATIENT)
Facility: HOSPITAL | Age: 77
LOS: 4 days | Discharge: HOME | DRG: 683 | End: 2022-05-17
Attending: INTERNAL MEDICINE | Admitting: INTERNAL MEDICINE
Payer: MEDICARE

## 2022-05-12 VITALS
HEART RATE: 75 BPM | OXYGEN SATURATION: 97 % | SYSTOLIC BLOOD PRESSURE: 148 MMHG | WEIGHT: 171.96 LBS | RESPIRATION RATE: 18 BRPM | HEIGHT: 64 IN | DIASTOLIC BLOOD PRESSURE: 84 MMHG | TEMPERATURE: 98 F

## 2022-05-12 DIAGNOSIS — Z41.9 ENCOUNTER FOR PROCEDURE FOR PURPOSES OTHER THAN REMEDYING HEALTH STATE, UNSPECIFIED: Chronic | ICD-10-CM

## 2022-05-12 DIAGNOSIS — Z90.721 ACQUIRED ABSENCE OF OVARIES, UNILATERAL: Chronic | ICD-10-CM

## 2022-05-12 DIAGNOSIS — Z90.5 ACQUIRED ABSENCE OF KIDNEY: Chronic | ICD-10-CM

## 2022-05-12 DIAGNOSIS — Z98.89 OTHER SPECIFIED POSTPROCEDURAL STATES: Chronic | ICD-10-CM

## 2022-05-12 DIAGNOSIS — Z95.2 PRESENCE OF PROSTHETIC HEART VALVE: Chronic | ICD-10-CM

## 2022-05-12 LAB
ALBUMIN SERPL ELPH-MCNC: 4.6 G/DL — SIGNIFICANT CHANGE UP (ref 3.3–5)
ALP SERPL-CCNC: 77 U/L — SIGNIFICANT CHANGE UP (ref 40–120)
ALT FLD-CCNC: 29 U/L — SIGNIFICANT CHANGE UP (ref 10–45)
ANION GAP SERPL CALC-SCNC: 15 MMOL/L — SIGNIFICANT CHANGE UP (ref 5–17)
APPEARANCE UR: CLEAR — SIGNIFICANT CHANGE UP
AST SERPL-CCNC: 36 U/L — SIGNIFICANT CHANGE UP (ref 10–40)
BACTERIA # UR AUTO: ABNORMAL
BASOPHILS # BLD AUTO: 0.05 K/UL — SIGNIFICANT CHANGE UP (ref 0–0.2)
BASOPHILS NFR BLD AUTO: 0.5 % — SIGNIFICANT CHANGE UP (ref 0–2)
BILIRUB SERPL-MCNC: 1.1 MG/DL — SIGNIFICANT CHANGE UP (ref 0.2–1.2)
BILIRUB UR-MCNC: NEGATIVE — SIGNIFICANT CHANGE UP
BUN SERPL-MCNC: 92 MG/DL — HIGH (ref 7–23)
CALCIUM SERPL-MCNC: 10.9 MG/DL — HIGH (ref 8.4–10.5)
CHLORIDE SERPL-SCNC: 89 MMOL/L — LOW (ref 96–108)
CO2 SERPL-SCNC: 21 MMOL/L — LOW (ref 22–31)
COLOR SPEC: COLORLESS — SIGNIFICANT CHANGE UP
CREAT SERPL-MCNC: 2.13 MG/DL — HIGH (ref 0.5–1.3)
DIFF PNL FLD: NEGATIVE — SIGNIFICANT CHANGE UP
EGFR: 24 ML/MIN/1.73M2 — LOW
EOSINOPHIL # BLD AUTO: 0.17 K/UL — SIGNIFICANT CHANGE UP (ref 0–0.5)
EOSINOPHIL NFR BLD AUTO: 1.8 % — SIGNIFICANT CHANGE UP (ref 0–6)
EPI CELLS # UR: 2 /HPF — SIGNIFICANT CHANGE UP
GLUCOSE SERPL-MCNC: 129 MG/DL — HIGH (ref 70–99)
GLUCOSE UR QL: NEGATIVE — SIGNIFICANT CHANGE UP
HCT VFR BLD CALC: 41.1 % — SIGNIFICANT CHANGE UP (ref 34.5–45)
HGB BLD-MCNC: 14.1 G/DL — SIGNIFICANT CHANGE UP (ref 11.5–15.5)
HYALINE CASTS # UR AUTO: 0 /LPF — SIGNIFICANT CHANGE UP (ref 0–2)
IMM GRANULOCYTES NFR BLD AUTO: 0.4 % — SIGNIFICANT CHANGE UP (ref 0–1.5)
KETONES UR-MCNC: NEGATIVE — SIGNIFICANT CHANGE UP
LEUKOCYTE ESTERASE UR-ACNC: ABNORMAL
LIDOCAIN IGE QN: 54 U/L — SIGNIFICANT CHANGE UP (ref 7–60)
LYMPHOCYTES # BLD AUTO: 1.98 K/UL — SIGNIFICANT CHANGE UP (ref 1–3.3)
LYMPHOCYTES # BLD AUTO: 21.3 % — SIGNIFICANT CHANGE UP (ref 13–44)
MCHC RBC-ENTMCNC: 27.1 PG — SIGNIFICANT CHANGE UP (ref 27–34)
MCHC RBC-ENTMCNC: 34.3 GM/DL — SIGNIFICANT CHANGE UP (ref 32–36)
MCV RBC AUTO: 78.9 FL — LOW (ref 80–100)
MONOCYTES # BLD AUTO: 0.73 K/UL — SIGNIFICANT CHANGE UP (ref 0–0.9)
MONOCYTES NFR BLD AUTO: 7.8 % — SIGNIFICANT CHANGE UP (ref 2–14)
NEUTROPHILS # BLD AUTO: 6.34 K/UL — SIGNIFICANT CHANGE UP (ref 1.8–7.4)
NEUTROPHILS NFR BLD AUTO: 68.2 % — SIGNIFICANT CHANGE UP (ref 43–77)
NITRITE UR-MCNC: NEGATIVE — SIGNIFICANT CHANGE UP
NRBC # BLD: 0 /100 WBCS — SIGNIFICANT CHANGE UP (ref 0–0)
PH UR: 5.5 — SIGNIFICANT CHANGE UP (ref 5–8)
PLATELET # BLD AUTO: 228 K/UL — SIGNIFICANT CHANGE UP (ref 150–400)
POTASSIUM SERPL-MCNC: 4.7 MMOL/L — SIGNIFICANT CHANGE UP (ref 3.5–5.3)
POTASSIUM SERPL-SCNC: 4.7 MMOL/L — SIGNIFICANT CHANGE UP (ref 3.5–5.3)
PROT SERPL-MCNC: 8 G/DL — SIGNIFICANT CHANGE UP (ref 6–8.3)
PROT UR-MCNC: NEGATIVE — SIGNIFICANT CHANGE UP
RBC # BLD: 5.21 M/UL — HIGH (ref 3.8–5.2)
RBC # FLD: 14.4 % — SIGNIFICANT CHANGE UP (ref 10.3–14.5)
RBC CASTS # UR COMP ASSIST: 0 /HPF — SIGNIFICANT CHANGE UP (ref 0–4)
SODIUM SERPL-SCNC: 125 MMOL/L — LOW (ref 135–145)
SP GR SPEC: 1.01 — LOW (ref 1.01–1.02)
TROPONIN T, HIGH SENSITIVITY RESULT: 28 NG/L — SIGNIFICANT CHANGE UP (ref 0–51)
UROBILINOGEN FLD QL: NEGATIVE — SIGNIFICANT CHANGE UP
WBC # BLD: 9.31 K/UL — SIGNIFICANT CHANGE UP (ref 3.8–10.5)
WBC # FLD AUTO: 9.31 K/UL — SIGNIFICANT CHANGE UP (ref 3.8–10.5)
WBC UR QL: 4 /HPF — SIGNIFICANT CHANGE UP (ref 0–5)

## 2022-05-12 PROCEDURE — 99285 EMERGENCY DEPT VISIT HI MDM: CPT | Mod: GC

## 2022-05-12 PROCEDURE — 93010 ELECTROCARDIOGRAM REPORT: CPT

## 2022-05-12 PROCEDURE — 71045 X-RAY EXAM CHEST 1 VIEW: CPT | Mod: 26

## 2022-05-12 RX ORDER — ONDANSETRON 8 MG/1
4 TABLET, FILM COATED ORAL ONCE
Refills: 0 | Status: COMPLETED | OUTPATIENT
Start: 2022-05-12 | End: 2022-05-12

## 2022-05-12 RX ORDER — LIDOCAINE 4 G/100G
10 CREAM TOPICAL ONCE
Refills: 0 | Status: COMPLETED | OUTPATIENT
Start: 2022-05-12 | End: 2022-05-12

## 2022-05-12 NOTE — ED PROVIDER NOTE - OBJECTIVE STATEMENT
76F with PMHx/PSHx including of HTN, HLD, CAD, aortic stenosis s/p aortic valve repair, CHF presents to the ED with a chief complaint of nausea, lightheadedness (feeling like she pass out when stands) and generalized malaise for 2 days. Denies recent fevers, chills, difficulty breathing, abdominal pain, vomiting, or bloody stools since recent discharge from hospital. Patient saw Dr. Jones (cardio MD) for outpatient f/u this week and found to have creatinine up from baseline of 1.29 to 3.57, for which the patient was told to drink a lot of water and return for repeat lab check. Patient reports she drank 8 bottles of water today. No history of PE, DVT, recent surgeries, calf tenderness, swelling, or erythema.

## 2022-05-12 NOTE — ED PROVIDER NOTE - ATTENDING CONTRIBUTION TO CARE
pt is a 75 y/o female with h/o chf c/o vague sts light headed, nausea, weakness went to see her cards yesterday recently left chf admission on diuretics, held torsemide, bp med yesterday with marni 3.5 range from 1 on discharge for admission for marni, renal consult, possibly related to diuretics, r/o chf.

## 2022-05-12 NOTE — ED CLERICAL - NS ED CLERK NOTE PRE-ARRIVAL INFORMATION; ADDITIONAL PRE-ARRIVAL INFORMATION
This patient is enrolled in the Heart Failure STARS readmission reduction initiative and has active care navigation. This patient can be followed up by the care navigation team within 24 hours.  To arrange close follow-up or to obtain additional clinical information, please call the contact number above.   For patients followed by the NS cardiology heart failure team, please call the on call cardiomyopathy attending ([phone]) for ALL PATIENTS PRIOR to decision for admission or observation.   Consider CDU for management of CHF exacerbations per guidelines.

## 2022-05-12 NOTE — ED PROVIDER NOTE - NS ED ROS FT
Constitution: No Fever or chills, No Weight Loss,   Eyes: No visual changes  HEENT: No cough, No Discharge, No Rhinorrhea, No URI symptoms  Cardio: (+) Chest pain, No Palpitations, No Dyspnea  Resp: No SOB, No Wheezing  GI: No abdominal pain, (+) Nausea, No Vomiting, No Constipation, No Diarrhea  : No burning upon urination, trouble urinating, no foul odor from urine  MSK: No Back pain, No Numbness, No Tingling, No Weakness  Neuro: (+) Lightheadedness; No Headache, No changes to Vision, No changes to Hearing, Normal Gait  Skin: No rashes, No Bruising, No Swelling

## 2022-05-12 NOTE — ED PROVIDER NOTE - PHYSICAL EXAMINATION
GEN - NAD; non-toxic; A+Ox3, speaking full sentences, steady gait   HENT - NC/AT, No visible Ecchymosis, No Abrasions, No Lac/Tears, MMM, no discharge  EYES - EOMI, no conjunctival pallor, no scleral icterus  NECK - Neck supple, No LAD, No Swelling  PULM - CTA B/L,  symmetric breath sounds  CV -  RRR, S1 S2, no murmurs 2+ Pulses B/L UE  GI - (-) Bradley's, (-) Rovsings, (-) McBurneys; NT/ND, soft, no guarding, no rebound, no masses    MSK/EXT- no CVA tenderness; (+) 1 + Pitting symmetric edema, no gross deformity, warm and well perfused, no calf tenderness/swelling/erythema   SKIN - no rash or bruising  NEUROLOGIC - alert, CN2-12 intact, sensation intact, moving all 4 ext with 5/5 Strength

## 2022-05-12 NOTE — ED PROVIDER NOTE - PROGRESS NOTE DETAILS
Daniel Ríos): 76 female with PMHx/PSHx including HTN, HLD, CAD, and CHF presents to the ED with generalized malaise, nausea and feeling lightheaded every time she stands up. Exam - regular rate rhythm, clear to ascultation bilaterally, 1+ pitting edema bilaterally. Presentation and history concerning for likely ADAMS versus dehydration versus atypical ACS. Plan - CBC, CMP, EKG, chest x-ray, lipase, troponin, UA, and urine culture. Vital signs stable, nontoxic. Resident [place]: preliminary eval concerning for ADAMS and concomitant Hyponatremia. Case endorsed to MD Wilmar.

## 2022-05-12 NOTE — ED ADULT TRIAGE NOTE - BSA (M2)
1.83 [FreeTextEntry1] : expectorant-suppressant --promethazine DM at night to help her sleep and Mucinex DM (generic) by day\par Tylenol for pain or discomfort. Watch for fever.  normal...

## 2022-05-13 DIAGNOSIS — N17.9 ACUTE KIDNEY FAILURE, UNSPECIFIED: ICD-10-CM

## 2022-05-13 DIAGNOSIS — I48.0 PAROXYSMAL ATRIAL FIBRILLATION: ICD-10-CM

## 2022-05-13 DIAGNOSIS — Z29.9 ENCOUNTER FOR PROPHYLACTIC MEASURES, UNSPECIFIED: ICD-10-CM

## 2022-05-13 DIAGNOSIS — E87.1 HYPO-OSMOLALITY AND HYPONATREMIA: ICD-10-CM

## 2022-05-13 DIAGNOSIS — E03.9 HYPOTHYROIDISM, UNSPECIFIED: ICD-10-CM

## 2022-05-13 DIAGNOSIS — I10 ESSENTIAL (PRIMARY) HYPERTENSION: ICD-10-CM

## 2022-05-13 DIAGNOSIS — I50.32 CHRONIC DIASTOLIC (CONGESTIVE) HEART FAILURE: ICD-10-CM

## 2022-05-13 LAB
ALBUMIN SERPL ELPH-MCNC: 3.7 G/DL — SIGNIFICANT CHANGE UP (ref 3.3–5)
ALP SERPL-CCNC: 60 U/L — SIGNIFICANT CHANGE UP (ref 40–120)
ALT FLD-CCNC: 25 U/L — SIGNIFICANT CHANGE UP (ref 10–45)
ANION GAP SERPL CALC-SCNC: 15 MMOL/L — SIGNIFICANT CHANGE UP (ref 5–17)
AST SERPL-CCNC: 31 U/L — SIGNIFICANT CHANGE UP (ref 10–40)
BILIRUB SERPL-MCNC: 0.7 MG/DL — SIGNIFICANT CHANGE UP (ref 0.2–1.2)
BUN SERPL-MCNC: 81 MG/DL — HIGH (ref 7–23)
CALCIUM SERPL-MCNC: 10.1 MG/DL — SIGNIFICANT CHANGE UP (ref 8.4–10.5)
CHLORIDE SERPL-SCNC: 97 MMOL/L — SIGNIFICANT CHANGE UP (ref 96–108)
CO2 SERPL-SCNC: 20 MMOL/L — LOW (ref 22–31)
CORTIS AM PEAK SERPL-MCNC: 14.7 UG/DL — SIGNIFICANT CHANGE UP (ref 6–18.4)
CREAT ?TM UR-MCNC: 38 MG/DL — SIGNIFICANT CHANGE UP
CREAT SERPL-MCNC: 2.03 MG/DL — HIGH (ref 0.5–1.3)
EGFR: 25 ML/MIN/1.73M2 — LOW
GLUCOSE BLDC GLUCOMTR-MCNC: 106 MG/DL — HIGH (ref 70–99)
GLUCOSE BLDC GLUCOMTR-MCNC: 113 MG/DL — HIGH (ref 70–99)
GLUCOSE BLDC GLUCOMTR-MCNC: 116 MG/DL — HIGH (ref 70–99)
GLUCOSE BLDC GLUCOMTR-MCNC: 136 MG/DL — HIGH (ref 70–99)
GLUCOSE SERPL-MCNC: 86 MG/DL — SIGNIFICANT CHANGE UP (ref 70–99)
HCT VFR BLD CALC: 36.4 % — SIGNIFICANT CHANGE UP (ref 34.5–45)
HGB BLD-MCNC: 12.4 G/DL — SIGNIFICANT CHANGE UP (ref 11.5–15.5)
MCHC RBC-ENTMCNC: 27.1 PG — SIGNIFICANT CHANGE UP (ref 27–34)
MCHC RBC-ENTMCNC: 34.1 GM/DL — SIGNIFICANT CHANGE UP (ref 32–36)
MCV RBC AUTO: 79.5 FL — LOW (ref 80–100)
NRBC # BLD: 0 /100 WBCS — SIGNIFICANT CHANGE UP (ref 0–0)
OSMOLALITY SERPL: 303 MOSMOL/KG — HIGH (ref 280–301)
PLATELET # BLD AUTO: 187 K/UL — SIGNIFICANT CHANGE UP (ref 150–400)
POTASSIUM SERPL-MCNC: 4.9 MMOL/L — SIGNIFICANT CHANGE UP (ref 3.5–5.3)
POTASSIUM SERPL-SCNC: 4.9 MMOL/L — SIGNIFICANT CHANGE UP (ref 3.5–5.3)
POTASSIUM UR-SCNC: 39 MMOL/L — SIGNIFICANT CHANGE UP
PROT SERPL-MCNC: 6.6 G/DL — SIGNIFICANT CHANGE UP (ref 6–8.3)
RBC # BLD: 4.58 M/UL — SIGNIFICANT CHANGE UP (ref 3.8–5.2)
RBC # FLD: 14.7 % — HIGH (ref 10.3–14.5)
SARS-COV-2 RNA SPEC QL NAA+PROBE: SIGNIFICANT CHANGE UP
SODIUM SERPL-SCNC: 132 MMOL/L — LOW (ref 135–145)
SODIUM UR-SCNC: 6 MMOL/L — SIGNIFICANT CHANGE UP
TSH SERPL-MCNC: 3.46 UIU/ML — SIGNIFICANT CHANGE UP (ref 0.27–4.2)
URATE SERPL-MCNC: 12.8 MG/DL — HIGH (ref 2.5–7)
WBC # BLD: 7.64 K/UL — SIGNIFICANT CHANGE UP (ref 3.8–10.5)
WBC # FLD AUTO: 7.64 K/UL — SIGNIFICANT CHANGE UP (ref 3.8–10.5)

## 2022-05-13 PROCEDURE — 99223 1ST HOSP IP/OBS HIGH 75: CPT

## 2022-05-13 PROCEDURE — 76775 US EXAM ABDO BACK WALL LIM: CPT | Mod: 26

## 2022-05-13 RX ORDER — AMIODARONE HYDROCHLORIDE 400 MG/1
200 TABLET ORAL DAILY
Refills: 0 | Status: DISCONTINUED | OUTPATIENT
Start: 2022-05-13 | End: 2022-05-17

## 2022-05-13 RX ORDER — SODIUM CHLORIDE 9 MG/ML
1000 INJECTION, SOLUTION INTRAVENOUS
Refills: 0 | Status: DISCONTINUED | OUTPATIENT
Start: 2022-05-13 | End: 2022-05-17

## 2022-05-13 RX ORDER — FERROUS SULFATE 325(65) MG
325 TABLET ORAL THREE TIMES A DAY
Refills: 0 | Status: DISCONTINUED | OUTPATIENT
Start: 2022-05-13 | End: 2022-05-14

## 2022-05-13 RX ORDER — APIXABAN 2.5 MG/1
5 TABLET, FILM COATED ORAL
Refills: 0 | Status: DISCONTINUED | OUTPATIENT
Start: 2022-05-13 | End: 2022-05-17

## 2022-05-13 RX ORDER — ASPIRIN/CALCIUM CARB/MAGNESIUM 324 MG
1 TABLET ORAL
Qty: 0 | Refills: 0 | DISCHARGE

## 2022-05-13 RX ORDER — METOPROLOL TARTRATE 50 MG
100 TABLET ORAL DAILY
Refills: 0 | Status: DISCONTINUED | OUTPATIENT
Start: 2022-05-13 | End: 2022-05-14

## 2022-05-13 RX ORDER — METOPROLOL TARTRATE 50 MG
1 TABLET ORAL
Qty: 0 | Refills: 0 | DISCHARGE

## 2022-05-13 RX ORDER — METFORMIN HYDROCHLORIDE 850 MG/1
1 TABLET ORAL
Qty: 0 | Refills: 0 | DISCHARGE

## 2022-05-13 RX ORDER — DEXTROSE 50 % IN WATER 50 %
25 SYRINGE (ML) INTRAVENOUS ONCE
Refills: 0 | Status: DISCONTINUED | OUTPATIENT
Start: 2022-05-13 | End: 2022-05-17

## 2022-05-13 RX ORDER — INSULIN LISPRO 100/ML
VIAL (ML) SUBCUTANEOUS
Refills: 0 | Status: DISCONTINUED | OUTPATIENT
Start: 2022-05-13 | End: 2022-05-17

## 2022-05-13 RX ORDER — SODIUM CHLORIDE 9 MG/ML
1000 INJECTION INTRAMUSCULAR; INTRAVENOUS; SUBCUTANEOUS ONCE
Refills: 0 | Status: COMPLETED | OUTPATIENT
Start: 2022-05-13 | End: 2022-05-13

## 2022-05-13 RX ORDER — LEVOTHYROXINE SODIUM 125 MCG
112 TABLET ORAL DAILY
Refills: 0 | Status: DISCONTINUED | OUTPATIENT
Start: 2022-05-13 | End: 2022-05-17

## 2022-05-13 RX ORDER — DEXTROSE 50 % IN WATER 50 %
15 SYRINGE (ML) INTRAVENOUS ONCE
Refills: 0 | Status: DISCONTINUED | OUTPATIENT
Start: 2022-05-13 | End: 2022-05-17

## 2022-05-13 RX ORDER — APIXABAN 2.5 MG/1
1 TABLET, FILM COATED ORAL
Qty: 0 | Refills: 0 | DISCHARGE

## 2022-05-13 RX ORDER — GLUCAGON INJECTION, SOLUTION 0.5 MG/.1ML
1 INJECTION, SOLUTION SUBCUTANEOUS ONCE
Refills: 0 | Status: DISCONTINUED | OUTPATIENT
Start: 2022-05-13 | End: 2022-05-17

## 2022-05-13 RX ORDER — SIMVASTATIN 20 MG/1
20 TABLET, FILM COATED ORAL AT BEDTIME
Refills: 0 | Status: DISCONTINUED | OUTPATIENT
Start: 2022-05-13 | End: 2022-05-17

## 2022-05-13 RX ORDER — VALSARTAN 80 MG/1
1 TABLET ORAL
Qty: 0 | Refills: 0 | DISCHARGE

## 2022-05-13 RX ORDER — INSULIN LISPRO 100/ML
VIAL (ML) SUBCUTANEOUS AT BEDTIME
Refills: 0 | Status: DISCONTINUED | OUTPATIENT
Start: 2022-05-13 | End: 2022-05-17

## 2022-05-13 RX ORDER — DEXTROSE 50 % IN WATER 50 %
12.5 SYRINGE (ML) INTRAVENOUS ONCE
Refills: 0 | Status: DISCONTINUED | OUTPATIENT
Start: 2022-05-13 | End: 2022-05-17

## 2022-05-13 RX ORDER — ASPIRIN/CALCIUM CARB/MAGNESIUM 324 MG
81 TABLET ORAL DAILY
Refills: 0 | Status: DISCONTINUED | OUTPATIENT
Start: 2022-05-13 | End: 2022-05-17

## 2022-05-13 RX ADMIN — AMIODARONE HYDROCHLORIDE 200 MILLIGRAM(S): 400 TABLET ORAL at 05:21

## 2022-05-13 RX ADMIN — APIXABAN 5 MILLIGRAM(S): 2.5 TABLET, FILM COATED ORAL at 05:21

## 2022-05-13 RX ADMIN — ONDANSETRON 4 MILLIGRAM(S): 8 TABLET, FILM COATED ORAL at 00:41

## 2022-05-13 RX ADMIN — Medication 325 MILLIGRAM(S): at 05:21

## 2022-05-13 RX ADMIN — Medication 30 MILLILITER(S): at 00:41

## 2022-05-13 RX ADMIN — LIDOCAINE 10 MILLILITER(S): 4 CREAM TOPICAL at 00:41

## 2022-05-13 RX ADMIN — Medication 325 MILLIGRAM(S): at 21:10

## 2022-05-13 RX ADMIN — SODIUM CHLORIDE 1000 MILLILITER(S): 9 INJECTION INTRAMUSCULAR; INTRAVENOUS; SUBCUTANEOUS at 00:42

## 2022-05-13 RX ADMIN — SIMVASTATIN 20 MILLIGRAM(S): 20 TABLET, FILM COATED ORAL at 21:10

## 2022-05-13 RX ADMIN — Medication 112 MICROGRAM(S): at 05:21

## 2022-05-13 RX ADMIN — Medication 325 MILLIGRAM(S): at 14:24

## 2022-05-13 RX ADMIN — APIXABAN 5 MILLIGRAM(S): 2.5 TABLET, FILM COATED ORAL at 17:34

## 2022-05-13 NOTE — H&P ADULT - HISTORY OF PRESENT ILLNESS
76F w/ CAD s/p CABG, severe TR, chronic diastolic HF, Afib s/p PPM on eliquis, AS s/p bioAVR, DM2, HTN,  Left RCC s/p partial nephrectomy, hypothyroid p/w nausea, malaise and elevated Cr. The patient reports feeling nausea and tired over the last few days and therefore sought evaluation with her cardiologist who identified that she had an elevated Cr and therefore had her diuretic and valsartan stopped. She was to follow up in the next day but became nervous that she was experiencing continue symptoms including hands shaking. Per chart review, she is noted to have recent hospitalization for acute HF and afib requiring DCCV. She keeps a strict log of her body weight and she notes from discharge her weight has changed from 177lb to 169lb before seeing her cardiologist and stopping her torsemide. She denies fever, chills, cp, palpitations, sob, cough, vomit, diarrhea, or painful urination.

## 2022-05-13 NOTE — PATIENT PROFILE ADULT - PUBLIC BENEFITS
HIDA reviewed, negative for cholecystitis.     Surgery signing off, please call with any questions.    B Team Surgery  [id number] no

## 2022-05-13 NOTE — H&P ADULT - PROBLEM SELECTOR PLAN 1
- suspect from over diuresis  - hold diuretic and arb  - in am need consults for cardiology and nephrology(patient would like to use nephrologist her son uses and will check with him in the am)

## 2022-05-13 NOTE — PATIENT PROFILE ADULT - FALL HARM RISK - UNIVERSAL INTERVENTIONS
Bed in lowest position, wheels locked, appropriate side rails in place/Call bell, personal items and telephone in reach/Instruct patient to call for assistance before getting out of bed or chair/Non-slip footwear when patient is out of bed/[place] to call system/Physically safe environment - no spills, clutter or unnecessary equipment/Purposeful Proactive Rounding/Room/bathroom lighting operational, light cord in reach

## 2022-05-13 NOTE — H&P ADULT - PROBLEM SELECTOR PLAN 2
suspect from overdiuresis  - suspect from over diuresis  - hold diuretic and arb  - nephrology consult as above  - US renal

## 2022-05-13 NOTE — CONSULT NOTE ADULT - SUBJECTIVE AND OBJECTIVE BOX
[facility] - John Ng, Robson Hopkins Pannella, Patel, Savella  Office Number: [phone]    Initial Consult Note    CHIEF COMPLAINT: Patient is a 76y old  Female who presents with a chief complaint of 76F p/w nausea, malaise, elevated Cr        HPI:  76F w/ CAD s/p CABG, severe TR being evaluated for intervention by structural heart, chronic diastolic HF, Afib s/p PPM on eliquis, AS s/p bioAVR, DM2, HTN,  Left RCC s/p partial nephrectomy, hypothyroid p/w nausea, malaise and elevated Cr. The patient reports feeling nausea and tired over the last few days and therefore sought evaluation with her cardiologist who identified that she had an elevated Cr and therefore had her diuretic and valsartan stopped. She was to follow up in the next day but became nervous that she was experiencing continue symptoms including hands shaking. Per chart review, she is noted to have recent hospitalization for acute HF and afib requiring DCCV. She keeps a strict log of her body weight and she notes from discharge her weight has changed from 177lb to 169lb before seeing her cardiologist and stopping her torsemide. She denies fever, chills, cp, palpitations, sob, cough, vomit, diarrhea, or painful urination.     She saw Dr. Jones on Wed, and her diuretics and ARB were stopped for rising creatinine.  She seems dry.  She denies chest pains, increased dyspnea, PND, orthopnea, LE swelling, dizziness, or syncope.       PAST MEDICAL & SURGICAL HISTORY:  Hypertension  x10 years      Dyslipidemia      Hiatal hernia      Type II diabetes mellitus      Aortic stenosis      Cancer of kidney, left      Right bundle branch block (RBBB)      Anemia      Hypothyroidism      Status post unilateral salpingo-oophorectomy      S/P T&amp;A (status post tonsillectomy and adenoidectomy)      H/O hand surgery      H/O partial nephrectomy  right [id number]      H/O aortic valve replacement      Elective surgery  PPM  2016          SOCIAL HISTORY:  No tobacco, ethanol, or drug abuse.    FAMILY HISTORY:  FH: renal cell carcinoma (Child)      No family history of acute MI or sudden cardiac death.    MEDICATIONS  (STANDING):  aMIOdarone    Tablet 200 milliGRAM(s) Oral daily  apixaban 5 milliGRAM(s) Oral two times a day  aspirin enteric coated 81 milliGRAM(s) Oral daily  dextrose 5%. 1000 milliLiter(s) (100 mL/Hr) IV Continuous <Continuous>  dextrose 5%. 1000 milliLiter(s) (50 mL/Hr) IV Continuous <Continuous>  dextrose 50% Injectable 25 Gram(s) IV Push once  dextrose 50% Injectable 12.5 Gram(s) IV Push once  dextrose 50% Injectable 25 Gram(s) IV Push once  ferrous    sulfate 325 milliGRAM(s) Oral three times a day  glucagon  Injectable 1 milliGRAM(s) IntraMuscular once  insulin lispro (ADMELOG) corrective regimen sliding scale   SubCutaneous three times a day before meals  insulin lispro (ADMELOG) corrective regimen sliding scale   SubCutaneous at bedtime  levothyroxine 112 MICROGram(s) Oral daily  metoprolol succinate  milliGRAM(s) Oral daily  simvastatin 20 milliGRAM(s) Oral at bedtime    MEDICATIONS  (PRN):  dextrose Oral Gel 15 Gram(s) Oral once PRN Blood Glucose LESS THAN 70 milliGRAM(s)/deciliter      Allergies    sulfa drugs (Short breath)    Intolerances        REVIEW OF SYSTEMS:       All other review of systems is negative unless indicated above    VITAL SIGNS:   Vital Signs Last 24 Hrs  T(C): 36.9 (13 May 2022 05:10), Max: 36.9 (13 May 2022 05:10)  T(F): 98.4 (13 May 2022 05:10), Max: 98.4 (13 May 2022 05:10)  HR: 60 (13 May 2022 05:10) (60 - 77)  BP: 99/58 (13 May 2022 05:10) (99/58 - 148/84)  BP(mean): 77 (13 May 2022 02:50) (77 - 77)  RR: 16 (13 May 2022 05:10) (16 - 18)  SpO2: 99% (13 May 2022 05:10) (97% - 99%)    I&O's Summary      On Exam:    Constitutional: NAD, alert and oriented x 3  Lungs:  Non-labored, breath sounds are clear bilaterally, No wheezing, rales or rhonchi  Cardiovascular: RRR.  S1 and S2 positive.  3/6 systolic murmur  Gastrointestinal: Bowel Sounds present, soft, nontender.   Lymph: Minimal b/l peripheral edema. No cervical lymphadenopathy.  Neurological: Alert, no focal deficits  Skin: No rashes or ulcers   Psych:  Mood & affect appropriate.    LABS: All Labs Reviewed:                        12.4   7.64  )-----------( 187      ( 13 May 2022 06:35 )             36.4                         14.1   9.31  )-----------( 228      ( 12 May 2022 23:18 )             41.1     13 May 2022 06:35    132    |  97     |  81     ----------------------------<  86     4.9     |  20     |  2.03   12 May 2022 23:18    125    |  89     |  92     ----------------------------<  129    4.7     |  21     |  2.13     Ca    10.1       13 May 2022 06:35  Ca    10.9       12 May 2022 23:18    TPro  6.6    /  Alb  3.7    /  TBili  0.7    /  DBili  x      /  AST  31     /  ALT  25     /  AlkPhos  60     13 May 2022 06:35  TPro  8.0    /  Alb  4.6    /  TBili  1.1    /  DBili  x      /  AST  36     /  ALT  29     /  AlkPhos  77     12 May 2022 23:18                RADIOLOGY:    EKG:

## 2022-05-13 NOTE — CONSULT NOTE ADULT - ASSESSMENT
76F w/ CAD s/p CABG, severe TR being evaluated for intervention by structural heart, chronic diastolic HF, Afib s/p PPM on eliquis, AS s/p bioAVR, DM2, HTN,  Left RCC s/p partial nephrectomy, hypothyroid p/w nausea, malaise and elevated Cr, secondary to overdiuresis    - ADAMS from dehydration and overdiuresis with hypovolemic hyponatremia  - Hold diuretics.  Encourage PO hydration.  Creatinine improving.  Try to avoid IVF if possible  - Hold ARB  - Continue full dose ELiquis  - Continue aspirin  - Continue amio 200 QD.  Maintaining NSR  - Continue Toprol 100 QD  - HR and BP acceptable  - Continue statin drug  - Monitor and replete lytes  - Renal evaluation pending  - No sign of active ischemia  - To follow while admitted

## 2022-05-13 NOTE — H&P ADULT - NSHPREVIEWOFSYSTEMS_GEN_ALL_CORE
CONSTITUTIONAL: No fever, no chills  EYES: No eye pain, no acute blindness  ENMT: no pain in mouth, no cuts on tongue  RESPIRATORY: No cough, No sob  CARDIOVASCULAR: No CP, no palpitations  GASTROINTESTINAL: no abdominal pain, nausea+  GENITOURINARY: No dysuria, no hematuria  Heme/Lymph: No easy bruising, no swelling of neck lymph nodes  NEUROLOGICAL: No seizure, No acute paralysis  SKIN: No itching, no rashes  MUSCULOSKELETAL: No acute joint pain, no joint swelling

## 2022-05-13 NOTE — H&P ADULT - ASSESSMENT
76F w/ CAD s/p CABG, severe TR, chronic diastolic HF, Afib s/p PPM on eliquis, AS s/p bioAVR, DM2, HTN,  Left RCC s/p partial nephrectomy, hypothyroid p/w nausea, malaise and elevated Cr admit for marni and hyponatremia

## 2022-05-13 NOTE — H&P ADULT - NSHPPHYSICALEXAM_GEN_ALL_CORE
Vital Signs Last 24 Hrs  T(C): 36.6 (13 May 2022 03:00), Max: 36.6 (12 May 2022 20:15)  T(F): 97.8 (13 May 2022 03:00), Max: 97.8 (12 May 2022 20:15)  HR: 77 (13 May 2022 03:00) (64 - 77)  BP: 101/86 (13 May 2022 03:00) (101/66 - 148/84)  BP(mean): 77 (13 May 2022 02:50) (77 - 77)  RR: 17 (13 May 2022 03:00) (17 - 18)  SpO2: 98% (13 May 2022 03:00) (97% - 98%)    GENERAL: NAD, well-developed  HEAD:  Atraumatic, Normocephalic  EYES: EOMI, PERRL, conjunctiva and sclera clear  ENMT: MMM, good dentition  NECK: Supple, trachea midline  Lung: normal work of breathing, cta b/l  Cardiovascular: S1&S2+, rrr, no m/r/g appreciated; no pitting edema appreciated in b/l LE  ABDOMEN: bs+, soft, nt, nd, no appreciable masses  : No espinoza catheter, no CVA tenderness  Neuro: A&Ox3, no flaccid paralysis in extremities appreciated  SKIN: warm and dry, no visible purulence in exposed areas, normal skin turgor

## 2022-05-13 NOTE — H&P ADULT - NSHPLABSRESULTS_GEN_ALL_CORE
Personally reviewed available labs, imaging and ekg  [1]  CBC Full  -  ( 12 May 2022 23:18 )  WBC Count : 9.31 K/uL  RBC Count : 5.21 M/uL  Hemoglobin : 14.1 g/dL  Hematocrit : 41.1 %  Platelet Count - Automated : 228 K/uL  Mean Cell Volume : 78.9 fl  Mean Cell Hemoglobin : 27.1 pg  Mean Cell Hemoglobin Concentration : 34.3 gm/dL  Auto Neutrophil # : 6.34 K/uL  Auto Lymphocyte # : 1.98 K/uL  Auto Monocyte # : 0.73 K/uL  Auto Eosinophil # : 0.17 K/uL  Auto Basophil # : 0.05 K/uL  Auto Neutrophil % : 68.2 %  Auto Lymphocyte % : 21.3 %  Auto Monocyte % : 7.8 %  Auto Eosinophil % : 1.8 %  Auto Basophil % : 0.5 %    05-12    125<L>  |  89<L>  |  92<H>  ----------------------------<  129<H>  4.7   |  21<L>  |  2.13<H>    Ca    10.9<H>      12 May 2022 23:18    TPro  8.0  /  Alb  4.6  /  TBili  1.1  /  DBili  x   /  AST  36  /  ALT  29  /  AlkPhos  77  05-12      Imaging:  [ 2] I independently reviewed CXR and no lobar opacification appreciated  EKG:  [2] I independently reviewed EKG/cardiac tracing and av paced    Review of old records:  [2] I personally reviewed previous records which identified hospitalization for HF

## 2022-05-13 NOTE — ED ADULT NURSE NOTE - OBJECTIVE STATEMENT
75 y/o female presenting to the ER c/o shakiness, lightheadedness, and nausea. Pt reports 2 days of general malaise, lightheadedness, nausea, and shakiness, pt saw outpt MD and was found to have elevated Cr. Pt presents to [facility] A&Ox3, from triage, ambulatory, 18 G IV placed in L. AC by ER RN, pt endorsing s/s for 2 days, drank 8 bottles of water today due to outpt labs revealing elevated Cr levels and was instructed to come in for lab recheck. PMHx HTN, HLD, CAD, aortic stenosis s/p aortic valve repair, CHF. Pt denies chest pain, SOB/difficulty breathing, fever/chills, HA, abd pain, vomiting/diarrhea, dizziness, numbness/tingling. Pt A&Ox3, breathing spontaneous and unlabored, IV locked and patent, bed locked and in lowest position.

## 2022-05-13 NOTE — CHART NOTE - NSCHARTNOTEFT_GEN_A_CORE
H&P dated today reviewed in full. H&P dated today reviewed in full.  Agree with current plan, hold diuretics. Cardio to follow as well.

## 2022-05-13 NOTE — H&P ADULT - NSHPADDITIONALINFOADULT_GEN_ALL_CORE
Patient assigned to me by night hospitalist in charge for management and care for patient for this evening only. Care to be resumed by day hospitalist Dr. Urban at 08:00 in the morning and thereafter.     Leroy Medina MD  Medicine Attending  Department of Hospital Medicine  pager: [phone] (available from 20:00 to 08:00)

## 2022-05-14 LAB
ANION GAP SERPL CALC-SCNC: 14 MMOL/L — SIGNIFICANT CHANGE UP (ref 5–17)
BUN SERPL-MCNC: 76 MG/DL — HIGH (ref 7–23)
CALCIUM SERPL-MCNC: 10 MG/DL — SIGNIFICANT CHANGE UP (ref 8.4–10.5)
CHLORIDE SERPL-SCNC: 103 MMOL/L — SIGNIFICANT CHANGE UP (ref 96–108)
CO2 SERPL-SCNC: 23 MMOL/L — SIGNIFICANT CHANGE UP (ref 22–31)
CREAT SERPL-MCNC: 1.99 MG/DL — HIGH (ref 0.5–1.3)
EGFR: 26 ML/MIN/1.73M2 — LOW
GLUCOSE BLDC GLUCOMTR-MCNC: 108 MG/DL — HIGH (ref 70–99)
GLUCOSE BLDC GLUCOMTR-MCNC: 120 MG/DL — HIGH (ref 70–99)
GLUCOSE BLDC GLUCOMTR-MCNC: 123 MG/DL — HIGH (ref 70–99)
GLUCOSE BLDC GLUCOMTR-MCNC: 137 MG/DL — HIGH (ref 70–99)
GLUCOSE SERPL-MCNC: 107 MG/DL — HIGH (ref 70–99)
POTASSIUM SERPL-MCNC: 4.3 MMOL/L — SIGNIFICANT CHANGE UP (ref 3.5–5.3)
POTASSIUM SERPL-SCNC: 4.3 MMOL/L — SIGNIFICANT CHANGE UP (ref 3.5–5.3)
PROT ?TM UR-MCNC: 5 MG/DL — SIGNIFICANT CHANGE UP (ref 0–12)
SODIUM SERPL-SCNC: 140 MMOL/L — SIGNIFICANT CHANGE UP (ref 135–145)
UUN UR-MCNC: 630 MG/DL — SIGNIFICANT CHANGE UP

## 2022-05-14 PROCEDURE — 99232 SBSQ HOSP IP/OBS MODERATE 35: CPT

## 2022-05-14 RX ORDER — SODIUM CHLORIDE 9 MG/ML
250 INJECTION INTRAMUSCULAR; INTRAVENOUS; SUBCUTANEOUS ONCE
Refills: 0 | Status: COMPLETED | OUTPATIENT
Start: 2022-05-14 | End: 2022-05-14

## 2022-05-14 RX ORDER — FERROUS SULFATE 325(65) MG
325 TABLET ORAL DAILY
Refills: 0 | Status: DISCONTINUED | OUTPATIENT
Start: 2022-05-14 | End: 2022-05-17

## 2022-05-14 RX ORDER — METOPROLOL TARTRATE 50 MG
50 TABLET ORAL DAILY
Refills: 0 | Status: DISCONTINUED | OUTPATIENT
Start: 2022-05-14 | End: 2022-05-17

## 2022-05-14 RX ADMIN — Medication 112 MICROGRAM(S): at 05:18

## 2022-05-14 RX ADMIN — AMIODARONE HYDROCHLORIDE 200 MILLIGRAM(S): 400 TABLET ORAL at 05:56

## 2022-05-14 RX ADMIN — SODIUM CHLORIDE 250 MILLILITER(S): 9 INJECTION INTRAMUSCULAR; INTRAVENOUS; SUBCUTANEOUS at 12:34

## 2022-05-14 RX ADMIN — APIXABAN 5 MILLIGRAM(S): 2.5 TABLET, FILM COATED ORAL at 05:18

## 2022-05-14 RX ADMIN — Medication 100 MILLIGRAM(S): at 05:18

## 2022-05-14 RX ADMIN — SIMVASTATIN 20 MILLIGRAM(S): 20 TABLET, FILM COATED ORAL at 21:05

## 2022-05-14 RX ADMIN — Medication 325 MILLIGRAM(S): at 12:34

## 2022-05-14 RX ADMIN — Medication 325 MILLIGRAM(S): at 05:18

## 2022-05-14 RX ADMIN — APIXABAN 5 MILLIGRAM(S): 2.5 TABLET, FILM COATED ORAL at 17:13

## 2022-05-14 RX ADMIN — Medication 81 MILLIGRAM(S): at 11:47

## 2022-05-14 NOTE — PROGRESS NOTE ADULT - ASSESSMENT
76F w/ CAD s/p CABG, severe TR being evaluated for intervention by structural heart, chronic diastolic HF, Afib s/p PPM on eliquis, AS s/p bioAVR, DM2, HTN,  Left RCC s/p partial nephrectomy, hypothyroid p/w nausea, malaise and elevated Cr, secondary to overdiuresis    - ADAMS from dehydration and overdiuresis with hypovolemic hyponatremia  - Hold diuretics.  Encourage PO hydration.  Creatinine improving.  Try to avoid IVF if possible  - Hold ARB  - Continue full dose ELiquis  - Continue aspirin  - Continue amio 200 QD.  Maintaining NSR  - Continue Toprol 100 QD  - HR and BP acceptable  - Continue statin drug  - Monitor and replete lytes  - Renal evaluation  - No sign of active ischemia  - To follow while admitted

## 2022-05-14 NOTE — PROGRESS NOTE ADULT - SUBJECTIVE AND OBJECTIVE BOX
INTERVAL HPI/OVERNIGHT EVENTS:  Pt seen and examined at bedside.     Allergies/Intolerance: sulfa drugs (Short breath)      MEDICATIONS  (STANDING):  aMIOdarone    Tablet 200 milliGRAM(s) Oral daily  apixaban 5 milliGRAM(s) Oral two times a day  aspirin enteric coated 81 milliGRAM(s) Oral daily  dextrose 5%. 1000 milliLiter(s) (100 mL/Hr) IV Continuous <Continuous>  dextrose 5%. 1000 milliLiter(s) (50 mL/Hr) IV Continuous <Continuous>  dextrose 50% Injectable 25 Gram(s) IV Push once  dextrose 50% Injectable 12.5 Gram(s) IV Push once  dextrose 50% Injectable 25 Gram(s) IV Push once  ferrous    sulfate 325 milliGRAM(s) Oral three times a day  glucagon  Injectable 1 milliGRAM(s) IntraMuscular once  insulin lispro (ADMELOG) corrective regimen sliding scale   SubCutaneous three times a day before meals  insulin lispro (ADMELOG) corrective regimen sliding scale   SubCutaneous at bedtime  levothyroxine 112 MICROGram(s) Oral daily  metoprolol succinate  milliGRAM(s) Oral daily  simvastatin 20 milliGRAM(s) Oral at bedtime    MEDICATIONS  (PRN):  dextrose Oral Gel 15 Gram(s) Oral once PRN Blood Glucose LESS THAN 70 milliGRAM(s)/deciliter        ROS: all systems reviewed and wnl      PHYSICAL EXAMINATION:  Vital Signs Last 24 Hrs  T(C): 36.6 (14 May 2022 09:59), Max: 36.9 (13 May 2022 20:58)  T(F): 97.9 (14 May 2022 09:59), Max: 98.4 (13 May 2022 20:58)  HR: 69 (14 May 2022 09:59) (56 - 72)  BP: 93/54 (14 May 2022 09:59) (90/55 - 126/74)  BP(mean): --  RR: 18 (14 May 2022 09:59) (18 - 18)  SpO2: 99% (14 May 2022 09:59) (97% - 100%)  CAPILLARY BLOOD GLUCOSE      POCT Blood Glucose.: 137 mg/dL (14 May 2022 08:41)  POCT Blood Glucose.: 136 mg/dL (13 May 2022 21:09)  POCT Blood Glucose.: 116 mg/dL (13 May 2022 17:53)  POCT Blood Glucose.: 113 mg/dL (13 May 2022 13:19)       @ 07: @ 07:00  --------------------------------------------------------  IN: 0 mL / OUT: 400 mL / NET: -400 mL     @ 07: @ 11:29  --------------------------------------------------------  IN: 200 mL / OUT: 0 mL / NET: 200 mL        GENERAL:   NECK: supple, No JVD  CHEST/LUNG: clear to auscultation bilaterally; no rales, rhonchi, or wheezing b/l  HEART: normal S1, S2  ABDOMEN: BS+, soft, ND, NT   EXTREMITIES:  pulses palpable; no clubbing, cyanosis, or edema b/l LEs  SKIN: no rashes or lesions      LABS:                        12.4   7.64  )-----------( 187      ( 13 May 2022 06:35 )             36.4         140  |  103  |  76<H>  ----------------------------<  107<H>  4.3   |  23  |  1.99<H>    Ca    10.0      14 May 2022 07:36    TPro  6.6  /  Alb  3.7  /  TBili  0.7  /  DBili  x   /  AST  31  /  ALT  25  /  AlkPhos  60  05-13      Urinalysis Basic - ( 12 May 2022 23:18 )    Color: Colorless / Appearance: Clear / S.006 / pH: x  Gluc: x / Ketone: Negative  / Bili: Negative / Urobili: Negative   Blood: x / Protein: Negative / Nitrite: Negative   Leuk Esterase: Moderate / RBC: 0 /hpf / WBC 4 /HPF   Sq Epi: x / Non Sq Epi: 2 /hpf / Bacteria: Moderate             INTERVAL HPI/OVERNIGHT EVENTS:  Pt seen and examined at bedside.     Allergies/Intolerance: sulfa drugs (Short breath)      MEDICATIONS  (STANDING):  aMIOdarone    Tablet 200 milliGRAM(s) Oral daily  apixaban 5 milliGRAM(s) Oral two times a day  aspirin enteric coated 81 milliGRAM(s) Oral daily  dextrose 5%. 1000 milliLiter(s) (100 mL/Hr) IV Continuous <Continuous>  dextrose 5%. 1000 milliLiter(s) (50 mL/Hr) IV Continuous <Continuous>  dextrose 50% Injectable 25 Gram(s) IV Push once  dextrose 50% Injectable 12.5 Gram(s) IV Push once  dextrose 50% Injectable 25 Gram(s) IV Push once  ferrous    sulfate 325 milliGRAM(s) Oral three times a day  glucagon  Injectable 1 milliGRAM(s) IntraMuscular once  insulin lispro (ADMELOG) corrective regimen sliding scale   SubCutaneous three times a day before meals  insulin lispro (ADMELOG) corrective regimen sliding scale   SubCutaneous at bedtime  levothyroxine 112 MICROGram(s) Oral daily  metoprolol succinate  milliGRAM(s) Oral daily  simvastatin 20 milliGRAM(s) Oral at bedtime    MEDICATIONS  (PRN):  dextrose Oral Gel 15 Gram(s) Oral once PRN Blood Glucose LESS THAN 70 milliGRAM(s)/deciliter        ROS: all systems reviewed and wnl      PHYSICAL EXAMINATION:  Vital Signs Last 24 Hrs  T(C): 36.6 (14 May 2022 09:59), Max: 36.9 (13 May 2022 20:58)  T(F): 97.9 (14 May 2022 09:59), Max: 98.4 (13 May 2022 20:58)  HR: 69 (14 May 2022 09:59) (56 - 72)  BP: 93/54 (14 May 2022 09:59) (90/55 - 126/74)  BP(mean): --  RR: 18 (14 May 2022 09:59) (18 - 18)  SpO2: 99% (14 May 2022 09:59) (97% - 100%)  CAPILLARY BLOOD GLUCOSE      POCT Blood Glucose.: 137 mg/dL (14 May 2022 08:41)  POCT Blood Glucose.: 136 mg/dL (13 May 2022 21:09)  POCT Blood Glucose.: 116 mg/dL (13 May 2022 17:53)  POCT Blood Glucose.: 113 mg/dL (13 May 2022 13:19)       @ 07: @ 07:00  --------------------------------------------------------  IN: 0 mL / OUT: 400 mL / NET: -400 mL     @ 07: @ 11:29  --------------------------------------------------------  IN: 200 mL / OUT: 0 mL / NET: 200 mL        GENERAL: in bed, stable on RA, comfortable, eating meals well, no SOB  NECK: supple, No JVD  CHEST/LUNG: clear to auscultation bilaterally; no rales, rhonchi, or wheezing b/l  HEART: normal S1, S2  ABDOMEN: BS+, soft, ND, NT   EXTREMITIES:  pulses palpable; no clubbing, cyanosis, or edema b/l LEs  SKIN: no rashes or lesions      LABS:                        12.4   7.64  )-----------( 187      ( 13 May 2022 06:35 )             36.4         140  |  103  |  76<H>  ----------------------------<  107<H>  4.3   |  23  |  1.99<H>    Ca    10.0      14 May 2022 07:36    TPro  6.6  /  Alb  3.7  /  TBili  0.7  /  DBili  x   /  AST  31  /  ALT  25  /  AlkPhos  60  05-13      Urinalysis Basic - ( 12 May 2022 23:18 )    Color: Colorless / Appearance: Clear / S.006 / pH: x  Gluc: x / Ketone: Negative  / Bili: Negative / Urobili: Negative   Blood: x / Protein: Negative / Nitrite: Negative   Leuk Esterase: Moderate / RBC: 0 /hpf / WBC 4 /HPF   Sq Epi: x / Non Sq Epi: 2 /hpf / Bacteria: Moderate

## 2022-05-14 NOTE — PROGRESS NOTE ADULT - ASSESSMENT
76F w/ CAD s/p CABG, severe TR, chronic diastolic HF, Afib s/p PPM on eliquis, AS s/p bioAVR, DM2, HTN,  Left RCC s/p partial nephrectomy, hypothyroid p/w nausea, malaise and elevated Cr admit for marni and hyponatremia 76F w/ CAD s/p CABG, severe TR, chronic diastolic HF, Afib s/p PPM on eliquis, AS s/p bioAVR, DM2, HTN,  Left RCC s/p partial nephrectomy, hypothyroid p/w nausea, malaise and elevated Cr admit for marni and hyponatremia.      76F w/ CAD s/p CABG, severe TR, chronic diastolic HF, Afib s/p PPM on eliquis, AS s/p bioAVR, DM2, HTN,  Left RCC s/p partial nephrectomy, hypothyroid p/w nausea, malaise and elevated Cr admit for adams and hyponatremia.       Plan: Admitted to medicine for ADAMS from excess diuresis. Loop diuretic on hold, ADAMS same today at 1.99, normally 1.29. CXR is clear, sodium has improved today   to 140.     Continue home meds of Eliquis, Amiodarone, ASA, decrease ferrous sulfate to daily, Synthyroid, Zocur and Toprol. Will try to avoid IVF if possible, may need   fluid bolus.     Discharge home when ADAMS resolved and decrease dose of home diuretics.        76F w/ CAD s/p CABG, severe TR, chronic diastolic HF, Afib s/p PPM on eliquis, AS s/p bioAVR, DM2, HTN,  Left RCC s/p partial nephrectomy, hypothyroid p/w nausea, malaise and elevated Cr admit for adams and hyponatremia.       Plan: Admitted to medicine for ADAMS from excess diuresis. Loop diuretic on hold, ADAMS same today at 1.99, normally 1.29. CXR is clear, sodium has improved today   to 140.     Continue home meds of Eliquis, Amiodarone, ASA, decrease ferrous sulfate to daily, Synthyroid, Zocur and Toprol. Will try to avoid IVF if possible, may need   fluid bolus.     Discharge home when ADAMS resolved and decrease dose of home diuretics.   Was on Torsemide 20 mg bid prior to this admission.       SMA-7 in AM.        76F w/ CAD s/p CABG, severe TR, chronic diastolic HF, Afib s/p PPM on eliquis, AS s/p bioAVR, DM2, HTN,  Left RCC s/p partial nephrectomy, hypothyroid p/w nausea, malaise and elevated Cr admit for adams and hyponatremia.       Plan: Admitted to medicine for ADAMS from excess diuresis. Loop diuretic on hold, ADAMS same today at 1.99, normally 1.29. CXR is clear, sodium has improved today to 140.     Bed side bladder sonogram is normal, no PVR.     Continue home meds of Eliquis, Amiodarone, ASA, decrease ferrous sulfate to daily, Synthyroid, Zocur and Toprol. Will give  ml x 1 now  for ADAMS.       Discharge home when ADAMS resolved and decrease dose of home diuretics.   Was on Torsemide 20 mg bid prior to this admission.       SMA-7 in AM.

## 2022-05-14 NOTE — PROGRESS NOTE ADULT - SUBJECTIVE AND OBJECTIVE BOX
Follow up: CAD, AS, AF    HPI:  76F w/ CAD s/p CABG, severe TR, chronic diastolic HF, Afib s/p PPM on eliquis, AS s/p bioAVR, DM2, HTN,  Left RCC s/p partial nephrectomy, hypothyroid p/w nausea, malaise and elevated Cr. The patient reports feeling nausea and tired over the last few days and therefore sought evaluation with her cardiologist who identified that she had an elevated Cr and therefore had her diuretic and valsartan stopped. She was to follow up in the next day but became nervous that she was experiencing continue symptoms including hands shaking. Per chart review, she is noted to have recent hospitalization for acute HF and afib requiring DCCV. She keeps a strict log of her body weight and she notes from discharge her weight has changed from 177lb to 169lb before seeing her cardiologist and stopping her torsemide. She denies fever, chills, cp, palpitations, sob, cough, vomit, diarrhea, or painful urination. (13 May 2022 04:00)      She is awake and alert this morning.  She is ambulating and feeling well without any complaints.    PAST MEDICAL & SURGICAL HISTORY:  Hypertension  x10 years      Dyslipidemia      Hiatal hernia      Type II diabetes mellitus      Aortic stenosis      Cancer of kidney, left      Right bundle branch block (RBBB)      Anemia      Hypothyroidism      Status post unilateral salpingo-oophorectomy      S/P T&amp;A (status post tonsillectomy and adenoidectomy)      H/O hand surgery      H/O partial nephrectomy  right 20125      H/O aortic valve replacement      Elective surgery  PPM  2016          MEDICATIONS  (STANDING):  aMIOdarone    Tablet 200 milliGRAM(s) Oral daily  apixaban 5 milliGRAM(s) Oral two times a day  aspirin enteric coated 81 milliGRAM(s) Oral daily  dextrose 5%. 1000 milliLiter(s) (100 mL/Hr) IV Continuous <Continuous>  dextrose 5%. 1000 milliLiter(s) (50 mL/Hr) IV Continuous <Continuous>  dextrose 50% Injectable 25 Gram(s) IV Push once  dextrose 50% Injectable 12.5 Gram(s) IV Push once  dextrose 50% Injectable 25 Gram(s) IV Push once  ferrous    sulfate 325 milliGRAM(s) Oral daily  glucagon  Injectable 1 milliGRAM(s) IntraMuscular once  insulin lispro (ADMELOG) corrective regimen sliding scale   SubCutaneous three times a day before meals  insulin lispro (ADMELOG) corrective regimen sliding scale   SubCutaneous at bedtime  levothyroxine 112 MICROGram(s) Oral daily  metoprolol succinate ER 50 milliGRAM(s) Oral daily  simvastatin 20 milliGRAM(s) Oral at bedtime    Vital Signs Last 24 Hrs  T(C): 36.6 (14 May 2022 09:59), Max: 36.9 (13 May 2022 20:58)  T(F): 97.9 (14 May 2022 09:59), Max: 98.4 (13 May 2022 20:58)  HR: 65 (14 May 2022 13:37) (59 - 72)  BP: 101/54 (14 May 2022 13:37) (93/54 - 126/74)  BP(mean): --  RR: 18 (14 May 2022 13:37) (18 - 18)  SpO2: 100% (14 May 2022 13:37) (97% - 100%)    I&O's Summary    13 May 2022 07:01  -  14 May 2022 07:00  --------------------------------------------------------  IN: 0 mL / OUT: 400 mL / NET: -400 mL    14 May 2022 07:01  -  14 May 2022 13:42  --------------------------------------------------------  IN: 200 mL / OUT: 0 mL / NET: 200 mL        PHYSICAL EXAM:    Constitutional: NAD, awake and alert, well-developed  Eyes:  Pupils round, no lesions  ENMT: no exudate or erythema  Pulmonary: Non-labored, breath sounds are clear bilaterally, No wheezing, rales or rhonchi  Cardiovascular: PMI not palpable RRR normal S1 and S2, no murmurs, rubs, gallops or clicks  Gastrointestinal: Bowel Sounds present, soft, nontender.   Lymph: No cervical lymphadenopathy.  Neurological: Alert, no focal deficits  Skin: No rashes.  No cyanosis.  Psych:  Mood & affect appropriate   Ext: No lower ext edema                                12.4   7.64  )-----------( 187      ( 13 May 2022 06:35 )             36.4     05-14    140  |  103  |  76<H>  ----------------------------<  107<H>  4.3   |  23  |  1.99<H>    Ca    10.0      14 May 2022 07:36    TPro  6.6  /  Alb  3.7  /  TBili  0.7  /  DBili  x   /  AST  31  /  ALT  25  /  AlkPhos  60  05-13    < from: 12 Lead ECG (05.12.22 @ 23:54) >    Ventricular Rate 66 BPM    Atrial Rate 66 BPM    P-R Interval 290 ms    QRS Duration 180 ms    Q-T Interval 510 ms    QTC Calculation(Bazett) 534 ms    P Axis 28 degrees    R Axis 76 degrees    T Axis 53 degrees    Diagnosis Line Atrial-sensed ventricular-paced rhythm with prolonged AV conduction  ABNORMAL ECG  WHEN COMPARED WITH ECG OF 22-APR-2022 12:38,  NO  SIGNIFICANT CHANGES HAVE OCCURRED  Confirmed by TATUM FRANK, [facility] ([id number]) on 5/13/2022 12:46:04 PM    < end of copied text >    < from: Xray Chest 1 View- PORTABLE-Urgent (Xray Chest 1 View- PORTABLE-Urgent .) (05.12.22 @ 23:28) >    ACC: [id number] EXAM:  XR CHEST PORTABLE URGENT 1V                          PROCEDURE DATE:  05/12/2022          INTERPRETATION:  CLINICAL INDICATION: Lightheadedness and nausea    TECHNIQUE: Single view of the chest was obtained.    COMPARISON: Chest radiograph 4/18/2022.    FINDINGS:  Elevated right hemidiaphragm, not significantly changed. Status post   median sternotomy. Status post aortic valve replacement. Left chest wall   dual-lead pacemaker.  Clear lungs. No pleural effusion or pneumothorax.  The heart is stable in size aortic calcifications  No acute osseous abnormality.    IMPRESSION:  No focal consolidation    --- End of Report ---          BRAYAN TATUM MD; Resident Radiology  This document has been electronically signed.  MILTON NICHOLS MD; Attending Radiologist  This document has been electronically signed. May 13 2022  3:32PM    < end of copied text >  < from: Transesophageal Echocardiogram w/o TTE (04.22.22 @ 16:20) >    Patient name: NUNU HOLLIDAY  YOB: 1945   Age: 76 (F)   MR#: [id number]  Study Date: 4/22/2022  Location: [address] [place], [place] [id number]: Refugio Cary M.D.  Study quality: Technically good  Referring Physician: Tr Urban MD  Blood Pressure: 109/70 mmHg  Height: 163 cm  Weight: 93 kg  BSA: 2 m2  ------------------------------------------------------------------------  PROCEDURE: Transesophageal (TIM) was performed.  Informed  consent was first obtained for TIM. The patient was sedated  - see anesthesia record.  The procedure was monitored with  automatic blood pressure monitoring, ECG tracings and pulse  oximetry. The transesophageal probe was placed in the  esophagus posterior to the heart without complications.  Real-time and reconstructed 3-dimensional imaging was  performed with Workstation. Color Doppler analysis was  carried out using both 2D and 3D mapping. Patient was  injected with 10 cc's of aerosolized saline. Patient was  injected with 10 cc's of aerosolized saline.  INDICATION: Nonrheumatic tricuspid (valve) insufficiency  (136.1), Unspecified atrial fibrillation (I48.91),  Unspecified atrial flutter (I48.92), Nonrheumatic mitral  (valve) insufficiency (I34.0), Nonrheumatic mitral (valve)  stenosis (I34.2)  ------------------------------------------------------------------------  Dimensions:    Normal Values:  LA:            2.0 - 4.0 cm  Ao:            2.0 - 3.8 cm  SEPTUM:        0.6 - 1.2 cm  PWT:           0.6 - 1.1 cm  LVIDd:         3.0 - 5.6 cm  LVIDs:         1.8 - 4.0 cm  EF (Visual Estimate): 60-65 %  Doppler Peak Velocity (m/sec): AoV=3.4  ------------------------------------------------------------------------  Observations:  Mitral Valve: Mitral annular calcification and calcified  mitral leaflets with decreased diastolic opening.  Mild-moderate mitral regurgitation. Systolic Blunting at  PV. Peak mitral valve gradient equals 11 mm Hg, mean  transmitral valve gradient equals 3 mm Hg, estimated mitral  valve area equals 2 sqcm (by 3D evaluation and planimetry),  consistent with mild mitral stenosis. HR 50  Aortic Valve/Aorta: Bioprosthetic aortic valve. Leaflets  are not well seen, Peak transaortic valve gradient equals  46 mm Hg, mean transaortic valve gradient equals 26 mm Hg,  which is elevated even in the presence of a bioprosthetic  aortic valve. DVI 0.31; Acceleration time 70mm Hg. LVOT VTI  25.4cm , Transaortic VTI 82cm, LVOT diameter 2.0 cm, BSA  1.98  EOA 1sqcm, EOAi 0.51. Suggestive of patient  prosthesis mismatch.  Noaortic valve regurgitation seen.  LVOT diameter: 2 cm.  Left Atrium: Severely dilated left atrium.  LA volume index  = 61 cc/m2. No left atrial or left atrial appendage  thrombus. Decreased left atrial appendage velocities noted.  Left Ventricle: Normal left ventricular systolic function.  Increased relative wall thickness with normal left  ventricular mass index, consistent with concentric left  ventricular remodeling. Unable to evaluate diastology due  to MAC  Right Heart: Severe right atrial enlargement. Device wires  are noted in the right heart. Right ventricular enlargement  with decreased right ventricular systolic function. RV BASE  7.5cm (normal <4.1cm) ; RV mid 4.9cm (normal <3.5)  Torrential Tricuspid regurgitation. The 3D Vena contracta  area was 1.15sqcm, the vena contracta 2.1cm. Tethered  tricuspid valve with malcoaptation of the tricuspid valve  leaflets due to a severely dilated annulus (>7cm). There is  a RV wire crossing the TV valve and is predominantly  located near the septal leaflet. The RV wire is not seen be  scarred to or to impinge the septal leaflet;  however.  buy  the nature of the location of the RV wire near the septal  leaflet of the TV, the RV wire may be slightly contributory  to the tricuspid regurgitation as it may prevent the septal  leaflet to rise ("close") to its full potential  (albeit  tethered ) at end diastole.  Pulmonic valve not well  visualized, probably normal.  Pericardium/Pleura: Normal pericardium with no pericardial  effusion.  Hemodynamic: Incomplete tricuspid regurgitation Doppler  envelopes in this study may underestimate RVSP. Agitated  saline injection and color flow Doppler demonstrates no  evidence of a patent foramen ovale.  ------------------------------------------------------------------------  Conclusions:  1. Mitral annular calcification and calcified mitral  leaflets with decreased diastolic opening. Mild-moderate  mitral regurgitation. Systolic Blunting at PV. Peak mitral  valve gradient equals 11 mm Hg, mean transmitral valve  gradient equals 3 mm Hg, estimated mitral valve area equals  2 sqcm (by 3D evaluation and planimetry), consistent with  mild mitral stenosis. HR 50  2. Bioprosthetic aortic valve. Leaflets are not well seen,  Peak transaortic valve gradient equals 46 mm Hg, mean  transaortic valve gradient equals 26 mm Hg, which is  elevated even in the presence of a bioprosthetic aortic  valve. DVI 0.31; Acceleration time 70mm Hg. LVOT VTI 25.4cm  , Transaortic VTI 82cm, LVOT diameter 2.0 cm, BSA 1.98  EOA  1sqcm, EOAi 0.51. Suggestive of patient prosthesis  mismatch.  No aortic valve regurgitation seen.  3. Severely dilated left atrium.  LA volume index = 61  cc/m2. No left atrial or left atrial appendage thrombus.  Decreased left atrial appendage velocities noted.  4. Increased relative wall thickness with normal left  ventricular mass index, consistent with concentric left  ventricular remodeling.  5. Normal left ventricular systolic function.  6. Right ventricular enlargement with decreased right  ventricular systolic function. RV BASE 7.5cm (normal  <4.1cm) ; RV mid 4.9cm (normal <3.5)  7. Incomplete tricuspid regurgitation Doppler envelopes in  this study may underestimate RVSP.  8. Torrential Tricuspid regurgitation. The 3D Vena  contracta area was 1.15sqcm, the vena contracta 2.1cm.  Tethered tricuspid valve with malcoaptation of the  tricuspid valve leaflets due to a severely dilated annulus  (>7cm). There is a RV wire crossing the TV valve and is  predominantly located near the septal leaflet. The RV wire  is not seen be scarred to or to impinge the septal leaflet;   however.  buy the nature of the location of the RV wire  near the septal leaflet of the TV, the RV wire may be  slightly contributory to the tricuspid regurgitation as it  may prevent the septal leaflet to rise ("close") to its  full potential  (albeit tethered ) at end diastole.  ------------------------------------------------------------------------  Confirmed on  4/22/2022 - 18:02:13 by FRANDY Hernandez  ------------------------------------------------------------------------    < end of copied text >

## 2022-05-15 LAB
ANION GAP SERPL CALC-SCNC: 11 MMOL/L — SIGNIFICANT CHANGE UP (ref 5–17)
BUN SERPL-MCNC: 49 MG/DL — HIGH (ref 7–23)
CALCIUM SERPL-MCNC: 10.7 MG/DL — HIGH (ref 8.4–10.5)
CHLORIDE SERPL-SCNC: 108 MMOL/L — SIGNIFICANT CHANGE UP (ref 96–108)
CO2 SERPL-SCNC: 25 MMOL/L — SIGNIFICANT CHANGE UP (ref 22–31)
CREAT SERPL-MCNC: 1.39 MG/DL — HIGH (ref 0.5–1.3)
EGFR: 40 ML/MIN/1.73M2 — LOW
GLUCOSE BLDC GLUCOMTR-MCNC: 119 MG/DL — HIGH (ref 70–99)
GLUCOSE BLDC GLUCOMTR-MCNC: 120 MG/DL — HIGH (ref 70–99)
GLUCOSE BLDC GLUCOMTR-MCNC: 149 MG/DL — HIGH (ref 70–99)
GLUCOSE BLDC GLUCOMTR-MCNC: 151 MG/DL — HIGH (ref 70–99)
GLUCOSE SERPL-MCNC: 144 MG/DL — HIGH (ref 70–99)
POTASSIUM SERPL-MCNC: 4.6 MMOL/L — SIGNIFICANT CHANGE UP (ref 3.5–5.3)
POTASSIUM SERPL-SCNC: 4.6 MMOL/L — SIGNIFICANT CHANGE UP (ref 3.5–5.3)
SODIUM SERPL-SCNC: 144 MMOL/L — SIGNIFICANT CHANGE UP (ref 135–145)

## 2022-05-15 PROCEDURE — 99232 SBSQ HOSP IP/OBS MODERATE 35: CPT

## 2022-05-15 RX ADMIN — SIMVASTATIN 20 MILLIGRAM(S): 20 TABLET, FILM COATED ORAL at 21:14

## 2022-05-15 RX ADMIN — Medication 50 MILLIGRAM(S): at 05:32

## 2022-05-15 RX ADMIN — Medication 81 MILLIGRAM(S): at 12:29

## 2022-05-15 RX ADMIN — Medication 20 MILLIGRAM(S): at 14:02

## 2022-05-15 RX ADMIN — Medication 1: at 17:29

## 2022-05-15 RX ADMIN — Medication 112 MICROGRAM(S): at 05:33

## 2022-05-15 RX ADMIN — APIXABAN 5 MILLIGRAM(S): 2.5 TABLET, FILM COATED ORAL at 05:32

## 2022-05-15 RX ADMIN — APIXABAN 5 MILLIGRAM(S): 2.5 TABLET, FILM COATED ORAL at 17:30

## 2022-05-15 RX ADMIN — Medication 325 MILLIGRAM(S): at 12:29

## 2022-05-15 RX ADMIN — AMIODARONE HYDROCHLORIDE 200 MILLIGRAM(S): 400 TABLET ORAL at 05:33

## 2022-05-15 NOTE — DIETITIAN INITIAL EVALUATION ADULT - PERSON TAUGHT/METHOD
Provided education on Carbohydrate Consistent diet including sources of carbohydrates, portion sizes, pairing protein with carbohydrates, limiting sugar sweetened beverages in diet and the importance of consistent eating pattern to help optimize glycemic control. Also discussed importance of low salt/low fat/heart healthy foods.   Provided pt with Carbohydrate Counting for People with Diabetes./verbal instruction/written material/patient instructed Provided education on Carbohydrate Consistent diet including sources of carbohydrates, portion sizes, pairing protein with carbohydrates, limiting sugar sweetened beverages in diet and the importance of consistent eating pattern to help optimize glycemic control. Also discussed importance of low salt/low fat/heart healthy foods. discussed importance of weight gain parameters and to contact doctor if big changes are noted.   Provided pt with Carbohydrate Counting for People with Diabetes./written material/patient instructed

## 2022-05-15 NOTE — DIETITIAN INITIAL EVALUATION ADULT - PERTINENT MEDS FT
MEDICATIONS  (STANDING):  aMIOdarone    Tablet 200 milliGRAM(s) Oral daily  apixaban 5 milliGRAM(s) Oral two times a day  aspirin enteric coated 81 milliGRAM(s) Oral daily  dextrose 5%. 1000 milliLiter(s) (100 mL/Hr) IV Continuous <Continuous>  dextrose 5%. 1000 milliLiter(s) (50 mL/Hr) IV Continuous <Continuous>  dextrose 50% Injectable 25 Gram(s) IV Push once  dextrose 50% Injectable 12.5 Gram(s) IV Push once  dextrose 50% Injectable 25 Gram(s) IV Push once  ferrous    sulfate 325 milliGRAM(s) Oral daily  glucagon  Injectable 1 milliGRAM(s) IntraMuscular once  insulin lispro (ADMELOG) corrective regimen sliding scale   SubCutaneous three times a day before meals  insulin lispro (ADMELOG) corrective regimen sliding scale   SubCutaneous at bedtime  levothyroxine 112 MICROGram(s) Oral daily  metoprolol succinate ER 50 milliGRAM(s) Oral daily  simvastatin 20 milliGRAM(s) Oral at bedtime  torsemide 20 milliGRAM(s) Oral daily    MEDICATIONS  (PRN):  dextrose Oral Gel 15 Gram(s) Oral once PRN Blood Glucose LESS THAN 70 milliGRAM(s)/deciliter

## 2022-05-15 NOTE — DIETITIAN INITIAL EVALUATION ADULT - ORAL INTAKE PTA/DIET HISTORY
visited pt at bedside this afternoon. good appetite PTA. avoiding carbohydrates and eating mostly protein and vegetables. no known food allergies per chart. is checking blood glucose levels daily, on metformin.

## 2022-05-15 NOTE — PROGRESS NOTE ADULT - ASSESSMENT
76  yr   c hx HTN,  AS s/p bio  AVR,   CAD s/p CABG,     AFib s/p medtronic PPM on eliquis,, left clear cell RCC s/p partial nephrectomy, hypothyroidism,   c/c  diastolic  chf     admitted  with marni,  from  diuretics  card   dr anna    * Afib, on eliquis,, and  rukhsanao    has  PPM   *  HTN/ HLD , on  asa. , toprol,  zocor   *  DM, follow  fs     * Echo,  normal ef/ mod  MS. severe  TR//  Abd  u/s,  mod  ascites, from prior   seen by ep/  darryl heart   for  severe TR, on prior  visit   toprol lowered,  given  her  sbp    labs  pending/  follow  creatinine     rad< from: CT Angio Chest PE Protocol w/ IV Cont (04.18.22 @ 17:40) >  FINDINGS:  LUNGS AND AIRWAYS: Patent central airways.  Lungs are clear.  PLEURA: Trace right pleural effusion/pleural thickening.  MEDIASTINUM AND YASMANI: No lymphadenopathy.  HEAR: Heart is enlarged in size. Patient is status post aortic valve   replacement. Calcification of the coronary arteries is noted. Pacemaker   is noted in place. No pericardial effusion is noted. Pulmonary arteries  are normal in caliber. No filling defects are noted.  CHEST WALL AND LOWER NECK: Within normal limits.  VISUALIZED UPPER ABDOMEN: Ascites.  BONES: Degenerative changes of the spine are noted.    IMPRESSION:  No pulmonary embolus is noted.  --- End of Report ---  < end of copied text >

## 2022-05-15 NOTE — PROGRESS NOTE ADULT - SUBJECTIVE AND OBJECTIVE BOX
afberile  REVIEW OF SYSTEMS:  GEN: no fever,    no chills  RESP: no SOB,   no cough  CVS: no chest pain,   no palpitations  GI: no abdominal pain,   no nausea,   no vomiting,   no constipation,   no diarrhea  : no dysuria,   no frequency  NEURO: no headache,   no dizziness  PSYCH: no depression,   not anxious  Derm : no rash    MEDICATIONS  (STANDING):  aMIOdarone    Tablet 200 milliGRAM(s) Oral daily  apixaban 5 milliGRAM(s) Oral two times a day  aspirin enteric coated 81 milliGRAM(s) Oral daily  dextrose 5%. 1000 milliLiter(s) (100 mL/Hr) IV Continuous <Continuous>  dextrose 5%. 1000 milliLiter(s) (50 mL/Hr) IV Continuous <Continuous>  dextrose 50% Injectable 25 Gram(s) IV Push once  dextrose 50% Injectable 12.5 Gram(s) IV Push once  dextrose 50% Injectable 25 Gram(s) IV Push once  ferrous    sulfate 325 milliGRAM(s) Oral daily  glucagon  Injectable 1 milliGRAM(s) IntraMuscular once  insulin lispro (ADMELOG) corrective regimen sliding scale   SubCutaneous three times a day before meals  insulin lispro (ADMELOG) corrective regimen sliding scale   SubCutaneous at bedtime  levothyroxine 112 MICROGram(s) Oral daily  metoprolol succinate ER 50 milliGRAM(s) Oral daily  simvastatin 20 milliGRAM(s) Oral at bedtime    MEDICATIONS  (PRN):  dextrose Oral Gel 15 Gram(s) Oral once PRN Blood Glucose LESS THAN 70 milliGRAM(s)/deciliter      Vital Signs Last 24 Hrs  T(C): 36.8 (15 May 2022 05:06), Max: 37.1 (15 May 2022 00:12)  T(F): 98.3 (15 May 2022 05:06), Max: 98.7 (15 May 2022 00:12)  HR: 63 (15 May 2022 05:06) (60 - 69)  BP: 110/60 (15 May 2022 05:06) (93/54 - 110/60)  BP(mean): --  RR: 18 (15 May 2022 05:06) (18 - 18)  SpO2: 96% (15 May 2022 05:06) (95% - 100%)  CAPILLARY BLOOD GLUCOSE      POCT Blood Glucose.: 120 mg/dL (14 May 2022 21:31)  POCT Blood Glucose.: 108 mg/dL (14 May 2022 17:35)  POCT Blood Glucose.: 123 mg/dL (14 May 2022 12:38)  POCT Blood Glucose.: 137 mg/dL (14 May 2022 08:41)    I&O's Summary    14 May 2022 07:01  -  15 May 2022 07:00  --------------------------------------------------------  IN: 440 mL / OUT: 0 mL / NET: 440 mL        PHYSICAL EXAM:  HEAD:  Atraumatic, Normocephalic  NECK: Supple, No   JVD  CHEST/LUNG:   no     rales,     no,    rhonchi  HEART: Regular rate and rhythm;         murmur  ABDOMEN: Soft, Nontender, ;   EXTREMITIES:  less      edema  NEUROLOGY:  alert    LABS:    05-14    140  |  103  |  76<H>  ----------------------------<  107<H>  4.3   |  23  |  1.99<H>    Ca    10.0      14 May 2022 07:36                      Thyroid Stimulating Hormone, Serum: 3.46 uIU/mL (05-13 @ 06:35)          Consultant(s) Notes Reviewed:      Care Discussed with Consultants/Other Providers:

## 2022-05-15 NOTE — DIETITIAN INITIAL EVALUATION ADULT - PERTINENT LABORATORY DATA
05-15    144  |  108  |  49<H>  ----------------------------<  144<H>  4.6   |  25  |  1.39<H>    Ca    10.7<H>      15 May 2022 09:01    POCT Blood Glucose.: 149 mg/dL (05-15-22 @ 12:35)  A1C with Estimated Average Glucose Result: 6.8 % (04-19-22 @ 09:15)

## 2022-05-15 NOTE — DIETITIAN INITIAL EVALUATION ADULT - REASON FOR ADMISSION
Acute renal failure  .  Per chart: "76F w/ CAD s/p CABG, severe TR, chronic diastolic HF, Afib s/p PPM on eliquis, AS s/p bioAVR, DM2, HTN,  Left RCC s/p partial nephrectomy, hypothyroid p/w nausea, malaise and elevated Cr."

## 2022-05-15 NOTE — DIETITIAN INITIAL EVALUATION ADULT - PROBLEM SELECTOR PROBLEM 7
Health Maintenance Due   Topic Date Due   • Influenza Vaccine (1) 09/01/2020   • Hepatitis A Vaccine (2 of 2 - 2-dose series) 11/11/2020       Patient is due for the topics as listed above and wishes to proceed with them. Patient is following pediatric unified immunization schedule for immunizations.  Vaccine Information Statement(s) was given today. This has been reviewed, questions answered, and verbal consent given by Parent for injection(s) and administration of Hepatitis A and Influenza (Inactivated).    1. Does the patient have a moderate to severe fever?  No  2. Has the patient had a serious reaction to a flu shot before?   No  3. Has the patient ever had Guillian Dallas Syndrome within 6 weeks of a previous flu shot?  No  4. Is the patient less than 6 months of age?  No    Patient is eligible to receive the vaccine based on all questions being answered as 'No'.    Patient tolerated without incident. See immunization grid for documentation.             Prophylactic measure

## 2022-05-15 NOTE — DIETITIAN INITIAL EVALUATION ADULT - ADD RECOMMEND
1) Will continue to monitor PO intake, weight, labs, skin, GI status and diet 2) Add DASH restriction to diet 3) Consider addition of Multivitamin if no contraindications

## 2022-05-15 NOTE — DIETITIAN INITIAL EVALUATION ADULT - NS FNS WEIGHT CHANGE REASON
Weight per [facility] HIE:  12/20/21 207.5 pounds  4/25/22 186.7 pounds  Dosing Weight:   5/12/22 171.6 pounds  .  Weight loss noted. Suspect weight loss secondary to fluid shifts. Question further loss may be due to differences in scales. Pt does not report any weight loss. unable to recall UBW at this time.

## 2022-05-15 NOTE — PROGRESS NOTE ADULT - SUBJECTIVE AND OBJECTIVE BOX
Follow up: CAD, AF, AVR    HPI:  76F w/ CAD s/p CABG, severe TR, chronic diastolic HF, Afib s/p PPM on eliquis, AS s/p bioAVR, DM2, HTN,  Left RCC s/p partial nephrectomy, hypothyroid p/w nausea, malaise and elevated Cr. The patient reports feeling nausea and tired over the last few days and therefore sought evaluation with her cardiologist who identified that she had an elevated Cr and therefore had her diuretic and valsartan stopped. She was to follow up in the next day but became nervous that she was experiencing continue symptoms including hands shaking. Per chart review, she is noted to have recent hospitalization for acute HF and afib requiring DCCV. She keeps a strict log of her body weight and she notes from discharge her weight has changed from 177lb to 169lb before seeing her cardiologist and stopping her torsemide. She denies fever, chills, cp, palpitations, sob, cough, vomit, diarrhea, or painful urination. (13 May 2022 04:00)    She is awake and alert this morning with no new complaints.  She reports no chest pain, dyspnea, or palpitations      PAST MEDICAL & SURGICAL HISTORY:  Hypertension  x10 years      Dyslipidemia      Hiatal hernia      Type II diabetes mellitus      Aortic stenosis      Cancer of kidney, left      Right bundle branch block (RBBB)      Anemia      Hypothyroidism      Status post unilateral salpingo-oophorectomy      S/P T&amp;A (status post tonsillectomy and adenoidectomy)      H/O hand surgery      H/O partial nephrectomy  right [id number]      H/O aortic valve replacement      Elective surgery  PPM  2016          MEDICATIONS  (STANDING):  aMIOdarone    Tablet 200 milliGRAM(s) Oral daily  apixaban 5 milliGRAM(s) Oral two times a day  aspirin enteric coated 81 milliGRAM(s) Oral daily  dextrose 5%. 1000 milliLiter(s) (100 mL/Hr) IV Continuous <Continuous>  dextrose 5%. 1000 milliLiter(s) (50 mL/Hr) IV Continuous <Continuous>  dextrose 50% Injectable 25 Gram(s) IV Push once  dextrose 50% Injectable 12.5 Gram(s) IV Push once  dextrose 50% Injectable 25 Gram(s) IV Push once  ferrous    sulfate 325 milliGRAM(s) Oral daily  glucagon  Injectable 1 milliGRAM(s) IntraMuscular once  insulin lispro (ADMELOG) corrective regimen sliding scale   SubCutaneous three times a day before meals  insulin lispro (ADMELOG) corrective regimen sliding scale   SubCutaneous at bedtime  levothyroxine 112 MICROGram(s) Oral daily  metoprolol succinate ER 50 milliGRAM(s) Oral daily  simvastatin 20 milliGRAM(s) Oral at bedtime    Vital Signs Last 24 Hrs  T(C): 36.8 (15 May 2022 05:06), Max: 37.1 (15 May 2022 00:12)  T(F): 98.3 (15 May 2022 05:06), Max: 98.7 (15 May 2022 00:12)  HR: 63 (15 May 2022 05:06) (60 - 69)  BP: 110/60 (15 May 2022 05:06) (93/54 - 110/60)  BP(mean): --  RR: 18 (15 May 2022 05:06) (18 - 18)  SpO2: 96% (15 May 2022 05:06) (95% - 100%)    I&O's Summary    14 May 2022 07:01  -  15 May 2022 07:00  --------------------------------------------------------  IN: 440 mL / OUT: 0 mL / NET: 440 mL        PHYSICAL EXAM:    Constitutional: NAD, awake and alert, well-developed  Eyes:   Pupils round, no lesions  ENMT: no exudate or erythema  Pulmonary: , breath sounds are clear bilaterally, No wheezing, rales or rhonchi  Cardiovascular: PMI not palpable RRR normal S1 and S2, no murmurs, rubs, gallops or clicks  Gastrointestinal: Bowel Sounds present, soft, nontender.   Lymph: No cervical lymphadenopathy.  Neurological: Alert, no focal deficits  Skin: No rashes.  No cyanosis.  Psych:  Mood & affect appropriate   Ext: No lower ext edema            05-15    144  |  108  |  49<H>  ----------------------------<  144<H>  4.6   |  25  |  1.39<H>    Ca    10.7<H>      15 May 2022 09:01    < from: 12 Lead ECG (05.12.22 @ 23:54) >    Ventricular Rate 66 BPM    Atrial Rate 66 BPM    P-R Interval 290 ms    QRS Duration 180 ms    Q-T Interval 510 ms    QTC Calculation(Bazett) 534 ms    P Axis 28 degrees    R Axis 76 degrees    T Axis 53 degrees    Diagnosis Line Atrial-sensed ventricular-paced rhythm with prolonged AV conduction  ABNORMAL ECG  WHEN COMPARED WITH ECG OF 22-APR-2022 12:38,  NO  SIGNIFICANT CHANGES HAVE OCCURRED  Confirmed by DONAL WINN MD ([id number]) on 5/13/2022 12:46:04 PM    < end of copied text >      < from: Transesophageal Echocardiogram w/o TTE (04.22.22 @ 16:20) >    Patient name: NUNU HOLLIDAY  YOB: 1945   Age: 76 (F)   MR#: [id number]  Study Date: 4/22/2022  Location: [address] [place], [place] [id number]: Refugio Cary M.D.  Study quality: Technically good  Referring Physician: Tr Urban MD  Blood Pressure: 109/70 mmHg  Height: 163 cm  Weight: 93 kg  BSA: 2 m2  ------------------------------------------------------------------------  PROCEDURE: Transesophageal (TIM) was performed.  Informed  consent was first obtained for TIM. The patient was sedated  - see anesthesia record.  The procedure was monitored with  automatic blood pressure monitoring, ECG tracings and pulse  oximetry. The transesophageal probe was placed in the  esophagus posterior to the heart without complications.  Real-time and reconstructed 3-dimensional imaging was  performed with Workstation. Color Doppler analysis was  carried out using both 2D and 3D mapping. Patient was  injected with 10 cc's of aerosolized saline. Patient was  injected with 10 cc's of aerosolized saline.  INDICATION: Nonrheumatic tricuspid (valve) insufficiency  (136.1), Unspecified atrial fibrillation (I48.91),  Unspecified atrial flutter (I48.92), Nonrheumatic mitral  (valve) insufficiency (I34.0), Nonrheumatic mitral (valve)  stenosis (I34.2)  ------------------------------------------------------------------------  Dimensions:    Normal Values:  LA:            2.0 - 4.0 cm  Ao:            2.0 - 3.8 cm  SEPTUM:        0.6 - 1.2 cm  PWT:           0.6 - 1.1 cm  LVIDd:         3.0 - 5.6 cm  LVIDs:         1.8 - 4.0 cm  EF (Visual Estimate): 60-65 %  Doppler Peak Velocity (m/sec): AoV=3.4  ------------------------------------------------------------------------  Observations:  Mitral Valve: Mitral annular calcification and calcified  mitral leaflets with decreased diastolic opening.  Mild-moderate mitral regurgitation. Systolic Blunting at  PV. Peak mitral valve gradient equals 11 mm Hg, mean  transmitral valve gradient equals 3 mm Hg, estimated mitral  valve area equals 2 sqcm (by 3D evaluation and planimetry),  consistent with mild mitral stenosis. HR 50  Aortic Valve/Aorta: Bioprosthetic aortic valve. Leaflets  are not well seen, Peak transaortic valve gradient equals  46 mm Hg, mean transaortic valve gradient equals 26 mm Hg,  which is elevated even in the presence of a bioprosthetic  aortic valve. DVI 0.31; Acceleration time 70mm Hg. LVOT VTI  25.4cm , Transaortic VTI 82cm, LVOT diameter 2.0 cm, BSA  1.98  EOA 1sqcm, EOAi 0.51. Suggestive of patient  prosthesis mismatch.  Noaortic valve regurgitation seen.  LVOT diameter: 2 cm.  Left Atrium: Severely dilated left atrium.  LA volume index  = 61 cc/m2. No left atrial or left atrial appendage  thrombus. Decreased left atrial appendage velocities noted.  Left Ventricle: Normal left ventricular systolic function.  Increased relative wall thickness with normal left  ventricular mass index, consistent with concentric left  ventricular remodeling. Unable to evaluate diastology due  to MAC  Right Heart: Severe right atrial enlargement. Device wires  are noted in the right heart. Right ventricular enlargement  with decreased right ventricular systolic function. RV BASE  7.5cm (normal <4.1cm) ; RV mid 4.9cm (normal <3.5)  Torrential Tricuspid regurgitation. The 3D Vena contracta  area was 1.15sqcm, the vena contracta 2.1cm. Tethered  tricuspid valve with malcoaptation of the tricuspid valve  leaflets due to a severely dilated annulus (>7cm). There is  a RV wire crossing the TV valve and is predominantly  located near the septal leaflet. The RV wire is not seen be  scarred to or to impinge the septal leaflet;  however.  buy  the nature of the location of the RV wire near the septal  leaflet of the TV, the RV wire may be slightly contributory  to the tricuspid regurgitation as it may prevent the septal  leaflet to rise ("close") to its full potential  (albeit  tethered ) at end diastole.  Pulmonic valve not well  visualized, probably normal.  Pericardium/Pleura: Normal pericardium with no pericardial  effusion.  Hemodynamic: Incomplete tricuspid regurgitation Doppler  envelopes in this study may underestimate RVSP. Agitated  saline injection and color flow Doppler demonstrates no  evidence of a patent foramen ovale.  ------------------------------------------------------------------------  Conclusions:  1. Mitral annular calcification and calcified mitral  leaflets with decreased diastolic opening. Mild-moderate  mitral regurgitation. Systolic Blunting at PV. Peak mitral  valve gradient equals 11 mm Hg, mean transmitral valve  gradient equals 3 mm Hg, estimated mitral valve area equals  2 sqcm (by 3D evaluation and planimetry), consistent with  mild mitral stenosis. HR 50  2. Bioprosthetic aortic valve. Leaflets are not well seen,  Peak transaortic valve gradient equals 46 mm Hg, mean  transaortic valve gradient equals 26 mm Hg, which is  elevated even in the presence of a bioprosthetic aortic  valve. DVI 0.31; Acceleration time 70mm Hg. LVOT VTI 25.4cm  , Transaortic VTI 82cm, LVOT diameter 2.0 cm, BSA 1.98  EOA  1sqcm, EOAi 0.51. Suggestive of patient prosthesis  mismatch.  No aortic valve regurgitation seen.  3. Severely dilated left atrium.  LA volume index = 61  cc/m2. No left atrial or left atrial appendage thrombus.  Decreased left atrial appendage velocities noted.  4. Increased relative wall thickness with normal left  ventricular mass index, consistent with concentric left  ventricular remodeling.  5. Normal left ventricular systolic function.  6. Right ventricular enlargement with decreased right  ventricular systolic function. RV BASE 7.5cm (normal  <4.1cm) ; RV mid 4.9cm (normal <3.5)  7. Incomplete tricuspid regurgitation Doppler envelopes in  this study may underestimate RVSP.  8. Torrential Tricuspid regurgitation. The 3D Vena  contracta area was 1.15sqcm, the vena contracta 2.1cm.  Tethered tricuspid valve with malcoaptation of the  tricuspid valve leaflets due to a severely dilated annulus  (>7cm). There is a RV wire crossing the TV valve and is  predominantly located near the septal leaflet. The RV wire  is not seen be scarred to or to impinge the septal leaflet;   however.  buy the nature of the location of the RV wire  near the septal leaflet of the TV, the RV wire may be  slightly contributory to the tricuspid regurgitation as it  may prevent the septal leaflet to rise ("close") to its  full potential  (albeit tethered ) at end diastole.  ------------------------------------------------------------------------  Confirmed on  4/22/2022 - 18:02:13 by FRANDY Hernandez  ------------------------------------------------------------------------    < end of copied text >

## 2022-05-16 ENCOUNTER — TRANSCRIPTION ENCOUNTER (OUTPATIENT)
Age: 77
End: 2022-05-16

## 2022-05-16 LAB
-  AMIKACIN: SIGNIFICANT CHANGE UP
-  AMOXICILLIN/CLAVULANIC ACID: SIGNIFICANT CHANGE UP
-  AMPICILLIN/SULBACTAM: SIGNIFICANT CHANGE UP
-  AMPICILLIN: SIGNIFICANT CHANGE UP
-  AZTREONAM: SIGNIFICANT CHANGE UP
-  CEFAZOLIN: SIGNIFICANT CHANGE UP
-  CEFEPIME: SIGNIFICANT CHANGE UP
-  CEFOXITIN: SIGNIFICANT CHANGE UP
-  CEFTRIAXONE: SIGNIFICANT CHANGE UP
-  CIPROFLOXACIN: SIGNIFICANT CHANGE UP
-  ERTAPENEM: SIGNIFICANT CHANGE UP
-  GENTAMICIN: SIGNIFICANT CHANGE UP
-  IMIPENEM: SIGNIFICANT CHANGE UP
-  LEVOFLOXACIN: SIGNIFICANT CHANGE UP
-  MEROPENEM: SIGNIFICANT CHANGE UP
-  NITROFURANTOIN: SIGNIFICANT CHANGE UP
-  PIPERACILLIN/TAZOBACTAM: SIGNIFICANT CHANGE UP
-  TIGECYCLINE: SIGNIFICANT CHANGE UP
-  TOBRAMYCIN: SIGNIFICANT CHANGE UP
-  TRIMETHOPRIM/SULFAMETHOXAZOLE: SIGNIFICANT CHANGE UP
ANION GAP SERPL CALC-SCNC: 15 MMOL/L — SIGNIFICANT CHANGE UP (ref 5–17)
BUN SERPL-MCNC: 47 MG/DL — HIGH (ref 7–23)
CALCIUM SERPL-MCNC: 10 MG/DL — SIGNIFICANT CHANGE UP (ref 8.4–10.5)
CHLORIDE SERPL-SCNC: 105 MMOL/L — SIGNIFICANT CHANGE UP (ref 96–108)
CO2 SERPL-SCNC: 24 MMOL/L — SIGNIFICANT CHANGE UP (ref 22–31)
CREAT SERPL-MCNC: 1.52 MG/DL — HIGH (ref 0.5–1.3)
CULTURE RESULTS: SIGNIFICANT CHANGE UP
EGFR: 36 ML/MIN/1.73M2 — LOW
GLUCOSE BLDC GLUCOMTR-MCNC: 124 MG/DL — HIGH (ref 70–99)
GLUCOSE BLDC GLUCOMTR-MCNC: 125 MG/DL — HIGH (ref 70–99)
GLUCOSE BLDC GLUCOMTR-MCNC: 133 MG/DL — HIGH (ref 70–99)
GLUCOSE BLDC GLUCOMTR-MCNC: 157 MG/DL — HIGH (ref 70–99)
GLUCOSE SERPL-MCNC: 117 MG/DL — HIGH (ref 70–99)
HCT VFR BLD CALC: 34 % — LOW (ref 34.5–45)
HGB BLD-MCNC: 11.3 G/DL — LOW (ref 11.5–15.5)
MCHC RBC-ENTMCNC: 27.2 PG — SIGNIFICANT CHANGE UP (ref 27–34)
MCHC RBC-ENTMCNC: 33.2 GM/DL — SIGNIFICANT CHANGE UP (ref 32–36)
MCV RBC AUTO: 81.7 FL — SIGNIFICANT CHANGE UP (ref 80–100)
METHOD TYPE: SIGNIFICANT CHANGE UP
NRBC # BLD: 0 /100 WBCS — SIGNIFICANT CHANGE UP (ref 0–0)
ORGANISM # SPEC MICROSCOPIC CNT: SIGNIFICANT CHANGE UP
ORGANISM # SPEC MICROSCOPIC CNT: SIGNIFICANT CHANGE UP
PLATELET # BLD AUTO: 176 K/UL — SIGNIFICANT CHANGE UP (ref 150–400)
POTASSIUM SERPL-MCNC: 3.3 MMOL/L — LOW (ref 3.5–5.3)
POTASSIUM SERPL-SCNC: 3.3 MMOL/L — LOW (ref 3.5–5.3)
RBC # BLD: 4.16 M/UL — SIGNIFICANT CHANGE UP (ref 3.8–5.2)
RBC # FLD: 15.2 % — HIGH (ref 10.3–14.5)
SODIUM SERPL-SCNC: 144 MMOL/L — SIGNIFICANT CHANGE UP (ref 135–145)
SPECIMEN SOURCE: SIGNIFICANT CHANGE UP
WBC # BLD: 6.4 K/UL — SIGNIFICANT CHANGE UP (ref 3.8–10.5)
WBC # FLD AUTO: 6.4 K/UL — SIGNIFICANT CHANGE UP (ref 3.8–10.5)

## 2022-05-16 PROCEDURE — 99232 SBSQ HOSP IP/OBS MODERATE 35: CPT

## 2022-05-16 RX ORDER — POTASSIUM CHLORIDE 20 MEQ
20 PACKET (EA) ORAL ONCE
Refills: 0 | Status: COMPLETED | OUTPATIENT
Start: 2022-05-16 | End: 2022-05-16

## 2022-05-16 RX ADMIN — APIXABAN 5 MILLIGRAM(S): 2.5 TABLET, FILM COATED ORAL at 05:14

## 2022-05-16 RX ADMIN — APIXABAN 5 MILLIGRAM(S): 2.5 TABLET, FILM COATED ORAL at 17:32

## 2022-05-16 RX ADMIN — Medication 1: at 08:48

## 2022-05-16 RX ADMIN — Medication 81 MILLIGRAM(S): at 11:47

## 2022-05-16 RX ADMIN — SIMVASTATIN 20 MILLIGRAM(S): 20 TABLET, FILM COATED ORAL at 21:14

## 2022-05-16 RX ADMIN — Medication 20 MILLIGRAM(S): at 11:47

## 2022-05-16 RX ADMIN — AMIODARONE HYDROCHLORIDE 200 MILLIGRAM(S): 400 TABLET ORAL at 05:15

## 2022-05-16 RX ADMIN — Medication 112 MICROGRAM(S): at 05:14

## 2022-05-16 RX ADMIN — Medication 20 MILLIEQUIVALENT(S): at 08:50

## 2022-05-16 RX ADMIN — Medication 325 MILLIGRAM(S): at 11:47

## 2022-05-16 NOTE — PROGRESS NOTE ADULT - SUBJECTIVE AND OBJECTIVE BOX
[facility] - John Ng, Sandeep, Robson, Jacinto, Karen Wong  Office Number:  [phone]    Patient resting comfortably in bed in NAD.  Laying flat with no respiratory distress.  No complaints of chest pain, dyspnea, palpitations, PND, or orthopnea.    ROS: negative unless otherwise mentioned.    Telemetry:  off    MEDICATIONS  (STANDING):  aMIOdarone    Tablet 200 milliGRAM(s) Oral daily  apixaban 5 milliGRAM(s) Oral two times a day  aspirin enteric coated 81 milliGRAM(s) Oral daily  dextrose 5%. 1000 milliLiter(s) (100 mL/Hr) IV Continuous <Continuous>  dextrose 5%. 1000 milliLiter(s) (50 mL/Hr) IV Continuous <Continuous>  dextrose 50% Injectable 25 Gram(s) IV Push once  dextrose 50% Injectable 12.5 Gram(s) IV Push once  dextrose 50% Injectable 25 Gram(s) IV Push once  ferrous    sulfate 325 milliGRAM(s) Oral daily  glucagon  Injectable 1 milliGRAM(s) IntraMuscular once  insulin lispro (ADMELOG) corrective regimen sliding scale   SubCutaneous three times a day before meals  insulin lispro (ADMELOG) corrective regimen sliding scale   SubCutaneous at bedtime  levothyroxine 112 MICROGram(s) Oral daily  metoprolol succinate ER 50 milliGRAM(s) Oral daily  simvastatin 20 milliGRAM(s) Oral at bedtime  torsemide 20 milliGRAM(s) Oral daily    MEDICATIONS  (PRN):  dextrose Oral Gel 15 Gram(s) Oral once PRN Blood Glucose LESS THAN 70 milliGRAM(s)/deciliter      Allergies    sulfa drugs (Short breath)    Intolerances        Vital Signs Last 24 Hrs  T(C): 36.5 (16 May 2022 05:20), Max: 37.1 (15 May 2022 09:34)  T(F): 97.7 (16 May 2022 05:20), Max: 98.7 (15 May 2022 09:34)  HR: 71 (16 May 2022 05:20) (65 - 74)  BP: 109/60 (16 May 2022 05:20) (106/70 - 127/60)  BP(mean): --  RR: 18 (16 May 2022 05:20) (18 - 18)  SpO2: 95% (16 May 2022 05:20) (95% - 100%)    I&O's Summary    15 May 2022 07:01  -  16 May 2022 07:00  --------------------------------------------------------  IN: 660 mL / OUT: 0 mL / NET: 660 mL        ON EXAM:    Constitutional: NAD, awake and alert, well-developed  Eyes:   Pupils round, no lesions  ENMT: no exudate or erythema  Pulmonary: , breath sounds are clear bilaterally, No wheezing, rales or rhonchi  Cardiovascular: PMI not palpable RRR normal S1 and S2, no murmurs, rubs, gallops or clicks  Gastrointestinal: Bowel Sounds present, soft, nontender.   Lymph: No cervical lymphadenopathy.  Neurological: Alert, no focal deficits  Skin: No rashes.  No cyanosis.  Psych:  Mood & affect appropriate   Ext: No lower ext edema      LABS: All Labs Reviewed:                        11.3   6.40  )-----------( 176      ( 16 May 2022 07:10 )             34.0     16 May 2022 07:11    144    |  105    |  47     ----------------------------<  117    3.3     |  24     |  1.52   15 May 2022 09:01    144    |  108    |  49     ----------------------------<  144    4.6     |  25     |  1.39   14 May 2022 07:36    140    |  103    |  76     ----------------------------<  107    4.3     |  23     |  1.99     Ca    10.0       16 May 2022 07:11  Ca    10.7       15 May 2022 09:01  Ca    10.0       14 May 2022 07:36            Blood Culture: Organism Escherichia coli  Gram Stain Blood -- Gram Stain --  Specimen Source Clean Catch Clean Catch (Midstream)  Culture-Blood --

## 2022-05-16 NOTE — PROGRESS NOTE ADULT - SUBJECTIVE AND OBJECTIVE BOX
[facility]    REVIEW OF SYSTEMS:  GEN: no fever,    no chills  RESP: no SOB,   no cough  CVS: no chest pain,   no palpitations  GI: no abdominal pain,   no nausea,   no vomiting,   no constipation,   no diarrhea  : no dysuria,   no frequency  NEURO: no headache,   no dizziness  PSYCH: no depression,   not anxious  Derm : no rash    MEDICATIONS  (STANDING):  aMIOdarone    Tablet 200 milliGRAM(s) Oral daily  apixaban 5 milliGRAM(s) Oral two times a day  aspirin enteric coated 81 milliGRAM(s) Oral daily  dextrose 5%. 1000 milliLiter(s) (100 mL/Hr) IV Continuous <Continuous>  dextrose 5%. 1000 milliLiter(s) (50 mL/Hr) IV Continuous <Continuous>  dextrose 50% Injectable 25 Gram(s) IV Push once  dextrose 50% Injectable 12.5 Gram(s) IV Push once  dextrose 50% Injectable 25 Gram(s) IV Push once  ferrous    sulfate 325 milliGRAM(s) Oral daily  glucagon  Injectable 1 milliGRAM(s) IntraMuscular once  insulin lispro (ADMELOG) corrective regimen sliding scale   SubCutaneous three times a day before meals  insulin lispro (ADMELOG) corrective regimen sliding scale   SubCutaneous at bedtime  levothyroxine 112 MICROGram(s) Oral daily  metoprolol succinate ER 50 milliGRAM(s) Oral daily  simvastatin 20 milliGRAM(s) Oral at bedtime  torsemide 20 milliGRAM(s) Oral daily    MEDICATIONS  (PRN):  dextrose Oral Gel 15 Gram(s) Oral once PRN Blood Glucose LESS THAN 70 milliGRAM(s)/deciliter      Vital Signs Last 24 Hrs  T(C): 36.5 (16 May 2022 05:20), Max: 37.1 (15 May 2022 09:34)  T(F): 97.7 (16 May 2022 05:20), Max: 98.7 (15 May 2022 09:34)  HR: 71 (16 May 2022 05:20) (65 - 74)  BP: 109/60 (16 May 2022 05:20) (106/70 - 127/60)  BP(mean): --  RR: 18 (16 May 2022 05:20) (18 - 18)  SpO2: 95% (16 May 2022 05:20) (95% - 100%)  CAPILLARY BLOOD GLUCOSE      POCT Blood Glucose.: 120 mg/dL (15 May 2022 21:13)  POCT Blood Glucose.: 151 mg/dL (15 May 2022 17:14)  POCT Blood Glucose.: 149 mg/dL (15 May 2022 12:35)  POCT Blood Glucose.: 119 mg/dL (15 May 2022 08:30)    I&O's Summary    15 May 2022 07:01  -  16 May 2022 07:00  --------------------------------------------------------  IN: 660 mL / OUT: 0 mL / NET: 660 mL        PHYSICAL EXAM:  HEAD:  Atraumatic, Normocephalic  NECK: Supple, No   JVD  CHEST/LUNG:   no     rales,     no,    rhonchi  HEART: Regular rate and rhythm;         murmur  ABDOMEN: Soft, Nontender, ;   EXTREMITIES:        edema  NEUROLOGY:  alert    LABS:                        11.3   6.40  )-----------( 176      ( 16 May 2022 07:10 )             34.0     05-15    144  |  108  |  49<H>  ----------------------------<  144<H>  4.6   |  25  |  1.39<H>    Ca    10.7<H>      15 May 2022 09:01                      Thyroid Stimulating Hormone, Serum: 3.46 uIU/mL (05-13 @ 06:35)          Consultant(s) Notes Reviewed:      Care Discussed with Consultants/Other Providers:

## 2022-05-16 NOTE — DISCHARGE NOTE PROVIDER - NSDCCPCAREPLAN_GEN_ALL_CORE_FT
PRINCIPAL DISCHARGE DIAGNOSIS  Diagnosis: ADAMS (acute kidney injury)  Assessment and Plan of Treatment: Avoid taking (NSAIDs) - (ex: Ibuprofen, Advil, Celebrex, Naprosyn)  Avoid taking any nephrotoxic agents (can harm kidneys) - Intravenous contrast for diagnostic testing, combination cold medications.  Have all medications adjusted for your renal function by your Health Care Provider.  Blood pressure control is important.  Take all medication as prescribed.        SECONDARY DISCHARGE DIAGNOSES  Diagnosis: Hyponatremia  Assessment and Plan of Treatment: Resolved     PRINCIPAL DISCHARGE DIAGNOSIS  Diagnosis: ADAMS (acute kidney injury)  Assessment and Plan of Treatment: Follow up for labs on Friday 5/20/22  Avoid taking (NSAIDs) - (ex: Ibuprofen, Advil, Celebrex, Naprosyn)  Avoid taking any nephrotoxic agents (can harm kidneys) - Intravenous contrast for diagnostic testing, combination cold medications.  Have all medications adjusted for your renal function by your Health Care Provider.  Blood pressure control is important.  Take all medication as prescribed.        SECONDARY DISCHARGE DIAGNOSES  Diagnosis: Hyponatremia  Assessment and Plan of Treatment: Resolved

## 2022-05-16 NOTE — PROGRESS NOTE ADULT - ASSESSMENT
76F w/ CAD s/p CABG, severe TR being evaluated for intervention by structural heart, chronic diastolic HF, Afib s/p PPM on eliquis, AS s/p bioAVR, DM2, HTN,  Left RCC s/p partial nephrectomy, hypothyroid p/w nausea, malaise and elevated Cr, secondary to overdiuresis    - ADAMS from dehydration and overdiuresis with hypovolemic hyponatremia  - Creatinine has improved overall, though with slight increase over last 24 hours.  Encourage PO hydration  - would cont to hold diuretics, and eventually keep her on torsemide 10.  - Hold ARB  - Continue full dose ELiquis  - Continue aspirin  - Continue amio 200 QD.  Maintaining NSR  - Continue Toprol 100 QD  - HR and BP acceptable  - Continue statin drug  - Monitor and replete lytes  - No sign of active ischemia  - To follow while admitted

## 2022-05-16 NOTE — DISCHARGE NOTE PROVIDER - HOSPITAL COURSE
76F w/ CAD s/p CABG, severe TR, chronic diastolic HF, Afib s/p PPM on eliquis, AS s/p bioAVR, DM2, HTN,  Left RCC s/p partial nephrectomy, hypothyroid p/w nausea, malaise and elevated Cr admit for adams and hyponatremia.       Plan: Admitted to medicine for ADAMS from excess diuresis. Loop diuretic on hold, ADAMS same today at 1.99, normally 1.29. CXR is clear, sodium has improved today to 140.     Bed side bladder sonogram is normal, no PVR.     Continue home meds of Eliquis, Amiodarone, ASA, decrease ferrous sulfate to daily, Synthyroid, Zocur and Toprol.     Discharge home when ADAMS resolved and decrease dose of home diuretics. Was on Torsemide 20 mg bid prior to this admission and resumed.        76F w/ CAD s/p CABG, severe TR, chronic diastolic HF, Afib s/p PPM on eliquis, AS s/p bioAVR, DM2, HTN,  Left RCC s/p partial nephrectomy, hypothyroid p/w nausea, malaise and elevated Cr admit for adams and hyponatremia.       Plan: Admitted to medicine for ADAMS from excess diuresis. Loop diuretic on hold, ADAMS same today at 1.99, normally 1.29. CXR is clear, sodium has improved today to 140.     Bed side bladder sonogram is normal, no PVR.     Continue home meds of Eliquis, Amiodarone, ASA, decrease ferrous sulfate to daily, Synthyroid, Zocur and Toprol.     ADAMS improving and home diuretics, Torsemide 20 mg resumed.        76 year old female PMH CAD s/p CABG, severe TR, chronic diastolic HF,  chronic Afib s/p PPM on Eliquis, AS s/p bioAVR, DM2, HTN,  Left RCC s/p partial nephrectomy, hypothyroid p/w nausea, malaise and elevated Creatinine. Admitted for ADAMS and hyponatremia from overdiuresis.        Plan: Admitted to medicine for ADAMS from excess diuresis. Loop diuretic on hold, ADAMS same today at 1.99, normally 1.29. CXR is clear, sodium has improved today to 140. Discharge creatinine improved to 1.40 at discharge from 2.13 on arrival. Bed side bladder sonogram is normal, no PVR.   Continue home meds of Eliquis, Amiodarone, ASA, decrease ferrous sulfate to daily, Synthyroid, Zocur and Toprol.     ADAMS improving, Torsemide decreased to 20 mg daily from bid. Discharged to home, no infections were found. See med list.      76 year old female PMH CAD s/p CABG, severe TR, chronic diastolic HF,  chronic Afib s/p PPM on Eliquis, AS s/p bioAVR, DM2, HTN,  Left RCC s/p partial nephrectomy, hypothyroid p/w nausea, malaise and elevated Creatinine. Admitted for ADAMS and hyponatremia from overdiuresis.      Plan: Admitted to medicine for ADAMS from excess diuresis. Loop diuretic on hold, creatinine normally 1.29. CXR is clear, sodium has improved today to 140. Discharge creatinine improved to 1.40 at discharge from 2.13 on arrival. Bed side bladder sonogram is normal, no PVR. Had brief IVF bolus.     Continue home meds of Eliquis, Amiodarone, ASA, decrease ferrous sulfate to daily, Synthyroid, Zocur and Toprol.     ADAMS improving, Torsemide decreased to 20 mg daily from bid. Discharged to home, no infections were found. See med list.

## 2022-05-16 NOTE — DISCHARGE NOTE PROVIDER - CARE PROVIDER_API CALL
Efra Jones)  Cardiovascular Disease; Internal Medicine  [address], [place] [id number]  Phone: [phone]  Fax: [phone]  Scheduled Appointment: 05/20/2022

## 2022-05-16 NOTE — DISCHARGE NOTE PROVIDER - NSDCFUSCHEDAPPT_GEN_ALL_CORE_FT
Lillian Bergman  [facility] Physician [facility]  Cardio Electro [address]  Scheduled Appointment: 06/06/2022    Caleb Robles  [facility]  CTSURG [address]  Scheduled Appointment: 06/13/2022    [facility]  [address]  Scheduled Appointment: 06/20/2022    Efra Jones  [facility]  CARDIOLOGY [address]  Scheduled Appointment: 06/22/2022

## 2022-05-16 NOTE — DISCHARGE NOTE PROVIDER - NSDCMRMEDTOKEN_GEN_ALL_CORE_FT
amiodarone 200 mg oral tablet: 1 tab(s) orally once a day  Aspirin Enteric Coated 81 mg oral delayed release tablet: 1 tab(s) orally once a day  Eliquis 5 mg oral tablet: 1 tab(s) orally 2 times a day  ferrous sulfate 325 mg (65 mg elemental iron) oral tablet: 1 tab(s) orally 3 times a day  levothyroxine 112 mcg (0.112 mg) oral tablet: 1 tab(s) orally once a day  metFORMIN 500 mg oral tablet: 1 tab(s) orally 2 times a day  metoprolol succinate 100 mg oral tablet, extended release: 1 tab(s) orally once a day  potassium chloride 20 mEq oral tablet, extended release: 1 tab(s) orally 2 times a day  simvastatin 20 mg oral tablet: 1 tab(s) orally once a day (at bedtime)   amiodarone 200 mg oral tablet: 1 tab(s) orally once a day  Aspirin Enteric Coated 81 mg oral delayed release tablet: 1 tab(s) orally once a day  Eliquis 5 mg oral tablet: 1 tab(s) orally 2 times a day  ferrous sulfate 325 mg (65 mg elemental iron) oral tablet: 1 tab(s) orally once a day  levothyroxine 112 mcg (0.112 mg) oral tablet: 1 tab(s) orally once a day  metFORMIN 500 mg oral tablet: 1 tab(s) orally 2 times a day  metoprolol succinate 50 mg oral tablet, extended release: 1 tab(s) orally once a day  potassium chloride 20 mEq oral tablet, extended release: 1 tab(s) orally 2 times a day  simvastatin 20 mg oral tablet: 1 tab(s) orally once a day (at bedtime)  torsemide 20 mg oral tablet: 1 tab(s) orally once a day

## 2022-05-16 NOTE — PROGRESS NOTE ADULT - ASSESSMENT
76  yr   c hx HTN,  AS s/p bio  AVR,   CAD s/p CABG,     AFib s/p medtronic PPM on eliquis,, left clear cell RCC s/p partial nephrectomy, hypothyroidism,   c/c  diastolic  chf     admitted  with marni,  from  diuretics  card   dr anna    * Afib, on eliquis,, and  rukhsanao    has  PPM   *  HTN/ HLD , on  asa. , toprol,  zocor   *  DM, follow  fs     * Echo,  normal ef/ mod  MS. severe  TR//  Abd  u/s,  mod  ascites, from prior   seen by ep/  struc heart   for  severe TR, on prior  visit   toprol lowered,  given  her  sbp    and  torsemide  initaited   crt is pending      rad< from: CT Angio Chest PE Protocol w/ IV Cont (04.18.22 @ 17:40) >  FINDINGS:  LUNGS AND AIRWAYS: Patent central airways.  Lungs are clear.  PLEURA: Trace right pleural effusion/pleural thickening.  MEDIASTINUM AND YASMANI: No lymphadenopathy.  HEAR: Heart is enlarged in size. Patient is status post aortic valve   replacement. Calcification of the coronary arteries is noted. Pacemaker   is noted in place. No pericardial effusion is noted. Pulmonary arteries  are normal in caliber. No filling defects are noted.  CHEST WALL AND LOWER NECK: Within normal limits.  VISUALIZED UPPER ABDOMEN: Ascites.  BONES: Degenerative changes of the spine are noted.    IMPRESSION:  No pulmonary embolus is noted.  --- End of Report ---  < end of copied text >

## 2022-05-16 NOTE — DISCHARGE NOTE PROVIDER - NSDCFUADDAPPT_GEN_ALL_CORE_FT
Follow up with Dr. Jones on Friday for labs (CMP)  Follow up with primary care provider routinely, Dr. Freddy Maguire    APPTS ARE READY TO BE MADE: [ x] YES    Best Family or Patient Contact (if needed):    Additional Information about above appointments (if needed):    1:   2:   3:     Other comments or requests:    Follow up with Dr. Jones on Friday for labs (CMP)  Follow up with primary care provider routinely, Dr. Freddy Maguire    APPTS ARE READY TO BE MADE: [ x] YES    Best Family or Patient Contact (if needed):    Additional Information about above appointments (if needed):    1:   2:   3:     Other comments or requests:     Patient was provided with follow up request details and was advised to call to schedule follow up within specified time frame.

## 2022-05-17 ENCOUNTER — TRANSCRIPTION ENCOUNTER (OUTPATIENT)
Age: 77
End: 2022-05-17

## 2022-05-17 VITALS — DIASTOLIC BLOOD PRESSURE: 60 MMHG | HEART RATE: 74 BPM | SYSTOLIC BLOOD PRESSURE: 107 MMHG | TEMPERATURE: 98 F

## 2022-05-17 LAB
ANION GAP SERPL CALC-SCNC: 16 MMOL/L — SIGNIFICANT CHANGE UP (ref 5–17)
BUN SERPL-MCNC: 41 MG/DL — HIGH (ref 7–23)
CALCIUM SERPL-MCNC: 9.6 MG/DL — SIGNIFICANT CHANGE UP (ref 8.4–10.5)
CHLORIDE SERPL-SCNC: 103 MMOL/L — SIGNIFICANT CHANGE UP (ref 96–108)
CO2 SERPL-SCNC: 24 MMOL/L — SIGNIFICANT CHANGE UP (ref 22–31)
CREAT SERPL-MCNC: 1.4 MG/DL — HIGH (ref 0.5–1.3)
EGFR: 39 ML/MIN/1.73M2 — LOW
GLUCOSE BLDC GLUCOMTR-MCNC: 111 MG/DL — HIGH (ref 70–99)
GLUCOSE BLDC GLUCOMTR-MCNC: 177 MG/DL — HIGH (ref 70–99)
GLUCOSE SERPL-MCNC: 117 MG/DL — HIGH (ref 70–99)
POTASSIUM SERPL-MCNC: 3.2 MMOL/L — LOW (ref 3.5–5.3)
POTASSIUM SERPL-SCNC: 3.2 MMOL/L — LOW (ref 3.5–5.3)
SODIUM SERPL-SCNC: 143 MMOL/L — SIGNIFICANT CHANGE UP (ref 135–145)

## 2022-05-17 PROCEDURE — 84156 ASSAY OF PROTEIN URINE: CPT

## 2022-05-17 PROCEDURE — 84540 ASSAY OF URINE/UREA-N: CPT

## 2022-05-17 PROCEDURE — 85027 COMPLETE CBC AUTOMATED: CPT

## 2022-05-17 PROCEDURE — 87186 SC STD MICRODIL/AGAR DIL: CPT

## 2022-05-17 PROCEDURE — 93005 ELECTROCARDIOGRAM TRACING: CPT

## 2022-05-17 PROCEDURE — 82570 ASSAY OF URINE CREATININE: CPT

## 2022-05-17 PROCEDURE — 82962 GLUCOSE BLOOD TEST: CPT

## 2022-05-17 PROCEDURE — 83930 ASSAY OF BLOOD OSMOLALITY: CPT

## 2022-05-17 PROCEDURE — 84484 ASSAY OF TROPONIN QUANT: CPT

## 2022-05-17 PROCEDURE — 87086 URINE CULTURE/COLONY COUNT: CPT

## 2022-05-17 PROCEDURE — 84550 ASSAY OF BLOOD/URIC ACID: CPT

## 2022-05-17 PROCEDURE — 76775 US EXAM ABDO BACK WALL LIM: CPT

## 2022-05-17 PROCEDURE — 80048 BASIC METABOLIC PNL TOTAL CA: CPT

## 2022-05-17 PROCEDURE — 80053 COMPREHEN METABOLIC PANEL: CPT

## 2022-05-17 PROCEDURE — 36415 COLL VENOUS BLD VENIPUNCTURE: CPT

## 2022-05-17 PROCEDURE — 84300 ASSAY OF URINE SODIUM: CPT

## 2022-05-17 PROCEDURE — 83690 ASSAY OF LIPASE: CPT

## 2022-05-17 PROCEDURE — 84443 ASSAY THYROID STIM HORMONE: CPT

## 2022-05-17 PROCEDURE — 87635 SARS-COV-2 COVID-19 AMP PRB: CPT

## 2022-05-17 PROCEDURE — 81001 URINALYSIS AUTO W/SCOPE: CPT

## 2022-05-17 PROCEDURE — 99285 EMERGENCY DEPT VISIT HI MDM: CPT | Mod: 25

## 2022-05-17 PROCEDURE — 85025 COMPLETE CBC W/AUTO DIFF WBC: CPT

## 2022-05-17 PROCEDURE — 99232 SBSQ HOSP IP/OBS MODERATE 35: CPT

## 2022-05-17 PROCEDURE — 82533 TOTAL CORTISOL: CPT

## 2022-05-17 PROCEDURE — 71045 X-RAY EXAM CHEST 1 VIEW: CPT

## 2022-05-17 PROCEDURE — 84133 ASSAY OF URINE POTASSIUM: CPT

## 2022-05-17 RX ORDER — METOPROLOL TARTRATE 50 MG
1 TABLET ORAL
Qty: 0 | Refills: 0 | DISCHARGE

## 2022-05-17 RX ORDER — FERROUS SULFATE 325(65) MG
1 TABLET ORAL
Qty: 0 | Refills: 0 | DISCHARGE
Start: 2022-05-17

## 2022-05-17 RX ORDER — POTASSIUM CHLORIDE 20 MEQ
40 PACKET (EA) ORAL ONCE
Refills: 0 | Status: COMPLETED | OUTPATIENT
Start: 2022-05-17 | End: 2022-05-17

## 2022-05-17 RX ORDER — METOPROLOL TARTRATE 50 MG
1 TABLET ORAL
Qty: 30 | Refills: 0
Start: 2022-05-17 | End: 2022-06-15

## 2022-05-17 RX ORDER — FERROUS SULFATE 325(65) MG
1 TABLET ORAL
Qty: 0 | Refills: 0 | DISCHARGE

## 2022-05-17 RX ADMIN — APIXABAN 5 MILLIGRAM(S): 2.5 TABLET, FILM COATED ORAL at 05:19

## 2022-05-17 RX ADMIN — Medication 40 MILLIEQUIVALENT(S): at 08:49

## 2022-05-17 RX ADMIN — AMIODARONE HYDROCHLORIDE 200 MILLIGRAM(S): 400 TABLET ORAL at 05:19

## 2022-05-17 RX ADMIN — Medication 81 MILLIGRAM(S): at 11:13

## 2022-05-17 RX ADMIN — Medication 112 MICROGRAM(S): at 05:19

## 2022-05-17 RX ADMIN — Medication 50 MILLIGRAM(S): at 05:20

## 2022-05-17 RX ADMIN — Medication 1: at 08:52

## 2022-05-17 RX ADMIN — Medication 325 MILLIGRAM(S): at 11:12

## 2022-05-17 NOTE — PROGRESS NOTE ADULT - SUBJECTIVE AND OBJECTIVE BOX
[facility] - John Ng, Sandeep, Robson, Jacinto, Karen Wong  Office Number:  [phone]    Patient resting comfortably in bed in NAD.  Laying flat with no respiratory distress.  No complaints of chest pain, dyspnea, palpitations, PND, or orthopnea.    ROS: negative unless otherwise mentioned.    Telemetry:  off    MEDICATIONS  (STANDING):  aMIOdarone    Tablet 200 milliGRAM(s) Oral daily  apixaban 5 milliGRAM(s) Oral two times a day  aspirin enteric coated 81 milliGRAM(s) Oral daily  dextrose 5%. 1000 milliLiter(s) (100 mL/Hr) IV Continuous <Continuous>  dextrose 5%. 1000 milliLiter(s) (50 mL/Hr) IV Continuous <Continuous>  dextrose 50% Injectable 25 Gram(s) IV Push once  dextrose 50% Injectable 12.5 Gram(s) IV Push once  dextrose 50% Injectable 25 Gram(s) IV Push once  ferrous    sulfate 325 milliGRAM(s) Oral daily  glucagon  Injectable 1 milliGRAM(s) IntraMuscular once  insulin lispro (ADMELOG) corrective regimen sliding scale   SubCutaneous three times a day before meals  insulin lispro (ADMELOG) corrective regimen sliding scale   SubCutaneous at bedtime  levothyroxine 112 MICROGram(s) Oral daily  metoprolol succinate ER 50 milliGRAM(s) Oral daily  potassium chloride    Tablet ER 40 milliEquivalent(s) Oral once  simvastatin 20 milliGRAM(s) Oral at bedtime  torsemide 20 milliGRAM(s) Oral daily    MEDICATIONS  (PRN):  dextrose Oral Gel 15 Gram(s) Oral once PRN Blood Glucose LESS THAN 70 milliGRAM(s)/deciliter      Allergies    sulfa drugs (Short breath)    Intolerances        Vital Signs Last 24 Hrs  T(C): 36.5 (17 May 2022 05:22), Max: 36.8 (17 May 2022 01:38)  T(F): 97.7 (17 May 2022 05:22), Max: 98.2 (17 May 2022 01:38)  HR: 85 (17 May 2022 05:22) (76 - 85)  BP: 118/66 (17 May 2022 05:22) (102/59 - 130/71)  BP(mean): --  RR: 18 (17 May 2022 05:22) (18 - 18)  SpO2: 98% (17 May 2022 05:22) (97% - 100%)    I&O's Summary    16 May 2022 07:01  -  17 May 2022 07:00  --------------------------------------------------------  IN: 760 mL / OUT: 650 mL / NET: 110 mL        ON EXAM:  Constitutional: NAD, awake and alert, well-developed  Eyes:   Pupils round, no lesions  ENMT: no exudate or erythema  Pulmonary: , breath sounds are clear bilaterally, No wheezing, rales or rhonchi  Cardiovascular: PMI not palpable RRR normal S1 and S2, no murmurs, rubs, gallops or clicks  Gastrointestinal: Bowel Sounds present, soft, nontender.   Lymph: No cervical lymphadenopathy.  Neurological: Alert, no focal deficits  Skin: No rashes.  No cyanosis.  Psych:  Mood & affect appropriate   Ext: No lower ext edema    LABS: All Labs Reviewed:                        11.3   6.40  )-----------( 176      ( 16 May 2022 07:10 )             34.0     17 May 2022 07:32    143    |  103    |  41     ----------------------------<  117    3.2     |  24     |  1.40   16 May 2022 07:11    144    |  105    |  47     ----------------------------<  117    3.3     |  24     |  1.52   15 May 2022 09:01    144    |  108    |  49     ----------------------------<  144    4.6     |  25     |  1.39     Ca    9.6        17 May 2022 07:32  Ca    10.0       16 May 2022 07:11  Ca    10.7       15 May 2022 09:01            Blood Culture: Organism Escherichia coli  Gram Stain Blood -- Gram Stain --  Specimen Source Clean Catch Clean Catch (Midstream)  Culture-Blood --

## 2022-05-17 NOTE — PROGRESS NOTE ADULT - ASSESSMENT
76  yr   c hx HTN,  AS s/p bio  AVR,   CAD s/p CABG,     AFib s/p medtronic PPM on eliquis,, left clear cell RCC s/p partial nephrectomy, hypothyroidism,   c/c  diastolic  chf     admitted  with marni,  from  diuretics  card   dr anna    * Afib, on eliquis,, and  rukhsanao    has  PPM   *  HTN/ HLD , on  asa. , toprol,  zocor   *  DM, follow  fs     * Echo,  normal ef/ mod  MS. severe  TR//  Abd  u/s,  mod  ascites, from prior   seen by ep/  struc heart   for  severe TR, on prior  visit   toprol lowered,  given  her  sbp    and  torsemide  initaited/ crt is stbale   hypokalemia   pe r card  d r judah,  may   d/c  pt home     rad< from: CT Angio Chest PE Protocol w/ IV Cont (04.18.22 @ 17:40) >  FINDINGS:  LUNGS AND AIRWAYS: Patent central airways.  Lungs are clear.  PLEURA: Trace right pleural effusion/pleural thickening.  MEDIASTINUM AND YASMANI: No lymphadenopathy.  HEAR: Heart is enlarged in size. Patient is status post aortic valve   replacement. Calcification of the coronary arteries is noted. Pacemaker   is noted in place. No pericardial effusion is noted. Pulmonary arteries  are normal in caliber. No filling defects are noted.  CHEST WALL AND LOWER NECK: Within normal limits.  VISUALIZED UPPER ABDOMEN: Ascites.  BONES: Degenerative changes of the spine are noted.    IMPRESSION:  No pulmonary embolus is noted.  --- End of Report ---  < end of copied text >

## 2022-05-17 NOTE — PROGRESS NOTE ADULT - REASON FOR ADMISSION
76F p/w nausea, malaise, elevated Cr

## 2022-05-17 NOTE — DISCHARGE NOTE NURSING/CASE MANAGEMENT/SOCIAL WORK - PATIENT PORTAL LINK FT
You can access the FollowMyHealth Patient Portal offered by [facility] by registering at the following website: [email or web address]. By joining [employer]’s FollowMyHealth portal, you will also be able to view your health information using other applications (apps) compatible with our system.

## 2022-05-17 NOTE — PROGRESS NOTE ADULT - SUBJECTIVE AND OBJECTIVE BOX
afberile    REVIEW OF SYSTEMS:  GEN: no fever,    no chills  RESP: no SOB,   no cough  CVS: no chest pain,   no palpitations  GI: no abdominal pain,   no nausea,   no vomiting,   no constipation,   no diarrhea  : no dysuria,   no frequency  NEURO: no headache,   no dizziness  PSYCH: no depression,   not anxious  Derm : no rash    MEDICATIONS  (STANDING):  aMIOdarone    Tablet 200 milliGRAM(s) Oral daily  apixaban 5 milliGRAM(s) Oral two times a day  aspirin enteric coated 81 milliGRAM(s) Oral daily  dextrose 5%. 1000 milliLiter(s) (100 mL/Hr) IV Continuous <Continuous>  dextrose 5%. 1000 milliLiter(s) (50 mL/Hr) IV Continuous <Continuous>  dextrose 50% Injectable 25 Gram(s) IV Push once  dextrose 50% Injectable 12.5 Gram(s) IV Push once  dextrose 50% Injectable 25 Gram(s) IV Push once  ferrous    sulfate 325 milliGRAM(s) Oral daily  glucagon  Injectable 1 milliGRAM(s) IntraMuscular once  insulin lispro (ADMELOG) corrective regimen sliding scale   SubCutaneous three times a day before meals  insulin lispro (ADMELOG) corrective regimen sliding scale   SubCutaneous at bedtime  levothyroxine 112 MICROGram(s) Oral daily  metoprolol succinate ER 50 milliGRAM(s) Oral daily  potassium chloride    Tablet ER 40 milliEquivalent(s) Oral once  simvastatin 20 milliGRAM(s) Oral at bedtime  torsemide 20 milliGRAM(s) Oral daily    MEDICATIONS  (PRN):  dextrose Oral Gel 15 Gram(s) Oral once PRN Blood Glucose LESS THAN 70 milliGRAM(s)/deciliter      Vital Signs Last 24 Hrs  T(C): 36.5 (17 May 2022 05:22), Max: 36.8 (17 May 2022 01:38)  T(F): 97.7 (17 May 2022 05:22), Max: 98.2 (17 May 2022 01:38)  HR: 85 (17 May 2022 05:22) (76 - 85)  BP: 118/66 (17 May 2022 05:22) (102/59 - 130/71)  BP(mean): --  RR: 18 (17 May 2022 05:22) (18 - 18)  SpO2: 98% (17 May 2022 05:22) (97% - 100%)  CAPILLARY BLOOD GLUCOSE      POCT Blood Glucose.: 125 mg/dL (16 May 2022 21:14)  POCT Blood Glucose.: 133 mg/dL (16 May 2022 17:18)  POCT Blood Glucose.: 124 mg/dL (16 May 2022 12:46)  POCT Blood Glucose.: 157 mg/dL (16 May 2022 08:45)    I&O's Summary    16 May 2022 07:01  -  17 May 2022 07:00  --------------------------------------------------------  IN: 760 mL / OUT: 650 mL / NET: 110 mL        PHYSICAL EXAM:  HEAD:  Atraumatic, Normocephalic  NECK: Supple, No   JVD  CHEST/LUNG:   no     rales,     no,    rhonchi  HEART: Regular rate and rhythm;         murmur  ABDOMEN: Soft, Nontender, ;   EXTREMITIES:   no     edema  NEUROLOGY:  alert    LABS:                        11.3   6.40  )-----------( 176      ( 16 May 2022 07:10 )             34.0     05-17    143  |  103  |  41<H>  ----------------------------<  117<H>  3.2<L>   |  24  |  1.40<H>    Ca    9.6      17 May 2022 07:32                      Thyroid Stimulating Hormone, Serum: 3.46 uIU/mL (05-13 @ 06:35)          Consultant(s) Notes Reviewed:      Care Discussed with Consultants/Other Providers:

## 2022-05-17 NOTE — PROGRESS NOTE ADULT - ASSESSMENT
76F w/ CAD s/p CABG, severe TR being evaluated for intervention by structural heart, chronic diastolic HF, Afib s/p PPM on eliquis, AS s/p bioAVR, DM2, HTN,  Left RCC s/p partial nephrectomy, hypothyroid p/w nausea, malaise and elevated Cr, secondary to overdiuresis    - ADAMS from dehydration and overdiuresis with hypovolemic hyponatremia  - Creatinine has improved overall  - agree with restarting on torsemide 20 daily  - Hold ARB for now  - Continue full dose Eliquis  - Continue aspirin  - Continue amio 200 QD.  Maintaining NSR  - Continue Toprol 50 qd  - HR and BP acceptable  - Continue statin drug  - Monitor and replete lytes  - No sign of active ischemia  - To follow while admitted. no issue with dc home. Will repeat her CMP on Friday.

## 2022-05-17 NOTE — DISCHARGE NOTE NURSING/CASE MANAGEMENT/SOCIAL WORK - NSDCPEFALRISK_GEN_ALL_CORE
For information on Fall & Injury Prevention, visit: [email or web address] OR  [email or web address] OR  https://www.cdc.gov/steadi/patient.html

## 2022-05-17 NOTE — DISCHARGE NOTE NURSING/CASE MANAGEMENT/SOCIAL WORK - NSDCFUADDAPPT_GEN_ALL_CORE_FT
Follow up with Dr. Jones on Friday for labs (CMP)  Follow up with primary care provider routinely, Dr. Freddy Maguire    APPTS ARE READY TO BE MADE: [ x] YES    Best Family or Patient Contact (if needed):    Additional Information about above appointments (if needed):    1:   2:   3:     Other comments or requests:

## 2022-05-20 LAB
ALBUMIN SERPL ELPH-MCNC: 4.2 G/DL
ALP BLD-CCNC: 62 U/L
ALT SERPL-CCNC: 37 U/L
ANION GAP SERPL CALC-SCNC: 15 MMOL/L
AST SERPL-CCNC: 44 U/L
BILIRUB SERPL-MCNC: 0.6 MG/DL
BUN SERPL-MCNC: 63 MG/DL
CALCIUM SERPL-MCNC: 10.7 MG/DL
CHLORIDE SERPL-SCNC: 101 MMOL/L
CO2 SERPL-SCNC: 24 MMOL/L
CREAT SERPL-MCNC: 1.7 MG/DL
EGFR: 31 ML/MIN/1.73M2
GLUCOSE SERPL-MCNC: 156 MG/DL
POTASSIUM SERPL-SCNC: 4.7 MMOL/L
PROT SERPL-MCNC: 7.4 G/DL
SODIUM SERPL-SCNC: 140 MMOL/L

## 2022-05-23 ENCOUNTER — NON-APPOINTMENT (OUTPATIENT)
Age: 77
End: 2022-05-23

## 2022-05-26 ENCOUNTER — RX RENEWAL (OUTPATIENT)
Age: 77
End: 2022-05-26

## 2022-05-27 LAB
ALBUMIN SERPL ELPH-MCNC: 4 G/DL
ALP BLD-CCNC: 55 U/L
ALT SERPL-CCNC: 30 U/L
ANION GAP SERPL CALC-SCNC: 15 MMOL/L
AST SERPL-CCNC: 32 U/L
BILIRUB SERPL-MCNC: 0.6 MG/DL
BUN SERPL-MCNC: 43 MG/DL
CALCIUM SERPL-MCNC: 10.2 MG/DL
CHLORIDE SERPL-SCNC: 106 MMOL/L
CO2 SERPL-SCNC: 21 MMOL/L
CREAT SERPL-MCNC: 1.41 MG/DL
EGFR: 39 ML/MIN/1.73M2
GLUCOSE SERPL-MCNC: 144 MG/DL
NT-PROBNP SERPL-MCNC: 841 PG/ML
POTASSIUM SERPL-SCNC: 5.2 MMOL/L
PROT SERPL-MCNC: 6.5 G/DL
SODIUM SERPL-SCNC: 142 MMOL/L

## 2022-06-01 ENCOUNTER — INPATIENT (INPATIENT)
Facility: HOSPITAL | Age: 77
LOS: 5 days | Discharge: HOME | DRG: 812 | End: 2022-06-07
Attending: STUDENT IN AN ORGANIZED HEALTH CARE EDUCATION/TRAINING PROGRAM | Admitting: STUDENT IN AN ORGANIZED HEALTH CARE EDUCATION/TRAINING PROGRAM
Payer: MEDICARE

## 2022-06-01 VITALS
HEART RATE: 77 BPM | RESPIRATION RATE: 24 BRPM | DIASTOLIC BLOOD PRESSURE: 48 MMHG | WEIGHT: 162.04 LBS | HEIGHT: 64 IN | SYSTOLIC BLOOD PRESSURE: 92 MMHG | TEMPERATURE: 97 F

## 2022-06-01 DIAGNOSIS — I50.32 CHRONIC DIASTOLIC (CONGESTIVE) HEART FAILURE: ICD-10-CM

## 2022-06-01 DIAGNOSIS — Z98.89 OTHER SPECIFIED POSTPROCEDURAL STATES: Chronic | ICD-10-CM

## 2022-06-01 DIAGNOSIS — I48.20 CHRONIC ATRIAL FIBRILLATION, UNSPECIFIED: ICD-10-CM

## 2022-06-01 DIAGNOSIS — N17.9 ACUTE KIDNEY FAILURE, UNSPECIFIED: ICD-10-CM

## 2022-06-01 DIAGNOSIS — Z90.5 ACQUIRED ABSENCE OF KIDNEY: Chronic | ICD-10-CM

## 2022-06-01 DIAGNOSIS — E11.9 TYPE 2 DIABETES MELLITUS WITHOUT COMPLICATIONS: ICD-10-CM

## 2022-06-01 DIAGNOSIS — D64.9 ANEMIA, UNSPECIFIED: ICD-10-CM

## 2022-06-01 DIAGNOSIS — Z41.9 ENCOUNTER FOR PROCEDURE FOR PURPOSES OTHER THAN REMEDYING HEALTH STATE, UNSPECIFIED: Chronic | ICD-10-CM

## 2022-06-01 DIAGNOSIS — I10 ESSENTIAL (PRIMARY) HYPERTENSION: ICD-10-CM

## 2022-06-01 DIAGNOSIS — Z90.721 ACQUIRED ABSENCE OF OVARIES, UNILATERAL: Chronic | ICD-10-CM

## 2022-06-01 DIAGNOSIS — Z29.9 ENCOUNTER FOR PROPHYLACTIC MEASURES, UNSPECIFIED: ICD-10-CM

## 2022-06-01 DIAGNOSIS — E03.9 HYPOTHYROIDISM, UNSPECIFIED: ICD-10-CM

## 2022-06-01 DIAGNOSIS — E78.5 HYPERLIPIDEMIA, UNSPECIFIED: ICD-10-CM

## 2022-06-01 DIAGNOSIS — Z95.2 PRESENCE OF PROSTHETIC HEART VALVE: Chronic | ICD-10-CM

## 2022-06-01 LAB
ALBUMIN SERPL ELPH-MCNC: 3 G/DL — LOW (ref 3.3–5)
ALP SERPL-CCNC: 40 U/L — SIGNIFICANT CHANGE UP (ref 40–120)
ALT FLD-CCNC: 26 U/L — SIGNIFICANT CHANGE UP (ref 12–78)
ANION GAP SERPL CALC-SCNC: 21 MMOL/L — HIGH (ref 5–17)
AST SERPL-CCNC: 14 U/L — LOW (ref 15–37)
BASOPHILS # BLD AUTO: 0.03 K/UL — SIGNIFICANT CHANGE UP (ref 0–0.2)
BASOPHILS NFR BLD AUTO: 0.2 % — SIGNIFICANT CHANGE UP (ref 0–2)
BILIRUB SERPL-MCNC: 0.7 MG/DL — SIGNIFICANT CHANGE UP (ref 0.2–1.2)
BUN SERPL-MCNC: 71 MG/DL — HIGH (ref 7–23)
CALCIUM SERPL-MCNC: 9.8 MG/DL — SIGNIFICANT CHANGE UP (ref 8.5–10.1)
CHLORIDE SERPL-SCNC: 102 MMOL/L — SIGNIFICANT CHANGE UP (ref 96–108)
CO2 SERPL-SCNC: 14 MMOL/L — LOW (ref 22–31)
CREAT SERPL-MCNC: 2.2 MG/DL — HIGH (ref 0.5–1.3)
EGFR: 23 ML/MIN/1.73M2 — LOW
EOSINOPHIL # BLD AUTO: 0.01 K/UL — SIGNIFICANT CHANGE UP (ref 0–0.5)
EOSINOPHIL NFR BLD AUTO: 0.1 % — SIGNIFICANT CHANGE UP (ref 0–6)
GLUCOSE SERPL-MCNC: 196 MG/DL — HIGH (ref 70–99)
HCT VFR BLD CALC: 15.6 % — CRITICAL LOW (ref 34.5–45)
HGB BLD-MCNC: 4.6 G/DL — CRITICAL LOW (ref 11.5–15.5)
IMM GRANULOCYTES NFR BLD AUTO: 1.8 % — HIGH (ref 0–1.5)
LYMPHOCYTES # BLD AUTO: 1.32 K/UL — SIGNIFICANT CHANGE UP (ref 1–3.3)
LYMPHOCYTES # BLD AUTO: 8.3 % — LOW (ref 13–44)
MCHC RBC-ENTMCNC: 27.9 PG — SIGNIFICANT CHANGE UP (ref 27–34)
MCHC RBC-ENTMCNC: 29.5 GM/DL — LOW (ref 32–36)
MCV RBC AUTO: 94.5 FL — SIGNIFICANT CHANGE UP (ref 80–100)
MONOCYTES # BLD AUTO: 0.91 K/UL — HIGH (ref 0–0.9)
MONOCYTES NFR BLD AUTO: 5.7 % — SIGNIFICANT CHANGE UP (ref 2–14)
NEUTROPHILS # BLD AUTO: 13.38 K/UL — HIGH (ref 1.8–7.4)
NEUTROPHILS NFR BLD AUTO: 83.9 % — HIGH (ref 43–77)
NRBC # BLD: 2 /100 WBCS — HIGH (ref 0–0)
NT-PROBNP SERPL-SCNC: 2276 PG/ML — HIGH (ref 0–450)
OB PNL STL: NEGATIVE — SIGNIFICANT CHANGE UP
PLATELET # BLD AUTO: 387 K/UL — SIGNIFICANT CHANGE UP (ref 150–400)
POTASSIUM SERPL-MCNC: 4.8 MMOL/L — SIGNIFICANT CHANGE UP (ref 3.5–5.3)
POTASSIUM SERPL-SCNC: 4.8 MMOL/L — SIGNIFICANT CHANGE UP (ref 3.5–5.3)
PROT SERPL-MCNC: 6.4 G/DL — SIGNIFICANT CHANGE UP (ref 6–8.3)
RBC # BLD: 1.65 M/UL — LOW (ref 3.8–5.2)
RBC # FLD: 21.8 % — HIGH (ref 10.3–14.5)
SARS-COV-2 RNA SPEC QL NAA+PROBE: SIGNIFICANT CHANGE UP
SODIUM SERPL-SCNC: 137 MMOL/L — SIGNIFICANT CHANGE UP (ref 135–145)
TROPONIN I, HIGH SENSITIVITY RESULT: 15.8 NG/L — SIGNIFICANT CHANGE UP
WBC # BLD: 15.93 K/UL — HIGH (ref 3.8–10.5)
WBC # FLD AUTO: 15.93 K/UL — HIGH (ref 3.8–10.5)

## 2022-06-01 PROCEDURE — 99223 1ST HOSP IP/OBS HIGH 75: CPT | Mod: GC

## 2022-06-01 PROCEDURE — 70450 CT HEAD/BRAIN W/O DYE: CPT | Mod: 26,ME

## 2022-06-01 PROCEDURE — G1004: CPT

## 2022-06-01 PROCEDURE — 99285 EMERGENCY DEPT VISIT HI MDM: CPT | Mod: FS

## 2022-06-01 PROCEDURE — 76376 3D RENDER W/INTRP POSTPROCES: CPT | Mod: 26

## 2022-06-01 PROCEDURE — 71045 X-RAY EXAM CHEST 1 VIEW: CPT | Mod: 26

## 2022-06-01 PROCEDURE — 72125 CT NECK SPINE W/O DYE: CPT | Mod: 26,ME

## 2022-06-01 PROCEDURE — 93010 ELECTROCARDIOGRAM REPORT: CPT

## 2022-06-01 PROCEDURE — 72192 CT PELVIS W/O DYE: CPT | Mod: 26,MG

## 2022-06-01 RX ORDER — ACETAMINOPHEN 500 MG
2 TABLET ORAL
Qty: 0 | Refills: 0 | DISCHARGE

## 2022-06-01 RX ORDER — DEXTROSE 50 % IN WATER 50 %
25 SYRINGE (ML) INTRAVENOUS ONCE
Refills: 0 | Status: DISCONTINUED | OUTPATIENT
Start: 2022-06-01 | End: 2022-06-07

## 2022-06-01 RX ORDER — ONDANSETRON 8 MG/1
4 TABLET, FILM COATED ORAL ONCE
Refills: 0 | Status: COMPLETED | OUTPATIENT
Start: 2022-06-01 | End: 2022-06-01

## 2022-06-01 RX ORDER — INSULIN LISPRO 100/ML
VIAL (ML) SUBCUTANEOUS EVERY 6 HOURS
Refills: 0 | Status: DISCONTINUED | OUTPATIENT
Start: 2022-06-01 | End: 2022-06-04

## 2022-06-01 RX ORDER — METOPROLOL TARTRATE 50 MG
50 TABLET ORAL DAILY
Refills: 0 | Status: DISCONTINUED | OUTPATIENT
Start: 2022-06-01 | End: 2022-06-01

## 2022-06-01 RX ORDER — AMIODARONE HYDROCHLORIDE 400 MG/1
200 TABLET ORAL DAILY
Refills: 0 | Status: DISCONTINUED | OUTPATIENT
Start: 2022-06-01 | End: 2022-06-07

## 2022-06-01 RX ORDER — DEXTROSE 50 % IN WATER 50 %
15 SYRINGE (ML) INTRAVENOUS ONCE
Refills: 0 | Status: DISCONTINUED | OUTPATIENT
Start: 2022-06-01 | End: 2022-06-07

## 2022-06-01 RX ORDER — INSULIN LISPRO 100/ML
VIAL (ML) SUBCUTANEOUS AT BEDTIME
Refills: 0 | Status: DISCONTINUED | OUTPATIENT
Start: 2022-06-01 | End: 2022-06-04

## 2022-06-01 RX ORDER — PANTOPRAZOLE SODIUM 20 MG/1
40 TABLET, DELAYED RELEASE ORAL EVERY 12 HOURS
Refills: 0 | Status: DISCONTINUED | OUTPATIENT
Start: 2022-06-01 | End: 2022-06-07

## 2022-06-01 RX ORDER — ACETAMINOPHEN 500 MG
650 TABLET ORAL EVERY 6 HOURS
Refills: 0 | Status: DISCONTINUED | OUTPATIENT
Start: 2022-06-01 | End: 2022-06-07

## 2022-06-01 RX ORDER — SODIUM CHLORIDE 9 MG/ML
1000 INJECTION, SOLUTION INTRAVENOUS
Refills: 0 | Status: DISCONTINUED | OUTPATIENT
Start: 2022-06-01 | End: 2022-06-07

## 2022-06-01 RX ORDER — GLUCAGON INJECTION, SOLUTION 0.5 MG/.1ML
1 INJECTION, SOLUTION SUBCUTANEOUS ONCE
Refills: 0 | Status: DISCONTINUED | OUTPATIENT
Start: 2022-06-01 | End: 2022-06-07

## 2022-06-01 RX ORDER — POTASSIUM CHLORIDE 20 MEQ
1 PACKET (EA) ORAL
Qty: 0 | Refills: 0 | DISCHARGE

## 2022-06-01 RX ORDER — METFORMIN HYDROCHLORIDE 850 MG/1
1 TABLET ORAL
Qty: 0 | Refills: 0 | DISCHARGE

## 2022-06-01 RX ORDER — PANTOPRAZOLE SODIUM 20 MG/1
80 TABLET, DELAYED RELEASE ORAL ONCE
Refills: 0 | Status: COMPLETED | OUTPATIENT
Start: 2022-06-01 | End: 2022-06-02

## 2022-06-01 RX ORDER — DEXTROSE 50 % IN WATER 50 %
12.5 SYRINGE (ML) INTRAVENOUS ONCE
Refills: 0 | Status: DISCONTINUED | OUTPATIENT
Start: 2022-06-01 | End: 2022-06-07

## 2022-06-01 RX ORDER — ONDANSETRON 8 MG/1
4 TABLET, FILM COATED ORAL EVERY 8 HOURS
Refills: 0 | Status: DISCONTINUED | OUTPATIENT
Start: 2022-06-01 | End: 2022-06-07

## 2022-06-01 RX ORDER — POTASSIUM CHLORIDE 20 MEQ
10 PACKET (EA) ORAL DAILY
Refills: 0 | Status: DISCONTINUED | OUTPATIENT
Start: 2022-06-01 | End: 2022-06-07

## 2022-06-01 RX ORDER — FERROUS SULFATE 325(65) MG
325 TABLET ORAL DAILY
Refills: 0 | Status: DISCONTINUED | OUTPATIENT
Start: 2022-06-01 | End: 2022-06-07

## 2022-06-01 RX ORDER — LEVOTHYROXINE SODIUM 125 MCG
112 TABLET ORAL DAILY
Refills: 0 | Status: DISCONTINUED | OUTPATIENT
Start: 2022-06-01 | End: 2022-06-07

## 2022-06-01 RX ORDER — SIMVASTATIN 20 MG/1
20 TABLET, FILM COATED ORAL AT BEDTIME
Refills: 0 | Status: DISCONTINUED | OUTPATIENT
Start: 2022-06-01 | End: 2022-06-07

## 2022-06-01 RX ADMIN — SIMVASTATIN 20 MILLIGRAM(S): 20 TABLET, FILM COATED ORAL at 22:11

## 2022-06-01 RX ADMIN — ONDANSETRON 4 MILLIGRAM(S): 8 TABLET, FILM COATED ORAL at 17:37

## 2022-06-01 NOTE — ED PROVIDER NOTE - NS ED ATTENDING STATEMENT MOD
I have seen and examined this patient and fully participated in the care of this patient as the teaching attending.  The service was shared with the MARYAN.  I reviewed and verified the documentation and independently performed the documented:

## 2022-06-01 NOTE — PATIENT PROFILE ADULT - FALL HARM RISK - HARM RISK INTERVENTIONS

## 2022-06-01 NOTE — ED ADULT NURSE NOTE - OBJECTIVE STATEMENT
Pt brought in by family member c/o SOB, nausea, weakness, tiredness, s/p fall twice today. Denies LOC, head injury. Sat 100% on RA, Denies fever, cough, vomiting, diarrhea. Safety maintained, call bell within reach. Nursing care ongoing.

## 2022-06-01 NOTE — H&P ADULT - PROBLEM SELECTOR PLAN 7
- hold home metoprolol and torsemide (due to marni)  - patient blood pressure well controlled  - routine hemodynamic monitoring - continue home simvastatin

## 2022-06-01 NOTE — ED PROVIDER NOTE - CONSTITUTIONAL [+], MLM
Discharge Planner   Discharge Plans in progress: [facility].  Barriers to discharge plan: Medical stability  Follow up plan: [facility], prvt room at no additional daily private room fee due to recent infection. Clinically accepted pt for Friday or Saturday. Son Gael to transport. PAS completed. SW to continue following.        Entered by: Jany Rebolledo 08/01/2019 1:52 PM        weakness

## 2022-06-01 NOTE — H&P ADULT - PROBLEM SELECTOR PLAN 3
s/p pacemaker placement   - patient currently rate controlled  - admission EKG: Atrial-sensed v-paced @ 77 bpm  - hold home eliquis in setting of acute hgb drop   - hold home metoprolol as per cardio   - continue home amiodarone - hold home metformin  - insulin coverage scale   - am A1c   - hypoglycemia protocol

## 2022-06-01 NOTE — H&P ADULT - PROBLEM SELECTOR PLAN 2
Admission creatinine of 2.2, baseline appears to be ~1  - likely prerenal 2/2 to dehydration vs. acute blood loss   - s/p 1u PRBC, currently getting second unit  - trend renal indices   - avoid nephrotoxic medications, hold home torsemide

## 2022-06-01 NOTE — ED PROVIDER NOTE - ADMIT DISPOSITION PRESENT ON ADMISSION SEPSIS
After Visit Summary      Facility     Name Address Phone Fax    [facility] [address] [facility] [id number] [phone]           Patient Discharge Summary   4/25/2017    Jose F Forrester            Please bring this medication reconciliation form to your next doctor’s appointment(s). Please update the form if you stop taking any of these medications or you start taking any new medications including over the counter medications. Also please carry a copy of this form with you at all times in the event of an emergency.    A copy of these discharge instructions was reviewed with and given to the patient/patient representative/Guardian/Caregiver at discharge.          The doctor who took care of you in the hospital     Provider Specialty    Servando Funes MD Orthopedic Surgery      Allergies as of 4/26/2017        Reactions    Iodine CARDIAC DISTURBANCES           Discharge Medications              What to Do with Your Medications      START taking these medications today unless otherwise stated        Details    Morning Noon Evening Bedtime As needed    docusate sodium-sennosides 50-8.6 MG per tablet   Commonly known as:  SENOKOT S   Notes to Patient:  To avoid constipation caused by narcotics        Take 2 tablets by mouth daily. Use while on narcotics to avoid constipation. Hold for loose stool   Authorizing Provider:  Servando Funes   Last Dose:  2 tablets on 4/25/2017  1:33 PM                            HYDROcodone-acetaminophen  MG per tablet   Commonly known as:  NORCO   Replaces:  HYDROcodone-acetaminophen 5-325 MG per tablet   Notes to Patient:  For pain control        0.5-2 tabs po q 4-6 hours prn pain   Authorizing Provider:  Servando Funes   Last Dose:  1 tablet on 4/26/2017  2:27 AM                            tamsulosin 0.4 MG Cap   Commonly known as:  FLOMAX        Take 1 capsule by mouth daily after a meal.      Authorizing Provider:  Servando Funes   Last Dose:  0.4 mg on 4/26/2017  8:57 AM                                                     CONTINUE taking these medications which have NOT CHANGED        Details    Morning Noon Evening Bedtime As needed    allopurinol 300 MG tablet   Commonly known as:  ZYLOPRIM        TAKE ONE TABLET BY MOUTH ONCE DAILY   Authorizing Provider:  Davy Klein   Last Dose:  300 mg on 4/25/2017  5:02 PM                            aspirin 81 MG chewable tablet        Chew 81 mg by mouth daily.                            cyanocobalamin 1000 MCG/ML injection        Inject 1 mL into the muscle every 30 days.   Authorizing Provider:  Maykel Thompson                            levothyroxine 150 MCG tablet   Commonly known as:  SYNTHROID, LEVOTHROID        TAKE ONE TABLET BY MOUTH ONCE DAILY   Authorizing Provider:  Maykel Thompson   Last Dose:  150 mcg on 4/26/2017  8:53 AM                            lisinopril 10 MG tablet   Commonly known as:  ZESTRIL        Take 0.5 tablets by mouth daily.   Authorizing Provider:  Farshad Ba   Last Dose:  5 mg on 4/25/2017  5:02 PM                            lovastatin 20 MG tablet   Commonly known as:  MEVACOR        Take 1 tablet by mouth daily.   Authorizing Provider:  Maykel Thompson                            nitroGLYcerin 0.4 MG sublingual tablet   Commonly known as:  NITROSTAT        Place 1 tablet under the tongue every 5 minutes as needed for Chest pain.   Authorizing Provider:  Maykel Thompson                                                     STOP taking these medications, discuss with your Pharmacist        Reason for stopping Comments    HYDROcodone-acetaminophen 5-325 MG per tablet   Commonly known as:  NORCO   Replaced by:  HYDROcodone-acetaminophen  MG per tablet                                    Where to Get Your Medications      You are receiving a paper prescription for the following medications, you can take these to any  pharmacy.     Bring a paper prescription for each of these medications     docusate sodium-sennosides 50-8.6 MG per tablet    HYDROcodone-acetaminophen  MG per tablet    tamsulosin 0.4 MG Cap               Your To Do List     Future Appointments Provider Department Dept Phone    5/4/2017 10:30 AM John Szymanski MD [facility] Urology [phone]    5/17/2017 8:30 AM SBC IM NURSE [facility] Internal Medicine [phone]    5/24/2017 10:40 AM Servando Funes MD [facility] Orthopedics [phone]    6/9/2017 1:20 PM Hakan Garibay MD [facility] Pulmonology [phone]    7/31/2017 1:20 PM Maykel Thompson MD [facility] Internal Medicine [phone]    9/21/2017 8:00 AM SB ECHO 1 [facility] Ultrasound Cardiology [phone]    10/5/2017 12:45 PM Farshad Ba MD [facility] Cardiology [phone]        Discharge Instructions       Call with questions  May shower but do not immerse wound in hot tub, bathtub, or pool  Avoid bending, twisting, or lifting greater than 15 lbs  No sitting for greater than 20 minutes at a time  Call Dr. Funes's office for increased pain, numbness in legs, or new weakness.  Follow-up with Dr. Funes in 4 weeks, sooner if having problems  Start a walking program  Good luck in your recovery    Because you are not voiding you will be discharged with an indwelling ornelas catheter and started on flomax.  The flomax may make you more lightheaded than usual.  You will have a f/u visit with urology in one week to assess your ability to void without the ornelas.       See - Discharge Instructions After Your Laminectomy Sheets    Discharge References/Attachments     HYDROCODONE BITARTRATE, ACETAMINOPHEN ORAL TABLET (ENGLISH)    DOCUSATE SODIUM; SENNA (ENGLISH)    TAMSULOSIN (ENGLISH)    INDWELLING URINARY CATHETER, DISCHARGE INSTRUCTIONS (ENGLISH)     LEG BAG, DISCHARGE INSTRUCTIONS (ENGLISH)    RADER CATHETER, CARE (ENGLISH)      Pending Labs/Results     Any lab or diagnostic test results that are \"pending\" at time of discharge are listed below. If no results display then none were \"pending\" at time of discharge. Depending on when the test was completed results may be available within 3-5 days. Results can be reviewed with your provider at your next visits or through [facility]. If needed contact the provider office for additional information.          Your Opinion Matters To Us  If you receive a patient satisfaction survey in the mail, please complete and return it in the postage-paid envelope.    We truly value and appreciate your feedback.           Jose F JAE Forrester        Your To Do List     Future Appointments Provider Department Dept Phone    5/4/2017 10:30 AM John Szymanski MD [facility] Urology [phone]    5/17/2017 8:30 AM SBC IM NURSE [facility] Internal Medicine [phone]    5/24/2017 10:40 AM Servando Funes MD [facility] Orthopedics [phone]    6/9/2017 1:20 PM Hakan Garibay MD [facility] Pulmonology [phone]    7/31/2017 1:20 PM Maykel Thompson MD [facility] Internal Medicine [phone]    9/21/2017 8:00 AM SBC ECHO 1 [facility] Ultrasound Cardiology [phone]    10/5/2017 12:45 PM Farshad Ba MD [facility] Cardiology [phone]      Contact your doctor for follow-up appointment if not already scheduled.     Follow up with Servando Funes MD In 4 weeks.    Specialty:  Orthopedic Surgery    Contact information    [facility] [place] [address]  [phone]          Follow up with John Szymanski MD In 1 week.    Specialty:  Urology    Comments:  to assess ability to void and d/c rader catheter    Contact information    [facility] [place]  [address]  [phone]           Jose F Forrester           Summary of your Discharge Medications      Take these Medications        Details    Morning Noon Evening Bedtime As needed    allopurinol 300 MG tablet   Commonly known as:  ZYLOPRIM    TAKE ONE TABLET BY MOUTH ONCE DAILY                            aspirin 81 MG chewable tablet    Chew 81 mg by mouth daily.                            cyanocobalamin 1000 MCG/ML injection    Inject 1 mL into the muscle every 30 days.                            docusate sodium-sennosides 50-8.6 MG per tablet   Commonly known as:  SENOKOT S    Take 2 tablets by mouth daily. Use while on narcotics to avoid constipation. Hold for loose stool                            HYDROcodone-acetaminophen  MG per tablet   Commonly known as:  NORCO    0.5-2 tabs po q 4-6 hours prn pain                            levothyroxine 150 MCG tablet   Commonly known as:  SYNTHROID, LEVOTHROID    TAKE ONE TABLET BY MOUTH ONCE DAILY                            lisinopril 10 MG tablet   Commonly known as:  ZESTRIL    Take 0.5 tablets by mouth daily.                            lovastatin 20 MG tablet   Commonly known as:  MEVACOR    Take 1 tablet by mouth daily.                            nitroGLYcerin 0.4 MG sublingual tablet   Commonly known as:  NITROSTAT    Place 1 tablet under the tongue every 5 minutes as needed for Chest pain.                            tamsulosin 0.4 MG Cap   Commonly known as:  FLOMAX    Take 1 capsule by mouth daily after a meal.                                                          Outpatient Administered Medication List      Notice     You have not been prescribed any facility-administered medications.       No

## 2022-06-01 NOTE — H&P ADULT - PROBLEM SELECTOR PLAN 4
w/ severe tricuspid regurgitation   - Patient does not appear volume overloaded on exam   - hold home torsemide in the setting of ADAMS   - TTE 4/22/22: LVEF 60-65%, RV decreased systolic function, torrential tricuspid regurgitation s/p pacemaker placement   - patient currently rate controlled  - admission EKG: Atrial-sensed v-paced @ 77 bpm  - hold home eliquis in setting of acute hgb drop   - hold home metoprolol as per cardio   - continue home amiodarone

## 2022-06-01 NOTE — ED PROVIDER NOTE - OBJECTIVE STATEMENT
75 yo female with h/o HTN, hypothyroidism, CHF, HLD presents to the ED c/o worsening sob, generalized weakness, dizziness since this weekend. Patient recently admitted at [facility] for SOB/CHF. Patient feeling significantly more sob in the past few days. Patietn with multiple falls today due to weakness/lightheadedness. no head trauma or LOC. on eliquis. Patient c/o right buttock/pelvic pain. Denies fever, chills, chest pain, abd pain, vomiting, diarrhea, urinary sxs, flank pain    pmd: Dr. Raine Hayes, cards: Dr. Padilla

## 2022-06-01 NOTE — ED ADULT TRIAGE NOTE - CHIEF COMPLAINT QUOTE
Pt arrived to ed c/o dizziness, nausea and sob for the last 1.5 weeks.    Pt reports over the last month she has been "in and out of the hospital" several times for tx of CHF.   Pt appears dyspneic at this time and hypotensive.   Pt cool and clammy to touch.   Denies cp/palpitations though sob when speaking, pt appears weak at this time, afebrile.   BN

## 2022-06-01 NOTE — CONSULT NOTE ADULT - SUBJECTIVE AND OBJECTIVE BOX
Patient is a 76y old  Female who presents with a chief complaint of     HPI: 77 y/o f PMHx aortic stenosis (s/p bioprosthetic aortic valve replacement), complete heart block s/p pacemaker, afib, intermittent RBBB, hypertension, HLD, thyroid nodule, hypothyroidism, iron deficiency anemia, renal cell carcinoma s/p resection, recent admission for acute decompensated right heart failure, found to have severe tricuspid regurg. s/p cardioversion for afib at that time. She presents to the ED w/ increasing weakness over the past month, and more recently w/ dizziness, lightheadedness, and falls. Patient states she feels fine when she is laying flat or sitting, however whenever she stands up at home she starts feeling the symptoms. Patient states she fell yesterday without loss of consciousness onto her coccyx and again this afternoon. She states that this afternoon she fell, slightly injured her hand, and had an episode of NBNB vomiting. This is when she called for her son to take her to the hospital. Patient states that her stools have been black, however she did not think much of it because they're always somewhat dark 2/2 iron supplementation. She also states she has an intermittent pressure/pain in the lower mid chest / epigastric region. Also w/ frequent tremors / shakes of her b/l upper extremities over the past several days as well. States her Torsemide dosing was cut down to 10 qd however she was told to take 20mg qd if she felt more SOB. Took 20mg on Monday and Tuesday, however did not take Torsemide this morning. Denies headache, orthopnea, PND, headache, fever/chills, palpitations.      PAST MEDICAL & SURGICAL HISTORY:  Hypertension  x10 years      Dyslipidemia      Hiatal hernia      Type II diabetes mellitus      Aortic stenosis      Cancer of kidney, left      Right bundle branch block (RBBB)      Anemia      Hypothyroidism      Status post unilateral salpingo-oophorectomy      S/P T&amp;A (status post tonsillectomy and adenoidectomy)      H/O hand surgery      H/O partial nephrectomy  right 20125      H/O aortic valve replacement      Elective surgery  [facility]  2016                ECHO 4/22/22  FINDINGS: Intact lv function w/ mild to moderate mitral regurg, severe tricuspid regurg, malcoaptation of tricuspid leaflets, possible prosthesis mismatched Aortic valve. EF estimated at 60-65%    Home meds:  amiodarone  aspirin  eliquis  ferrous sulfate  levothyroxine  metformin  metoprolol  simvastatin  torsemide  valsartan        FAMILY HISTORY:  FH: renal cell carcinoma (Child)      Constitutional: denies fever, chills  HEENT: denies blurry vision  Respiratory: +SOB, +GONAZLES, denies cough  Cardiovascular: denies CP, palpitations, orthopnea, PND, LE edema  Gastrointestinal: +intermittent epigastric pain, +1 episode of vomiting, denies nausea  Genitourinary: denies urinary changes  Skin: Denies rashes  Neurologic: denies headache, weakness, dizziness  Hematology/Oncology: denies bleeding  ROS negative except as noted above      SOCIAL HISTORY:    No tobacco, Alcohol or Drug use    Vital Signs Last 24 Hrs  T(C): 36 (01 Jun 2022 15:19), Max: 36 (01 Jun 2022 15:19)  T(F): 96.8 (01 Jun 2022 15:19), Max: 96.8 (01 Jun 2022 15:19)  HR: 77 (01 Jun 2022 15:19) (77 - 77)  BP: 92/48 (01 Jun 2022 15:19) (92/48 - 92/48)  RR: 24 (01 Jun 2022 15:19) (24 - 24)      Physical Exam:  General: Pale, appears stated age, NAD  HEENT: NCAT, pale conjunctiva, PERRL, EOMI bl, moist mucous membranes   Neck: Supple, nontender, no jvd  Neurology: A&Ox3, nonfocal, sensation intact   Respiratory: CTA B/L, No W/R/R  CV: RRR, II/VI systolic murmur best heard over RUSB, +S1/S2, no rubs or gallops  Abdominal: Soft, NT, ND  Extremities: No C/C/E, + peripheral pulses  Heme: No obvious ecchymosis or petechiae   Skin: Pale. warm, dry.      ECG: a sensed, v paced 77bpm.    I&O's Detail      LABS:                        4.6    15.93 )-----------( 387      ( 01 Jun 2022 16:17 )             15.6     06-01    137  |  102  |  71<H>  ----------------------------<  196<H>  4.8   |  14<L>  |  2.20<H>    Ca    9.8      01 Jun 2022 16:17    TPro  6.4  /  Alb  3.0<L>  /  TBili  0.7  /  DBili  x   /  AST  14<L>  /  ALT  26  /  AlkPhos  40  06-01            I&O's Summary    BNPSerum Pro-Brain Natriuretic Peptide: 2276 pg/mL (06-01 @ 16:17)    RADIOLOGY & ADDITIONAL STUDIES:

## 2022-06-01 NOTE — H&P ADULT - PROBLEM SELECTOR PLAN 5
-continue home synthroid w/ severe tricuspid regurgitation   - Patient does not appear volume overloaded on exam   - hold home torsemide in the setting of ADAMS   - TTE 4/22/22: LVEF 60-65%, RV decreased systolic function, torrential tricuspid regurgitation

## 2022-06-01 NOTE — CONSULT NOTE ADULT - ASSESSMENT
75 y/o f PMHx aortic stenosis (s/p bioprosthetic aortic valve replacement), complete heart block s/p pacemaker, afib, intermittent RBBB, hypertension, HLD, thyroid nodule, hypothyroidism, iron deficiency anemia, renal cell carcinoma s/p resection, recent admission for acute decompensated right heart failure, found to have severe tricuspid regurg, presents to ED w/ lightheadedness, weakness, SOB, being admitted for symptomatic anemia.    - Not complaining of any cardiac related symptoms at this time such as chest pain or palpitations.  - No clear evidence of acute ischemia, troponin negative x1, however with severe anemia may develop some degree of demand ischemia. Can follow-up second set of troponin.  - Patient has no significant CAD. Found to have non-osbtructive CAD by cardiac cath 2016, has not had CABG or PCI.  - hold ASA 2/2 active bleeding.    - EKG today shows a-sensed v-paced rhythm at 77bpm, unchanged from prior  - For her history of afib, will need to hold elliquis at this time.  - Last PPM interrogation appears to be in december of 2021, battery life was >5 years at that time. Will repeat interrogation during this admission.  - Continue home amio, Can hold home metoprolol at this time, patient hypotensive.    - She is known to have intact lv function w/ mild to moderate mitral regurg, severe tricuspid regurg, malcoaptation of tricuspid leaflets, possible prosthesis mismatched Aortic valve, EF 60-65% on prior TIM in April.  - Her severe tricuspid regurgitation has been managed with torsemide and she has been planned for percutaneous tricuspid valve repair in the coming weeks however may have to be postponed. She will have to follow up with the structural heart team.  - hold torsemide at this time in the setting of severe anemia, volume depletion.    - She will need to be given Lasix intermittently between units of pRBCs to maintain euvolemia.  - Would recommend keeping Hgb at least >7.    - needs GI eval, will need EGD / colonscopy pending interrogation of ppm and clinical response to transfusions.  - Pt has no active ischemia, decompensated heart failure, unstable arrythmia, or severe stenotic valvular disease. In the setting of low risk GI procedures such as EGD and Colonscopy, she should be considered optimized from a cardiovascular standpoint to proceed with any planned GI procedure with routine hemodynamic monitoring. She has no modifiable cardiac risk factors at this time.    - BP has been low, likely 2/2 anemia and volume depletion. Continue to monitor routine hemodynamics.  - would hold valsartan 2/2 marni, patient is hypotensive in the ED, can hold metoprolol for now and reassess need for antihypertensives when her active anemia is more stable.    - Monitor and replete lytes, keep K>4, Mg>2.  - Strict I/Os, daily weights.  - Other cardiovascular workup will depend on clinical course.  - All other workup per primary team.  - Will continue to follow.             75 y/o f PMHx aortic stenosis (s/p bioprosthetic aortic valve replacement), complete heart block s/p pacemaker, afib, intermittent RBBB, hypertension, HLD, thyroid nodule, hypothyroidism, iron deficiency anemia, renal cell carcinoma s/p resection, recent admission for acute decompensated right heart failure, found to have severe tricuspid regurg, presents to ED w/ lightheadedness, weakness, SOB, being admitted for symptomatic anemia.    - Not complaining of any cardiac related symptoms at this time such as chest pain or palpitations.  - No clear evidence of acute ischemia, troponin negative x1, however with severe anemia may develop some degree of demand ischemia. Can follow-up second set of troponin.  - Patient has no significant CAD. Found to have non-osbtructive CAD by cardiac cath 2016, has not had CABG or PCI.  - hold ASA 2/2 active bleeding.    - EKG today shows a-sensed v-paced rhythm at 77bpm, unchanged from prior  - For her history of afib, will need to hold elliquis at this time.  - Last PPM interrogation appears to be in december of 2021, battery life was >5 years at that time. Will repeat interrogation during this admission.  - Continue home amio, Can hold home metoprolol at this time, patient hypotensive; restart based on clinical course.    - She is known to have intact lv function w/ mild to moderate mitral regurg, severe tricuspid regurg, malcoaptation of tricuspid leaflets, possible prosthesis mismatched Aortic valve, EF 60-65% on prior TIM in April.  - Her severe tricuspid regurgitation has been managed with torsemide and she has been planned for percutaneous tricuspid valve repair in the coming weeks however may have to be postponed. She will have to follow up with the structural heart team.  - hold torsemide at this time in the setting of severe anemia, volume depletion.    - She will need to be given Lasix intermittently between units of pRBCs to maintain euvolemia.  - Would recommend keeping Hgb at least >7.    - needs GI eval, will need EGD / colonscopy pending interrogation of ppm and clinical response to transfusions.  - Pt has no active ischemia, decompensated heart failure, unstable arrythmia, or severe stenotic valvular disease. In the setting of low risk GI procedures such as EGD and Colonscopy, she should be considered optimized from a cardiovascular standpoint to proceed with any planned GI procedure with routine hemodynamic monitoring. She has no modifiable cardiac risk factors at this time.    - BP has been low, likely 2/2 anemia and volume depletion. Continue to monitor routine hemodynamics.  - would hold valsartan 2/2 marni, patient is hypotensive in the ED, can hold metoprolol for now and reassess need for antihypertensives when her active anemia is more stable or if patient becomes hypertensive.    - Monitor and replete lytes, keep K>4, Mg>2.  - Strict I/Os, daily weights.  - Other cardiovascular workup will depend on clinical course.  - All other workup per primary team.  - Will continue to follow.

## 2022-06-01 NOTE — CONSULT NOTE ADULT - ATTENDING COMMENTS
bio avr, paf on ac, ppm  no known cad  recent hf on the basis of severe TR, planned for tv intervention  now with weakness, near syncope, malave  severe anemia  tranfuse with lasix in between units, goal >7  will need gi eval   at risk of decompensation

## 2022-06-01 NOTE — ED PROVIDER NOTE - HIV OFFER
Breanna Nassar presents today for   Chief Complaint   Patient presents with    Chest Pain     C/o right sided chest pain for 5 days. States pain in intermittent and dull not radiating       Is someone accompanying this pt? No     Is the patient using any DME equipment during OV? No     Depression Screening:  3 most recent PHQ Screens 1/28/2020   Little interest or pleasure in doing things Not at all   Feeling down, depressed, irritable, or hopeless Not at all   Total Score PHQ 2 0       Learning Assessment:  Learning Assessment 1/28/2020   PRIMARY LEARNER Patient   HIGHEST LEVEL OF EDUCATION - PRIMARY LEARNER  4 YEARS OF COLLEGE   BARRIERS PRIMARY LEARNER NONE   CO-LEARNER CAREGIVER No   PRIMARY LANGUAGE ENGLISH   LEARNER PREFERENCE PRIMARY LISTENING   ANSWERED BY self   RELATIONSHIP SELF       Abuse Screening:  Abuse Screening Questionnaire 8/28/2020   Do you ever feel afraid of your partner? N   Are you in a relationship with someone who physically or mentally threatens you? N   Is it safe for you to go home? Y         Health Maintenance Due   Topic Date Due    Hepatitis C Screening  1964    Pneumococcal 0-64 years (1 of 1 - PPSV23) 12/10/1971    DTaP/Tdap/Td series (1 - Tdap) 12/10/1986    Shingrix Vaccine Age 50> (1 of 2) 12/10/2015    Flu Vaccine (1) 09/01/2020   . Health Maintenance reviewed and discussed and ordered per Provider. Coordination of Care  1. Have you been to the ER, urgent care clinic since your last visit? Hospitalized since your last visit? No    2. Have you seen or consulted any other health care providers outside of the [address] [place] [address] since your last visit? Include any pap smears or colon screening. No      Advance Directive:  1. Do you have an advance directive in place? Patient Reply:No    2. If not, would you like material regarding how to put one in place?  Patient Reply: No Opt out

## 2022-06-01 NOTE — H&P ADULT - NSHPSOCIALHISTORY_GEN_ALL_CORE
Lives alone   ADL independent   Ambulates w/o assistance   COVID moderna x3   Former smoker quit 45 y/o   No alcohol, no drugs   Retired, former teacher

## 2022-06-01 NOTE — H&P ADULT - HISTORY OF PRESENT ILLNESS
A very pleasant and well oriented 77 y/o female w/ PMHx of DM2, aortic stenosis s/p bioAVR, complete heart block s/p pacemaker, Chronic HFpEF, afib on eliquis, HTN, HLD, hypothyroidism, GABRIELA, renal cell carcinoma s/p resection, presents to ED after having two falls and one episode of vomiting earlier today. Patient was recently admitted at [facility] for chronic diastolic heart failure exacerbation for which she was diuresed and discharged but was readmitted a few days later with an ADAMS 2/2 to overdiuresis. Two days after that discharge patient states she began to feel SOB when she would exert herself but also began to develop dizziness and weakness, however patient desired to just follow up with PCP and handle things on her own. This AM, however, patient got up from bed quickly to answer the door and fell on coccyx, denies head strike or LOC. Patient then had another fall later in the day while she was going to the bathroom where she felt extremely nausous and fell forward onto her left hand. Patient again denies any headstrike or loss of consciousness. Patient also had one episode of non-bloody vomiting today. Patient was seen and examined in the ED, currently denies any nausea/vomitng, CP/pressure, SOB, headache/dizziness, myalgia, abdominal pain.     In ED:   Vitals: 98.2F, HR 72, 135/58, 19 RR, 100% RA   Labs significant for: WBC 15.93, H/H 4.6/15.6, BUN/Cr 71/2.20 (baseline Cr: ~1 ), BNP 2276  Imaging: CT head/cspine: no fracture, no brain bleed   CT abd/pelvis: no bleeding, mild subluxation of coccyx    EKG: Atrial-sensed v-paced @ 77 bpm  Received: 1u PRBCs x2, zofran 4mg x1 A very pleasant and well oriented 77 y/o female w/ PMHx of DM2, aortic stenosis s/p bioAVR, complete heart block s/p pacemaker, Chronic HFpEF, afib on eliquis, HTN, HLD, hypothyroidism, GABRIELA, renal cell carcinoma s/p resection, presents to ED after having two falls and one episode of vomiting earlier today. Patient was recently admitted at [facility] for chronic diastolic heart failure exacerbation for which she was diuresed and discharged but was readmitted a few days later with an ADAMS 2/2 to overdiuresis. Two days after that discharge patient states she began to feel SOB when she would exert herself but also began to develop dizziness and weakness, however patient desired to just follow up with PCP and handle things on her own. This AM, however, patient got up from bed quickly to answer the door and fell on coccyx, denies head strike or LOC. Patient then had another fall later in the day while she was going to the bathroom where she felt extremely nausous and fell forward onto her left hand. Patient again denies any headstrike or loss of consciousness. Patient also had one episode of non-bloody vomiting today. Patient was seen and examined in the ED, currently denies any nausea/vomitng, CP/pressure, SOB, headache/dizziness, myalgia, abdominal pain.     In ED:   Vitals: 98.2F, HR 72, 135/58, 19 RR, 100% RA   Labs significant for: WBC 15.93, H/H 4.6/15.6, BUN/Cr 71/2.20 (baseline Cr: ~1 ), BNP 2276  Imaging: CT head/cspine: no fracture, no brain bleed   CT abd/pelvis: no bleeding, mild subluxation of coccyx    EKG: Atrial-sensed v-paced @ 77 bpm  Received:  PRBCs x2, zofran 4mg x1

## 2022-06-01 NOTE — ED PROVIDER NOTE - MUSCULOSKELETAL MINIMAL EXAM
no midline cervical tenderness. no midline vertebral tenderness. + right posterior pelvic tenderness with decreased ROM of right hip. no chest wall tenderness. dp pulses equal and intact bilaterally.

## 2022-06-01 NOTE — ED PROVIDER NOTE - CLINICAL SUMMARY MEDICAL DECISION MAKING FREE TEXT BOX
pt c/o dizziness, sob, nausea, worsening past few weeks, multiple falls today. plans - ekg/ct/xr/labs/zofran/cards

## 2022-06-01 NOTE — H&P ADULT - PROBLEM SELECTOR PLAN 1
Likely iron deficiency anemia in the setting of possible bleed? Less likely anemia of chronic disease considering acute drop  - Hgb 4 on admission, as per patient, outpatient bloodwork from last friday had hgb of 14   - s/p 1u of PRBCs in ED, currently getting second unit  - f/u post-transfusion H/H, goal hgb >7, monitor volume status, diurese as needed  - hold eliquis and hold asa 81  - Currently hemodynamically stable  - Blood transfusion consent signed and placed in chart  - Maintain active type and screen  - F/u iron panel: iron, ferritin, TIBC, transferrin  - F/u folate, vitamin B12, TSH  - GI Knowles consulted, f/u recs, keep patient NPO in preparation for possible endoscopy/colonoscopy Likely iron deficiency anemia in the setting of possible bleed? Less likely anemia of chronic disease considering acute drop. no obvious source of bleeding noted on exam or imaging  - Hgb 4 on admission, as per patient, outpatient bloodwork from last friday had hgb of 14   - s/p 1u of PRBCs in ED, currently receiving second unit  - hemodynamically stable   - f/u post-transfusion H/H, goal hgb >7, monitor volume status, diurese between units as needed  - hold eliquis and hold asa 81  - Blood transfusion consent signed and placed in chart  - Maintain active type and screen  - F/u iron panel: iron, ferritin, TIBC, transferrin  - F/u folate, vitamin B12, TSH  - repeat fobt  - GI  consulted, f/u recs, keep patient NPO in preparation for possible endoscopy/colonoscopy

## 2022-06-01 NOTE — ED PROVIDER NOTE - SKIN, MLM
Skin normal color for race, warm, dry and intact. No evidence of rash. no ecchymosis. no signs of trauma.

## 2022-06-01 NOTE — ED ADULT NURSE NOTE - NSIMPLEMENTINTERV_GEN_ALL_ED
Implemented All Fall with Harm Risk Interventions:  [place] to call system. Call bell, personal items and telephone within reach. Instruct patient to call for assistance. Room bathroom lighting operational. Non-slip footwear when patient is off stretcher. Physically safe environment: no spills, clutter or unnecessary equipment. Stretcher in lowest position, wheels locked, appropriate side rails in place. Provide visual cue, wrist band, yellow gown, etc. Monitor gait and stability. Monitor for mental status changes and reorient to person, place, and time. Review medications for side effects contributing to fall risk. Reinforce activity limits and safety measures with patient and family. Provide visual clues: red socks.

## 2022-06-01 NOTE — H&P ADULT - NSHPREVIEWOFSYSTEMS_GEN_ALL_CORE
CONSTITUTIONAL: denies fever, + chills, +fatigue, denies weakness  HEENT: denies blurred vision  CARDIOVASCULAR: denies chest pain, chest pressure, palpitations  RESPIRATORY: +shortness of breath on exertion, no sputum production  GASTROINTESTINAL: denies nausea, + earlier episode of vomiting, denies diarrhea, abdominal pain, +black stools  GENITOURINARY: denies dysuria, discharge, hematuria   NEUROLOGICAL: denies numbness, headache, focal weakness  MUSCULOSKELETAL: denies new joint pain, muscle aches  HEMATOLOGIC: denies gross bleeding, bruising  LYMPHATICS: denies enlarged lymph nodes, extremity swelling  PSYCHIATRIC: denies recent changes in anxiety, depression  ENDOCRINOLOGIC: denies sweating, cold or heat intolerance

## 2022-06-01 NOTE — H&P ADULT - NSHPPHYSICALEXAM_GEN_ALL_CORE
T(C): 36.5 (06-01-22 @ 21:02), Max: 36.9 (06-01-22 @ 18:15)  HR: 72 (06-01-22 @ 21:02) (70 - 77)  BP: 117/56 (06-01-22 @ 21:02) (92/48 - 135/58)  RR: 20 (06-01-22 @ 21:02) (18 - 24)  SpO2: 100% (06-01-22 @ 21:02) (100% - 100%)    GENERAL: patient appears well, no acute distress, appropriate, pleasant, pale-appearing  EYES: sclera clear, no exudates, b/l conjunctival pallor, b/l arcus senilis, EOMI, no nystagmus, PERRL  ENMT: oropharynx clear without erythema, no exudates, moist mucous membranes, fair dentition   NECK: supple, soft, no thyromegaly noted, no JVD visualized  LUNGS: good air entry bilaterally, clear to auscultation, symmetric breath sounds, no wheezing or rhonchi appreciated  HEART: soft S1/S2, regular rate and rhythm, grade 2/5 systolic murmur appreciated, no lower extremity edema  GASTROINTESTINAL: abdomen is soft, nontender, nondistended, normoactive bowel sounds  INTEGUMENT: good skin turgor, no lesions or bruising noted on back, abd, legs, arms  MUSCULOSKELETAL: no clubbing or cyanosis, no obvious deformity  NEUROLOGIC: awake, alert, oriented x3, good muscle tone in 4 extremities, no obvious sensory deficits, CN II-XII in tact  PSYCHIATRIC: mood is good, affect is congruent, linear and logical thought process  HEME/LYMPH: no palpable supraclavicular nodules, no obvious ecchymosis or petechiae

## 2022-06-01 NOTE — H&P ADULT - PROBLEM SELECTOR PLAN 8
DVT ppx- SCDs   GI ppx- Pantoprazole 40mg injectable BID - hold home metoprolol and torsemide (due to marni)  - patient blood pressure well controlled  - routine hemodynamic monitoring

## 2022-06-01 NOTE — H&P ADULT - ASSESSMENT
75 y/o female w/ PMHx of DM2, aortic stenosis s/p bioAVR, complete heart block s/p pacemaker, Chronic HFpEF, afib on eliquis, HTN, HLD, hypothyroidism, GABRIELA, renal cell carcinoma s/p resection presents s/p 2 mechanical falls earlier today, admitted for r/o bleeding in the setting of severe anemia, ADAMS.   77 y/o female w/ PMHx of DM2, aortic stenosis s/p bioAVR, complete heart block s/p pacemaker, Chronic HFpEF, afib on eliquis, HTN, HLD, hypothyroidism, GABRIELA, renal cell carcinoma s/p resection presents s/p 2 mechanical falls earlier today, admitted for symptomatic anemia, r/o acute blood loss

## 2022-06-01 NOTE — H&P ADULT - NSHPOUTPATIENTPROVIDERS_GEN_ALL_CORE
PCP - Dr. Raine Hayes   Electrophysiologist - Dr. Bergman   Cardio - Dr. Chance   Hematologist - Dr. Peres

## 2022-06-02 ENCOUNTER — TRANSCRIPTION ENCOUNTER (OUTPATIENT)
Age: 77
End: 2022-06-02

## 2022-06-02 LAB
ANION GAP SERPL CALC-SCNC: 10 MMOL/L — SIGNIFICANT CHANGE UP (ref 5–17)
BASOPHILS # BLD AUTO: 0.02 K/UL — SIGNIFICANT CHANGE UP (ref 0–0.2)
BASOPHILS NFR BLD AUTO: 0.2 % — SIGNIFICANT CHANGE UP (ref 0–2)
BUN SERPL-MCNC: 67 MG/DL — HIGH (ref 7–23)
CALCIUM SERPL-MCNC: 9.5 MG/DL — SIGNIFICANT CHANGE UP (ref 8.5–10.1)
CHLORIDE SERPL-SCNC: 105 MMOL/L — SIGNIFICANT CHANGE UP (ref 96–108)
CO2 SERPL-SCNC: 23 MMOL/L — SIGNIFICANT CHANGE UP (ref 22–31)
CREAT SERPL-MCNC: 1.6 MG/DL — HIGH (ref 0.5–1.3)
EGFR: 33 ML/MIN/1.73M2 — LOW
EOSINOPHIL # BLD AUTO: 0.07 K/UL — SIGNIFICANT CHANGE UP (ref 0–0.5)
EOSINOPHIL NFR BLD AUTO: 0.8 % — SIGNIFICANT CHANGE UP (ref 0–6)
FERRITIN SERPL-MCNC: 29 NG/ML — SIGNIFICANT CHANGE UP (ref 15–150)
FOLATE SERPL-MCNC: 15.2 NG/ML — SIGNIFICANT CHANGE UP
GLUCOSE SERPL-MCNC: 124 MG/DL — HIGH (ref 70–99)
HCT VFR BLD CALC: 20.6 % — CRITICAL LOW (ref 34.5–45)
HCT VFR BLD CALC: 24.4 % — LOW (ref 34.5–45)
HCT VFR BLD CALC: 25.8 % — LOW (ref 34.5–45)
HGB BLD-MCNC: 6.7 G/DL — CRITICAL LOW (ref 11.5–15.5)
HGB BLD-MCNC: 8.2 G/DL — LOW (ref 11.5–15.5)
HGB BLD-MCNC: 8.5 G/DL — LOW (ref 11.5–15.5)
IMM GRANULOCYTES NFR BLD AUTO: 1.6 % — HIGH (ref 0–1.5)
IRON SATN MFR SERPL: 278 UG/DL — HIGH (ref 30–160)
IRON SATN MFR SERPL: 68 % — HIGH (ref 14–50)
LYMPHOCYTES # BLD AUTO: 1.18 K/UL — SIGNIFICANT CHANGE UP (ref 1–3.3)
LYMPHOCYTES # BLD AUTO: 13.3 % — SIGNIFICANT CHANGE UP (ref 13–44)
MAGNESIUM SERPL-MCNC: 2.2 MG/DL — SIGNIFICANT CHANGE UP (ref 1.6–2.6)
MCHC RBC-ENTMCNC: 29.6 PG — SIGNIFICANT CHANGE UP (ref 27–34)
MCHC RBC-ENTMCNC: 29.9 PG — SIGNIFICANT CHANGE UP (ref 27–34)
MCHC RBC-ENTMCNC: 32.9 GM/DL — SIGNIFICANT CHANGE UP (ref 32–36)
MCHC RBC-ENTMCNC: 33.6 GM/DL — SIGNIFICANT CHANGE UP (ref 32–36)
MCV RBC AUTO: 89.1 FL — SIGNIFICANT CHANGE UP (ref 80–100)
MCV RBC AUTO: 89.9 FL — SIGNIFICANT CHANGE UP (ref 80–100)
MONOCYTES # BLD AUTO: 1.03 K/UL — HIGH (ref 0–0.9)
MONOCYTES NFR BLD AUTO: 11.6 % — SIGNIFICANT CHANGE UP (ref 2–14)
NEUTROPHILS # BLD AUTO: 6.42 K/UL — SIGNIFICANT CHANGE UP (ref 1.8–7.4)
NEUTROPHILS NFR BLD AUTO: 72.5 % — SIGNIFICANT CHANGE UP (ref 43–77)
NRBC # BLD: 4 /100 WBCS — HIGH (ref 0–0)
NRBC # BLD: 4 /100 WBCS — HIGH (ref 0–0)
PHOSPHATE SERPL-MCNC: 4.1 MG/DL — SIGNIFICANT CHANGE UP (ref 2.5–4.5)
PLATELET # BLD AUTO: 236 K/UL — SIGNIFICANT CHANGE UP (ref 150–400)
PLATELET # BLD AUTO: 251 K/UL — SIGNIFICANT CHANGE UP (ref 150–400)
POTASSIUM SERPL-MCNC: 4.6 MMOL/L — SIGNIFICANT CHANGE UP (ref 3.5–5.3)
POTASSIUM SERPL-SCNC: 4.6 MMOL/L — SIGNIFICANT CHANGE UP (ref 3.5–5.3)
RBC # BLD: 2.74 M/UL — LOW (ref 3.8–5.2)
RBC # BLD: 2.87 M/UL — LOW (ref 3.8–5.2)
RBC # FLD: 16.5 % — HIGH (ref 10.3–14.5)
RBC # FLD: 17 % — HIGH (ref 10.3–14.5)
SODIUM SERPL-SCNC: 138 MMOL/L — SIGNIFICANT CHANGE UP (ref 135–145)
TIBC SERPL-MCNC: 406 UG/DL — SIGNIFICANT CHANGE UP (ref 220–430)
TRANSFERRIN SERPL-MCNC: 334 MG/DL — SIGNIFICANT CHANGE UP (ref 200–360)
UIBC SERPL-MCNC: 128 UG/DL — SIGNIFICANT CHANGE UP (ref 110–370)
VIT B12 SERPL-MCNC: 420 PG/ML — SIGNIFICANT CHANGE UP (ref 232–1245)
WBC # BLD: 8.8 K/UL — SIGNIFICANT CHANGE UP (ref 3.8–10.5)
WBC # BLD: 8.86 K/UL — SIGNIFICANT CHANGE UP (ref 3.8–10.5)
WBC # FLD AUTO: 8.8 K/UL — SIGNIFICANT CHANGE UP (ref 3.8–10.5)
WBC # FLD AUTO: 8.86 K/UL — SIGNIFICANT CHANGE UP (ref 3.8–10.5)

## 2022-06-02 PROCEDURE — 99232 SBSQ HOSP IP/OBS MODERATE 35: CPT

## 2022-06-02 PROCEDURE — 99233 SBSQ HOSP IP/OBS HIGH 50: CPT

## 2022-06-02 RX ORDER — SOD SULF/SODIUM/NAHCO3/KCL/PEG
1 SOLUTION, RECONSTITUTED, ORAL ORAL EVERY 4 HOURS
Refills: 0 | Status: COMPLETED | OUTPATIENT
Start: 2022-06-02 | End: 2022-06-02

## 2022-06-02 RX ORDER — FUROSEMIDE 40 MG
20 TABLET ORAL ONCE
Refills: 0 | Status: DISCONTINUED | OUTPATIENT
Start: 2022-06-02 | End: 2022-06-02

## 2022-06-02 RX ADMIN — Medication 10 MILLIGRAM(S): at 18:34

## 2022-06-02 RX ADMIN — Medication 1: at 17:25

## 2022-06-02 RX ADMIN — AMIODARONE HYDROCHLORIDE 200 MILLIGRAM(S): 400 TABLET ORAL at 05:43

## 2022-06-02 RX ADMIN — Medication 112 MICROGRAM(S): at 05:43

## 2022-06-02 RX ADMIN — PANTOPRAZOLE SODIUM 80 MILLIGRAM(S): 20 TABLET, DELAYED RELEASE ORAL at 00:11

## 2022-06-02 RX ADMIN — SIMVASTATIN 20 MILLIGRAM(S): 20 TABLET, FILM COATED ORAL at 21:07

## 2022-06-02 RX ADMIN — Medication 1 LITER(S): at 21:03

## 2022-06-02 RX ADMIN — Medication 1 LITER(S): at 18:35

## 2022-06-02 RX ADMIN — PANTOPRAZOLE SODIUM 40 MILLIGRAM(S): 20 TABLET, DELAYED RELEASE ORAL at 11:53

## 2022-06-02 RX ADMIN — Medication 10 MILLIGRAM(S): at 21:07

## 2022-06-02 RX ADMIN — PANTOPRAZOLE SODIUM 40 MILLIGRAM(S): 20 TABLET, DELAYED RELEASE ORAL at 05:43

## 2022-06-02 RX ADMIN — Medication 10 MILLIEQUIVALENT(S): at 11:53

## 2022-06-02 RX ADMIN — Medication 325 MILLIGRAM(S): at 11:53

## 2022-06-02 NOTE — PROGRESS NOTE ADULT - PROBLEM SELECTOR PLAN 4
s/p pacemaker placement   - patient currently rate controlled  - admission EKG: Atrial-sensed v-paced @ 77 bpm  - hold home eliquis in setting of acute hgb drop   - hold home metoprolol as per cardio   - continue home amiodarone s/p pacemaker placement   - patient currently rate controlled  - admission EKG: Atrial-sensed v-paced @ 77 bpm  - hold home eliquis per gi/cards  - hold home metoprolol as per cardio   - continue home amiodarone

## 2022-06-02 NOTE — CHART NOTE - NSCHARTNOTEFT_GEN_A_CORE
Called by RN for Hgb of 6.7. Patient seen and examined at bedside. Patient laying in bed, sleeping, comfortable and in NAD. Physical exam grossly normal though trace edema b/l in legs. VS remain stable with soft BP. Patient with no complaints at this time and denies SOB, dizziness, or light headedness. S/P 2 units of PRBCs for Hg of 4.6 and now up to 6.7. Will administer additional PRBCs and Lasix 20mg IVP to keep euvolemic. Called by RN for Hgb of 6.7. Patient seen and examined at bedside. Patient laying in bed, sleeping, comfortable and in NAD. Physical exam grossly normal though trace edema b/l in legs. VS remain stable with soft BP. Patient with no complaints at this time and denies SOB, dizziness, or light headedness. S/P 2 units of PRBCs for Hg of 4.6 and now up to 6.7. Will administer additional PRBCs and Lasix 20mg IVP to keep euvolemic. Re-timed CBC for 09:00 and f/o. Will continue to monitor and nurse to call for any acute changes. Called by RN for Hgb of 6.7. Patient seen and examined at bedside. Patient laying in bed, sleeping, comfortable and in NAD. Physical exam grossly normal though trace edema b/l in legs. VS remain stable with soft BP. Patient with no complaints at this time and denies SOB, dizziness, or light headedness. S/P 2 units of PRBCs for Hg of 4.6 and now up to 6.7. Will administer additional PRBCs and will hold on diuresis at this time given BP. Re-timed CBC for 09:00 and day team to f/u. Will continue to monitor and nurse to call for any acute changes.

## 2022-06-02 NOTE — PROGRESS NOTE ADULT - PROBLEM SELECTOR PLAN 3
- hold home metformin  - insulin coverage scale   - am A1c   - hypoglycemia protocol - hold home metformin  - insulin coverage scale   - am A1c   - hypoglycemia protocol  - monitor FS

## 2022-06-02 NOTE — PROGRESS NOTE ADULT - ASSESSMENT
75 y/o F PMHx aortic stenosis (s/p bioprosthetic aortic valve replacement), complete heart block s/p pacemaker, afib, intermittent RBBB, hypertension, HLD, thyroid nodule, hypothyroidism, iron deficiency anemia, renal cell carcinoma s/p resection, recent admission for acute decompensated right heart failure, found to have severe tricuspid regurg, presents to ED w/ lightheadedness, weakness, SOB, being admitted for symptomatic anemia.    SOB/ HF/PAF/CHB s/p PPM/ HTN/HLD/ AS/TR  - Not complaining of any cardiac related symptoms at this time such as chest pain, palpitations or SOB.  - EKG Vpaced, NSR on tele, and no clear evidence of acute ischemia, troponin negative x2   - Patient has no significant CAD. Found to have nonobstructive CAD by cardiac cath 2016, has not had CABG or PCI.  - Euvolemic, hold torsemide at this time in the setting of severe anemia, volume depletion  - Strict I/Os, daily weights.  - Hgb improved 4.6 to 6.7, would recommend keeping Hgb at least >7, will need to be given Lasix intermittently between units of pRBCs to maintain euvolemia  - Pt has no active ischemia, decompensated heart failure, unstable arrythmia, or severe stenotic valvular disease. In the setting of low risk GI procedures such as EGD and Colonoscopy, she should be considered optimized from a cardiovascular standpoint to proceed with any planned GI procedure with routine hemodynamic monitoring. She has no modifiable cardiac risk factors at this time.  - Hold ASA and Eliquis 2/2 bleeding  - Continue home Amiodarone  - Last PPM interrogation appears to be in december of 2021, battery life was >5 years at that time. Will repeat interrogation during this admission.  - Patient's BP soft, can hold home metoprolol at this time, restart based on clinical course.  - Hold valsartan 2/2 ADAMS  - Continue statin  - She is known to have intact lv function w/ mild to moderate MR severe TR, malcoaptation of tricuspid leaflets, possible prosthesis mismatched Aortic valve, EF 60-65% on prior TIM in April.  - Her severe tricuspid regurgitation has been managed with torsemide and she has been planned for percutaneous tricuspid valve repair in the coming weeks however may have to be postponed. She will have to follow up with the structural heart team.  - Monitor and replete lytes, keep K>4, Mg>2.  - Will continue to follow.    Kelby Mckeon NP  Nurse Practitioner- Cardiology   [facility] #[phone]

## 2022-06-02 NOTE — PROGRESS NOTE ADULT - PROBLEM SELECTOR PLAN 2
Admission creatinine of 2.2, baseline appears to be ~1  - likely prerenal 2/2 to dehydration vs. acute blood loss   - s/p 1u PRBC, currently getting second unit  - trend renal indices   - avoid nephrotoxic medications, hold home torsemide likely prerenal 2/2 to dehydration vs. acute blood loss   - s/p 3 units prbcs  - cr improving 1.6 from 2.2 (baseline 1.0-1.3)  - monitor bmp   - avoid nephrotoxic medications, hold home torsemide

## 2022-06-02 NOTE — PROGRESS NOTE ADULT - PROBLEM SELECTOR PLAN 8
- hold home metoprolol and torsemide (due to marni)  - patient blood pressure well controlled  - routine hemodynamic monitoring bp low normal  -- hold home metoprolol and torsemide per cards  -  monitor bp

## 2022-06-02 NOTE — PROGRESS NOTE ADULT - ASSESSMENT
75 y/o female w/ PMHx of DM2, aortic stenosis s/p bioAVR, complete heart block s/p pacemaker, Chronic HFpEF, afib on eliquis, HTN, HLD, hypothyroidism, GABRIELA, renal cell carcinoma s/p resection presents s/p 2 mechanical falls earlier today, admitted for symptomatic anemia, r/o acute blood loss

## 2022-06-02 NOTE — CONSULT NOTE ADULT - ASSESSMENT
anemia  afib on a/c  heart block s/p PPM  marni    clear liquid diet  plan for EGD/colon in am  cardiology eval noted  ppm battery life 5 years  s/p prbc  d/w patient

## 2022-06-02 NOTE — PROGRESS NOTE ADULT - PROBLEM SELECTOR PLAN 1
suspect subacute bleeding over past 2 weeks as hgb was 11.5 on 5/16, per pt, hgb was 14 last friday  - Hgb 4 on admission, as per patient, outpatient bloodwork from last friday had hgb of 14   - s/p 1u of PRBCs in ED, currently receiving second unit  - hemodynamically stable   - f/u post-transfusion H/H, goal hgb >7, monitor volume status, diurese between units as needed  - hold eliquis and hold asa 81  - Blood transfusion consent signed and placed in chart  - Maintain active type and screen  - F/u iron panel: iron, ferritin, TIBC, transferrin  - F/u folate, vitamin B12, TSH  - repeat fobt  - GI Knowles consulted, f/u recs, keep patient NPO in preparation for possible endoscopy/colonoscopy suspect subacute GI bleeding as hgb was 11.5 on 5/16, per pt, hgb was 14 last friday per pt but didn't see on HIE, no back pain, abd pain  - reports dark stool at home but on iron, also some blood when wiping but on hematochezia   - no melena or hematochezia since admission  - s/p 3 units since admission with appropriate response hgb now 8.2 from 4.6  - trend cbc q8-q12 hours  - transfuse for hgb <7  - hemodynamically stable   - c/w IV PPI BID   - hold eliquis and hold asa 81 per cards/GI  - iron studies reviewed relatively normal   - discussed with GI, CLD diet today, NPO after Mn for EGD/colonoscopy gallito 6/3 suspect subacute GI bleeding as hgb was 11.5 on 5/16, per pt, hgb was 14 last friday per pt but didn't see on HIE, no back pain, abd pain  - reports dark stool at home but on iron, also some blood when wiping but on hematochezia   - no melena or hematochezia since admission  - s/p 3 units since admission with appropriate response hgb now 8.2 from 4.6  - trend cbc q8-q12 hours  - transfuse for hgb <7  - hemodynamically stable   - c/w IV PPI BID   - hold eliquis and hold asa 81 per cards/GI  - iron studies reviewed relatively normal   - discussed with GI, CLD diet today, NPO after Mn for EGD/colonoscopy gallito 6/3  -patient medically optimized to proceed with EGD as long as hgb and vitals remain stable, defer to cardiology for further optimization prior to EGD/colonoscopy

## 2022-06-02 NOTE — PROGRESS NOTE ADULT - PROBLEM SELECTOR PLAN 5
w/ severe tricuspid regurgitation   - Patient does not appear volume overloaded on exam   - hold home torsemide in the setting of ADAMS   - TTE 4/22/22: LVEF 60-65%, RV decreased systolic function, torrential tricuspid regurgitation w/ severe tricuspid regurgitation   - euvolemic on exam  - hold home torsemide in the setting of ADAMS, possible bleed   - TTE 4/22/22: LVEF 60-65%, RV decreased systolic function, torrential tricuspid regurgitation  - cards following  - outpatient structural heart followup

## 2022-06-02 NOTE — PROGRESS NOTE ADULT - PROBLEM SELECTOR PLAN 9
DVT ppx- SCDs   GI ppx- Pantoprazole 40mg injectable BID DVT ppx- SCDs     Dispo: pending egd/colonscopy gallito    discussed with GI

## 2022-06-03 ENCOUNTER — RESULT REVIEW (OUTPATIENT)
Age: 77
End: 2022-06-03

## 2022-06-03 LAB
ALBUMIN SERPL ELPH-MCNC: 2.8 G/DL — LOW (ref 3.3–5)
ALP SERPL-CCNC: 45 U/L — SIGNIFICANT CHANGE UP (ref 40–120)
ALT FLD-CCNC: 31 U/L — SIGNIFICANT CHANGE UP (ref 12–78)
ANION GAP SERPL CALC-SCNC: 5 MMOL/L — SIGNIFICANT CHANGE UP (ref 5–17)
ANION GAP SERPL CALC-SCNC: 9 MMOL/L — SIGNIFICANT CHANGE UP (ref 5–17)
AST SERPL-CCNC: 29 U/L — SIGNIFICANT CHANGE UP (ref 15–37)
BILIRUB DIRECT SERPL-MCNC: 0.5 MG/DL — HIGH (ref 0–0.3)
BILIRUB INDIRECT FLD-MCNC: 0.6 MG/DL — SIGNIFICANT CHANGE UP (ref 0.2–1)
BILIRUB SERPL-MCNC: 1.1 MG/DL — SIGNIFICANT CHANGE UP (ref 0.2–1.2)
BILIRUB SERPL-MCNC: 1.1 MG/DL — SIGNIFICANT CHANGE UP (ref 0.2–1.2)
BUN SERPL-MCNC: 31 MG/DL — HIGH (ref 7–23)
BUN SERPL-MCNC: 38 MG/DL — HIGH (ref 7–23)
CALCIUM SERPL-MCNC: 9.1 MG/DL — SIGNIFICANT CHANGE UP (ref 8.5–10.1)
CALCIUM SERPL-MCNC: 9.4 MG/DL — SIGNIFICANT CHANGE UP (ref 8.5–10.1)
CHLORIDE SERPL-SCNC: 112 MMOL/L — HIGH (ref 96–108)
CHLORIDE SERPL-SCNC: 114 MMOL/L — HIGH (ref 96–108)
CO2 SERPL-SCNC: 24 MMOL/L — SIGNIFICANT CHANGE UP (ref 22–31)
CO2 SERPL-SCNC: 27 MMOL/L — SIGNIFICANT CHANGE UP (ref 22–31)
CREAT SERPL-MCNC: 1.1 MG/DL — SIGNIFICANT CHANGE UP (ref 0.5–1.3)
CREAT SERPL-MCNC: 1.1 MG/DL — SIGNIFICANT CHANGE UP (ref 0.5–1.3)
EGFR: 52 ML/MIN/1.73M2 — LOW
EGFR: 52 ML/MIN/1.73M2 — LOW
GLUCOSE SERPL-MCNC: 112 MG/DL — HIGH (ref 70–99)
GLUCOSE SERPL-MCNC: 119 MG/DL — HIGH (ref 70–99)
HAPTOGLOB SERPL-MCNC: 140 MG/DL — SIGNIFICANT CHANGE UP (ref 34–200)
HCT VFR BLD CALC: 22.4 % — LOW (ref 34.5–45)
HCT VFR BLD CALC: 25.2 % — LOW (ref 34.5–45)
HGB BLD-MCNC: 7.5 G/DL — LOW (ref 11.5–15.5)
HGB BLD-MCNC: 8.3 G/DL — LOW (ref 11.5–15.5)
LDH SERPL L TO P-CCNC: 197 U/L — SIGNIFICANT CHANGE UP (ref 50–242)
MCHC RBC-ENTMCNC: 30.1 PG — SIGNIFICANT CHANGE UP (ref 27–34)
MCHC RBC-ENTMCNC: 30.4 PG — SIGNIFICANT CHANGE UP (ref 27–34)
MCHC RBC-ENTMCNC: 32.9 GM/DL — SIGNIFICANT CHANGE UP (ref 32–36)
MCHC RBC-ENTMCNC: 33.5 GM/DL — SIGNIFICANT CHANGE UP (ref 32–36)
MCV RBC AUTO: 90.7 FL — SIGNIFICANT CHANGE UP (ref 80–100)
MCV RBC AUTO: 91.3 FL — SIGNIFICANT CHANGE UP (ref 80–100)
NRBC # BLD: 0 /100 WBCS — SIGNIFICANT CHANGE UP (ref 0–0)
NRBC # BLD: 2 /100 WBCS — HIGH (ref 0–0)
PLATELET # BLD AUTO: 226 K/UL — SIGNIFICANT CHANGE UP (ref 150–400)
PLATELET # BLD AUTO: 240 K/UL — SIGNIFICANT CHANGE UP (ref 150–400)
POTASSIUM SERPL-MCNC: 3.2 MMOL/L — LOW (ref 3.5–5.3)
POTASSIUM SERPL-MCNC: 4.1 MMOL/L — SIGNIFICANT CHANGE UP (ref 3.5–5.3)
POTASSIUM SERPL-SCNC: 3.2 MMOL/L — LOW (ref 3.5–5.3)
POTASSIUM SERPL-SCNC: 4.1 MMOL/L — SIGNIFICANT CHANGE UP (ref 3.5–5.3)
PROT SERPL-MCNC: 5.5 G/DL — LOW (ref 6–8.3)
RBC # BLD: 2.47 M/UL — LOW (ref 3.8–5.2)
RBC # BLD: 2.76 M/UL — LOW (ref 3.8–5.2)
RBC # FLD: 17.2 % — HIGH (ref 10.3–14.5)
RBC # FLD: 17.4 % — HIGH (ref 10.3–14.5)
SODIUM SERPL-SCNC: 144 MMOL/L — SIGNIFICANT CHANGE UP (ref 135–145)
SODIUM SERPL-SCNC: 147 MMOL/L — HIGH (ref 135–145)
WBC # BLD: 6.83 K/UL — SIGNIFICANT CHANGE UP (ref 3.8–10.5)
WBC # BLD: 7.05 K/UL — SIGNIFICANT CHANGE UP (ref 3.8–10.5)
WBC # FLD AUTO: 6.83 K/UL — SIGNIFICANT CHANGE UP (ref 3.8–10.5)
WBC # FLD AUTO: 7.05 K/UL — SIGNIFICANT CHANGE UP (ref 3.8–10.5)

## 2022-06-03 PROCEDURE — 88313 SPECIAL STAINS GROUP 2: CPT | Mod: 26

## 2022-06-03 PROCEDURE — 99233 SBSQ HOSP IP/OBS HIGH 50: CPT

## 2022-06-03 PROCEDURE — 88312 SPECIAL STAINS GROUP 1: CPT | Mod: 26

## 2022-06-03 PROCEDURE — 74176 CT ABD & PELVIS W/O CONTRAST: CPT | Mod: 26

## 2022-06-03 PROCEDURE — 99232 SBSQ HOSP IP/OBS MODERATE 35: CPT

## 2022-06-03 PROCEDURE — 88305 TISSUE EXAM BY PATHOLOGIST: CPT | Mod: 26

## 2022-06-03 DEVICE — ESOPHAGEAL BALLOON CATH CRE FIXED WIRE 8-9-10MM: Type: IMPLANTABLE DEVICE | Status: FUNCTIONAL

## 2022-06-03 DEVICE — ESOPHAGEAL BALLOON CATH CRE FIXED WIRE 10-11-12MM: Type: IMPLANTABLE DEVICE | Status: FUNCTIONAL

## 2022-06-03 DEVICE — HEMOSPRAY HEMOSTAT ENDO 7F: Type: IMPLANTABLE DEVICE | Status: FUNCTIONAL

## 2022-06-03 DEVICE — ESOPHAGEAL BALLOON CATH CRE FIXED WIRE 12-13.5-15MM: Type: IMPLANTABLE DEVICE | Status: FUNCTIONAL

## 2022-06-03 DEVICE — ESOPHAGEAL BALLOON CATH CRE FIXED WIRE 6-7-8MM: Type: IMPLANTABLE DEVICE | Status: FUNCTIONAL

## 2022-06-03 DEVICE — ESOPHAGEAL BALLOON CATH CRE FIXED WIRE 15-16.5-18MM: Type: IMPLANTABLE DEVICE | Status: FUNCTIONAL

## 2022-06-03 DEVICE — CLIP RESOLUTION 360 235CM: Type: IMPLANTABLE DEVICE | Status: FUNCTIONAL

## 2022-06-03 RX ORDER — APIXABAN 2.5 MG/1
5 TABLET, FILM COATED ORAL EVERY 12 HOURS
Refills: 0 | Status: DISCONTINUED | OUTPATIENT
Start: 2022-06-03 | End: 2022-06-07

## 2022-06-03 RX ORDER — POTASSIUM CHLORIDE 20 MEQ
10 PACKET (EA) ORAL
Refills: 0 | Status: DISCONTINUED | OUTPATIENT
Start: 2022-06-03 | End: 2022-06-05

## 2022-06-03 RX ORDER — METOPROLOL TARTRATE 50 MG
50 TABLET ORAL DAILY
Refills: 0 | Status: DISCONTINUED | OUTPATIENT
Start: 2022-06-03 | End: 2022-06-07

## 2022-06-03 RX ADMIN — Medication 100 MILLIEQUIVALENT(S): at 20:58

## 2022-06-03 RX ADMIN — PANTOPRAZOLE SODIUM 40 MILLIGRAM(S): 20 TABLET, DELAYED RELEASE ORAL at 00:13

## 2022-06-03 RX ADMIN — Medication 10 MILLIEQUIVALENT(S): at 17:12

## 2022-06-03 RX ADMIN — Medication 100 MILLIEQUIVALENT(S): at 17:55

## 2022-06-03 RX ADMIN — Medication 50 MILLIGRAM(S): at 21:30

## 2022-06-03 RX ADMIN — Medication 112 MICROGRAM(S): at 06:46

## 2022-06-03 RX ADMIN — Medication 325 MILLIGRAM(S): at 17:12

## 2022-06-03 RX ADMIN — SIMVASTATIN 20 MILLIGRAM(S): 20 TABLET, FILM COATED ORAL at 21:29

## 2022-06-03 RX ADMIN — PANTOPRAZOLE SODIUM 40 MILLIGRAM(S): 20 TABLET, DELAYED RELEASE ORAL at 17:11

## 2022-06-03 RX ADMIN — AMIODARONE HYDROCHLORIDE 200 MILLIGRAM(S): 400 TABLET ORAL at 06:44

## 2022-06-03 RX ADMIN — APIXABAN 5 MILLIGRAM(S): 2.5 TABLET, FILM COATED ORAL at 17:54

## 2022-06-03 NOTE — PROGRESS NOTE ADULT - PROBLEM SELECTOR PLAN 1
suspect subacute GI bleeding as hgb was 11.5 on 5/16 and per pt hgb was 14 last friday, less likely RP bleed vs hemolysis  - reports dark stool at home but on iron, also some blood when wiping but on hematochezia   - s/p EGD and colonoscopy today, discussed with Dr North no evidence of bleeding, rec anemia workup as below  - GI rec hemolytic workup, tbili wnl, LDH and hapto pending, (of note has valve replacement)  - CT abd/pelvis to r/o RP bleed,   - outpatient colonoscopy for screening purposes as prep wasn't optimized  - no melena or hematochezia since admission  - s/p 3 units since admission with appropriate response hgb 8s from 4  - trend cbc daily  - transfuse for hgb <7  - hemodynamically stable   - change ppi to once daily po  - f/u GI/cards if can restart eliquis and or asa 81   - iron studies reviewed relatively normal

## 2022-06-03 NOTE — PROGRESS NOTE ADULT - ASSESSMENT
75 y/o F PMHx aortic stenosis (s/p bioprosthetic aortic valve replacement), complete heart block s/p pacemaker, afib, intermittent RBBB, hypertension, HLD, thyroid nodule, hypothyroidism, iron deficiency anemia, renal cell carcinoma s/p resection, recent admission for acute decompensated right heart failure, found to have severe tricuspid regurg, presents to ED w/ lightheadedness, weakness, SOB, being admitted for symptomatic anemia.    SOB/ HF/PAF/CHB s/p PPM/ HTN/HLD/ AS/TR  - EKG Vpaced, NSR on tele, and no clear evidence of acute ischemia, troponin negative x2   - Euvolemic, hold torsemide at this time in the setting of severe anemia, volume depletion  - Strict I/Os, daily weights.  - Hgb improved 4.6 to 6.7, would recommend keeping Hgb >8 given CAD will need to be given Lasix intermittently between units of pRBCs to maintain euvolemia  - Pt has no active ischemia, decompensated heart failure, unstable arrythmia, or severe stenotic valvular disease. In the setting of low risk GI procedures such as EGD and Colonoscopy, she should be considered optimized from a cardiovascular standpoint to proceed with any planned GI procedure with routine hemodynamic monitoring. She has no modifiable cardiac risk factors at this time.  - Hold ASA and Eliquis 2/2 bleeding  - Continue home Amiodarone  - Last PPM interrogation appears to be in december of 2021, battery life was >5 years at that time. Will repeat interrogation during this admission.  - resume home bb with hold parameters .  - Hold valsartan 2/2 ADAMS  - Continue statin  - She is known to have intact lv function w/ mild to moderate MR severe TR, malcoaptation of tricuspid leaflets, possible prosthesis mismatched Aortic valve, EF 60-65% on prior TIM in April.  - Her severe tricuspid regurgitation has been managed with torsemide and she has been planned for percutaneous tricuspid valve repair in the coming weeks however may have to be postponed. she has appt scheduled with Dr Bergman and Dr gant .  - Monitor and replete lytes, keep K>4, Mg>2.  Celena Serrano FNP-C  Cardiology NP  [id number] [phone]

## 2022-06-03 NOTE — PROGRESS NOTE ADULT - PROBLEM SELECTOR PLAN 4
s/p pacemaker placement   - f/u GI and cards if can restart home eliquis   - restart home toprol 50mg ER daily with holding parameters per cardio   - continue home amiodarone

## 2022-06-03 NOTE — PROGRESS NOTE ADULT - PROBLEM SELECTOR PLAN 2
likely prerenal 2/2 to hypovolemia due to anemia  - resolved, cr back to baseline of 1.1 (from 2.2 on admit)  - s/p 3 units prbcs  - monitor bmp   - avoid nephrotoxic medications, hold home torsemide

## 2022-06-03 NOTE — PROGRESS NOTE ADULT - PROBLEM SELECTOR PLAN 5
w/ severe tricuspid regurgitation   - euvolemic on exam  - hold home torsemide in the setting of ADAMS,   - TTE 4/22/22: LVEF 60-65%, RV decreased systolic function, torrential tricuspid regurgitation  - cards following  - outpatient structural heart followup

## 2022-06-03 NOTE — CHART NOTE - NSCHARTNOTEFT_GEN_A_CORE
hgb stable in 8s  tbili not consistent with hemolysis, ldh and hapto pending  CT negative for RP bleed  discussed with GI: no objection to restart a/c and antiplatelet     -will start eliquis 5mg bid and observe for bleeding and hgb  -if remains stable on eliquis and consider restarting home asa in next 1-2 days  -informed patient

## 2022-06-04 LAB
ALBUMIN SERPL ELPH-MCNC: 2.5 G/DL — LOW (ref 3.3–5)
ALP SERPL-CCNC: 49 U/L — SIGNIFICANT CHANGE UP (ref 40–120)
ALT FLD-CCNC: 32 U/L — SIGNIFICANT CHANGE UP (ref 12–78)
ANION GAP SERPL CALC-SCNC: 4 MMOL/L — LOW (ref 5–17)
AST SERPL-CCNC: 28 U/L — SIGNIFICANT CHANGE UP (ref 15–37)
BASOPHILS # BLD AUTO: 0.03 K/UL — SIGNIFICANT CHANGE UP (ref 0–0.2)
BASOPHILS NFR BLD AUTO: 0.5 % — SIGNIFICANT CHANGE UP (ref 0–2)
BILIRUB SERPL-MCNC: 1 MG/DL — SIGNIFICANT CHANGE UP (ref 0.2–1.2)
BUN SERPL-MCNC: 21 MG/DL — SIGNIFICANT CHANGE UP (ref 7–23)
CALCIUM SERPL-MCNC: 9.1 MG/DL — SIGNIFICANT CHANGE UP (ref 8.5–10.1)
CHLORIDE SERPL-SCNC: 111 MMOL/L — HIGH (ref 96–108)
CO2 SERPL-SCNC: 28 MMOL/L — SIGNIFICANT CHANGE UP (ref 22–31)
CREAT SERPL-MCNC: 1 MG/DL — SIGNIFICANT CHANGE UP (ref 0.5–1.3)
EGFR: 59 ML/MIN/1.73M2 — LOW
EOSINOPHIL # BLD AUTO: 0.21 K/UL — SIGNIFICANT CHANGE UP (ref 0–0.5)
EOSINOPHIL NFR BLD AUTO: 3.5 % — SIGNIFICANT CHANGE UP (ref 0–6)
GLUCOSE SERPL-MCNC: 129 MG/DL — HIGH (ref 70–99)
HCT VFR BLD CALC: 24.7 % — LOW (ref 34.5–45)
HCT VFR BLD CALC: 26.6 % — LOW (ref 34.5–45)
HGB BLD-MCNC: 7.9 G/DL — LOW (ref 11.5–15.5)
HGB BLD-MCNC: 8.6 G/DL — LOW (ref 11.5–15.5)
IMM GRANULOCYTES NFR BLD AUTO: 1 % — SIGNIFICANT CHANGE UP (ref 0–1.5)
LYMPHOCYTES # BLD AUTO: 1.01 K/UL — SIGNIFICANT CHANGE UP (ref 1–3.3)
LYMPHOCYTES # BLD AUTO: 16.9 % — SIGNIFICANT CHANGE UP (ref 13–44)
MCHC RBC-ENTMCNC: 29.6 PG — SIGNIFICANT CHANGE UP (ref 27–34)
MCHC RBC-ENTMCNC: 30 PG — SIGNIFICANT CHANGE UP (ref 27–34)
MCHC RBC-ENTMCNC: 32 GM/DL — SIGNIFICANT CHANGE UP (ref 32–36)
MCHC RBC-ENTMCNC: 32.3 GM/DL — SIGNIFICANT CHANGE UP (ref 32–36)
MCV RBC AUTO: 92.5 FL — SIGNIFICANT CHANGE UP (ref 80–100)
MCV RBC AUTO: 92.7 FL — SIGNIFICANT CHANGE UP (ref 80–100)
MONOCYTES # BLD AUTO: 0.66 K/UL — SIGNIFICANT CHANGE UP (ref 0–0.9)
MONOCYTES NFR BLD AUTO: 11 % — SIGNIFICANT CHANGE UP (ref 2–14)
NEUTROPHILS # BLD AUTO: 4.01 K/UL — SIGNIFICANT CHANGE UP (ref 1.8–7.4)
NEUTROPHILS NFR BLD AUTO: 67.1 % — SIGNIFICANT CHANGE UP (ref 43–77)
NRBC # BLD: 0 /100 WBCS — SIGNIFICANT CHANGE UP (ref 0–0)
NRBC # BLD: 0 /100 WBCS — SIGNIFICANT CHANGE UP (ref 0–0)
PLATELET # BLD AUTO: 231 K/UL — SIGNIFICANT CHANGE UP (ref 150–400)
PLATELET # BLD AUTO: 250 K/UL — SIGNIFICANT CHANGE UP (ref 150–400)
POTASSIUM SERPL-MCNC: 4 MMOL/L — SIGNIFICANT CHANGE UP (ref 3.5–5.3)
POTASSIUM SERPL-SCNC: 4 MMOL/L — SIGNIFICANT CHANGE UP (ref 3.5–5.3)
PROT SERPL-MCNC: 5.1 G/DL — LOW (ref 6–8.3)
RBC # BLD: 2.67 M/UL — LOW (ref 3.8–5.2)
RBC # BLD: 2.87 M/UL — LOW (ref 3.8–5.2)
RBC # FLD: 17.8 % — HIGH (ref 10.3–14.5)
RBC # FLD: 18 % — HIGH (ref 10.3–14.5)
SODIUM SERPL-SCNC: 143 MMOL/L — SIGNIFICANT CHANGE UP (ref 135–145)
WBC # BLD: 5.98 K/UL — SIGNIFICANT CHANGE UP (ref 3.8–10.5)
WBC # BLD: 6.64 K/UL — SIGNIFICANT CHANGE UP (ref 3.8–10.5)
WBC # FLD AUTO: 5.98 K/UL — SIGNIFICANT CHANGE UP (ref 3.8–10.5)
WBC # FLD AUTO: 6.64 K/UL — SIGNIFICANT CHANGE UP (ref 3.8–10.5)

## 2022-06-04 PROCEDURE — 99232 SBSQ HOSP IP/OBS MODERATE 35: CPT

## 2022-06-04 PROCEDURE — 99233 SBSQ HOSP IP/OBS HIGH 50: CPT

## 2022-06-04 RX ORDER — INSULIN LISPRO 100/ML
VIAL (ML) SUBCUTANEOUS
Refills: 0 | Status: DISCONTINUED | OUTPATIENT
Start: 2022-06-04 | End: 2022-06-07

## 2022-06-04 RX ORDER — INSULIN LISPRO 100/ML
VIAL (ML) SUBCUTANEOUS AT BEDTIME
Refills: 0 | Status: DISCONTINUED | OUTPATIENT
Start: 2022-06-04 | End: 2022-06-07

## 2022-06-04 RX ADMIN — PANTOPRAZOLE SODIUM 40 MILLIGRAM(S): 20 TABLET, DELAYED RELEASE ORAL at 05:57

## 2022-06-04 RX ADMIN — Medication 10 MILLIEQUIVALENT(S): at 12:25

## 2022-06-04 RX ADMIN — Medication 325 MILLIGRAM(S): at 12:26

## 2022-06-04 RX ADMIN — PANTOPRAZOLE SODIUM 40 MILLIGRAM(S): 20 TABLET, DELAYED RELEASE ORAL at 23:25

## 2022-06-04 RX ADMIN — PANTOPRAZOLE SODIUM 40 MILLIGRAM(S): 20 TABLET, DELAYED RELEASE ORAL at 12:26

## 2022-06-04 RX ADMIN — Medication 50 MILLIGRAM(S): at 05:52

## 2022-06-04 RX ADMIN — APIXABAN 5 MILLIGRAM(S): 2.5 TABLET, FILM COATED ORAL at 17:17

## 2022-06-04 RX ADMIN — AMIODARONE HYDROCHLORIDE 200 MILLIGRAM(S): 400 TABLET ORAL at 05:51

## 2022-06-04 RX ADMIN — Medication 112 MICROGRAM(S): at 05:52

## 2022-06-04 RX ADMIN — APIXABAN 5 MILLIGRAM(S): 2.5 TABLET, FILM COATED ORAL at 05:52

## 2022-06-04 RX ADMIN — SIMVASTATIN 20 MILLIGRAM(S): 20 TABLET, FILM COATED ORAL at 22:02

## 2022-06-04 NOTE — PROGRESS NOTE ADULT - ASSESSMENT
anemia  afib on a/c  heart block s/p PPM  marni          reg diet  hemolysis workup  ct a/p r/o rp bleed  outpatient colonoscopy for screening purposes  d/w patient       Advanced care planning was discussed with patient and family.  Advanced care planning forms were reviewed and discussed.  Risks, benefits and alternatives of gastroenterologic procedures were discussed in detail and all questions were answered.    30 minutes spent.

## 2022-06-04 NOTE — PROGRESS NOTE ADULT - PROBLEM SELECTOR PLAN 4
s/p pacemaker placement   - restart home toprol 50mg ER daily with holding parameters per cardio   - continue home amiodarone  - Restarted Eliquis but will need to monitor HGB closely

## 2022-06-04 NOTE — CONSULT NOTE ADULT - ASSESSMENT
75 yo woman with extensive cardiac history including aortic valve replacement in 2015, tricuspid valve abnormality, heart block requiring pacemaker, chronic HFpEF, A-fib on Eliquis with recent admission in 5/2022 to [facility] for CHF exacerbation and aggressive diuresis which resulted in renal insufficiency, noted on 6/1/22 to have recurrent falls with dizziness and vomiting and found to have Hg 4.6    - no lab criteria for hemolysis  - iron studies cannot be adequately interpreted as likely drawn after blood transfusion (which explains high iron saturation and serum iron); low ferritin and high TIBC more consistent with iron deficiency and could be indicative of acute GI blood loss despite negative stool guaiac  - would consider nuclear bleeding scan if Hg does not stabilize  - Eliquis restarted; would monitor H/H q 12 hours to monitor for drop in Hg  - transfuse to maintain Hg >7 g/dL or as clinically indicated  - discussed with hospitalist (Dr. Marti); will follow with you

## 2022-06-04 NOTE — PROGRESS NOTE ADULT - ASSESSMENT
77 y/o female w/ PMHx of DM2, aortic stenosis s/p bioAVR, complete heart block s/p pacemaker, Chronic HFpEF, afib on eliquis, HTN, HLD, hypothyroidism, GABRIELA, renal cell carcinoma s/p resection presents s/p 2 mechanical falls earlier today, admitted for symptomatic anemia, r/o acute blood loss     HF, PAF, CHB s/p PPM, HTN, HLD,  AS, TR  - EKG V paced, no clear evidence of acute ischemia, troponin negative x2     - S/p EGD mild gastritis, Colonoscopy, poor prep, scattered diverticulosis   - S/p PRBC, Hgb improved, would recommend keeping Hgb >8 given CAD will need to be given Lasix intermittently between units of pRBCs to maintain euvolemia  - Holding ASA     - She is known to have intact LV function w/ mild to moderate MR severe TR, malcoaptation of tricuspid leaflets, possible prosthesis mismatched Aortic valve, EF 60-65% on prior TIM in April.  - Her severe tricuspid regurgitation has been managed with torsemide and she has been planned for percutaneous tricuspid valve repair in the coming weeks however may have to be postponed. she has appt scheduled with Dr Bergman and Dr gant.  - No evidence of any meaningful volume overload   - Holding torsemide at this time in the setting of severe anemia, volume depletion  - Strict I/Os, daily weights.    - Rate controlled A fib 60-80s per flow sheet   - Continue Eliquis 2/2 bleeding  - Continue Amiodarone and Toprol 50   - Last PPM interrogation appears to be in december of 2021, battery life was >5 years at that time. Will repeat interrogation during this admission.    - Holding valsartan 2/2 ADAMS. Creatinine improved. Would resume   - BP 90-150s     - Continue statin    - Monitor and replete lytes, keep K>4, Mg>2.  - Will continue to follow.    Jordan Cee, MS FNP, [facility]  Nurse Practitioner- Cardiology   Spectra #[phone] /[phone]

## 2022-06-04 NOTE — PROGRESS NOTE ADULT - PROBLEM SELECTOR PLAN 3
- hold home metformin  - insulin coverage scale   -  A1c 6.8 in april. May be incorrect if repeated now due to transfusions    - hypoglycemia protocol  - monitor FS

## 2022-06-04 NOTE — CONSULT NOTE ADULT - SUBJECTIVE AND OBJECTIVE BOX
Patient is a 76y old  Female who presents with a chief complaint of Anemia r/o bleed, ADAMS (04 Jun 2022 10:24)      HPI:   75 y/o female w/ PMHx of DM2, aortic stenosis s/p bioAVR prior to which in 2015 was seen by Dr. Annabel Wasserman for anemia that was attributed to valve associated hemolysis, also with iron deficiency anemia in past requiring IV iron, complete heart block s/p pacemaker, Chronic HFpEF, afib on eliquis, HTN, HLD, hypothyroidism, renal cell carcinoma s/p resection many years ago, presented to [facility] on 6/1/22 after having two falls and one episode of vomiting.  Patient was recently admitted at [facility] for chronic diastolic heart failure exacerbation.   She had aggressive diuresis and discharged but was readmitted a few days later with an ADAMS 2/2 to overdiuresis. She was discharged on 5/14/22. Two days after that discharge patient states she began to feel SOB when she would exert herself but also began to develop dizziness and weakness, however patient desired to just follow up with PCP. However on 6/1/22 she felt extremely nauseous and fell forward onto her left hand. Patient again denies any headstrike or loss of consciousness. Patient also had one episode of non-bloody vomiting on 6/1/22 and came to the ER where she was noted to have a dramatic drop in her Hg level from baseline of 11-13 range to 4.6g/dL; she responded well to transfusion with increase in Hg to 8. Stool Guaiac was negative.    She was transfused pRBC with clinical improvement       ROS:  Negative except for: dizziness, falls, dyspnea, all of which resolved after transfusion    PAST MEDICAL & SURGICAL HISTORY:  Hypertension x10 years  Dyslipidemia  Hiatal hernia  Type II diabetes mellitus  Aortic stenosis  Cancer of kidney, left  Right bundle branch block (RBBB)  Anemia  Hypothyroidism  Status post unilateral salpingo-oophorectomy  S/P T&amp;A (status post tonsillectomy and adenoidectomy)  H/O hand surgery  H/O partial nephrectomy right 2015  H/O aortic valve replacement  Elective surgery PPM  2016          SOCIAL HISTORY:  Denies tobacco or ETOH use  Lives at home    FAMILY HISTORY:  FH: renal cell carcinoma (Child)        MEDICATIONS  (STANDING):  aMIOdarone    Tablet 200 milliGRAM(s) Oral daily  apixaban 5 milliGRAM(s) Oral every 12 hours  dextrose 5%. 1000 milliLiter(s) (100 mL/Hr) IV Continuous <Continuous>  ferrous    sulfate 325 milliGRAM(s) Oral daily  glucagon  Injectable 1 milliGRAM(s) IntraMuscular once  insulin lispro (ADMELOG) corrective regimen sliding scale   SubCutaneous every 6 hours  insulin lispro (ADMELOG) corrective regimen sliding scale   SubCutaneous at bedtime  levothyroxine 112 MICROGram(s) Oral daily  metoprolol succinate ER 50 milliGRAM(s) Oral daily  pantoprazole  Injectable 40 milliGRAM(s) IV Push every 12 hours  potassium chloride    Tablet ER 10 milliEquivalent(s) Oral daily  potassium chloride  10 mEq/100 mL IVPB 10 milliEquivalent(s) IV Intermittent every 1 hour  simvastatin 20 milliGRAM(s) Oral at bedtime    MEDICATIONS  (PRN):  acetaminophen     Tablet .. 650 milliGRAM(s) Oral every 6 hours PRN Temp greater or equal to 38C (100.4F), Mild Pain (1 - 3)  dextrose Oral Gel 15 Gram(s) Oral once PRN Blood Glucose LESS THAN 70 milliGRAM(s)/deciliter  ondansetron Injectable 4 milliGRAM(s) IV Push every 8 hours PRN Nausea and/or Vomiting      Allergies    sulfa drugs (Short breath)    Intolerances        Vital Signs Last 24 Hrs  T(C): 36.9 (04 Jun 2022 04:35), Max: 37.1 (03 Jun 2022 21:20)  T(F): 98.5 (04 Jun 2022 04:35), Max: 98.8 (03 Jun 2022 21:20)  HR: 66 (04 Jun 2022 04:35) (66 - 85)  BP: 97/58 (04 Jun 2022 04:35) (97/58 - 152/67)  BP(mean): --  RR: 18 (04 Jun 2022 04:35) (16 - 18)  SpO2: 94% (04 Jun 2022 04:35) (94% - 100%)    PHYSICAL EXAM  General: adult in NAD  HEENT: clear oropharynx, anicteric sclera, pink conjunctivae  Neck: supple  CV: normal S1S2 with no murmur rubs or gallops  Lungs: clear to auscultation, no wheezes, no rhales  Abdomen: soft non-tender non-distended, no hepato/splenomegaly  Ext: no clubbing cyanosis or edema  Skin: no rashes and no petichiae  Neuro: alert and oriented X3 no focal deficits      LABS:    CBC Full  -  ( 04 Jun 2022 07:43 )  WBC Count : 5.98 K/uL  RBC Count : 2.67 M/uL  Hemoglobin : 7.9 g/dL  Hematocrit : 24.7 %  Platelet Count - Automated : 231 K/uL  Mean Cell Volume : 92.5 fl  Mean Cell Hemoglobin : 29.6 pg  Mean Cell Hemoglobin Concentration : 32.0 gm/dL  Auto Neutrophil # : 4.01 K/uL  Auto Lymphocyte # : 1.01 K/uL  Auto Monocyte # : 0.66 K/uL  Auto Eosinophil # : 0.21 K/uL  Auto Basophil # : 0.03 K/uL  Auto Neutrophil % : 67.1 %  Auto Lymphocyte % : 16.9 %  Auto Monocyte % : 11.0 %  Auto Eosinophil % : 3.5 %  Auto Basophil % : 0.5 %    Hemoglobin: 7.9 g/dL (06-04 @ 07:43)  Hemoglobin: 8.3 g/dL (06-03 @ 13:49)  Hemoglobin: 7.5 g/dL (06-03 @ 08:26)  Hemoglobin: 8.5 g/dL (06-02 @ 18:41)  Hemoglobin: 8.2 g/dL (06-02 @ 10:16)  Hemoglobin at admission on 6/1/22 4.6 g/dL    06-04    143  |  111<H>  |  21  ----------------------------<  129<H>  4.0   |  28  |  1.00    Ca    9.1      04 Jun 2022 07:43    TPro  5.1<L>  /  Alb  2.5<L>  /  TBili  1.0  /  DBili  x   /  AST  28  /  ALT  32  /  AlkPhos  49  06-04    iron 278, TIBC 406, iron sat 68%  Ferritin 29  B12 420  Folate 15.2      haptoglobin 140  indirect bili 0.6

## 2022-06-04 NOTE — PROGRESS NOTE ADULT - PROBLEM SELECTOR PLAN 1
suspect subacute GI bleeding as hgb was 11.5 on 5/16 and per pt hgb was 14 last friday, less likely RP bleed vs hemolysis  - reports dark stool at home but on iron, also some blood when wiping but on hematochezia   - s/p EGD and colonoscopy, suboptimal but not revealing source of bleed  - CT abd/pelvis to r/o RP bleed, unremarkable  - outpatient colonoscopy for screening purposes as prep wasn't optimized  - no melena or hematochezia since admission  - s/p 3 units since admission with appropriate response hgb 8s from 4  - trend cbc twice daily for now  - transfuse for hgb <7  - Hematology input appreciated   - May need NM bleeding scan if workup unrevealing

## 2022-06-05 LAB
ANION GAP SERPL CALC-SCNC: 5 MMOL/L — SIGNIFICANT CHANGE UP (ref 5–17)
BASOPHILS # BLD AUTO: 0.02 K/UL — SIGNIFICANT CHANGE UP (ref 0–0.2)
BASOPHILS NFR BLD AUTO: 0.4 % — SIGNIFICANT CHANGE UP (ref 0–2)
BUN SERPL-MCNC: 15 MG/DL — SIGNIFICANT CHANGE UP (ref 7–23)
CALCIUM SERPL-MCNC: 8.7 MG/DL — SIGNIFICANT CHANGE UP (ref 8.5–10.1)
CHLORIDE SERPL-SCNC: 112 MMOL/L — HIGH (ref 96–108)
CO2 SERPL-SCNC: 25 MMOL/L — SIGNIFICANT CHANGE UP (ref 22–31)
CREAT SERPL-MCNC: 0.94 MG/DL — SIGNIFICANT CHANGE UP (ref 0.5–1.3)
EGFR: 63 ML/MIN/1.73M2 — SIGNIFICANT CHANGE UP
EOSINOPHIL # BLD AUTO: 0.22 K/UL — SIGNIFICANT CHANGE UP (ref 0–0.5)
EOSINOPHIL NFR BLD AUTO: 4.1 % — SIGNIFICANT CHANGE UP (ref 0–6)
GLUCOSE SERPL-MCNC: 130 MG/DL — HIGH (ref 70–99)
HCT VFR BLD CALC: 23.6 % — LOW (ref 34.5–45)
HCT VFR BLD CALC: 27.5 % — LOW (ref 34.5–45)
HGB BLD-MCNC: 7.6 G/DL — LOW (ref 11.5–15.5)
HGB BLD-MCNC: 8.8 G/DL — LOW (ref 11.5–15.5)
IMM GRANULOCYTES NFR BLD AUTO: 0.6 % — SIGNIFICANT CHANGE UP (ref 0–1.5)
LYMPHOCYTES # BLD AUTO: 1.07 K/UL — SIGNIFICANT CHANGE UP (ref 1–3.3)
LYMPHOCYTES # BLD AUTO: 20 % — SIGNIFICANT CHANGE UP (ref 13–44)
MCHC RBC-ENTMCNC: 29.9 PG — SIGNIFICANT CHANGE UP (ref 27–34)
MCHC RBC-ENTMCNC: 32.2 GM/DL — SIGNIFICANT CHANGE UP (ref 32–36)
MCV RBC AUTO: 92.9 FL — SIGNIFICANT CHANGE UP (ref 80–100)
MONOCYTES # BLD AUTO: 0.68 K/UL — SIGNIFICANT CHANGE UP (ref 0–0.9)
MONOCYTES NFR BLD AUTO: 12.7 % — SIGNIFICANT CHANGE UP (ref 2–14)
NEUTROPHILS # BLD AUTO: 3.33 K/UL — SIGNIFICANT CHANGE UP (ref 1.8–7.4)
NEUTROPHILS NFR BLD AUTO: 62.2 % — SIGNIFICANT CHANGE UP (ref 43–77)
NRBC # BLD: 0 /100 WBCS — SIGNIFICANT CHANGE UP (ref 0–0)
PLATELET # BLD AUTO: 209 K/UL — SIGNIFICANT CHANGE UP (ref 150–400)
POTASSIUM SERPL-MCNC: 4 MMOL/L — SIGNIFICANT CHANGE UP (ref 3.5–5.3)
POTASSIUM SERPL-SCNC: 4 MMOL/L — SIGNIFICANT CHANGE UP (ref 3.5–5.3)
RBC # BLD: 2.54 M/UL — LOW (ref 3.8–5.2)
RBC # FLD: 17.9 % — HIGH (ref 10.3–14.5)
SODIUM SERPL-SCNC: 142 MMOL/L — SIGNIFICANT CHANGE UP (ref 135–145)
WBC # BLD: 5.35 K/UL — SIGNIFICANT CHANGE UP (ref 3.8–10.5)
WBC # FLD AUTO: 5.35 K/UL — SIGNIFICANT CHANGE UP (ref 3.8–10.5)

## 2022-06-05 PROCEDURE — 99232 SBSQ HOSP IP/OBS MODERATE 35: CPT

## 2022-06-05 PROCEDURE — 99233 SBSQ HOSP IP/OBS HIGH 50: CPT

## 2022-06-05 RX ADMIN — APIXABAN 5 MILLIGRAM(S): 2.5 TABLET, FILM COATED ORAL at 06:00

## 2022-06-05 RX ADMIN — APIXABAN 5 MILLIGRAM(S): 2.5 TABLET, FILM COATED ORAL at 17:07

## 2022-06-05 RX ADMIN — Medication 325 MILLIGRAM(S): at 11:04

## 2022-06-05 RX ADMIN — AMIODARONE HYDROCHLORIDE 200 MILLIGRAM(S): 400 TABLET ORAL at 06:00

## 2022-06-05 RX ADMIN — PANTOPRAZOLE SODIUM 40 MILLIGRAM(S): 20 TABLET, DELAYED RELEASE ORAL at 12:00

## 2022-06-05 RX ADMIN — Medication 10 MILLIGRAM(S): at 11:03

## 2022-06-05 RX ADMIN — Medication 1: at 12:00

## 2022-06-05 RX ADMIN — SIMVASTATIN 20 MILLIGRAM(S): 20 TABLET, FILM COATED ORAL at 21:46

## 2022-06-05 RX ADMIN — Medication 112 MICROGRAM(S): at 06:00

## 2022-06-05 RX ADMIN — Medication 10 MILLIEQUIVALENT(S): at 11:03

## 2022-06-05 NOTE — PROGRESS NOTE ADULT - ASSESSMENT
77 y/o female w/ PMHx of DM2, aortic stenosis s/p bioAVR, complete heart block s/p pacemaker, Chronic HFpEF, afib on eliquis, HTN, HLD, hypothyroidism, GABRIELA, renal cell carcinoma s/p resection presents s/p 2 mechanical falls earlier today, admitted for symptomatic anemia, r/o acute blood loss     HF, PAF, CHB s/p PPM, HTN, HLD,  AS, TR  - EKG V paced, no clear evidence of acute ischemia, troponin negative x2     - S/p EGD mild gastritis, Colonoscopy, poor prep, scattered diverticulosis   - S/p PRBC, Hgb improved, would recommend keeping Hgb >8 given CAD will need to be given Lasix intermittently between units of pRBCs to maintain euvolemia  - Holding ASA. Resume per GI     - She is known to have intact LV function w/ mild to moderate MR severe TR, mal coaptation of tricuspid leaflets, possible prosthesis mismatched Aortic valve, EF 60-65% on prior TIM in April.  - Her severe tricuspid regurgitation has been managed with torsemide and she has been planned for percutaneous tricuspid valve repair in the coming weeks however may have to be postponed. she has appt scheduled with Dr Bergman and Dr gant.  - No evidence of any meaningful volume overload other than LE edema  - Would resume home Torsemide in the setting of LE edema   - Strict I/Os, daily weights.    - Rate controlled A fib per flow sheet   - Continue Eliquis   - Continue Amiodarone and Toprol 50   - Last PPM interrogation appears to be in december of 2021, battery life was >5 years at that time. Will repeat interrogation during this admission.    - Holding valsartan 2/2 ADAMS. Creatinine improved. Would resume   - BP stable and controlled     - Continue statin    - Monitor and replete lytes, keep K>4, Mg>2.  - Will continue to follow.    Jordan Cee, MS FNP, [facility]  Nurse Practitioner- Cardiology   Spectra #[phone] /[phone]

## 2022-06-05 NOTE — PROGRESS NOTE ADULT - ASSESSMENT
75 yo woman with extensive cardiac history including aortic valve replacement in 2015, tricuspid valve abnormality, heart block requiring pacemaker, chronic HFpEF, A-fib on Eliquis with recent admission in 5/2022 to [facility] for CHF exacerbation and aggressive diuresis which resulted in renal insufficiency, noted on 6/1/22 to have recurrent falls with dizziness and vomiting and found to have Hg 4.6    - no lab criteria for hemolysis  - iron studies cannot be adequately interpreted as likely drawn after blood transfusion (which explains high iron saturation and serum iron); low ferritin and high TIBC more consistent with iron deficiency and could be indicative of acute GI blood loss despite negative stool guaiac  - EGD with mild gastritis and normal duodenum; biopsied; Colonoscopy with poor prep; no active bleeding; scattered diverticulosis and hemmoroids  - would consider nuclear bleeding scan if Hg does not stabilize but suspect it will be low yield at this point  - Eliquis restarted; would monitor CBC daily at this point  - transfuse to maintain Hg >7 g/dL or as clinically indicated  - no contraindication to discharge from heme/onc standpoint; patient can follow up in office upon discharge; will sign off; reconsult if needed

## 2022-06-05 NOTE — PROGRESS NOTE ADULT - ASSESSMENT
77 y/o female w/ PMHx of DM2, aortic stenosis s/p bioAVR, complete heart block s/p pacemaker, Chronic HFpEF, afib on eliquis, HTN, HLD, hypothyroidism, GABRIELA, renal cell carcinoma s/p resection presents s/p 2 mechanical falls earlier today, admitted for symptomatic anemia, r/o acute blood loss.     6/5/22 - hgb drop 1 point to 7.6 this AM, possibly diluted due to increased LE edema -- torsemide resumed per cardio -- can f/u outpt w/ heme/onc for further anemia workup as repeat Hgb acceptable at 8.8 -- d/w GI, will send for NM bleeding scan tomorrow to r/u GIB as bowel prep was poor for prev done egd/colon. Will likely need to postpone outpt intervention on her tricuspid valve until acute issues have stabilized.   77 y/o female w/ PMHx of DM2, aortic stenosis s/p bioAVR, complete heart block s/p pacemaker, Chronic HFpEF, afib on eliquis, HTN, HLD, hypothyroidism, GABRIELA, renal cell carcinoma s/p resection presents s/p 2 mechanical falls earlier today, admitted for symptomatic anemia, r/o acute blood loss.     6/5/22 - hgb drop 1 point to 7.6 this AM, possibly diluted due to increased LE edema -- torsemide resumed per cardio -- can f/u outpt w/ heme/onc for further anemia workup as repeat Hgb acceptable at 8.8 -- d/w GI, will send for NM bleeding scan tomorrow to r/o GIB as bowel prep was poor for prev done egd/colon. Will likely need to postpone outpt intervention on her tricuspid valve until acute issues have stabilized.   75 y/o female w/ PMHx of DM2, aortic stenosis s/p bioAVR, complete heart block s/p pacemaker, Chronic HFpEF, afib on eliquis, HTN, HLD, hypothyroidism, GABRIELA, renal cell carcinoma s/p resection presents s/p 2 mechanical falls earlier today, admitted for symptomatic anemia, r/o acute blood loss.     6/5/22 - hgb drop 1 point to 7.6 this AM, possibly diluted due to increased LE edema -- torsemide resumed per cardio -- can f/u outpt w/ heme/onc for further anemia workup as repeat Hgb acceptable at 8.8 -- d/w GI, will send for NM bleeding scan tomorrow to r/o GIB as bowel prep was poor for prev done egd/colon. Will likely need to postpone outpt intervention on her tricuspid valve until acute issues have stabilized.     * updated ICE# Surinder

## 2022-06-06 ENCOUNTER — OUTPATIENT (OUTPATIENT)
Dept: OUTPATIENT SERVICES | Facility: HOSPITAL | Age: 77
LOS: 1 days | End: 2022-06-06
Payer: COMMERCIAL

## 2022-06-06 ENCOUNTER — APPOINTMENT (OUTPATIENT)
Dept: ELECTROPHYSIOLOGY | Facility: CLINIC | Age: 77
End: 2022-06-06

## 2022-06-06 DIAGNOSIS — Z90.5 ACQUIRED ABSENCE OF KIDNEY: Chronic | ICD-10-CM

## 2022-06-06 DIAGNOSIS — Z41.9 ENCOUNTER FOR PROCEDURE FOR PURPOSES OTHER THAN REMEDYING HEALTH STATE, UNSPECIFIED: Chronic | ICD-10-CM

## 2022-06-06 DIAGNOSIS — Z00.00 ENCOUNTER FOR GENERAL ADULT MEDICAL EXAMINATION WITHOUT ABNORMAL FINDINGS: ICD-10-CM

## 2022-06-06 DIAGNOSIS — Z98.89 OTHER SPECIFIED POSTPROCEDURAL STATES: Chronic | ICD-10-CM

## 2022-06-06 DIAGNOSIS — Z90.721 ACQUIRED ABSENCE OF OVARIES, UNILATERAL: Chronic | ICD-10-CM

## 2022-06-06 DIAGNOSIS — Z95.2 PRESENCE OF PROSTHETIC HEART VALVE: Chronic | ICD-10-CM

## 2022-06-06 LAB
ANION GAP SERPL CALC-SCNC: 6 MMOL/L — SIGNIFICANT CHANGE UP (ref 5–17)
ANION GAP SERPL CALC-SCNC: 7 MMOL/L — SIGNIFICANT CHANGE UP (ref 5–17)
BASOPHILS # BLD AUTO: 0.03 K/UL — SIGNIFICANT CHANGE UP (ref 0–0.2)
BASOPHILS NFR BLD AUTO: 0.6 % — SIGNIFICANT CHANGE UP (ref 0–2)
BUN SERPL-MCNC: 17 MG/DL — SIGNIFICANT CHANGE UP (ref 7–23)
BUN SERPL-MCNC: 19 MG/DL — SIGNIFICANT CHANGE UP (ref 7–23)
CALCIUM SERPL-MCNC: 8.8 MG/DL — SIGNIFICANT CHANGE UP (ref 8.5–10.1)
CALCIUM SERPL-MCNC: 8.9 MG/DL — SIGNIFICANT CHANGE UP (ref 8.5–10.1)
CHLORIDE SERPL-SCNC: 106 MMOL/L — SIGNIFICANT CHANGE UP (ref 96–108)
CHLORIDE SERPL-SCNC: 109 MMOL/L — HIGH (ref 96–108)
CO2 SERPL-SCNC: 29 MMOL/L — SIGNIFICANT CHANGE UP (ref 22–31)
CO2 SERPL-SCNC: 30 MMOL/L — SIGNIFICANT CHANGE UP (ref 22–31)
CREAT SERPL-MCNC: 1.1 MG/DL — SIGNIFICANT CHANGE UP (ref 0.5–1.3)
CREAT SERPL-MCNC: 1.2 MG/DL — SIGNIFICANT CHANGE UP (ref 0.5–1.3)
EGFR: 47 ML/MIN/1.73M2 — LOW
EGFR: 52 ML/MIN/1.73M2 — LOW
EOSINOPHIL # BLD AUTO: 0.22 K/UL — SIGNIFICANT CHANGE UP (ref 0–0.5)
EOSINOPHIL NFR BLD AUTO: 4.7 % — SIGNIFICANT CHANGE UP (ref 0–6)
GLUCOSE SERPL-MCNC: 121 MG/DL — HIGH (ref 70–99)
GLUCOSE SERPL-MCNC: 122 MG/DL — HIGH (ref 70–99)
HCT VFR BLD CALC: 24.8 % — LOW (ref 34.5–45)
HGB BLD-MCNC: 8 G/DL — LOW (ref 11.5–15.5)
IMM GRANULOCYTES NFR BLD AUTO: 0.6 % — SIGNIFICANT CHANGE UP (ref 0–1.5)
LYMPHOCYTES # BLD AUTO: 1.02 K/UL — SIGNIFICANT CHANGE UP (ref 1–3.3)
LYMPHOCYTES # BLD AUTO: 21.6 % — SIGNIFICANT CHANGE UP (ref 13–44)
MCHC RBC-ENTMCNC: 29.5 PG — SIGNIFICANT CHANGE UP (ref 27–34)
MCHC RBC-ENTMCNC: 32.3 GM/DL — SIGNIFICANT CHANGE UP (ref 32–36)
MCV RBC AUTO: 91.5 FL — SIGNIFICANT CHANGE UP (ref 80–100)
MONOCYTES # BLD AUTO: 0.49 K/UL — SIGNIFICANT CHANGE UP (ref 0–0.9)
MONOCYTES NFR BLD AUTO: 10.4 % — SIGNIFICANT CHANGE UP (ref 2–14)
NEUTROPHILS # BLD AUTO: 2.93 K/UL — SIGNIFICANT CHANGE UP (ref 1.8–7.4)
NEUTROPHILS NFR BLD AUTO: 62.1 % — SIGNIFICANT CHANGE UP (ref 43–77)
NRBC # BLD: 0 /100 WBCS — SIGNIFICANT CHANGE UP (ref 0–0)
PLATELET # BLD AUTO: 229 K/UL — SIGNIFICANT CHANGE UP (ref 150–400)
POTASSIUM SERPL-MCNC: 3.1 MMOL/L — LOW (ref 3.5–5.3)
POTASSIUM SERPL-MCNC: 3.2 MMOL/L — LOW (ref 3.5–5.3)
POTASSIUM SERPL-SCNC: 3.1 MMOL/L — LOW (ref 3.5–5.3)
POTASSIUM SERPL-SCNC: 3.2 MMOL/L — LOW (ref 3.5–5.3)
RBC # BLD: 2.71 M/UL — LOW (ref 3.8–5.2)
RBC # FLD: 17.5 % — HIGH (ref 10.3–14.5)
SODIUM SERPL-SCNC: 143 MMOL/L — SIGNIFICANT CHANGE UP (ref 135–145)
SODIUM SERPL-SCNC: 144 MMOL/L — SIGNIFICANT CHANGE UP (ref 135–145)
WBC # BLD: 4.72 K/UL — SIGNIFICANT CHANGE UP (ref 3.8–10.5)
WBC # FLD AUTO: 4.72 K/UL — SIGNIFICANT CHANGE UP (ref 3.8–10.5)

## 2022-06-06 PROCEDURE — 99232 SBSQ HOSP IP/OBS MODERATE 35: CPT

## 2022-06-06 PROCEDURE — 78278 ACUTE GI BLOOD LOSS IMAGING: CPT

## 2022-06-06 PROCEDURE — 78278 ACUTE GI BLOOD LOSS IMAGING: CPT | Mod: 26,MH

## 2022-06-06 PROCEDURE — A9560: CPT

## 2022-06-06 RX ORDER — POTASSIUM CHLORIDE 20 MEQ
40 PACKET (EA) ORAL EVERY 4 HOURS
Refills: 0 | Status: COMPLETED | OUTPATIENT
Start: 2022-06-06 | End: 2022-06-06

## 2022-06-06 RX ORDER — POTASSIUM CHLORIDE 20 MEQ
40 PACKET (EA) ORAL EVERY 4 HOURS
Refills: 0 | Status: DISCONTINUED | OUTPATIENT
Start: 2022-06-06 | End: 2022-06-06

## 2022-06-06 RX ADMIN — SIMVASTATIN 20 MILLIGRAM(S): 20 TABLET, FILM COATED ORAL at 21:46

## 2022-06-06 RX ADMIN — Medication 112 MICROGRAM(S): at 05:32

## 2022-06-06 RX ADMIN — PANTOPRAZOLE SODIUM 40 MILLIGRAM(S): 20 TABLET, DELAYED RELEASE ORAL at 00:17

## 2022-06-06 RX ADMIN — APIXABAN 5 MILLIGRAM(S): 2.5 TABLET, FILM COATED ORAL at 17:34

## 2022-06-06 RX ADMIN — Medication 50 MILLIGRAM(S): at 05:32

## 2022-06-06 RX ADMIN — Medication 10 MILLIGRAM(S): at 05:32

## 2022-06-06 RX ADMIN — Medication 10 MILLIEQUIVALENT(S): at 17:34

## 2022-06-06 RX ADMIN — AMIODARONE HYDROCHLORIDE 200 MILLIGRAM(S): 400 TABLET ORAL at 05:32

## 2022-06-06 RX ADMIN — APIXABAN 5 MILLIGRAM(S): 2.5 TABLET, FILM COATED ORAL at 05:32

## 2022-06-06 RX ADMIN — Medication 40 MILLIEQUIVALENT(S): at 21:50

## 2022-06-06 RX ADMIN — PANTOPRAZOLE SODIUM 40 MILLIGRAM(S): 20 TABLET, DELAYED RELEASE ORAL at 17:34

## 2022-06-06 RX ADMIN — Medication 325 MILLIGRAM(S): at 17:36

## 2022-06-06 RX ADMIN — Medication 40 MILLIEQUIVALENT(S): at 17:35

## 2022-06-06 NOTE — PROGRESS NOTE ADULT - ASSESSMENT
77 y/o female w/ PMHx of DM2, aortic stenosis s/p bioAVR, complete heart block s/p pacemaker, Chronic HFpEF, afib on eliquis, HTN, HLD, hypothyroidism, GABRIELA, renal cell carcinoma s/p resection presents s/p 2 mechanical falls earlier today, admitted for symptomatic anemia, r/o acute blood loss     HF, PAF, CHB s/p PPM, HTN, HLD,  AS, TR  - EKG V paced, no clear evidence of acute ischemia, troponin negative x2, no need to trend further     - S/p EGD mild gastritis, Colonoscopy, poor prep, scattered diverticulosis   - S/p PRBC, Hgb improved, would recommend keeping Hgb >8 given CAD will need to be given Lasix intermittently between units of PRBCs to maintain euvolemia  - Holding ASA. Resume per GI     - She is known to have intact LV function w/ mild to moderate MR severe TR, mal coaptation of tricuspid leaflets, possible prosthesis mismatched Aortic valve, EF 60-65% on prior TIM in April.  - Her severe tricuspid regurgitation has been managed with torsemide and she has been planned for percutaneous tricuspid valve repair in the coming weeks however may have to be postponed. She has appt scheduled with Dr Bergman and Dr Robles.  - No evidence of any meaningful volume overload other than LE edema  - continue Torsemide in the setting of LE edema  - Strict I/Os, daily weights.    - Rate controlled A fib per flow sheet   - K: 3.1 today, repletion ordered    - Continue Eliquis, Amiodarone, Toprol   - Last PPM interrogation appears to be in december of 2021, battery life was >5 years at that time. Will repeat interrogation during this admission.  - Monitor and replete lytes, keep K>4, Mg>2.    - Holding valsartan 2/2 ADAMS. Creatinine improved. Would resume   - BP stable and controlled  - Continue statin    - Will continue to follow.    Valeria Coles, [facility]  Nurse Practitioner - Cardiology   Spectra #[phone]

## 2022-06-06 NOTE — PROGRESS NOTE ADULT - PROBLEM SELECTOR PROBLEM 2
ADAMS (acute kidney injury)

## 2022-06-06 NOTE — PHYSICAL THERAPY INITIAL EVALUATION ADULT - ADDITIONAL COMMENTS
Patient lives in private home, can access without negotiating stairs.  Patient was independent in all ADLs and ambulates independently without device.

## 2022-06-06 NOTE — PROGRESS NOTE ADULT - PROBLEM SELECTOR PLAN 7
- continue home simvastatin

## 2022-06-06 NOTE — PROGRESS NOTE ADULT - ASSESSMENT
77 y/o female w/ PMHx of DM2, aortic stenosis s/p bioAVR, complete heart block s/p pacemaker, Chronic HFpEF, afib on eliquis, HTN, HLD, hypothyroidism, GABRIELA, renal cell carcinoma s/p resection presents s/p 2 mechanical falls earlier today, admitted for symptomatic anemia, r/o acute blood loss.     6/5/22 - hgb drop 1 point to 7.6 this AM, possibly diluted due to increased LE edema -- torsemide resumed per cardio -- can f/u outpt w/ heme/onc for further anemia workup as repeat Hgb acceptable at 8.8 -- d/w GI, will send for NM bleeding scan tomorrow to r/o GIB as bowel prep was poor for prev done egd/colon. Will likely need to postpone outpt intervention on her tricuspid valve until acute issues have stabilized.     * updated ICE# Surinder    6/6/22 - Hgb stable,  possibly diluted due to increased LE edema -- torsemide resumed per cardio -- can f/u outpt w/ heme/onc for further anemia workup -- d/w GI, will send for NM bleeding scan today to r/o GIB as bowel prep was poor for prev done egd/colon. Will likely need to postpone outpt intervention on her tricuspid valve until acute issues have stabilized. LE edema improved. PT eval -- no skilled needs. - updated Surinder

## 2022-06-06 NOTE — PHYSICAL THERAPY INITIAL EVALUATION ADULT - PERTINENT HX OF CURRENT PROBLEM, REHAB EVAL
A very pleasant and well oriented 75 y/o female w/ PMHx of DM2, aortic stenosis s/p bioAVR, complete heart block s/p pacemaker, Chronic HFpEF, afib on eliquis, HTN, HLD, hypothyroidism, GABRIELA, renal cell carcinoma s/p resection, presents to ED after having two falls and one episode of vomiting earlier today.

## 2022-06-06 NOTE — PROGRESS NOTE ADULT - PROBLEM SELECTOR PLAN 6
-continue home synthroid

## 2022-06-06 NOTE — PROGRESS NOTE ADULT - ASSESSMENT
anemia  afib on a/c  heart block s/p PPM  marni      reg diet  hemolysis workup  ct a/p, no evidence of rp bleed  outpatient colonoscopy for screening purposes  d/w patient     Advanced care planning was discussed with patient and family.  Advanced care planning forms were reviewed and discussed.  Risks, benefits and alternatives of gastroenterologic procedures were discussed in detail and all questions were answered.    30 minutes spent.

## 2022-06-06 NOTE — PROGRESS NOTE ADULT - PROBLEM SELECTOR PROBLEM 5
Chronic diastolic congestive heart failure

## 2022-06-07 ENCOUNTER — TRANSCRIPTION ENCOUNTER (OUTPATIENT)
Age: 77
End: 2022-06-07

## 2022-06-07 VITALS
OXYGEN SATURATION: 95 % | WEIGHT: 175.71 LBS | HEART RATE: 72 BPM | SYSTOLIC BLOOD PRESSURE: 103 MMHG | TEMPERATURE: 98 F | RESPIRATION RATE: 18 BRPM | DIASTOLIC BLOOD PRESSURE: 51 MMHG

## 2022-06-07 LAB
ANION GAP SERPL CALC-SCNC: 7 MMOL/L — SIGNIFICANT CHANGE UP (ref 5–17)
BUN SERPL-MCNC: 19 MG/DL — SIGNIFICANT CHANGE UP (ref 7–23)
CALCIUM SERPL-MCNC: 9.6 MG/DL — SIGNIFICANT CHANGE UP (ref 8.5–10.1)
CHLORIDE SERPL-SCNC: 107 MMOL/L — SIGNIFICANT CHANGE UP (ref 96–108)
CO2 SERPL-SCNC: 28 MMOL/L — SIGNIFICANT CHANGE UP (ref 22–31)
CREAT SERPL-MCNC: 1.2 MG/DL — SIGNIFICANT CHANGE UP (ref 0.5–1.3)
EGFR: 47 ML/MIN/1.73M2 — LOW
GLUCOSE SERPL-MCNC: 150 MG/DL — HIGH (ref 70–99)
HCT VFR BLD CALC: 30.1 % — LOW (ref 34.5–45)
HGB BLD-MCNC: 9.7 G/DL — LOW (ref 11.5–15.5)
MCHC RBC-ENTMCNC: 30 PG — SIGNIFICANT CHANGE UP (ref 27–34)
MCHC RBC-ENTMCNC: 32.2 GM/DL — SIGNIFICANT CHANGE UP (ref 32–36)
MCV RBC AUTO: 93.2 FL — SIGNIFICANT CHANGE UP (ref 80–100)
NRBC # BLD: 0 /100 WBCS — SIGNIFICANT CHANGE UP (ref 0–0)
PLATELET # BLD AUTO: 291 K/UL — SIGNIFICANT CHANGE UP (ref 150–400)
POTASSIUM SERPL-MCNC: 4.7 MMOL/L — SIGNIFICANT CHANGE UP (ref 3.5–5.3)
POTASSIUM SERPL-SCNC: 4.7 MMOL/L — SIGNIFICANT CHANGE UP (ref 3.5–5.3)
RBC # BLD: 3.23 M/UL — LOW (ref 3.8–5.2)
RBC # FLD: 17.5 % — HIGH (ref 10.3–14.5)
SODIUM SERPL-SCNC: 142 MMOL/L — SIGNIFICANT CHANGE UP (ref 135–145)
WBC # BLD: 6.72 K/UL — SIGNIFICANT CHANGE UP (ref 3.8–10.5)
WBC # FLD AUTO: 6.72 K/UL — SIGNIFICANT CHANGE UP (ref 3.8–10.5)

## 2022-06-07 PROCEDURE — G1004: CPT

## 2022-06-07 PROCEDURE — 85025 COMPLETE CBC W/AUTO DIFF WBC: CPT

## 2022-06-07 PROCEDURE — 80053 COMPREHEN METABOLIC PANEL: CPT

## 2022-06-07 PROCEDURE — 85027 COMPLETE CBC AUTOMATED: CPT

## 2022-06-07 PROCEDURE — 83540 ASSAY OF IRON: CPT

## 2022-06-07 PROCEDURE — 80048 BASIC METABOLIC PNL TOTAL CA: CPT

## 2022-06-07 PROCEDURE — 74176 CT ABD & PELVIS W/O CONTRAST: CPT

## 2022-06-07 PROCEDURE — 72125 CT NECK SPINE W/O DYE: CPT | Mod: ME

## 2022-06-07 PROCEDURE — 83550 IRON BINDING TEST: CPT

## 2022-06-07 PROCEDURE — 82746 ASSAY OF FOLIC ACID SERUM: CPT

## 2022-06-07 PROCEDURE — 82272 OCCULT BLD FECES 1-3 TESTS: CPT

## 2022-06-07 PROCEDURE — 86850 RBC ANTIBODY SCREEN: CPT

## 2022-06-07 PROCEDURE — 83880 ASSAY OF NATRIURETIC PEPTIDE: CPT

## 2022-06-07 PROCEDURE — 83615 LACTATE (LD) (LDH) ENZYME: CPT

## 2022-06-07 PROCEDURE — 36430 TRANSFUSION BLD/BLD COMPNT: CPT

## 2022-06-07 PROCEDURE — 85018 HEMOGLOBIN: CPT

## 2022-06-07 PROCEDURE — 86923 COMPATIBILITY TEST ELECTRIC: CPT

## 2022-06-07 PROCEDURE — 83735 ASSAY OF MAGNESIUM: CPT

## 2022-06-07 PROCEDURE — 88305 TISSUE EXAM BY PATHOLOGIST: CPT

## 2022-06-07 PROCEDURE — 71045 X-RAY EXAM CHEST 1 VIEW: CPT

## 2022-06-07 PROCEDURE — 97161 PT EVAL LOW COMPLEX 20 MIN: CPT

## 2022-06-07 PROCEDURE — 93005 ELECTROCARDIOGRAM TRACING: CPT

## 2022-06-07 PROCEDURE — 70450 CT HEAD/BRAIN W/O DYE: CPT | Mod: ME

## 2022-06-07 PROCEDURE — 99239 HOSP IP/OBS DSCHRG MGMT >30: CPT

## 2022-06-07 PROCEDURE — 85014 HEMATOCRIT: CPT

## 2022-06-07 PROCEDURE — 86901 BLOOD TYPING SEROLOGIC RH(D): CPT

## 2022-06-07 PROCEDURE — 84484 ASSAY OF TROPONIN QUANT: CPT

## 2022-06-07 PROCEDURE — 82607 VITAMIN B-12: CPT

## 2022-06-07 PROCEDURE — 82248 BILIRUBIN DIRECT: CPT

## 2022-06-07 PROCEDURE — 82247 BILIRUBIN TOTAL: CPT

## 2022-06-07 PROCEDURE — 36415 COLL VENOUS BLD VENIPUNCTURE: CPT

## 2022-06-07 PROCEDURE — 83010 ASSAY OF HAPTOGLOBIN QUANT: CPT

## 2022-06-07 PROCEDURE — 88313 SPECIAL STAINS GROUP 2: CPT

## 2022-06-07 PROCEDURE — 72192 CT PELVIS W/O DYE: CPT | Mod: MG

## 2022-06-07 PROCEDURE — P9016: CPT

## 2022-06-07 PROCEDURE — 99285 EMERGENCY DEPT VISIT HI MDM: CPT

## 2022-06-07 PROCEDURE — 99232 SBSQ HOSP IP/OBS MODERATE 35: CPT

## 2022-06-07 PROCEDURE — 84100 ASSAY OF PHOSPHORUS: CPT

## 2022-06-07 PROCEDURE — 84466 ASSAY OF TRANSFERRIN: CPT

## 2022-06-07 PROCEDURE — 82728 ASSAY OF FERRITIN: CPT

## 2022-06-07 PROCEDURE — 88312 SPECIAL STAINS GROUP 1: CPT

## 2022-06-07 PROCEDURE — 96374 THER/PROPH/DIAG INJ IV PUSH: CPT | Mod: XU

## 2022-06-07 PROCEDURE — 82962 GLUCOSE BLOOD TEST: CPT

## 2022-06-07 PROCEDURE — 86900 BLOOD TYPING SEROLOGIC ABO: CPT

## 2022-06-07 PROCEDURE — 87635 SARS-COV-2 COVID-19 AMP PRB: CPT

## 2022-06-07 PROCEDURE — 76376 3D RENDER W/INTRP POSTPROCES: CPT

## 2022-06-07 RX ORDER — PANTOPRAZOLE SODIUM 20 MG/1
1 TABLET, DELAYED RELEASE ORAL
Qty: 30 | Refills: 0
Start: 2022-06-07 | End: 2022-07-06

## 2022-06-07 RX ORDER — PANTOPRAZOLE SODIUM 20 MG/1
1 TABLET, DELAYED RELEASE ORAL
Qty: 0 | Refills: 0 | DISCHARGE
Start: 2022-06-07

## 2022-06-07 RX ADMIN — Medication 1: at 12:23

## 2022-06-07 RX ADMIN — AMIODARONE HYDROCHLORIDE 200 MILLIGRAM(S): 400 TABLET ORAL at 05:38

## 2022-06-07 RX ADMIN — PANTOPRAZOLE SODIUM 40 MILLIGRAM(S): 20 TABLET, DELAYED RELEASE ORAL at 12:24

## 2022-06-07 RX ADMIN — Medication 325 MILLIGRAM(S): at 12:25

## 2022-06-07 RX ADMIN — Medication 10 MILLIEQUIVALENT(S): at 12:25

## 2022-06-07 RX ADMIN — PANTOPRAZOLE SODIUM 40 MILLIGRAM(S): 20 TABLET, DELAYED RELEASE ORAL at 00:19

## 2022-06-07 RX ADMIN — APIXABAN 5 MILLIGRAM(S): 2.5 TABLET, FILM COATED ORAL at 05:38

## 2022-06-07 RX ADMIN — Medication 10 MILLIGRAM(S): at 05:38

## 2022-06-07 RX ADMIN — Medication 1: at 07:52

## 2022-06-07 RX ADMIN — Medication 50 MILLIGRAM(S): at 05:38

## 2022-06-07 RX ADMIN — Medication 112 MICROGRAM(S): at 05:38

## 2022-06-07 NOTE — PROGRESS NOTE ADULT - ASSESSMENT
77 y/o female w/ PMHx of DM2, aortic stenosis s/p bioAVR, complete heart block s/p pacemaker, Chronic HFpEF, afib on eliquis, HTN, HLD, hypothyroidism, GABRIELA, renal cell carcinoma s/p resection presents s/p 2 mechanical falls earlier today, admitted for symptomatic anemia, r/o acute blood loss     HF, PAF, CHB s/p PPM, HTN, HLD,  AS, TR  - EKG V paced, no clear evidence of acute ischemia, troponin negative x2, no need to trend further     - S/p EGD mild gastritis, Colonoscopy, poor prep, scattered diverticulosis   - S/p PRBC, Hgb remains stable, would recommend keeping Hgb >8 given CAD will need to be given Lasix intermittently between units of PRBCs to maintain euvolemia  - Holding ASA. Resume per GI     - She is known to have intact LV function w/ mild to moderate MR severe TR, mal coaptation of tricuspid leaflets, possible prosthesis mismatched Aortic valve, EF 60-65% on prior TIM in April.  - Her severe TR has been managed with torsemide and she has been planned for percutaneous tricuspid valve repair in the coming weeks however may have to be postponed. She has appt scheduled with Dr Bergman and Dr Robles.  - No evidence of any meaningful volume overload other than LE edema  - continue Torsemide in the setting of LE edema  - Strict I/Os, daily weights.    - Rate controlled A fib per flow sheet   - Continue Eliquis, Amiodarone, Toprol   - Last PPM interrogation appears to be in december of 2021, battery life was >5 years at that time. Will repeat interrogation during this admission.  - Monitor and replete lytes, keep K>4, Mg>2.    - Holding valsartan 2/2 ADAMS. Creatinine improved. Would resume   - BP stable and controlled  - Continue statin    - Will continue to follow.    Valeria Coles, [facility]  Nurse Practitioner - Cardiology   Spectra #[phone]

## 2022-06-07 NOTE — PROGRESS NOTE ADULT - PROVIDER SPECIALTY LIST ADULT
Cardiology
Heme/Onc
Cardiology
Cardiology
Gastroenterology
Cardiology
Cardiology
Gastroenterology
Cardiology
Gastroenterology
Hospitalist

## 2022-06-07 NOTE — DISCHARGE NOTE NURSING/CASE MANAGEMENT/SOCIAL WORK - NSDCPEFALRISK_GEN_ALL_CORE
For information on Fall & Injury Prevention, visit: [email or web address] OR  [email or web address] OR  https://www.cdc.gov/steadi/patient.html

## 2022-06-07 NOTE — PROGRESS NOTE ADULT - SUBJECTIVE AND OBJECTIVE BOX
Patient is a 76y old  Female who presents with a chief complaint of Anemia r/o bleed, ADAMS (02 Jun 2022 12:33)      SUBJECTIVE / OVERNIGHT EVENTS: No On events. Feels much better. No longer sob or fatigue. Reports dark stools at home but on iron, also sometimes bloody when wiping from hemmrhoid but no hematochezia. Denies cp, sob, leg swelling, n/v, orthopnea.        ADDITIONAL REVIEW OF SYSTEMS: Negative except for above    MEDICATIONS  (STANDING):  aMIOdarone    Tablet 200 milliGRAM(s) Oral daily  bisacodyl 10 milliGRAM(s) Oral every 4 hours  dextrose 5%. 1000 milliLiter(s) (100 mL/Hr) IV Continuous <Continuous>  dextrose 5%. 1000 milliLiter(s) (50 mL/Hr) IV Continuous <Continuous>  dextrose 50% Injectable 25 Gram(s) IV Push once  dextrose 50% Injectable 12.5 Gram(s) IV Push once  dextrose 50% Injectable 25 Gram(s) IV Push once  ferrous    sulfate 325 milliGRAM(s) Oral daily  glucagon  Injectable 1 milliGRAM(s) IntraMuscular once  insulin lispro (ADMELOG) corrective regimen sliding scale   SubCutaneous every 6 hours  insulin lispro (ADMELOG) corrective regimen sliding scale   SubCutaneous at bedtime  levothyroxine 112 MICROGram(s) Oral daily  pantoprazole  Injectable 40 milliGRAM(s) IV Push every 12 hours  polyethylene glycol/electrolyte Solution 1 Liter(s) Oral every 4 hours  potassium chloride    Tablet ER 10 milliEquivalent(s) Oral daily  simvastatin 20 milliGRAM(s) Oral at bedtime    MEDICATIONS  (PRN):  acetaminophen     Tablet .. 650 milliGRAM(s) Oral every 6 hours PRN Temp greater or equal to 38C (100.4F), Mild Pain (1 - 3)  dextrose Oral Gel 15 Gram(s) Oral once PRN Blood Glucose LESS THAN 70 milliGRAM(s)/deciliter  ondansetron Injectable 4 milliGRAM(s) IV Push every 8 hours PRN Nausea and/or Vomiting      CAPILLARY BLOOD GLUCOSE      POCT Blood Glucose.: 108 mg/dL (02 Jun 2022 11:48)  POCT Blood Glucose.: 103 mg/dL (02 Jun 2022 07:42)  POCT Blood Glucose.: 118 mg/dL (02 Jun 2022 06:06)  POCT Blood Glucose.: 147 mg/dL (02 Jun 2022 00:00)  POCT Blood Glucose.: 165 mg/dL (01 Jun 2022 21:58)    I&O's Summary    01 Jun 2022 07:01  -  02 Jun 2022 07:00  --------------------------------------------------------  IN: 327 mL / OUT: 0 mL / NET: 327 mL    02 Jun 2022 07:01  -  02 Jun 2022 13:55  --------------------------------------------------------  IN: 400 mL / OUT: 0 mL / NET: 400 mL        PHYSICAL EXAM:  Vital Signs Last 24 Hrs  T(C): 37.1 (02 Jun 2022 13:36), Max: 37.1 (01 Jun 2022 22:49)  T(F): 98.7 (02 Jun 2022 13:36), Max: 98.7 (01 Jun 2022 22:49)  HR: 70 (02 Jun 2022 13:36) (67 - 77)  BP: 109/69 (02 Jun 2022 13:36) (92/48 - 135/58)  BP(mean): --  RR: 18 (02 Jun 2022 13:36) (18 - 24)  SpO2: 98% (02 Jun 2022 13:36) (95% - 100%)    PHYSICAL EXAM:  GENERAL: NAD, well-developed  HEAD:  Atraumatic, Normocephalic  EYES:  conjunctiva and sclera clear  NECK: Supple, No JVD  CHEST/LUNG: Clear to auscultation bilaterally;  HEART: Regular rate and rhythm;   ABDOMEN: Soft, Nontender, Nondistended; Bowel sounds present  EXTREMITIES:  2+ Peripheral Pulses, No clubbing, cyanosis, or edema  PSYCH: AAOx3  NEUROLOGY: non-focal        LABS:                        8.2    8.86  )-----------( 236      ( 02 Jun 2022 10:16 )             24.4     06-02    138  |  105  |  67<H>  ----------------------------<  124<H>  4.6   |  23  |  1.60<H>    Ca    9.5      02 Jun 2022 03:50  Phos  4.1     06-02  Mg     2.2     06-02    TPro  6.4  /  Alb  3.0<L>  /  TBili  0.7  /  DBili  x   /  AST  14<L>  /  ALT  26  /  AlkPhos  40  06-01                RADIOLOGY & ADDITIONAL TESTS:    Imaging Personally Reviewed:    Electrocardiogram Personally Reviewed:    COORDINATION OF CARE:  Care Discussed with Consultants/Other Providers [Y/N]:  Prior or Outpatient Records Reviewed [Y/N]:  
[facility]  Charlie Mora PA-C  [facility] [place] [address]  [phone]      INTERVAL HPI/OVERNIGHT EVENTS:  Seen and examined, no acute overnight events   s/p Endo/ colon  mild gastritis/ scattered diverticulosis/ hemorrhoids        MEDICATIONS  (STANDING):  aMIOdarone    Tablet 200 milliGRAM(s) Oral daily  apixaban 5 milliGRAM(s) Oral every 12 hours  dextrose 5%. 1000 milliLiter(s) (100 mL/Hr) IV Continuous <Continuous>  dextrose 5%. 1000 milliLiter(s) (50 mL/Hr) IV Continuous <Continuous>  dextrose 50% Injectable 25 Gram(s) IV Push once  dextrose 50% Injectable 12.5 Gram(s) IV Push once  dextrose 50% Injectable 25 Gram(s) IV Push once  ferrous    sulfate 325 milliGRAM(s) Oral daily  glucagon  Injectable 1 milliGRAM(s) IntraMuscular once  insulin lispro (ADMELOG) corrective regimen sliding scale   SubCutaneous every 6 hours  insulin lispro (ADMELOG) corrective regimen sliding scale   SubCutaneous at bedtime  levothyroxine 112 MICROGram(s) Oral daily  metoprolol succinate ER 50 milliGRAM(s) Oral daily  pantoprazole  Injectable 40 milliGRAM(s) IV Push every 12 hours  potassium chloride    Tablet ER 10 milliEquivalent(s) Oral daily  potassium chloride  10 mEq/100 mL IVPB 10 milliEquivalent(s) IV Intermittent every 1 hour  simvastatin 20 milliGRAM(s) Oral at bedtime    MEDICATIONS  (PRN):  acetaminophen     Tablet .. 650 milliGRAM(s) Oral every 6 hours PRN Temp greater or equal to 38C (100.4F), Mild Pain (1 - 3)  dextrose Oral Gel 15 Gram(s) Oral once PRN Blood Glucose LESS THAN 70 milliGRAM(s)/deciliter  ondansetron Injectable 4 milliGRAM(s) IV Push every 8 hours PRN Nausea and/or Vomiting      Allergies    sulfa drugs (Short breath)    Intolerances        ROS:   General:  No wt loss, fevers, chills, night sweats, fatigue,   Eyes:  Good vision, no reported pain  ENT:  No sore throat, pain, runny nose, dysphagia  CV:  No pain, palpitations, hypo/hypertension  Resp:  No dyspnea, cough, tachypnea, wheezing  GI:  No pain, No nausea, No vomiting, No diarrhea, No constipation, No weight loss, No fever, No pruritis, No rectal bleeding, No tarry stools, No dysphagia,  :  No pain, bleeding, incontinence, nocturia  Muscle:  No pain, weakness  Neuro:  No weakness, tingling, memory problems  Psych:  No fatigue, insomnia, mood problems, depression  Endocrine:  No polyuria, polydipsia, cold/heat intolerance  Heme:  No petechiae, ecchymosis, easy bruisability  Skin:  No rash, tattoos, scars, edema      PHYSICAL EXAM:   Vital Signs:  Vital Signs Last 24 Hrs  T(C): 36.9 (04 Jun 2022 04:35), Max: 37.1 (03 Jun 2022 21:20)  T(F): 98.5 (04 Jun 2022 04:35), Max: 98.8 (03 Jun 2022 21:20)  HR: 66 (04 Jun 2022 04:35) (66 - 85)  BP: 97/58 (04 Jun 2022 04:35) (97/58 - 152/67)  BP(mean): --  RR: 18 (04 Jun 2022 04:35) (16 - 18)  SpO2: 94% (04 Jun 2022 04:35) (94% - 100%)  Daily     Daily       GENERAL:  Appears stated age,   HEENT:  NC/AT,    CHEST:  Full & symmetric excursion,   HEART:  Regular rhythm  ABDOMEN:  Soft, non-tender, non-distended,   EXTEREMITIES:  no cyanosis,clubbing or edema  SKIN:  No rash  NEURO:  Alert,        LABS:                        7.9    5.98  )-----------( 231      ( 04 Jun 2022 07:43 )             24.7     06-04    143  |  111<H>  |  21  ----------------------------<  129<H>  4.0   |  28  |  1.00    Ca    9.1      04 Jun 2022 07:43    TPro  5.1<L>  /  Alb  2.5<L>  /  TBili  1.0  /  DBili  x   /  AST  28  /  ALT  32  /  AlkPhos  49  06-04          RADIOLOGY & ADDITIONAL TESTS:      
[facility]  Charlie Mora PA-C  [address] [place] [address]  [phone]      INTERVAL HPI/OVERNIGHT EVENTS:  Seen and examined, no acute overnight events   s/p Endo/ colon  h/h downtrending  no overt s/s GIB    MEDICATIONS  (STANDING):  aMIOdarone    Tablet 200 milliGRAM(s) Oral daily  apixaban 5 milliGRAM(s) Oral every 12 hours  dextrose 5%. 1000 milliLiter(s) (100 mL/Hr) IV Continuous <Continuous>  dextrose 5%. 1000 milliLiter(s) (50 mL/Hr) IV Continuous <Continuous>  dextrose 50% Injectable 25 Gram(s) IV Push once  dextrose 50% Injectable 12.5 Gram(s) IV Push once  dextrose 50% Injectable 25 Gram(s) IV Push once  ferrous    sulfate 325 milliGRAM(s) Oral daily  glucagon  Injectable 1 milliGRAM(s) IntraMuscular once  insulin lispro (ADMELOG) corrective regimen sliding scale   SubCutaneous three times a day before meals  insulin lispro (ADMELOG) corrective regimen sliding scale   SubCutaneous at bedtime  levothyroxine 112 MICROGram(s) Oral daily  metoprolol succinate ER 50 milliGRAM(s) Oral daily  pantoprazole  Injectable 40 milliGRAM(s) IV Push every 12 hours  potassium chloride    Tablet ER 10 milliEquivalent(s) Oral daily  simvastatin 20 milliGRAM(s) Oral at bedtime  torsemide 10 milliGRAM(s) Oral daily    MEDICATIONS  (PRN):  acetaminophen     Tablet .. 650 milliGRAM(s) Oral every 6 hours PRN Temp greater or equal to 38C (100.4F), Mild Pain (1 - 3)  dextrose Oral Gel 15 Gram(s) Oral once PRN Blood Glucose LESS THAN 70 milliGRAM(s)/deciliter  ondansetron Injectable 4 milliGRAM(s) IV Push every 8 hours PRN Nausea and/or Vomiting      Allergies    sulfa drugs (Short breath)    Intolerances          PHYSICAL EXAM  Vital Signs Last 24 Hrs  T(C): 37.1 (05 Jun 2022 04:19), Max: 37.1 (05 Jun 2022 04:19)  T(F): 98.7 (05 Jun 2022 04:19), Max: 98.7 (05 Jun 2022 04:19)  HR: 63 (05 Jun 2022 04:19) (62 - 66)  BP: 91/55 (05 Jun 2022 04:19) (91/55 - 137/82)  BP(mean): --  RR: 18 (05 Jun 2022 04:19) (18 - 18)  SpO2: 96% (05 Jun 2022 04:19) (96% - 99%)            GENERAL:  Appears stated age  ABDOMEN:  Soft, non-tender, non-distended  NEURO:  Alert        LABS:                        8.0    4.72  )-----------( 229      ( 06 Jun 2022 09:28 )             24.8     06-06    144  |  109<H>  |  19  ----------------------------<  122<H>  3.1<L>   |  29  |  1.10    Ca    8.8      06 Jun 2022 09:28      RADIOLOGY:  < from: CT Abdomen and Pelvis No Cont (06.03.22 @ 14:19) >    ACC: [id number] EXAM:  CT ABDOMEN AND PELVIS                          PROCEDURE DATE:  06/03/2022          INTERPRETATION:  CLINICAL INFORMATION: Abdominal pain. History of left   partial nephrectomy for renal cell carcinoma.    COMPARISON: Noncontrast CT of the bony pelvis dated June 1, 2022, pre and   postcontrast CT of the abdomen and pelvis December 21, 2019    CONTRAST/COMPLICATIONS:  IV Contrast: NONE  Oral Contrast: NONE  Complications: None reported at time of study completion    PROCEDURE:  CT of the Abdomen and Pelvis was performed.  Sagittal and coronal reformats were performed.    FINDINGS:  LOWER CHEST: Status post aortic valve replacement. Cardiac pacer wires.   Mild platelike atelectasis in the right lower lobe at the lung base.    LIVER: Within normal limits.  BILE DUCTS: Normal caliber.  GALLBLADDER: Within normal limits.  SPLEEN: Within normal limits.  PANCREAS: Within normal limits.  ADRENALS: Within normal limits.  KIDNEYS/URETERS: Wedge resection of the medial upper pole of the left   kidney. Mild cortical scarring in the lower pole of the left kidney.   Otherwise, within normal limits.    BLADDER: Within normal limits.  REPRODUCTIVE ORGANS: Subserosal calcified and noncalcified fibroids. The   adnexa are unremarkable by CT criteria.    BOWEL: Scattered sigmoid diverticula. No bowel obstruction. Appendix is   normal.  PERITONEUM: No ascites. There is no evidence of hyperattenuating   collection in the peritoneal cavity or retroperitoneum to suggest acute   hemorrhage.  VESSELS: Moderate atherosclerotic calcification of the nonaneurysmal   abdominal aorta.  RETROPERITONEUM/LYMPH NODES: No lymphadenopathy.  ABDOMINAL WALL: Within normal limits.  BONES: Within normal limits.    IMPRESSION: No evidence of acute inflammatory or obstructive process in   the abdomen and pelvis.    --- End of Report ---            RELL GARCIA MD; Attending Radiologist  This document has been electronically signed. Todd  3 2022  3:53PM    < end of copied text >  
  Patient seen and examined;  Chart reviewed and events noted;   Walking around more; to resume torsemide for increased LE edema; denies any dark or bloody stools    MEDICATIONS  (STANDING):  aMIOdarone    Tablet 200 milliGRAM(s) Oral daily  apixaban 5 milliGRAM(s) Oral every 12 hours  dextrose 5%. 1000 milliLiter(s) (100 mL/Hr) IV Continuous <Continuous>  dextrose 5%. 1000 milliLiter(s) (50 mL/Hr) IV Continuous <Continuous>  dextrose 50% Injectable 25 Gram(s) IV Push once  dextrose 50% Injectable 12.5 Gram(s) IV Push once  dextrose 50% Injectable 25 Gram(s) IV Push once  ferrous    sulfate 325 milliGRAM(s) Oral daily  glucagon  Injectable 1 milliGRAM(s) IntraMuscular once  insulin lispro (ADMELOG) corrective regimen sliding scale   SubCutaneous three times a day before meals  insulin lispro (ADMELOG) corrective regimen sliding scale   SubCutaneous at bedtime  levothyroxine 112 MICROGram(s) Oral daily  metoprolol succinate ER 50 milliGRAM(s) Oral daily  pantoprazole  Injectable 40 milliGRAM(s) IV Push every 12 hours  potassium chloride    Tablet ER 10 milliEquivalent(s) Oral daily  simvastatin 20 milliGRAM(s) Oral at bedtime  torsemide 10 milliGRAM(s) Oral daily    MEDICATIONS  (PRN):  acetaminophen     Tablet .. 650 milliGRAM(s) Oral every 6 hours PRN Temp greater or equal to 38C (100.4F), Mild Pain (1 - 3)  dextrose Oral Gel 15 Gram(s) Oral once PRN Blood Glucose LESS THAN 70 milliGRAM(s)/deciliter  ondansetron Injectable 4 milliGRAM(s) IV Push every 8 hours PRN Nausea and/or Vomiting      Vital Signs Last 24 Hrs  T(C): 37.1 (05 Jun 2022 04:19), Max: 37.1 (05 Jun 2022 04:19)  T(F): 98.7 (05 Jun 2022 04:19), Max: 98.7 (05 Jun 2022 04:19)  HR: 63 (05 Jun 2022 04:19) (62 - 66)  BP: 91/55 (05 Jun 2022 04:19) (91/55 - 137/82)  BP(mean): --  RR: 18 (05 Jun 2022 04:19) (18 - 18)  SpO2: 96% (05 Jun 2022 04:19) (96% - 99%)    PHYSICAL EXAM  General: adult in NAD  HEENT: clear oropharynx, anicteric sclera, pink conjunctivae  Neck: supple  CV: normal S1S2; + valvular click  Lungs: clear to auscultation, no wheezes, no rhales  Abdomen: soft non-tender non-distended, no hepato/splenomegaly  Ext: 2+ edema to mid-shins  Skin: no rashes and no petichiae  Neuro: alert and oriented X3 no focal deficits      LABS:                        7.6    5.35  )-----------( 209      ( 05 Jun 2022 07:53 )             23.6     Hemoglobin: 7.6 g/dL (06-05 @ 07:53)  Hemoglobin: 8.6 g/dL (06-04 @ 17:48)  Hemoglobin: 7.9 g/dL (06-04 @ 07:43)  Hemoglobin: 8.3 g/dL (06-03 @ 13:49)  Hemoglobin: 7.5 g/dL (06-03 @ 08:26)    06-05    142  |  112<H>  |  15  ----------------------------<  130<H>  4.0   |  25  |  0.94    Ca    8.7      05 Jun 2022 07:53    TPro  5.1<L>  /  Alb  2.5<L>  /  TBili  1.0  /  DBili  x   /  AST  28  /  ALT  32  /  AlkPhos  49  06-04    
[facility] -- John Ng, Robson Hopkins, Marvin Casey Savella, Goodger  Office # [phone]    Follow Up:  SOB, HF    Subjective/Observations: No events overnight resting comfortably in bed, on RA.  No complaints of chest pain, dyspnea, or palpitations reported. No signs of orthopnea or PND.    REVIEW OF SYSTEMS: All other review of systems is negative unless indicated above  PAST MEDICAL & SURGICAL HISTORY:  Hypertension  x10 years      Dyslipidemia      Hiatal hernia      Type II diabetes mellitus      Aortic stenosis      Cancer of kidney, left      Right bundle branch block (RBBB)      Anemia      Hypothyroidism      Status post unilateral salpingo-oophorectomy      S/P T&amp;A (status post tonsillectomy and adenoidectomy)      H/O hand surgery      H/O partial nephrectomy  right [id number]      H/O aortic valve replacement      Elective surgery  [facility]  2016        MEDICATIONS  (STANDING):  aMIOdarone    Tablet 200 milliGRAM(s) Oral daily  dextrose 5%. 1000 milliLiter(s) (50 mL/Hr) IV Continuous <Continuous>  dextrose 5%. 1000 milliLiter(s) (100 mL/Hr) IV Continuous <Continuous>  dextrose 50% Injectable 25 Gram(s) IV Push once  dextrose 50% Injectable 12.5 Gram(s) IV Push once  dextrose 50% Injectable 25 Gram(s) IV Push once  ferrous    sulfate 325 milliGRAM(s) Oral daily  glucagon  Injectable 1 milliGRAM(s) IntraMuscular once  insulin lispro (ADMELOG) corrective regimen sliding scale   SubCutaneous every 6 hours  insulin lispro (ADMELOG) corrective regimen sliding scale   SubCutaneous at bedtime  levothyroxine 112 MICROGram(s) Oral daily  pantoprazole  Injectable 40 milliGRAM(s) IV Push every 12 hours  potassium chloride    Tablet ER 10 milliEquivalent(s) Oral daily  simvastatin 20 milliGRAM(s) Oral at bedtime    MEDICATIONS  (PRN):  acetaminophen     Tablet .. 650 milliGRAM(s) Oral every 6 hours PRN Temp greater or equal to 38C (100.4F), Mild Pain (1 - 3)  dextrose Oral Gel 15 Gram(s) Oral once PRN Blood Glucose LESS THAN 70 milliGRAM(s)/deciliter  ondansetron Injectable 4 milliGRAM(s) IV Push every 8 hours PRN Nausea and/or Vomiting    Allergies    sulfa drugs (Short breath)    Intolerances      Vital Signs Last 24 Hrs  T(C): 36.7 (02 Jun 2022 07:05), Max: 37.1 (01 Jun 2022 22:49)  T(F): 98 (02 Jun 2022 07:05), Max: 98.7 (01 Jun 2022 22:49)  HR: 68 (02 Jun 2022 07:05) (67 - 77)  BP: 113/68 (02 Jun 2022 07:05) (92/48 - 135/58)  BP(mean): --  RR: 18 (02 Jun 2022 07:05) (18 - 24)  SpO2: 98% (02 Jun 2022 07:05) (95% - 100%)  I&O's Summary    01 Jun 2022 07:01  -  02 Jun 2022 07:00  --------------------------------------------------------  IN: 327 mL / OUT: 0 mL / NET: 327 mL      Weight (kg): 73.5 (06-01 @ 15:19)  PHYSICAL EXAM:  TELE: NSR  General: NAD  HEENT: NCAT, pale conjunctiva, PERRL, EOMI bl, moist mucous membranes   Neck: Supple, nontender, no jvd  Neurology: A&Ox3, nonfocal, sensation intact   Respiratory: CTA B/L, No W/R/R  CV: RRR ,S1S2, II/VI systolic murmur best heard over RUSB, no rubs or gallops  Abdominal: Soft, NT, ND  Extremities: No C/C/E, + peripheral pulses  Heme: No obvious ecchymosis or petechiae   Skin: warm, dry.      LABS:   All Labs Reviewed:                        6.7    x     )-----------( x        ( 02 Jun 2022 03:15 )             20.6                         4.6    15.93 )-----------( 387      ( 01 Jun 2022 16:17 )             15.6     02 Jun 2022 03:50    138    |  105    |  67     ----------------------------<  124    4.6     |  23     |  1.60   01 Jun 2022 16:17    137    |  102    |  71     ----------------------------<  196    4.8     |  14     |  2.20     Ca    9.5        02 Jun 2022 03:50  Ca    9.8        01 Jun 2022 16:17  Phos  4.1       02 Jun 2022 03:50  Mg     2.2       02 Jun 2022 03:50    TPro  6.4    /  Alb  3.0    /  TBili  0.7    /  DBili  x      /  AST  14     /  ALT  26     /  AlkPhos  40     01 Jun 2022 16:17    BNPSerum Pro-Brain Natriuretic Peptide: 2276 pg/mL (06-01 @ 16:17)    EKG:  A sensed, V paced 77bpm.           
[facility] -- John Ng Grossman, Wachsman, Marvin Casey, Reji Jones: Office # [phone]    Follow Up:  Anemia r/o bleed, ADAMS    Subjective/Observations: Patient awake, alert, resting in bed. Denies chest pain, palpitations and dizziness. Denies any difficulty breathing. No orthopnea and PND. Tolerating room air. C/o lower extremity edema.     REVIEW OF SYSTEMS: All other review of systems are negative unless indicated above    PAST MEDICAL & SURGICAL HISTORY:  Hypertension  x10 years      Hypertension  x10 years      Dyslipidemia      Hiatal hernia      Type II diabetes mellitus      Aortic stenosis      Cancer of kidney, left      Right bundle branch block (RBBB)      Anemia      Anemia      Hypothyroidism      Hypothyroidism      Status post unilateral salpingo-oophorectomy      S/P T&amp;A (status post tonsillectomy and adenoidectomy)      H/O hand surgery      H/O partial nephrectomy  right [id number]      H/O aortic valve replacement      Elective surgery  [facility]  2016    MEDICATIONS  (STANDING):  aMIOdarone    Tablet 200 milliGRAM(s) Oral daily  apixaban 5 milliGRAM(s) Oral every 12 hours  dextrose 5%. 1000 milliLiter(s) (100 mL/Hr) IV Continuous <Continuous>  dextrose 5%. 1000 milliLiter(s) (50 mL/Hr) IV Continuous <Continuous>  dextrose 50% Injectable 25 Gram(s) IV Push once  dextrose 50% Injectable 12.5 Gram(s) IV Push once  dextrose 50% Injectable 25 Gram(s) IV Push once  ferrous    sulfate 325 milliGRAM(s) Oral daily  glucagon  Injectable 1 milliGRAM(s) IntraMuscular once  insulin lispro (ADMELOG) corrective regimen sliding scale   SubCutaneous three times a day before meals  insulin lispro (ADMELOG) corrective regimen sliding scale   SubCutaneous at bedtime  levothyroxine 112 MICROGram(s) Oral daily  metoprolol succinate ER 50 milliGRAM(s) Oral daily  pantoprazole  Injectable 40 milliGRAM(s) IV Push every 12 hours  potassium chloride    Tablet ER 10 milliEquivalent(s) Oral daily  simvastatin 20 milliGRAM(s) Oral at bedtime    MEDICATIONS  (PRN):  acetaminophen     Tablet .. 650 milliGRAM(s) Oral every 6 hours PRN Temp greater or equal to 38C (100.4F), Mild Pain (1 - 3)  dextrose Oral Gel 15 Gram(s) Oral once PRN Blood Glucose LESS THAN 70 milliGRAM(s)/deciliter  ondansetron Injectable 4 milliGRAM(s) IV Push every 8 hours PRN Nausea and/or Vomiting    Allergies  sulfa drugs (Short breath)    Vital Signs Last 24 Hrs  T(C): 37.1 (05 Jun 2022 04:19), Max: 37.1 (05 Jun 2022 04:19)  T(F): 98.7 (05 Jun 2022 04:19), Max: 98.7 (05 Jun 2022 04:19)  HR: 63 (05 Jun 2022 04:19) (62 - 66)  BP: 91/55 (05 Jun 2022 04:19) (91/55 - 137/82)  BP(mean): --  RR: 18 (05 Jun 2022 04:19) (18 - 18)  SpO2: 96% (05 Jun 2022 04:19) (96% - 99%)  I&O's Summary    04 Jun 2022 07:01  -  05 Jun 2022 07:00  --------------------------------------------------------  IN: 590 mL / OUT: 0 mL / NET: 590 mL      TELE: Not on telemetry   PHYSICAL EXAM:  Constitutional: NAD, awake and alert, Well developed   HEENT: Moist Mucous Membranes, Anicteric  Pulmonary: Non-labored, breath sounds are clear bilaterally, No wheezing, rales or rhonchi  Cardiovascular: Regular, S1 and S2, + murmurs, No rubs, gallops or clicks  Gastrointestinal:  soft, nontender, nondistended   Lymph: trace peripheral edema. No lymphadenopathy.   Skin: No visible rashes or ulcers.  Psych:  Mood & affect appropriate        LABS: All Labs Reviewed:                        7.6    5.35  )-----------( 209      ( 05 Jun 2022 07:53 )             23.6                         8.6    6.64  )-----------( 250      ( 04 Jun 2022 17:48 )             26.6                         7.9    5.98  )-----------( 231      ( 04 Jun 2022 07:43 )             24.7     05 Jun 2022 07:53    142    |  112    |  15     ----------------------------<  130    4.0     |  25     |  0.94   04 Jun 2022 07:43    143    |  111    |  21     ----------------------------<  129    4.0     |  28     |  1.00   03 Jun 2022 13:49    144    |  112    |  31     ----------------------------<  119    4.1     |  27     |  1.10     Ca    8.7        05 Jun 2022 07:53  Ca    9.1        04 Jun 2022 07:43  Ca    9.4        03 Jun 2022 13:49    TPro  5.1    /  Alb  2.5    /  TBili  1.0    /  DBili  x      /  AST  28     /  ALT  32     /  AlkPhos  49     04 Jun 2022 07:43  TPro  5.5    /  Alb  2.8    /  TBili  1.1    /  DBili  0.5    /  AST  29     /  ALT  31     /  AlkPhos  45     03 Jun 2022 13:49   LIVER FUNCTIONS - ( 04 Jun 2022 07:43 )  Alb: 2.5 g/dL / Pro: 5.1 g/dL / ALK PHOS: 49 U/L / ALT: 32 U/L / AST: 28 U/L / GGT: x           Troponin I, High Sensitivity Result: 15.8 ng/L (06-01-22 @ 16:17)    12 Lead ECG:   Ventricular Rate 77 BPM    Atrial Rate 77 BPM    P-R Interval 272 ms    QRS Duration 166 ms    Q-T Interval 468 ms    QTC Calculation(Bazett) 529 ms    P Axis 61 degrees    R Axis 71 degrees    T Axis 34 degrees    Diagnosis Line   Atrial-sensed ventricular-paced rhythm with prolonged AV conduction  Abnormal ECG  When compared with ECG of 04-JUN-2018 14:22,  Electronic ventricular pacemaker has replaced Sinus rhythm  Confirmed by Waldemar Hopkins MD ([id number]) on 6/2/2022 11:15:43 AM (06-01-22 @ 15:42)      Patient name: NUNU HOLLIDAY  YOB: 1945   Age: 76 (F)   MR#: [id number]  Study Date: 4/19/2022  [facility] Diego [facility]oud[email or web address]  Study quality: Technically fair  Referring Physician: Tr Urban MD  Blood Pressure: 116/68 mmHg  Height: 163 cm  Weight: 93 kg  BSA: 2 m2  ------------------------------------------------------------------------  PROCEDURE: Transthoracic echocardiogram with 2-D, M-Mode  and complete spectral and color flow Doppler.  INDICATION: Dyspnea, unspecified (R06.00)  ------------------------------------------------------------------------  Dimensions:    Normal Values:  LA:     6.5    2.0 - 4.0 cm  Ao:     2.9    2.0 - 3.8 cm  SEPTUM: 1.2    0.6 - 1.2 cm  PWT:    1.0    0.6 - 1.1 cm  LVIDd:  4.0    3.0 - 5.6 cm  LVIDs:  2.2    1.8 - 4.0 cm  Derived variables:  LVMI: 74 g/m2  RWT: 0.50  Fractional short: 45 %  EF (Shin Rule): 66 %Doppler Peak Velocity (m/sec):  AoV=3.5  ------------------------------------------------------------------------  Observations:  Mitral Valve: Mitral annular calcification and calcified  mitral leaflets with decreased diastolic opening. Mild  mitral regurgitation.  Peak mitral valve gradient equals 21  mm Hg, mean transmitral valve gradient equals 5 mm Hg,  consistent with moderate mitral stenosis.  Aortic Valve/Aorta: Bioprosthetic aortic valve. Peak  transaortic valve gradient equals 49 mm Hg, mean  transaortic valve gradient equals 31 mm Hg, which is  elevated even in the presence of a bioprosthetic aortic  valve. Peak left ventricular outflow tract gradient equals  6 mm Hg, mean gradient is equal to 3 mm Hg, LVOT velocity  time integral equals 28 cm.  Aortic Root: 2.9 cm. The proximal ascending aorta is not  well visualized.  Left Atrium:Severely dilated left atrium.  LA volume index  = 67 cc/m2.  Left Ventricle: Normal left ventricular systolic function.  No segmental wall motion abnormalities. Septal flattening  consistent with right ventricular overload. Unable to  determine diastolic functino due to severe mitral annular  calcification and mild mitral stenosis.  Right Heart: Severe right atrial enlargement. Right  ventricular enlargement with decreased right ventricular  systolic function. A device wire is noted in the right  heart. There is malcoaptation of the tricuspid valve with  severe (wide open) tricuspid regurgitation. Normal pulmonic  valve.  Pericardium/Pleura: No pericardial effusion seen.  Hemodynamic: Estimated right ventricular systolic pressure  equals 31 mm Hg, assuming right atrial pressure equals 15  mm Hg, consistent with normal pulmonary pressures.  ------------------------------------------------------------------------  Conclusions:  1. Mitral annular calcification and calcified mitral  leaflets with decreased diastolic opening. Mild mitral  regurgitation.  Peak mitral valve gradient equals 21 mm Hg,  mean transmitral valve gradient equals 5 mm Hg, consistent  with moderate mitral stenosis.  2. Severely dilated left atrium.  LA volume index =67  cc/m2.Severe right atrial enlargement.  4. Right ventricular enlargement with decreased right  ventricular systolic function. A device wire is noted in  the right heart.  5. There is malcoaptation of the tricuspid valve with  severe (wide open) tricuspid regurgitation.  6. Estimated pulmonary artery systolic pressure equals 31  mm Hg, assuming right atrial pressure equals 15  mm Hg,  consistent with normal pulmonary pressures. The RVSP is  probably underestimated due to severe TR.  7. No pericardial effusion seen.  *** Compared with echocardiogram of 7/5/2016, no  significant changes noted.  ------------------------------------------------------------------------  Confirmed on  4/19/2022 - 16:13:24 by Yesy Langford M.D.  ------------------------------------------------------------------------  
[facility]  Charlie Mora PA-C  [facility] [place] [address]  [phone]      INTERVAL HPI/OVERNIGHT EVENTS:  Pt s/e  No reported GI complaints  No GIB symptoms    MEDICATIONS  (STANDING):  aMIOdarone    Tablet 200 milliGRAM(s) Oral daily  apixaban 5 milliGRAM(s) Oral every 12 hours  dextrose 5%. 1000 milliLiter(s) (100 mL/Hr) IV Continuous <Continuous>  dextrose 5%. 1000 milliLiter(s) (50 mL/Hr) IV Continuous <Continuous>  dextrose 50% Injectable 25 Gram(s) IV Push once  dextrose 50% Injectable 12.5 Gram(s) IV Push once  dextrose 50% Injectable 25 Gram(s) IV Push once  ferrous    sulfate 325 milliGRAM(s) Oral daily  glucagon  Injectable 1 milliGRAM(s) IntraMuscular once  insulin lispro (ADMELOG) corrective regimen sliding scale   SubCutaneous three times a day before meals  insulin lispro (ADMELOG) corrective regimen sliding scale   SubCutaneous at bedtime  levothyroxine 112 MICROGram(s) Oral daily  metoprolol succinate ER 50 milliGRAM(s) Oral daily  pantoprazole  Injectable 40 milliGRAM(s) IV Push every 12 hours  potassium chloride    Tablet ER 10 milliEquivalent(s) Oral daily  simvastatin 20 milliGRAM(s) Oral at bedtime  torsemide 10 milliGRAM(s) Oral daily    MEDICATIONS  (PRN):  acetaminophen     Tablet .. 650 milliGRAM(s) Oral every 6 hours PRN Temp greater or equal to 38C (100.4F), Mild Pain (1 - 3)  dextrose Oral Gel 15 Gram(s) Oral once PRN Blood Glucose LESS THAN 70 milliGRAM(s)/deciliter  ondansetron Injectable 4 milliGRAM(s) IV Push every 8 hours PRN Nausea and/or Vomiting      Allergies  sulfa drugs (Short breath)      PHYSICAL EXAM:   Vital Signs:  Vital Signs Last 24 Hrs  T(C): 36.7 (2022 05:29), Max: 36.7 (2022 20:14)  T(F): 98 (2022 05:29), Max: 98.1 (2022 20:14)  HR: 72 (2022 05:29) (71 - 77)  BP: 103/51 (2022 05:29) (103/51 - 127/66)  BP(mean): --  RR: 18 (2022 05:29) (18 - 18)  SpO2: 95% (2022 05:29) (94% - 96%)  Daily     Daily Weight in k.7 (2022 05:29)    GENERAL:  Appears stated age  ABDOMEN:  Soft, non-tender, non-distended  NEURO:  Alert      LABS:                        9.7    6.72  )-----------( 291      ( 2022 08:35 )             30.1     06-    142  |  107  |  19  ----------------------------<  150<H>  4.7   |  28  |  1.20    Ca    9.6      2022 08:35    
[facility] -- John Ng Grossman, Wachsman, Marvin Casey Savella, Goodger: Office # [phone]    Follow Up:  Anemia r/o bleed, [facility]    Subjective/Observations: Patient seen and examined, awake, alert, sitting comfortably in the chair eating breakfast, denies  chest pain, dyspnea, palpitations or dizziness, orthopnea and PND. Tolerating room air, c/o b/l le swelling    REVIEW OF SYSTEMS: All review of systems is negative for eye, ENT, GI, , allergic, dermatologic, musculoskeletal and neurologic except as described above    PAST MEDICAL & SURGICAL HISTORY:  Hypertension  x10 years      Dyslipidemia      Hiatal hernia      Type II diabetes mellitus      Aortic stenosis      Cancer of kidney, left      Right bundle branch block (RBBB)      Anemia      Hypothyroidism      Status post unilateral salpingo-oophorectomy      S/P T&amp;A (status post tonsillectomy and adenoidectomy)      H/O hand surgery      H/O partial nephrectomy  right 20125      H/O aortic valve replacement      Elective surgery  [facility]  2016          MEDICATIONS  (STANDING):  aMIOdarone    Tablet 200 milliGRAM(s) Oral daily  apixaban 5 milliGRAM(s) Oral every 12 hours  dextrose 5%. 1000 milliLiter(s) (100 mL/Hr) IV Continuous <Continuous>  dextrose 5%. 1000 milliLiter(s) (50 mL/Hr) IV Continuous <Continuous>  dextrose 50% Injectable 25 Gram(s) IV Push once  dextrose 50% Injectable 12.5 Gram(s) IV Push once  dextrose 50% Injectable 25 Gram(s) IV Push once  ferrous    sulfate 325 milliGRAM(s) Oral daily  glucagon  Injectable 1 milliGRAM(s) IntraMuscular once  insulin lispro (ADMELOG) corrective regimen sliding scale   SubCutaneous three times a day before meals  insulin lispro (ADMELOG) corrective regimen sliding scale   SubCutaneous at bedtime  levothyroxine 112 MICROGram(s) Oral daily  metoprolol succinate ER 50 milliGRAM(s) Oral daily  pantoprazole  Injectable 40 milliGRAM(s) IV Push every 12 hours  potassium chloride    Tablet ER 10 milliEquivalent(s) Oral daily  simvastatin 20 milliGRAM(s) Oral at bedtime  torsemide 10 milliGRAM(s) Oral daily    MEDICATIONS  (PRN):  acetaminophen     Tablet .. 650 milliGRAM(s) Oral every 6 hours PRN Temp greater or equal to 38C (100.4F), Mild Pain (1 - 3)  dextrose Oral Gel 15 Gram(s) Oral once PRN Blood Glucose LESS THAN 70 milliGRAM(s)/deciliter  ondansetron Injectable 4 milliGRAM(s) IV Push every 8 hours PRN Nausea and/or Vomiting    Allergies    sulfa drugs (Short breath)    Intolerances      Vital Signs Last 24 Hrs  T(C): 36.7 (07 Jun 2022 05:29), Max: 36.7 (06 Jun 2022 20:14)  T(F): 98 (07 Jun 2022 05:29), Max: 98.1 (06 Jun 2022 20:14)  HR: 72 (07 Jun 2022 05:29) (71 - 77)  BP: 103/51 (07 Jun 2022 05:29) (103/51 - 127/66)  BP(mean): --  RR: 18 (07 Jun 2022 05:29) (18 - 18)  SpO2: 95% (07 Jun 2022 05:29) (94% - 96%)  I&O's Summary        TELE: Not on telemetry   PHYSICAL EXAM:  Constitutional: NAD, awake and alert, well-developed  HEENT: Moist Mucous Membranes, Anicteric  Pulmonary: Non-labored, breath sounds are clear bilaterally, No wheezing, rales or rhonchi  Cardiovascular: Regular, S1 and S2, + murmurs, rubs, gallops or clicks  Gastrointestinal: Bowel Sounds present, soft, nontender.   Lymph: + peripheral edema. No lymphadenopathy.  Skin: No visible rashes or ulcers.  Psych:  Mood & affect appropriate for situation    LABS: All Labs Reviewed:                        9.7    6.72  )-----------( 291      ( 07 Jun 2022 08:35 )             30.1                         8.0    4.72  )-----------( 229      ( 06 Jun 2022 09:28 )             24.8                         8.8    x     )-----------( x        ( 05 Jun 2022 13:47 )             27.5     07 Jun 2022 08:35    142    |  107    |  19     ----------------------------<  150    4.7     |  28     |  1.20   06 Jun 2022 18:54    143    |  106    |  17     ----------------------------<  121    3.2     |  30     |  1.20   06 Jun 2022 09:28    144    |  109    |  19     ----------------------------<  122    3.1     |  29     |  1.10     Ca    9.6        07 Jun 2022 08:35  Ca    8.9        06 Jun 2022 18:54  Ca    8.8        06 Jun 2022 09:28        Troponin I, High Sensitivity Result: 15.8 ng/L (06-01-22 @ 16:17)                12 Lead ECG:   Ventricular Rate 77 BPM    Atrial Rate 77 BPM    P-R Interval 272 ms    QRS Duration 166 ms    Q-T Interval 468 ms    QTC Calculation(Bazett) 529 ms    P Axis 61 degrees    R Axis 71 degrees    T Axis 34 degrees    Diagnosis Line   Atrial-sensed ventricular-paced rhythm with prolonged AV conduction  Abnormal ECG  When compared with ECG of 04-JUN-2018 14:22,  Electronic ventricular pacemaker has replaced Sinus rhythm  Confirmed by Waldemar Hopkins MD ([id number]) on 6/2/2022 11:15:43 AM (06-01-22 @ 15:42)    < from: Xray Chest 1 View- PORTABLE-Urgent (06.01.22 @ 17:29) >    ACC: [id number] EXAM:  XR CHEST PORTABLE URGENT 1V                          PROCEDURE DATE:  06/01/2022          INTERPRETATION:  AP semierect chest on June 1, 2022 at 5:21 PM. Patient   is short of breath.    Heart normal for projection. Sternotomy,calcified mitral area, and   left-sided pacemaker again noted.    Elevated right hemidiaphragm again noted.    Lungs remain clear.    Chest is similar to May 12 of this year.    IMPRESSION: No acute finding or change.    --- End of Report ---            JORDON GREER MD; Attending Radiologist  This document has been electronically signed. Jun 2 2022  9:19AM    < end of copied text >  < from: Transesophageal Echocardiogram w/o TTE (04.22.22 @ 16:20) >    Patient name: NUNU HOLLIDAY  YOB: 1945   Age: 76 (F)   MR#: [id number]  Study Date: 4/22/2022  Location: [id number]: Refugio Cary M.D.  Study quality: Technically good  Referring Physician: Tr Urban MD  Blood Pressure: 109/70 mmHg  Height: 163 cm  Weight: 93 kg  BSA: 2 m2  ------------------------------------------------------------------------  PROCEDURE: Transesophageal (TIM) was performed.  Informed  consent was first obtained for TIM. The patient was sedated  - see anesthesia record.  The procedure was monitored with  automatic blood pressure monitoring, ECG tracings and pulse  oximetry. The transesophageal probe was placed in the  esophagus posterior to the heart without complications.  Real-time and reconstructed 3-dimensional imaging was  performed with Workstation. Color Doppler analysis was  carried out using both 2D and 3D mapping. Patient was  injected with 10 cc's of aerosolized saline. Patient was  injected with 10 cc's of aerosolized saline.  INDICATION: Nonrheumatic tricuspid (valve) insufficiency  (136.1), Unspecified atrial fibrillation (I48.91),  Unspecified atrial flutter (I48.92), Nonrheumatic mitral  (valve) insufficiency (I34.0), Nonrheumatic mitral (valve)  stenosis (I34.2)  ------------------------------------------------------------------------  Dimensions:    Normal Values:  LA:            2.0 - 4.0 cm  Ao:            2.0 - 3.8 cm  SEPTUM:        0.6 - 1.2 cm  PWT:           0.6 - 1.1 cm  LVIDd:         3.0 - 5.6 cm  LVIDs:         1.8 - 4.0 cm  EF (Visual Estimate): 60-65 %  Doppler Peak Velocity (m/sec): AoV=3.4  ------------------------------------------------------------------------  Observations:  Mitral Valve: Mitral annular calcification and calcified  mitral leaflets with decreased diastolic opening.  Mild-moderate mitral regurgitation. Systolic Blunting at  PV. Peak mitral valve gradient equals 11 mm Hg, mean  transmitral valve gradient equals 3 mm Hg, estimated mitral  valve area equals 2 sqcm (by 3D evaluation and planimetry),  consistent with mild mitral stenosis. HR 50  Aortic Valve/Aorta: Bioprosthetic aortic valve. Leaflets  are not well seen, Peak transaortic valve gradient equals  46 mm Hg, mean transaortic valve gradient equals 26 mm Hg,  which is elevated even in the presence of a bioprosthetic  aortic valve. DVI 0.31; Acceleration time 70mm Hg. LVOT VTI  25.4cm , Transaortic VTI 82cm, LVOT diameter 2.0 cm, BSA  1.98  EOA 1sqcm, EOAi 0.51. Suggestive of patient  prosthesis mismatch.  Noaortic valve regurgitation seen.  LVOT diameter: 2 cm.  Left Atrium: Severely dilated left atrium.  LA volume index  = 61 cc/m2. No left atrial or left atrial appendage  thrombus. Decreased left atrial appendage velocities noted.  Left Ventricle: Normal left ventricular systolic function.  Increased relative wall thickness with normal left  ventricular mass index, consistent with concentric left  ventricular remodeling. Unable to evaluate diastology due  to MAC  Right Heart: Severe right atrial enlargement. Device wires  are noted in the right heart. Right ventricular enlargement  with decreased right ventricular systolic function. RV BASE  7.5cm (normal <4.1cm) ; RV mid 4.9cm (normal <3.5)  Torrential Tricuspid regurgitation. The 3D Vena contracta  area was 1.15sqcm, the vena contracta 2.1cm. Tethered  tricuspid valve with malcoaptation of the tricuspid valve  leaflets due to a severely dilated annulus (>7cm). There is  a RV wire crossing the TV valve and is predominantly  located near the septal leaflet. The RV wire is not seen be  scarred to or to impinge the septal leaflet;  however.  buy  the nature of the location of the RV wire near the septal  leaflet of the TV, the RV wire may be slightly contributory  to the tricuspid regurgitation as it may prevent the septal  leaflet to rise ("close") to its full potential  (albeit  tethered ) at end diastole.  Pulmonic valve not well  visualized, probably normal.  Pericardium/Pleura: Normal pericardium with no pericardial  effusion.  Hemodynamic: Incomplete tricuspid regurgitation Doppler  envelopes in this study may underestimate RVSP. Agitated  saline injection and color flow Doppler demonstrates no  evidence of a patent foramen ovale.  ------------------------------------------------------------------------  Conclusions:  1. Mitral annular calcification and calcified mitral  leaflets with decreased diastolic opening. Mild-moderate  mitral regurgitation. Systolic Blunting at PV. Peak mitral  valve gradient equals 11 mm Hg, mean transmitral valve  gradient equals 3 mm Hg, estimated mitral valve area equals  2 sqcm (by 3D evaluation and planimetry), consistent with  mild mitral stenosis. HR 50  2. Bioprosthetic aortic valve. Leaflets are not well seen,  Peak transaortic valve gradient equals 46 mm Hg, mean  transaortic valve gradient equals 26 mm Hg, which is  elevated even in the presence of a bioprosthetic aortic  valve. DVI 0.31; Acceleration time 70mm Hg. LVOT VTI 25.4cm  , Transaortic VTI 82cm, LVOT diameter 2.0 cm, BSA 1.98  EOA  1sqcm, EOAi 0.51. Suggestive of patient prosthesis  mismatch.  No aortic valve regurgitation seen.  3. Severely dilated left atrium.  LA volume index = 61  cc/m2. No left atrial or left atrial appendage thrombus.  Decreased left atrial appendage velocities noted.  4. Increased relative wall thickness with normal left  ventricular mass index, consistent with concentric left  ventricular remodeling.  5. Normal left ventricular systolic function.  6. Right ventricular enlargement with decreased right  ventricular systolic function. RV BASE 7.5cm (normal  <4.1cm) ; RV mid 4.9cm (normal <3.5)  7. Incomplete tricuspid regurgitation Doppler envelopes in  this study may underestimate RVSP.  8. Torrential Tricuspid regurgitation. The 3D Vena  contracta area was 1.15sqcm, the vena contracta 2.1cm.  Tethered tricuspid valve with malcoaptation of the  tricuspid valve leaflets due to a severely dilated annulus  (>7cm). There is a RV wire crossing the TV valve and is  predominantly located near the septal leaflet. The RV wire  is not seen be scarred to or to impinge the septal leaflet;   however.  buy the nature of the location of the RV wire  near the septal leaflet of the TV, the RV wire may be  slightly contributory to the tricuspid regurgitation as it  may prevent the septal leaflet to rise ("close") to its  full potential  (albeit tethered ) at end diastole.  ------------------------------------------------------------------------  Confirmed on  4/22/2022 - 18:02:13 by FRANDY Hernandez  ------------------------------------------------------------------------    < end of copied text >  
s/p  upper gastrointestinal endoscopy and colonoscopy    mild gastritis, biopsied  normal duodenum, biopsied    colon; poor prep  no bleeding noted  scattered diverticulosis  hemmoroids    rec:     reg diet  hemolysis workup  ct a/p r/o rp bleed  outpatient colonoscopy for screening purposes  d/w patient 
[facility] -- John Ng Grossman, Wachsman, Marvin Casey Savella, Goodger: Office # [phone]    Follow Up:      Subjective/Observations:     REVIEW OF SYSTEMS: All review of systems is negative for eye, ENT, GI, , allergic, dermatologic, musculoskeletal and neurologic except as described above    PAST MEDICAL & SURGICAL HISTORY:  Hypertension  x10 years      Dyslipidemia      Hiatal hernia      Type II diabetes mellitus      Aortic stenosis      Cancer of kidney, left      Right bundle branch block (RBBB)      Anemia      Hypothyroidism      Status post unilateral salpingo-oophorectomy      S/P T&amp;A (status post tonsillectomy and adenoidectomy)      H/O hand surgery      H/O partial nephrectomy  right 20125      H/O aortic valve replacement      Elective surgery  [facility]  2016          MEDICATIONS  (STANDING):  aMIOdarone    Tablet 200 milliGRAM(s) Oral daily  apixaban 5 milliGRAM(s) Oral every 12 hours  dextrose 5%. 1000 milliLiter(s) (100 mL/Hr) IV Continuous <Continuous>  dextrose 5%. 1000 milliLiter(s) (50 mL/Hr) IV Continuous <Continuous>  dextrose 50% Injectable 25 Gram(s) IV Push once  dextrose 50% Injectable 12.5 Gram(s) IV Push once  dextrose 50% Injectable 25 Gram(s) IV Push once  ferrous    sulfate 325 milliGRAM(s) Oral daily  glucagon  Injectable 1 milliGRAM(s) IntraMuscular once  insulin lispro (ADMELOG) corrective regimen sliding scale   SubCutaneous three times a day before meals  insulin lispro (ADMELOG) corrective regimen sliding scale   SubCutaneous at bedtime  levothyroxine 112 MICROGram(s) Oral daily  metoprolol succinate ER 50 milliGRAM(s) Oral daily  pantoprazole  Injectable 40 milliGRAM(s) IV Push every 12 hours  potassium chloride    Tablet ER 10 milliEquivalent(s) Oral daily  simvastatin 20 milliGRAM(s) Oral at bedtime  torsemide 10 milliGRAM(s) Oral daily    MEDICATIONS  (PRN):  acetaminophen     Tablet .. 650 milliGRAM(s) Oral every 6 hours PRN Temp greater or equal to 38C (100.4F), Mild Pain (1 - 3)  dextrose Oral Gel 15 Gram(s) Oral once PRN Blood Glucose LESS THAN 70 milliGRAM(s)/deciliter  ondansetron Injectable 4 milliGRAM(s) IV Push every 8 hours PRN Nausea and/or Vomiting    Allergies    sulfa drugs (Short breath)    Intolerances      Vital Signs Last 24 Hrs  T(C): 36.3 (06 Jun 2022 05:45), Max: 36.9 (05 Jun 2022 13:33)  T(F): 97.4 (06 Jun 2022 05:45), Max: 98.4 (05 Jun 2022 13:33)  HR: 71 (06 Jun 2022 05:45) (64 - 72)  BP: 112/66 (06 Jun 2022 05:45) (109/72 - 115/71)  BP(mean): --  RR: 18 (06 Jun 2022 05:45) (18 - 19)  SpO2: 92% (06 Jun 2022 05:45) (92% - 98%)  I&O's Summary        TELE:   PHYSICAL EXAM:  Constitutional: NAD, awake and alert, well-developed  HEENT: Moist Mucous Membranes, Anicteric  Pulmonary: Non-labored, breath sounds are clear bilaterally, No wheezing, rales or rhonchi  Cardiovascular: Regular, S1 and S2, No murmurs, rubs, gallops or clicks  Gastrointestinal: Bowel Sounds present, soft, nontender.   Lymph: No peripheral edema. No lymphadenopathy.  Skin: No visible rashes or ulcers.  Psych:  Mood & affect appropriate for situation    LABS: All Labs Reviewed:                        8.0    4.72  )-----------( 229      ( 06 Jun 2022 09:28 )             24.8                         8.8    x     )-----------( x        ( 05 Jun 2022 13:47 )             27.5                         7.6    5.35  )-----------( 209      ( 05 Jun 2022 07:53 )             23.6     06 Jun 2022 09:28    144    |  109    |  19     ----------------------------<  122    3.1     |  29     |  1.10   05 Jun 2022 07:53    142    |  112    |  15     ----------------------------<  130    4.0     |  25     |  0.94   04 Jun 2022 07:43    143    |  111    |  21     ----------------------------<  129    4.0     |  28     |  1.00     Ca    8.8        06 Jun 2022 09:28  Ca    8.7        05 Jun 2022 07:53  Ca    9.1        04 Jun 2022 07:43    TPro  5.1    /  Alb  2.5    /  TBili  1.0    /  DBili  x      /  AST  28     /  ALT  32     /  AlkPhos  49     04 Jun 2022 07:43  TPro  5.5    /  Alb  2.8    /  TBili  1.1    /  DBili  0.5    /  AST  29     /  ALT  31     /  AlkPhos  45     03 Jun 2022 13:49      Troponin I, High Sensitivity Result: 15.8 ng/L (06-01-22 @ 16:17)                12 Lead ECG:   Ventricular Rate 77 BPM    Atrial Rate 77 BPM    P-R Interval 272 ms    QRS Duration 166 ms    Q-T Interval 468 ms    QTC Calculation(Bazett) 529 ms    P Axis 61 degrees    R Axis 71 degrees    T Axis 34 degrees    Diagnosis Line   Atrial-sensed ventricular-paced rhythm with prolonged AV conduction  Abnormal ECG  When compared with ECG of 04-JUN-2018 14:22,  Electronic ventricular pacemaker has replaced Sinus rhythm  Confirmed by Waldemar Hopkins MD ([id number]) on 6/2/2022 11:15:43 AM (06-01-22 @ 15:42)    < from: Xray Chest 1 View- PORTABLE-Urgent (06.01.22 @ 17:29) >    ACC: [id number] EXAM:  XR CHEST PORTABLE URGENT 1V                          PROCEDURE DATE:  06/01/2022          INTERPRETATION:  AP semierect chest on June 1, 2022 at 5:21 PM. Patient   is short of breath.    Heart normal for projection. Sternotomy,calcified mitral area, and   left-sided pacemaker again noted.    Elevated right hemidiaphragm again noted.    Lungs remain clear.    Chest is similar to May 12 of this year.    IMPRESSION: No acute finding or change.    --- End of Report ---            JORDON GREER MD; Attending Radiologist  This document has been electronically signed. Jun 2 2022  9:19AM    < end of copied text >  < from: Transesophageal Echocardiogram w/o TTE (04.22.22 @ 16:20) >    Patient name: NUNU HOLLIDAY  YOB: 1945   Age: 76 (F)   MR#: [id number]  Study Date: 4/22/2022  Location: [address] [place], [place] [id number]: Refugio Cary M.D.  Study quality: Technically good  Referring Physician: Tr Urban MD  Blood Pressure: 109/70 mmHg  Height: 163 cm  Weight: 93 kg  BSA: 2 m2  ------------------------------------------------------------------------  PROCEDURE: Transesophageal (TIM) was performed.  Informed  consent was first obtained for TIM. The patient was sedated  - see anesthesia record.  The procedure was monitored with  automatic blood pressure monitoring, ECG tracings and pulse  oximetry. The transesophageal probe was placed in the  esophagus posterior to the heart without complications.  Real-time and reconstructed 3-dimensional imaging was  performed with Workstation. Color Doppler analysis was  carried out using both 2D and 3D mapping. Patient was  injected with 10 cc's of aerosolized saline. Patient was  injected with 10 cc's of aerosolized saline.  INDICATION: Nonrheumatic tricuspid (valve) insufficiency  (136.1), Unspecified atrial fibrillation (I48.91),  Unspecified atrial flutter (I48.92), Nonrheumatic mitral  (valve) insufficiency (I34.0), Nonrheumatic mitral (valve)  stenosis (I34.2)  ------------------------------------------------------------------------  Dimensions:    Normal Values:  LA:            2.0 - 4.0 cm  Ao:            2.0 - 3.8 cm  SEPTUM:        0.6 - 1.2 cm  PWT:           0.6 - 1.1 cm  LVIDd:         3.0 - 5.6 cm  LVIDs:         1.8 - 4.0 cm  EF (Visual Estimate): 60-65 %  Doppler Peak Velocity (m/sec): AoV=3.4  ------------------------------------------------------------------------  Observations:  Mitral Valve: Mitral annular calcification and calcified  mitral leaflets with decreased diastolic opening.  Mild-moderate mitral regurgitation. Systolic Blunting at  PV. Peak mitral valve gradient equals 11 mm Hg, mean  transmitral valve gradient equals 3 mm Hg, estimated mitral  valve area equals 2 sqcm (by 3D evaluation and planimetry),  consistent with mild mitral stenosis. HR 50  Aortic Valve/Aorta: Bioprosthetic aortic valve. Leaflets  are not well seen, Peak transaortic valve gradient equals  46 mm Hg, mean transaortic valve gradient equals 26 mm Hg,  which is elevated even in the presence of a bioprosthetic  aortic valve. DVI 0.31; Acceleration time 70mm Hg. LVOT VTI  25.4cm , Transaortic VTI 82cm, LVOT diameter 2.0 cm, BSA  1.98  EOA 1sqcm, EOAi 0.51. Suggestive of patient  prosthesis mismatch.  Noaortic valve regurgitation seen.  LVOT diameter: 2 cm.  Left Atrium: Severely dilated left atrium.  LA volume index  = 61 cc/m2. No left atrial or left atrial appendage  thrombus. Decreased left atrial appendage velocities noted.  Left Ventricle: Normal left ventricular systolic function.  Increased relative wall thickness with normal left  ventricular mass index, consistent with concentric left  ventricular remodeling. Unable to evaluate diastology due  to MAC  Right Heart: Severe right atrial enlargement. Device wires  are noted in the right heart. Right ventricular enlargement  with decreased right ventricular systolic function. RV BASE  7.5cm (normal <4.1cm) ; RV mid 4.9cm (normal <3.5)  Torrential Tricuspid regurgitation. The 3D Vena contracta  area was 1.15sqcm, the vena contracta 2.1cm. Tethered  tricuspid valve with malcoaptation of the tricuspid valve  leaflets due to a severely dilated annulus (>7cm). There is  a RV wire crossing the TV valve and is predominantly  located near the septal leaflet. The RV wire is not seen be  scarred to or to impinge the septal leaflet;  however.  buy  the nature of the location of the RV wire near the septal  leaflet of the TV, the RV wire may be slightly contributory  to the tricuspid regurgitation as it may prevent the septal  leaflet to rise ("close") to its full potential  (albeit  tethered ) at end diastole.  Pulmonic valve not well  visualized, probably normal.  Pericardium/Pleura: Normal pericardium with no pericardial  effusion.  Hemodynamic: Incomplete tricuspid regurgitation Doppler  envelopes in this study may underestimate RVSP. Agitated  saline injection and color flow Doppler demonstrates no  evidence of a patent foramen ovale.  ------------------------------------------------------------------------  Conclusions:  1. Mitral annular calcification and calcified mitral  leaflets with decreased diastolic opening. Mild-moderate  mitral regurgitation. Systolic Blunting at PV. Peak mitral  valve gradient equals 11 mm Hg, mean transmitral valve  gradient equals 3 mm Hg, estimated mitral valve area equals  2 sqcm (by 3D evaluation and planimetry), consistent with  mild mitral stenosis. HR 50  2. Bioprosthetic aortic valve. Leaflets are not well seen,  Peak transaortic valve gradient equals 46 mm Hg, mean  transaortic valve gradient equals 26 mm Hg, which is  elevated even in the presence of a bioprosthetic aortic  valve. DVI 0.31; Acceleration time 70mm Hg. LVOT VTI 25.4cm  , Transaortic VTI 82cm, LVOT diameter 2.0 cm, BSA 1.98  EOA  1sqcm, EOAi 0.51. Suggestive of patient prosthesis  mismatch.  No aortic valve regurgitation seen.  3. Severely dilated left atrium.  LA volume index = 61  cc/m2. No left atrial or left atrial appendage thrombus.  Decreased left atrial appendage velocities noted.  4. Increased relative wall thickness with normal left  ventricular mass index, consistent with concentric left  ventricular remodeling.  5. Normal left ventricular systolic function.  6. Right ventricular enlargement with decreased right  ventricular systolic function. RV BASE 7.5cm (normal  <4.1cm) ; RV mid 4.9cm (normal <3.5)  7. Incomplete tricuspid regurgitation Doppler envelopes in  this study may underestimate RVSP.  8. Torrential Tricuspid regurgitation. The 3D Vena  contracta area was 1.15sqcm, the vena contracta 2.1cm.  Tethered tricuspid valve with malcoaptation of the  tricuspid valve leaflets due to a severely dilated annulus  (>7cm). There is a RV wire crossing the TV valve and is  predominantly located near the septal leaflet. The RV wire  is not seen be scarred to or to impinge the septal leaflet;   however.  buy the nature of the location of the RV wire  near the septal leaflet of the TV, the RV wire may be  slightly contributory to the tricuspid regurgitation as it  may prevent the septal leaflet to rise ("close") to its  full potential  (albeit tethered ) at end diastole.  ------------------------------------------------------------------------  Confirmed on  4/22/2022 - 18:02:13 by FRANDY Hernandez  ------------------------------------------------------------------------    < end of copied text >  
[facility] -- John Ng, Sandeep, Robson, Marvin Casey Savella  Office # [phone]      Follow Up:    dyspnea    Subjective/Observations:     No events overnight resting comfortably in bed.  No complaints of chest pain, dyspnea, or palpitations reported. No signs of orthopnea or PND.  overall feeling much better after PRBC   REVIEW OF SYSTEMS: All other review of systems is negative unless indicated above    PAST MEDICAL & SURGICAL HISTORY:  Hypertension  x10 years      Dyslipidemia      Hiatal hernia      Type II diabetes mellitus      Aortic stenosis      Cancer of kidney, left      Right bundle branch block (RBBB)      Anemia      Hypothyroidism      Status post unilateral salpingo-oophorectomy      S/P T&amp;A (status post tonsillectomy and adenoidectomy)      H/O hand surgery      H/O partial nephrectomy  right 20125      H/O aortic valve replacement      Elective surgery  [facility]  2016          MEDICATIONS  (STANDING):  aMIOdarone    Tablet 200 milliGRAM(s) Oral daily  dextrose 5%. 1000 milliLiter(s) (100 mL/Hr) IV Continuous <Continuous>  dextrose 5%. 1000 milliLiter(s) (50 mL/Hr) IV Continuous <Continuous>  dextrose 50% Injectable 25 Gram(s) IV Push once  dextrose 50% Injectable 12.5 Gram(s) IV Push once  dextrose 50% Injectable 25 Gram(s) IV Push once  ferrous    sulfate 325 milliGRAM(s) Oral daily  glucagon  Injectable 1 milliGRAM(s) IntraMuscular once  insulin lispro (ADMELOG) corrective regimen sliding scale   SubCutaneous every 6 hours  insulin lispro (ADMELOG) corrective regimen sliding scale   SubCutaneous at bedtime  levothyroxine 112 MICROGram(s) Oral daily  pantoprazole  Injectable 40 milliGRAM(s) IV Push every 12 hours  potassium chloride    Tablet ER 10 milliEquivalent(s) Oral daily  potassium chloride  10 mEq/100 mL IVPB 10 milliEquivalent(s) IV Intermittent every 1 hour  simvastatin 20 milliGRAM(s) Oral at bedtime    MEDICATIONS  (PRN):  acetaminophen     Tablet .. 650 milliGRAM(s) Oral every 6 hours PRN Temp greater or equal to 38C (100.4F), Mild Pain (1 - 3)  dextrose Oral Gel 15 Gram(s) Oral once PRN Blood Glucose LESS THAN 70 milliGRAM(s)/deciliter  ondansetron Injectable 4 milliGRAM(s) IV Push every 8 hours PRN Nausea and/or Vomiting      Allergies    sulfa drugs (Short breath)    Intolerances        Vital Signs Last 24 Hrs  T(C): 36.9 (03 Jun 2022 05:25), Max: 37.1 (02 Jun 2022 13:36)  T(F): 98.4 (03 Jun 2022 05:25), Max: 98.7 (02 Jun 2022 13:36)  HR: 69 (03 Jun 2022 05:25) (69 - 70)  BP: 104/68 (03 Jun 2022 05:25) (101/66 - 109/69)  BP(mean): --  RR: 18 (03 Jun 2022 05:25) (18 - 18)  SpO2: 95% (03 Jun 2022 05:25) (95% - 98%)    I&O's Summary    02 Jun 2022 07:01  -  03 Jun 2022 07:00  --------------------------------------------------------  IN: 400 mL / OUT: 0 mL / NET: 400 mL          PHYSICAL EXAM:  TELE: SR 1st degree avb   Constitutional: NAD, awake and alert, well-developed  HEENT: Moist Mucous Membranes, Anicteric  Pulmonary: Non-labored, breath sounds are clear bilaterally, No wheezing, crackles or rhonchi  Cardiovascular: Regular, S1 and S2 nl,murmur   Gastrointestinal: Bowel Sounds present, soft, nontender.   Lymph: No lymphadenopathy. No peripheral edema.  Skin: No visible rashes or ulcers.  Psych:  Mood & affect appropriate    LABS: All Labs Reviewed:                        7.5    7.05  )-----------( 226      ( 03 Jun 2022 08:26 )             22.4                         8.5    8.80  )-----------( 251      ( 02 Jun 2022 18:41 )             25.8                         8.2    8.86  )-----------( 236      ( 02 Jun 2022 10:16 )             24.4     03 Jun 2022 08:26    147    |  114    |  38     ----------------------------<  112    3.2     |  24     |  1.10   02 Jun 2022 03:50    138    |  105    |  67     ----------------------------<  124    4.6     |  23     |  1.60   01 Jun 2022 16:17    137    |  102    |  71     ----------------------------<  196    4.8     |  14     |  2.20     Ca    9.1        03 Jun 2022 08:26  Ca    9.5        02 Jun 2022 03:50  Ca    9.8        01 Jun 2022 16:17  Phos  4.1       02 Jun 2022 03:50  Mg     2.2       02 Jun 2022 03:50    TPro  6.4    /  Alb  3.0    /  TBili  0.7    /  DBili  x      /  AST  14     /  ALT  26     /  AlkPhos  40     01 Jun 2022 16:17          < from: 12 Lead ECG (06.01.22 @ 15:42) >    Ventricular Rate 77 BPM    Atrial Rate 77 BPM    P-R Interval 272 ms    QRS Duration 166 ms    Q-T Interval 468 ms    QTC Calculation(Bazett) 529 ms    P Axis 61 degrees    R Axis 71 degrees    T Axis 34 degrees    Diagnosis Line   Atrial-sensed ventricular-paced rhythm with prolonged AV conduction  Abnormal ECG  When compared with ECG of 04-JUN-2018 14:22,  Electronic ventricular pacemaker has replaced Sinus rhythm    < from: Transthoracic Echocardiogram (04.19.22 @ 11:58) >  Conclusions:  1. Mitral annular calcification and calcified mitral  leaflets with decreased diastolic opening. Mild mitral  regurgitation.  Peak mitral valve gradient equals 21 mm Hg,  mean transmitral valve gradient equals 5 mm Hg, consistent  with moderate mitral stenosis.  2. Severely dilated left atrium.  LA volume index =67  cc/m2.Severe right atrial enlargement.  4. Right ventricular enlargement with decreased right  ventricular systolic function. A device wire is noted in  the right heart.  5. There is malcoaptation of the tricuspid valve with  severe (wide open) tricuspid regurgitation.  6. Estimated pulmonary artery systolic pressure equals 31  mm Hg, assuming right atrial pressure equals 15  mm Hg,  consistent with normal pulmonary pressures. The RVSP is  probably underestimated due to severe TR.  7. No pericardial effusion seen.    < end of copied text >          
[facility] -- John Ng Grossman, [place], Marvin Casey, Reji Jones: Office # [phone]    Follow Up:  Anemia r/o bleed, [facility]    Subjective/Observations: Patient awake, alert, resting in bed. Denies chest pain, palpitations and dizziness. Denies any difficulty breathing. No orthopnea and PND. Tolerating room air.     REVIEW OF SYSTEMS: All other review of systems are negative unless indicated above    PAST MEDICAL & SURGICAL HISTORY:  Hypertension  x10 years      Hypertension  x10 years      Dyslipidemia      Hiatal hernia      Type II diabetes mellitus      Aortic stenosis      Cancer of kidney, left      Right bundle branch block (RBBB)      Anemia      Anemia      Hypothyroidism      Hypothyroidism      Status post unilateral salpingo-oophorectomy      S/P T&amp;A (status post tonsillectomy and adenoidectomy)      H/O hand surgery      H/O partial nephrectomy  right 20125      H/O aortic valve replacement      Elective surgery  [facility]  2016    MEDICATIONS  (STANDING):  aMIOdarone    Tablet 200 milliGRAM(s) Oral daily  apixaban 5 milliGRAM(s) Oral every 12 hours  dextrose 5%. 1000 milliLiter(s) (100 mL/Hr) IV Continuous <Continuous>  dextrose 5%. 1000 milliLiter(s) (50 mL/Hr) IV Continuous <Continuous>  dextrose 50% Injectable 25 Gram(s) IV Push once  dextrose 50% Injectable 12.5 Gram(s) IV Push once  dextrose 50% Injectable 25 Gram(s) IV Push once  ferrous    sulfate 325 milliGRAM(s) Oral daily  glucagon  Injectable 1 milliGRAM(s) IntraMuscular once  insulin lispro (ADMELOG) corrective regimen sliding scale   SubCutaneous every 6 hours  insulin lispro (ADMELOG) corrective regimen sliding scale   SubCutaneous at bedtime  levothyroxine 112 MICROGram(s) Oral daily  metoprolol succinate ER 50 milliGRAM(s) Oral daily  pantoprazole  Injectable 40 milliGRAM(s) IV Push every 12 hours  potassium chloride    Tablet ER 10 milliEquivalent(s) Oral daily  potassium chloride  10 mEq/100 mL IVPB 10 milliEquivalent(s) IV Intermittent every 1 hour  simvastatin 20 milliGRAM(s) Oral at bedtime    MEDICATIONS  (PRN):  acetaminophen     Tablet .. 650 milliGRAM(s) Oral every 6 hours PRN Temp greater or equal to 38C (100.4F), Mild Pain (1 - 3)  dextrose Oral Gel 15 Gram(s) Oral once PRN Blood Glucose LESS THAN 70 milliGRAM(s)/deciliter  ondansetron Injectable 4 milliGRAM(s) IV Push every 8 hours PRN Nausea and/or Vomiting    Allergies    sulfa drugs (Short breath)    Intolerances      Vital Signs Last 24 Hrs  T(C): 36.9 (04 Jun 2022 04:35), Max: 37.1 (03 Jun 2022 21:20)  T(F): 98.5 (04 Jun 2022 04:35), Max: 98.8 (03 Jun 2022 21:20)  HR: 66 (04 Jun 2022 04:35) (66 - 85)  BP: 97/58 (04 Jun 2022 04:35) (97/58 - 152/67)  BP(mean): --  RR: 18 (04 Jun 2022 04:35) (16 - 18)  SpO2: 94% (04 Jun 2022 04:35) (94% - 100%)  I&O's Summary    03 Jun 2022 07:01  -  04 Jun 2022 07:00  --------------------------------------------------------  IN: 360 mL / OUT: 0 mL / NET: 360 mL          TELE: Not on telemetry   PHYSICAL EXAM:  Constitutional: NAD, awake and alert, Well developed   HEENT: Moist Mucous Membranes, Anicteric  Pulmonary: Non-labored, breath sounds are clear bilaterally, No wheezing, rales or rhonchi  Cardiovascular: Regular, S1 and S2, + murmurs, No rubs, gallops or clicks  Gastrointestinal:  soft, nontender, nondistended   Lymph: trace peripheral edema. No lymphadenopathy.   Skin: No visible rashes or ulcers.  Psych:  Mood & affect appropriate      LABS: All Labs Reviewed:                        7.9    5.98  )-----------( 231      ( 04 Jun 2022 07:43 )             24.7                         8.3    6.83  )-----------( 240      ( 03 Jun 2022 13:49 )             25.2                         7.5    7.05  )-----------( 226      ( 03 Jun 2022 08:26 )             22.4     04 Jun 2022 07:43    143    |  111    |  21     ----------------------------<  129    4.0     |  28     |  1.00   03 Jun 2022 13:49    144    |  112    |  31     ----------------------------<  119    4.1     |  27     |  1.10   03 Jun 2022 08:26    147    |  114    |  38     ----------------------------<  112    3.2     |  24     |  1.10     Ca    9.1        04 Jun 2022 07:43  Ca    9.4        03 Jun 2022 13:49  Ca    9.1        03 Jun 2022 08:26  Phos  4.1       02 Jun 2022 03:50  Mg     2.2       02 Jun 2022 03:50    TPro  5.1    /  Alb  2.5    /  TBili  1.0    /  DBili  x      /  AST  28     /  ALT  32     /  AlkPhos  49     04 Jun 2022 07:43  TPro  5.5    /  Alb  2.8    /  TBili  1.1    /  DBili  0.5    /  AST  29     /  ALT  31     /  AlkPhos  45     03 Jun 2022 13:49  TPro  6.4    /  Alb  3.0    /  TBili  0.7    /  DBili  x      /  AST  14     /  ALT  26     /  AlkPhos  40     01 Jun 2022 16:17   LIVER FUNCTIONS - ( 04 Jun 2022 07:43 )  Alb: 2.5 g/dL / Pro: 5.1 g/dL / ALK PHOS: 49 U/L / ALT: 32 U/L / AST: 28 U/L / GGT: x           Troponin I, High Sensitivity Result: 15.8 ng/L (06-01-22 @ 16:17)    12 Lead ECG:   Ventricular Rate 77 BPM    Atrial Rate 77 BPM    P-R Interval 272 ms    QRS Duration 166 ms    Q-T Interval 468 ms    QTC Calculation(Bazett) 529 ms    P Axis 61 degrees    R Axis 71 degrees    T Axis 34 degrees    Diagnosis Line   Atrial-sensed ventricular-paced rhythm with prolonged AV conduction  Abnormal ECG  When compared with ECG of 04-JUN-2018 14:22,  Electronic ventricular pacemaker has replaced Sinus rhythm  Confirmed by Waldemar Hopkins MD ([id number]) on 6/2/2022 11:15:43 AM (06-01-22 @ 15:42)      Patient name: NUNU HOLLIDAY  YOB: 1945   Age: 76 (F)   MR#: [id number]  Study Date: 4/19/2022  [facility]  Study quality: Technically fair  Referring Physician: Tr Urban MD  Blood Pressure: 116/68 mmHg  Height: 163 cm  Weight: 93 kg  BSA: 2 m2  ------------------------------------------------------------------------  PROCEDURE: Transthoracic echocardiogram with 2-D, M-Mode  and complete spectral and color flow Doppler.  INDICATION: Dyspnea, unspecified (R06.00)  ------------------------------------------------------------------------  Dimensions:    Normal Values:  LA:     6.5    2.0 - 4.0 cm  Ao:     2.9    2.0 - 3.8 cm  SEPTUM: 1.2    0.6 - 1.2 cm  PWT:    1.0    0.6 - 1.1 cm  LVIDd:  4.0    3.0 - 5.6 cm  LVIDs:  2.2    1.8 - 4.0 cm  Derived variables:  LVMI: 74 g/m2  RWT: 0.50  Fractional short: 45 %  EF (Shin Rule): 66 %Doppler Peak Velocity (m/sec):  AoV=3.5  ------------------------------------------------------------------------  Observations:  Mitral Valve: Mitral annular calcification and calcified  mitral leaflets with decreased diastolic opening. Mild  mitral regurgitation.  Peak mitral valve gradient equals 21  mm Hg, mean transmitral valve gradient equals 5 mm Hg,  consistent with moderate mitral stenosis.  Aortic Valve/Aorta: Bioprosthetic aortic valve. Peak  transaortic valve gradient equals 49 mm Hg, mean  transaortic valve gradient equals 31 mm Hg, which is  elevated even in the presence of a bioprosthetic aortic  valve. Peak left ventricular outflow tract gradient equals  6 mm Hg, mean gradient is equal to 3 mm Hg, LVOT velocity  time integral equals 28 cm.  Aortic Root: 2.9 cm. The proximal ascending aorta is not  well visualized.  Left Atrium:Severely dilated left atrium.  LA volume index  = 67 cc/m2.  Left Ventricle: Normal left ventricular systolic function.  No segmental wall motion abnormalities. Septal flattening  consistent with right ventricular overload. Unable to  determine diastolic functino due to severe mitral annular  calcification and mild mitral stenosis.  Right Heart: Severe right atrial enlargement. Right  ventricular enlargement with decreased right ventricular  systolic function. A device wire is noted in the right  heart. There is malcoaptation of the tricuspid valve with  severe (wide open) tricuspid regurgitation. Normal pulmonic  valve.  Pericardium/Pleura: No pericardial effusion seen.  Hemodynamic: Estimated right ventricular systolic pressure  equals 31 mm Hg, assuming right atrial pressure equals 15  mm Hg, consistent with normal pulmonary pressures.  ------------------------------------------------------------------------  Conclusions:  1. Mitral annular calcification and calcified mitral  leaflets with decreased diastolic opening. Mild mitral  regurgitation.  Peak mitral valve gradient equals 21 mm Hg,  mean transmitral valve gradient equals 5 mm Hg, consistent  with moderate mitral stenosis.  2. Severely dilated left atrium.  LA volume index =67  cc/m2.Severe right atrial enlargement.  4. Right ventricular enlargement with decreased right  ventricular systolic function. A device wire is noted in  the right heart.  5. There is malcoaptation of the tricuspid valve with  severe (wide open) tricuspid regurgitation.  6. Estimated pulmonary artery systolic pressure equals 31  mm Hg, assuming right atrial pressure equals 15  mm Hg,  consistent with normal pulmonary pressures. The RVSP is  probably underestimated due to severe TR.  7. No pericardial effusion seen.  *** Compared with echocardiogram of 7/5/2016, no  significant changes noted.  ------------------------------------------------------------------------  Confirmed on  4/19/2022 - 16:13:24 by Yesy Langford M.D.  ------------------------------------------------------------------------  
[facility]  Charlie Mora PA-C  [address] [place] [address]  [phone]      INTERVAL HPI/OVERNIGHT EVENTS:  Seen and examined, no acute overnight events   s/p Endo/ colon  h/h downtrending  no overt s/s GIB          MEDICATIONS  (STANDING):  aMIOdarone    Tablet 200 milliGRAM(s) Oral daily  apixaban 5 milliGRAM(s) Oral every 12 hours  dextrose 5%. 1000 milliLiter(s) (100 mL/Hr) IV Continuous <Continuous>  dextrose 5%. 1000 milliLiter(s) (50 mL/Hr) IV Continuous <Continuous>  dextrose 50% Injectable 25 Gram(s) IV Push once  dextrose 50% Injectable 12.5 Gram(s) IV Push once  dextrose 50% Injectable 25 Gram(s) IV Push once  ferrous    sulfate 325 milliGRAM(s) Oral daily  glucagon  Injectable 1 milliGRAM(s) IntraMuscular once  insulin lispro (ADMELOG) corrective regimen sliding scale   SubCutaneous three times a day before meals  insulin lispro (ADMELOG) corrective regimen sliding scale   SubCutaneous at bedtime  levothyroxine 112 MICROGram(s) Oral daily  metoprolol succinate ER 50 milliGRAM(s) Oral daily  pantoprazole  Injectable 40 milliGRAM(s) IV Push every 12 hours  potassium chloride    Tablet ER 10 milliEquivalent(s) Oral daily  simvastatin 20 milliGRAM(s) Oral at bedtime  torsemide 10 milliGRAM(s) Oral daily    MEDICATIONS  (PRN):  acetaminophen     Tablet .. 650 milliGRAM(s) Oral every 6 hours PRN Temp greater or equal to 38C (100.4F), Mild Pain (1 - 3)  dextrose Oral Gel 15 Gram(s) Oral once PRN Blood Glucose LESS THAN 70 milliGRAM(s)/deciliter  ondansetron Injectable 4 milliGRAM(s) IV Push every 8 hours PRN Nausea and/or Vomiting      Allergies    sulfa drugs (Short breath)    Intolerances          PHYSICAL EXAM  Vital Signs Last 24 Hrs  T(C): 37.1 (05 Jun 2022 04:19), Max: 37.1 (05 Jun 2022 04:19)  T(F): 98.7 (05 Jun 2022 04:19), Max: 98.7 (05 Jun 2022 04:19)  HR: 63 (05 Jun 2022 04:19) (62 - 66)  BP: 91/55 (05 Jun 2022 04:19) (91/55 - 137/82)  BP(mean): --  RR: 18 (05 Jun 2022 04:19) (18 - 18)  SpO2: 96% (05 Jun 2022 04:19) (96% - 99%)            GENERAL:  Appears stated age,   HEENT:  NC/AT,    CHEST:  Full & symmetric excursion,   HEART:  Regular rhythm  ABDOMEN:  Soft, non-tender, non-distended,   EXTEREMITIES:  no cyanosis,clubbing or edema  SKIN:  No rash  NEURO:  Alert,        LABS:                        7.6    5.35  )-----------( 209      ( 05 Jun 2022 07:53 )             23.6     06-05    142  |  112<H>  |  15  ----------------------------<  130<H>  4.0   |  25  |  0.94    Ca    8.7      05 Jun 2022 07:53    TPro  5.1<L>  /  Alb  2.5<L>  /  TBili  1.0  /  DBili  x   /  AST  28  /  ALT  32  /  AlkPhos  49  06-04 06-04-22 @ 07:01  -  06-05-22 @ 07:00  --------------------------------------------------------  IN: 590 mL / OUT: 0 mL / NET: 590 mL                RADIOLOGY & ADDITIONAL TESTS:      
  Patient is a 76y old  Female who presents with a chief complaint of Anemia r/o bleed, ADAMS (03 Jun 2022 12:33)      SUBJECTIVE / OVERNIGHT EVENTS: No overnight events. Feels well, No complaints. Denies chest pain, sob, dizziness, palpitations, n/v.  No melena, hematochezia, abd pain, back pain          ADDITIONAL REVIEW OF SYSTEMS: Negative except for above    MEDICATIONS  (STANDING):  aMIOdarone    Tablet 200 milliGRAM(s) Oral daily  dextrose 5%. 1000 milliLiter(s) (100 mL/Hr) IV Continuous <Continuous>  dextrose 5%. 1000 milliLiter(s) (50 mL/Hr) IV Continuous <Continuous>  dextrose 50% Injectable 25 Gram(s) IV Push once  dextrose 50% Injectable 12.5 Gram(s) IV Push once  dextrose 50% Injectable 25 Gram(s) IV Push once  ferrous    sulfate 325 milliGRAM(s) Oral daily  glucagon  Injectable 1 milliGRAM(s) IntraMuscular once  insulin lispro (ADMELOG) corrective regimen sliding scale   SubCutaneous every 6 hours  insulin lispro (ADMELOG) corrective regimen sliding scale   SubCutaneous at bedtime  levothyroxine 112 MICROGram(s) Oral daily  pantoprazole  Injectable 40 milliGRAM(s) IV Push every 12 hours  potassium chloride    Tablet ER 10 milliEquivalent(s) Oral daily  potassium chloride  10 mEq/100 mL IVPB 10 milliEquivalent(s) IV Intermittent every 1 hour  simvastatin 20 milliGRAM(s) Oral at bedtime    MEDICATIONS  (PRN):  acetaminophen     Tablet .. 650 milliGRAM(s) Oral every 6 hours PRN Temp greater or equal to 38C (100.4F), Mild Pain (1 - 3)  dextrose Oral Gel 15 Gram(s) Oral once PRN Blood Glucose LESS THAN 70 milliGRAM(s)/deciliter  ondansetron Injectable 4 milliGRAM(s) IV Push every 8 hours PRN Nausea and/or Vomiting      CAPILLARY BLOOD GLUCOSE      POCT Blood Glucose.: 113 mg/dL (03 Jun 2022 14:23)  POCT Blood Glucose.: 132 mg/dL (03 Jun 2022 07:43)  POCT Blood Glucose.: 126 mg/dL (03 Jun 2022 05:56)  POCT Blood Glucose.: 122 mg/dL (03 Jun 2022 00:13)  POCT Blood Glucose.: 102 mg/dL (02 Jun 2022 22:13)  POCT Blood Glucose.: 194 mg/dL (02 Jun 2022 17:20)    I&O's Summary    02 Jun 2022 07:01  -  03 Jun 2022 07:00  --------------------------------------------------------  IN: 400 mL / OUT: 0 mL / NET: 400 mL    03 Jun 2022 07:01  -  03 Jun 2022 14:30  --------------------------------------------------------  IN: 360 mL / OUT: 0 mL / NET: 360 mL        PHYSICAL EXAM:  Vital Signs Last 24 Hrs  T(C): 36.5 (03 Jun 2022 13:53), Max: 36.9 (03 Jun 2022 05:25)  T(F): 97.7 (03 Jun 2022 13:53), Max: 98.4 (03 Jun 2022 05:25)  HR: 71 (03 Jun 2022 13:53) (67 - 76)  BP: 122/82 (03 Jun 2022 13:53) (101/59 - 152/67)  BP(mean): --  RR: 18 (03 Jun 2022 13:53) (16 - 18)  SpO2: 95% (03 Jun 2022 13:53) (95% - 100%)    PHYSICAL EXAM:  GENERAL: NAD, well-developed in chair on RA  HEAD:  Atraumatic, Normocephalic  EYES:  conjunctiva and sclera clear  NECK: Supple, No JVD  CHEST/LUNG: Clear to auscultation bilaterally; No wheeze  HEART: Regular rate and rhythm;   ABDOMEN: Soft, Nontender, Nondistended; Bowel sounds present  EXTREMITIES:  2+ Peripheral Pulses, No clubbing, cyanosis, or edema  PSYCH: AAOx3  NEUROLOGY: non-focal  SKIN: No rashes or lesions      LABS:                        8.3    6.83  )-----------( 240      ( 03 Jun 2022 13:49 )             25.2     06-03    144  |  112<H>  |  31<H>  ----------------------------<  119<H>  4.1   |  27  |  1.10    Ca    9.4      03 Jun 2022 13:49  Phos  4.1     06-02  Mg     2.2     06-02    TPro  5.5<L>  /  Alb  2.8<L>  /  TBili  1.1  /  DBili  0.5<H>  /  AST  29  /  ALT  31  /  AlkPhos  45  06-03                RADIOLOGY & ADDITIONAL TESTS:    Imaging Personally Reviewed:    Electrocardiogram Personally Reviewed:    COORDINATION OF CARE:  Care Discussed with Consultants/Other Providers [Y/N]:  Prior or Outpatient Records Reviewed [Y/N]:  
Patient is a 76y old  Female who presents with a chief complaint of Anemia r/o bleed, ADAMS (06 Jun 2022 10:47)      INTERVAL HPI/OVERNIGHT EVENTS: Pt seen and examined at bedside. has no complaints, states LE edema improving, denies bloody stools.    MEDICATIONS  (STANDING):  aMIOdarone    Tablet 200 milliGRAM(s) Oral daily  apixaban 5 milliGRAM(s) Oral every 12 hours  dextrose 5%. 1000 milliLiter(s) (100 mL/Hr) IV Continuous <Continuous>  dextrose 5%. 1000 milliLiter(s) (50 mL/Hr) IV Continuous <Continuous>  dextrose 50% Injectable 25 Gram(s) IV Push once  dextrose 50% Injectable 12.5 Gram(s) IV Push once  dextrose 50% Injectable 25 Gram(s) IV Push once  ferrous    sulfate 325 milliGRAM(s) Oral daily  glucagon  Injectable 1 milliGRAM(s) IntraMuscular once  insulin lispro (ADMELOG) corrective regimen sliding scale   SubCutaneous three times a day before meals  insulin lispro (ADMELOG) corrective regimen sliding scale   SubCutaneous at bedtime  levothyroxine 112 MICROGram(s) Oral daily  metoprolol succinate ER 50 milliGRAM(s) Oral daily  pantoprazole  Injectable 40 milliGRAM(s) IV Push every 12 hours  potassium chloride    Tablet ER 40 milliEquivalent(s) Oral every 4 hours  potassium chloride    Tablet ER 10 milliEquivalent(s) Oral daily  simvastatin 20 milliGRAM(s) Oral at bedtime  torsemide 10 milliGRAM(s) Oral daily    MEDICATIONS  (PRN):  acetaminophen     Tablet .. 650 milliGRAM(s) Oral every 6 hours PRN Temp greater or equal to 38C (100.4F), Mild Pain (1 - 3)  dextrose Oral Gel 15 Gram(s) Oral once PRN Blood Glucose LESS THAN 70 milliGRAM(s)/deciliter  ondansetron Injectable 4 milliGRAM(s) IV Push every 8 hours PRN Nausea and/or Vomiting      Allergies    sulfa drugs (Short breath)    Intolerances        REVIEW OF SYSTEMS:  CONSTITUTIONAL: No fever or chills  HEENT:  No headache, no sore throat  RESPIRATORY: No cough, wheezing, or shortness of breath  CARDIOVASCULAR: No chest pain, palpitations, improved leg swelling  GASTROINTESTINAL: No abd pain, nausea, vomiting, or diarrhea  GENITOURINARY: No dysuria, frequency, or hematuria  NEUROLOGICAL: no focal weakness or dizziness  MUSCULOSKELETAL: no myalgias     Vital Signs Last 24 Hrs  T(C): 36.3 (06 Jun 2022 05:45), Max: 36.9 (05 Jun 2022 22:19)  T(F): 97.4 (06 Jun 2022 05:45), Max: 98.4 (05 Jun 2022 22:19)  HR: 77 (06 Jun 2022 11:00) (71 - 77)  BP: 127/66 (06 Jun 2022 11:00) (112/66 - 127/66)  BP(mean): --  RR: 18 (06 Jun 2022 05:45) (18 - 18)  SpO2: 96% (06 Jun 2022 11:00) (92% - 98%)    PHYSICAL EXAM:  GENERAL: F in NAD  HEENT:  NC/AT, EOMI, moist mucous membranes  CHEST/LUNG:  CTA b/l, no rales, wheezes, or rhonchi  HEART: RRR, S1, S2  ABDOMEN: BS+, soft, nontender, nondistended  EXTREMITIES: 1+ pitting edema b/l LE  NERVOUS SYSTEM: AA&Ox3    LABS:                        8.0    4.72  )-----------( 229      ( 06 Jun 2022 09:28 )             24.8     CBC Full  -  ( 06 Jun 2022 09:28 )  WBC Count : 4.72 K/uL  Hemoglobin : 8.0 g/dL  Hematocrit : 24.8 %  Platelet Count - Automated : 229 K/uL  Mean Cell Volume : 91.5 fl  Mean Cell Hemoglobin : 29.5 pg  Mean Cell Hemoglobin Concentration : 32.3 gm/dL  Auto Neutrophil # : 2.93 K/uL  Auto Lymphocyte # : 1.02 K/uL  Auto Monocyte # : 0.49 K/uL  Auto Eosinophil # : 0.22 K/uL  Auto Basophil # : 0.03 K/uL  Auto Neutrophil % : 62.1 %  Auto Lymphocyte % : 21.6 %  Auto Monocyte % : 10.4 %  Auto Eosinophil % : 4.7 %  Auto Basophil % : 0.6 %    06 Jun 2022 09:28    144    |  109    |  19     ----------------------------<  122    3.1     |  29     |  1.10     Ca    8.8        06 Jun 2022 09:28          CAPILLARY BLOOD GLUCOSE      POCT Blood Glucose.: 130 mg/dL (06 Jun 2022 07:37)  POCT Blood Glucose.: 126 mg/dL (05 Jun 2022 22:00)  POCT Blood Glucose.: 137 mg/dL (05 Jun 2022 16:40)          RADIOLOGY & ADDITIONAL TESTS:    Personally reviewed.     Consultant(s) Notes Reviewed:  [x] YES  [ ] NO    Care Discussed with [x] Consultants  [x] Patient  [ ] Family  [ ]      [ ] Other; RN  DVT ppx  
Patient is a 76y old  Female who presents with a chief complaint of Anemia r/o bleed, ADAMS (05 Jun 2022 10:22)      INTERVAL HPI/OVERNIGHT EVENTS: Pt seen and examined at bedside. admits LE edema, no other complaints at present. denies bloody stools, weakness, dizziness.     MEDICATIONS  (STANDING):  aMIOdarone    Tablet 200 milliGRAM(s) Oral daily  apixaban 5 milliGRAM(s) Oral every 12 hours  dextrose 5%. 1000 milliLiter(s) (100 mL/Hr) IV Continuous <Continuous>  dextrose 5%. 1000 milliLiter(s) (50 mL/Hr) IV Continuous <Continuous>  dextrose 50% Injectable 25 Gram(s) IV Push once  dextrose 50% Injectable 12.5 Gram(s) IV Push once  dextrose 50% Injectable 25 Gram(s) IV Push once  ferrous    sulfate 325 milliGRAM(s) Oral daily  glucagon  Injectable 1 milliGRAM(s) IntraMuscular once  insulin lispro (ADMELOG) corrective regimen sliding scale   SubCutaneous three times a day before meals  insulin lispro (ADMELOG) corrective regimen sliding scale   SubCutaneous at bedtime  levothyroxine 112 MICROGram(s) Oral daily  metoprolol succinate ER 50 milliGRAM(s) Oral daily  pantoprazole  Injectable 40 milliGRAM(s) IV Push every 12 hours  potassium chloride    Tablet ER 10 milliEquivalent(s) Oral daily  simvastatin 20 milliGRAM(s) Oral at bedtime  torsemide 10 milliGRAM(s) Oral daily    MEDICATIONS  (PRN):  acetaminophen     Tablet .. 650 milliGRAM(s) Oral every 6 hours PRN Temp greater or equal to 38C (100.4F), Mild Pain (1 - 3)  dextrose Oral Gel 15 Gram(s) Oral once PRN Blood Glucose LESS THAN 70 milliGRAM(s)/deciliter  ondansetron Injectable 4 milliGRAM(s) IV Push every 8 hours PRN Nausea and/or Vomiting      Allergies    sulfa drugs (Short breath)    Intolerances        REVIEW OF SYSTEMS:  CONSTITUTIONAL: No fever or chills  HEENT:  No headache, no sore throat  RESPIRATORY: No cough, wheezing, or shortness of breath  CARDIOVASCULAR: No chest pain, palpitations, (+) leg swelling  GASTROINTESTINAL: No abd pain, nausea, vomiting, or diarrhea  GENITOURINARY: No dysuria, frequency, or hematuria  NEUROLOGICAL: no focal weakness or dizziness  MUSCULOSKELETAL: no myalgias     Vital Signs Last 24 Hrs  T(C): 36.9 (05 Jun 2022 13:33), Max: 37.1 (05 Jun 2022 04:19)  T(F): 98.4 (05 Jun 2022 13:33), Max: 98.7 (05 Jun 2022 04:19)  HR: 64 (05 Jun 2022 13:33) (63 - 66)  BP: 109/72 (05 Jun 2022 13:33) (91/55 - 109/72)  BP(mean): --  RR: 19 (05 Jun 2022 13:33) (18 - 19)  SpO2: 97% (05 Jun 2022 13:33) (96% - 98%)    PHYSICAL EXAM:  GENERAL: F in NAD  HEENT:  NC/AT, EOMI, moist mucous membranes  CHEST/LUNG:  CTA b/l, no rales, wheezes, or rhonchi  HEART: RRR, S1, S2  ABDOMEN:  BS+, soft, nontender, nondistended  EXTREMITIES: 2+ pitting edema b/l LE, no cyanosis, or calf tenderness  NERVOUS SYSTEM: AA&Ox3    LABS:                        8.8    x     )-----------( x        ( 05 Jun 2022 13:47 )             27.5     CBC Full  -  ( 05 Jun 2022 13:47 )  WBC Count : x  Hemoglobin : 8.8 g/dL  Hematocrit : 27.5 %  Platelet Count - Automated : x  Mean Cell Volume : x  Mean Cell Hemoglobin : x  Mean Cell Hemoglobin Concentration : x  Auto Neutrophil # : x  Auto Lymphocyte # : x  Auto Monocyte # : x  Auto Eosinophil # : x  Auto Basophil # : x  Auto Neutrophil % : x  Auto Lymphocyte % : x  Auto Monocyte % : x  Auto Eosinophil % : x  Auto Basophil % : x    05 Jun 2022 07:53    142    |  112    |  15     ----------------------------<  130    4.0     |  25     |  0.94     Ca    8.7        05 Jun 2022 07:53          CAPILLARY BLOOD GLUCOSE      POCT Blood Glucose.: 162 mg/dL (05 Jun 2022 11:19)  POCT Blood Glucose.: 135 mg/dL (05 Jun 2022 08:07)  POCT Blood Glucose.: 143 mg/dL (04 Jun 2022 21:58)  POCT Blood Glucose.: 122 mg/dL (04 Jun 2022 17:00)          RADIOLOGY & ADDITIONAL TESTS:    Personally reviewed.     Consultant(s) Notes Reviewed:  [x] YES  [ ] NO    Care Discussed with [x] Consultants  [x] Patient  [ ] Family  [ ]      [ ] Other; RN  DVT ppx  
Patient is a 76y old  Female who presents with a chief complaint of Anemia r/o bleed, ADAMS (04 Jun 2022 10:46)      INTERVAL HPI/OVERNIGHT EVENTS: Patient seen and examined at bedside. No overnight events. EGD/Colonoscopy poor prep but not revealing source of bleed. Feels well today    MEDICATIONS  (STANDING):  aMIOdarone    Tablet 200 milliGRAM(s) Oral daily  apixaban 5 milliGRAM(s) Oral every 12 hours  dextrose 5%. 1000 milliLiter(s) (100 mL/Hr) IV Continuous <Continuous>  dextrose 5%. 1000 milliLiter(s) (50 mL/Hr) IV Continuous <Continuous>  dextrose 50% Injectable 25 Gram(s) IV Push once  dextrose 50% Injectable 12.5 Gram(s) IV Push once  dextrose 50% Injectable 25 Gram(s) IV Push once  ferrous    sulfate 325 milliGRAM(s) Oral daily  glucagon  Injectable 1 milliGRAM(s) IntraMuscular once  insulin lispro (ADMELOG) corrective regimen sliding scale   SubCutaneous every 6 hours  insulin lispro (ADMELOG) corrective regimen sliding scale   SubCutaneous at bedtime  levothyroxine 112 MICROGram(s) Oral daily  metoprolol succinate ER 50 milliGRAM(s) Oral daily  pantoprazole  Injectable 40 milliGRAM(s) IV Push every 12 hours  potassium chloride    Tablet ER 10 milliEquivalent(s) Oral daily  potassium chloride  10 mEq/100 mL IVPB 10 milliEquivalent(s) IV Intermittent every 1 hour  simvastatin 20 milliGRAM(s) Oral at bedtime    MEDICATIONS  (PRN):  acetaminophen     Tablet .. 650 milliGRAM(s) Oral every 6 hours PRN Temp greater or equal to 38C (100.4F), Mild Pain (1 - 3)  dextrose Oral Gel 15 Gram(s) Oral once PRN Blood Glucose LESS THAN 70 milliGRAM(s)/deciliter  ondansetron Injectable 4 milliGRAM(s) IV Push every 8 hours PRN Nausea and/or Vomiting      Allergies    sulfa drugs (Short breath)    Intolerances        REVIEW OF SYSTEMS:  CONSTITUTIONAL: No fever or chills  HEENT:  No headache, no sore throat  RESPIRATORY: No cough, wheezing, or shortness of breath  CARDIOVASCULAR: No chest pain, palpitations, or leg swelling  GASTROINTESTINAL: No abd pain, nausea, vomiting, or diarrhea  GENITOURINARY: No dysuria, frequency, or hematuria  NEUROLOGICAL: no focal weakness or dizziness  MUSCULOSKELETAL: no myalgias     Vital Signs Last 24 Hrs  T(C): 36.9 (04 Jun 2022 04:35), Max: 37.1 (03 Jun 2022 21:20)  T(F): 98.5 (04 Jun 2022 04:35), Max: 98.8 (03 Jun 2022 21:20)  HR: 66 (04 Jun 2022 04:35) (66 - 85)  BP: 97/58 (04 Jun 2022 04:35) (97/58 - 122/82)  BP(mean): --  RR: 18 (04 Jun 2022 04:35) (18 - 18)  SpO2: 94% (04 Jun 2022 04:35) (94% - 96%)    PHYSICAL EXAM:  GENERAL: NAD  HEENT:  NCAT, moist mucous membranes  CHEST/LUNG:  CTA b/l, no rales, wheezes, or rhonchi  HEART:  RRR, S1, S2  ABDOMEN:  BS+, soft, nontender, nondistended  EXTREMITIES: no edema, cyanosis, or calf tenderness  NERVOUS SYSTEM: AA&Ox3, sensation intact    LABS:                        7.9    5.98  )-----------( 231      ( 04 Jun 2022 07:43 )             24.7     CBC Full  -  ( 04 Jun 2022 07:43 )  WBC Count : 5.98 K/uL  Hemoglobin : 7.9 g/dL  Hematocrit : 24.7 %  Platelet Count - Automated : 231 K/uL  Mean Cell Volume : 92.5 fl  Mean Cell Hemoglobin : 29.6 pg  Mean Cell Hemoglobin Concentration : 32.0 gm/dL  Auto Neutrophil # : 4.01 K/uL  Auto Lymphocyte # : 1.01 K/uL  Auto Monocyte # : 0.66 K/uL  Auto Eosinophil # : 0.21 K/uL  Auto Basophil # : 0.03 K/uL  Auto Neutrophil % : 67.1 %  Auto Lymphocyte % : 16.9 %  Auto Monocyte % : 11.0 %  Auto Eosinophil % : 3.5 %  Auto Basophil % : 0.5 %    04 Jun 2022 07:43    143    |  111    |  21     ----------------------------<  129    4.0     |  28     |  1.00     Ca    9.1        04 Jun 2022 07:43    TPro  5.1    /  Alb  2.5    /  TBili  1.0    /  DBili  x      /  AST  28     /  ALT  32     /  AlkPhos  49     04 Jun 2022 07:43        CAPILLARY BLOOD GLUCOSE      POCT Blood Glucose.: 147 mg/dL (04 Jun 2022 12:14)  POCT Blood Glucose.: 125 mg/dL (04 Jun 2022 07:38)  POCT Blood Glucose.: 121 mg/dL (04 Jun 2022 05:54)  POCT Blood Glucose.: 129 mg/dL (03 Jun 2022 22:03)  POCT Blood Glucose.: 134 mg/dL (03 Jun 2022 16:59)  POCT Blood Glucose.: 113 mg/dL (03 Jun 2022 14:23)          RADIOLOGY & ADDITIONAL TESTS: < from: CT Abdomen and Pelvis No Cont (06.03.22 @ 14:19) >    ACC: [id number] EXAM:  CT ABDOMEN AND PELVIS                          PROCEDURE DATE:  06/03/2022          INTERPRETATION:  CLINICAL INFORMATION: Abdominal pain. History of left   partial nephrectomy for renal cell carcinoma.    COMPARISON: Noncontrast CT of the bony pelvis dated June 1, 2022, pre and   postcontrast CT of the abdomen and pelvis December 21, 2019    CONTRAST/COMPLICATIONS:  IV Contrast: NONE  Oral Contrast: NONE  Complications: None reported at time of study completion    PROCEDURE:  CT of the Abdomen and Pelvis was performed.  Sagittal and coronal reformats were performed.    FINDINGS:  LOWER CHEST: Status post aortic valve replacement. Cardiac pacer wires.   Mild platelike atelectasis in the right lower lobe at the lung base.    LIVER: Within normal limits.  BILE DUCTS: Normal caliber.  GALLBLADDER: Within normal limits.  SPLEEN: Within normal limits.  PANCREAS: Within normal limits.  ADRENALS: Within normal limits.  KIDNEYS/URETERS: Wedge resection of the medial upper pole of the left   kidney. Mild cortical scarring in the lower pole of the left kidney.   Otherwise, within normal limits.    BLADDER: Within normal limits.  REPRODUCTIVE ORGANS: Subserosal calcified and noncalcified fibroids. The   adnexa are unremarkable by CT criteria.    BOWEL: Scattered sigmoid diverticula. No bowel obstruction. Appendix is   normal.  PERITONEUM: No ascites. There is no evidence of hyperattenuating   collection in the peritoneal cavity or retroperitoneum to suggest acute   hemorrhage.  VESSELS: Moderate atherosclerotic calcification of the nonaneurysmal   abdominal aorta.  RETROPERITONEUM/LYMPH NODES: No lymphadenopathy.  ABDOMINAL WALL: Within normal limits.  BONES: Within normal limits.    IMPRESSION: No evidence of acute inflammatory or obstructive process in   the abdomen and pelvis.    --- End of Report ---        < end of copied text >    < from: CT Pelvis Bony Only No Cont (06.01.22 @ 19:16) >  ACC: [id number] EXAM:  CT 3D RECONSTRUCT MENA CASTANEDA                        ACC: [id number] EXAM:  CT PELVIS BONY ONLY                          PROCEDURE DATE:  06/01/2022          INTERPRETATION:  Clinical information: Fall with buttock pain. Left   kidney cancer.    Comparison: CT scan of the chest, abdomen and pelvis from 12/21/2019.    Technique:  CT scan of the pelvis.  No intravenous contrast was administered.  3D reconstructions were created.    Findings:    There is no acute fracture or dislocation. 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   This appears unchanged since 12/21/2019.    There is a levocurvature of the lower lumbar spine. There is a moderate   posterior displacement of a proximal coccygeal segment which is new since   the previous study from 12/21/2019 but is otherwise age indeterminate.   There is lower lumbar facet arthropathy.    There is ossification at the left hamstring origin which is unchanged.   There are calcified fibroids. There is sigmoid diverticulosis. There is a   small fat-containing umbilical hernia.    Impression:  No acute fracture.  Moderate posterior subluxation of a coccygeal segment which is new since   12/21/2019 but otherwise age indeterminate.    --- End of Report ---            KRISTIN BUENO MD; Attending Radiologist  This document has been electronically signed. Jun 1 2022  8:14PM    < end of copied text >      Consultant(s) Notes Reviewed:  [x] YES  [ ] NO    Care Discussed with [x] Consultants  [x] Patient  [ ] Family  [ ]      [ x] Other; RN  DVT ppx

## 2022-06-07 NOTE — DISCHARGE NOTE PROVIDER - CARE PROVIDERS DIRECT ADDRESSES
,[email or web address],DirectAddress_Unknown,DirectAddress_Unknown

## 2022-06-07 NOTE — DISCHARGE NOTE PROVIDER - ATTENDING DISCHARGE PHYSICAL EXAMINATION:
T(C): 36.7 (06-07-22 @ 05:29), Max: 36.7 (06-06-22 @ 20:14)  HR: 72 (06-07-22 @ 05:29) (71 - 72)  BP: 103/51 (06-07-22 @ 05:29) (103/51 - 103/60)  RR: 18 (06-07-22 @ 05:29) (18 - 18)  SpO2: 95% (06-07-22 @ 05:29) (94% - 95%)    Physical Exam:  General: Well developed, well nourished, NAD  HEENT: NC/AT, EOMI B/L, moist mucous membranes   Neck: Supple, nontender, no masses  CV: RRR, +S1/S2, AS murmur present, no rubs or gallops  Respiratory: CTA B/L, No W/R/R  Abdominal: Soft, NT, ND +BSx4  Extremities: 1+ pitting edema b/l LE  Neurology: AAOx3, nonfocal

## 2022-06-07 NOTE — DISCHARGE NOTE NURSING/CASE MANAGEMENT/SOCIAL WORK - PATIENT PORTAL LINK FT
You can access the FollowMyHealth Patient Portal offered by [facility] by registering at the following website: [email or web address]. By joining [employer]’s FollowMyHealth portal, you will also be able to view your health information using other applications (apps) compatible with our system.

## 2022-06-07 NOTE — DISCHARGE NOTE PROVIDER - NSDCCPCAREPLAN_GEN_ALL_CORE_FT
PRINCIPAL DISCHARGE DIAGNOSIS  Diagnosis: Anemia  Assessment and Plan of Treatment: Pt was admitted for [facility] fall and anemia workup. She was given packed RBCs tranfusion and Hgb david appropriately and stabilized. Pt was evaluated by heme/onc, GI, cardiology specialists. She had EGD/colon, however her prep was not ideal, but no overt bleeding was noted per GI. NM scan was negative for bleed. Hgb was presumed to in part be low due to underlying GABRIELA as well as volume overload, and she was resumed on her Torsemide. She was advised to follow closely with Dr. Bergman and Dr. Robles as an outpatient (for tricuspid valve repair), as well as heme/onc and GI for close followup. Return to ED for any new bleeding or new/worsening symptoms.      SECONDARY DISCHARGE DIAGNOSES  Diagnosis: Shortness of breath  Assessment and Plan of Treatment: resumed on her Torsemide.

## 2022-06-07 NOTE — PROGRESS NOTE ADULT - NS ATTEND AMEND GEN_ALL_CORE FT
Chart reviewed    Patient seen and examined    Agree with plan as outlined above    77 y/o F PMHx aortic stenosis (s/p bioprosthetic aortic valve replacement), complete heart block s/p pacemaker, afib, intermittent RBBB, hypertension, HLD, thyroid nodule, hypothyroidism, iron deficiency anemia, renal cell carcinoma s/p resection, recent admission for acute decompensated right heart failure, found to have severe tricuspid regurg, presents to ED w/ lightheadedness, weakness, SOB, being admitted for symptomatic anemia.    SOB/ HF/PAF/CHB s/p PPM/ HTN/HLD/ AS/TR  - Not complaining of any cardiac related symptoms at this time such as chest pain, palpitations or SOB.  - EKG Vpaced, NSR on tele, and no clear evidence of acute ischemia, troponin negative x2   - Patient has no significant CAD. Found to have nonobstructive CAD by cardiac cath 2016, has not had CABG or PCI.  - Euvolemic, hold torsemide at this time in the setting of severe anemia, volume depletion  - Strict I/Os, daily weights.  - Hgb improved 4.6 to 6.7, would recommend keeping Hgb at least >7, will need to be given Lasix intermittently between units of pRBCs to maintain euvolemia  - Pt has no active ischemia, decompensated heart failure, unstable arrythmia, or severe stenotic valvular disease. In the setting of low risk GI procedures such as EGD and Colonoscopy, she should be considered optimized from a cardiovascular standpoint to proceed with any planned GI procedure with routine hemodynamic monitoring. She has no modifiable cardiac risk factors at this time.  - Hold ASA and Eliquis 2/2 bleeding  - Continue home Amiodarone  - Last PPM interrogation appears to be in december of 2021, battery life was >5 years at that time. Will repeat interrogation during this admission.  - Patient's BP soft, can hold home metoprolol at this time, restart based on clinical course.  - Hold valsartan 2/2 ADAMS  - Continue statin  - She is known to have intact lv function w/ mild to moderate MR severe TR, malcoaptation of tricuspid leaflets, possible prosthesis mismatched Aortic valve, EF 60-65% on prior TIM in April.  - Her severe tricuspid regurgitation has been managed with torsemide and she has been planned for percutaneous tricuspid valve repair in the coming weeks however may have to be postponed. She will have to follow up with the structural heart team.  - Monitor and replete lytes, keep K>4, Mg>2.  - Will continue to follow.
Optimized for the OR from a cardiac point of view with no evidence of active ischemic heart disease, decompensated heart failure, severe obstructive valvular disease, or uncontrolled arrhythmia.
Developing mild LE edema.  To resume torsemide.  To follow clinically.  Trend H/h.  Hemolysis labs sent.  To follow while admitted
mild LE edema on exam. cont torsemide at current dose and trend creatinine and electrolytes. Trend Hb.
occult source of bleed  outpt colon and capsule  hgb up today, trend  compensated volume status  discussed possibility of Watchman as an outpt, pending resolution of bleeding issues
Tolerated EGD with no cardiac complications.  Eventually need to resume home torsemide.  To follow.

## 2022-06-07 NOTE — DISCHARGE NOTE PROVIDER - HOSPITAL COURSE
FROM ADMISSION H+P:   HPI:  A very pleasant and well oriented 77 y/o female w/ PMHx of DM2, aortic stenosis s/p bioAVR, complete heart block s/p pacemaker, Chronic HFpEF, afib on eliquis, HTN, HLD, hypothyroidism, GABRIELA, renal cell carcinoma s/p resection, presents to ED after having two falls and one episode of vomiting earlier today. Patient was recently admitted at [facility] for chronic diastolic heart failure exacerbation for which she was diuresed and discharged but was readmitted a few days later with an ADAMS 2/2 to overdiuresis. Two days after that discharge patient states she began to feel SOB when she would exert herself but also began to develop dizziness and weakness, however patient desired to just follow up with PCP and handle things on her own. This AM, however, patient got up from bed quickly to answer the door and fell on coccyx, denies head strike or LOC. Patient then had another fall later in the day while she was going to the bathroom where she felt extremely nausous and fell forward onto her left hand. Patient again denies any headstrike or loss of consciousness. Patient also had one episode of non-bloody vomiting today. Patient was seen and examined in the ED, currently denies any nausea/vomitng, CP/pressure, SOB, headache/dizziness, myalgia, abdominal pain.     In ED:   Vitals: 98.2F, HR 72, 135/58, 19 RR, 100% RA   Labs significant for: WBC 15.93, H/H 4.6/15.6, BUN/Cr 71/2.20 (baseline Cr: ~1 ), BNP 2276  Imaging: CT head/cspine: no fracture, no brain bleed   CT abd/pelvis: no bleeding, mild subluxation of coccyx    EKG: Atrial-sensed v-paced @ 77 bpm  Received:  PRBCs x2, zofran 4mg x1 (01 Jun 2022 21:03)      ---  HOSPITAL COURSE: Pt was admitted for [facility] fall and anemia workup. She was given packed RBCs tranfusion and Hgb david appropriately and stabilized. Pt was evaluated by heme/onc, GI, cardiology specialists. She had EGD/colon, however her prep was not ideal, but no overt bleeding was noted per GI. NM scan was negative for bleed. Hgb was presumed to in part be low due to underlying GABRIELA as well as volume overload, and she was resumed on her Torsemide. She was advised to follow closely with Dr. Bergman and Dr. Robles as an outpatient (for tricuspid valve repair), as well as heme/onc and GI for close followup. She was evaluated by PT and rec to be DC home. Pt seen and examined on day of discharge and is clinically optimized to return to home with recommendations to follow up with PMD and listed specialists within one week.   ---  CONSULTANTS:   nuha/onc  GI  cardiology  ---

## 2022-06-07 NOTE — PROGRESS NOTE ADULT - REASON FOR ADMISSION
Anemia r/o bleed, ADAMS

## 2022-06-07 NOTE — DISCHARGE NOTE NURSING/CASE MANAGEMENT/SOCIAL WORK - NSTOBACCONEVERSMOKERY/N_GEN_A
[de-identified] : 48M here for f/u for HL- notes right ear fullness with tinnitus- with some nausea- Pt denies otalgia, otorrhea, ear infections or vertigo or headaches. Stopped Singulair about 3-4 weeks ago. 4 days ago developed loud noise in ear - 
Yes

## 2022-06-07 NOTE — DISCHARGE NOTE PROVIDER - NSDCMRMEDTOKEN_GEN_ALL_CORE_FT
amiodarone 200 mg oral tablet: 1 tab(s) orally once a day  Aspirin Enteric Coated 81 mg oral delayed release tablet: 1 tab(s) orally once a day  Eliquis 5 mg oral tablet: 1 tab(s) orally 2 times a day  ferrous sulfate 325 mg (65 mg elemental iron) oral tablet: 1 tab(s) orally once a day  levothyroxine 112 mcg (0.112 mg) oral tablet: 1 tab(s) orally once a day  metFORMIN 500 mg oral tablet, extended release: 1 tab(s) orally 2 times a day  metoprolol succinate 50 mg oral tablet, extended release: 1 tab(s) orally once a day  Potassium Chloride (Eqv-Klor-Con M10) 10 mEq oral tablet, extended release: 1 tab(s) orally 2 times a day  Protonix 40 mg oral delayed release tablet: 1 tab(s) orally once a day  simvastatin 20 mg oral tablet: 1 tab(s) orally once a day (at bedtime)  torsemide 20 mg oral tablet: 0.5 tab(s) orally once a day

## 2022-06-07 NOTE — DISCHARGE NOTE PROVIDER - CARE PROVIDER_API CALL
Lillian Bergman)  Cardiac Electrophysiology; Cardiovascular Disease; Internal Medicine  [address] [place] [address]  Phone: [phone]  Fax: [phone]  Follow Up Time:     Caleb Robles)  Interventional Cardiology  [address] [place] [address]  Phone: [phone]  Fax: [phone]  Follow Up Time:     Vikash Porter)  Hematology; Medical Oncology  [address] [facility], [address] [place] [address]  Phone: [phone]  Fax: [phone]  Follow Up Time:     Mark Knowles (DO)  Gastroenterology; Internal Medicine  [address] [place] [address]  Phone: [phone]  Fax: [phone]  Follow Up Time:

## 2022-06-07 NOTE — PROGRESS NOTE ADULT - ASSESSMENT
anemia  afib on a/c  heart block s/p PPM  marni      reg diet  hemolysis workup negative  ct a/p, no evidence of rp bleed  outpatient colonoscopy for screening purposes  outpatient capsule endoscopy  d/w patient     Advanced care planning was discussed with patient and family.  Advanced care planning forms were reviewed and discussed.  Risks, benefits and alternatives of gastroenterologic procedures were discussed in detail and all questions were answered.    30 minutes spent.

## 2022-06-07 NOTE — DISCHARGE NOTE PROVIDER - PROVIDER TOKENS
PROVIDER:[TOKEN:[[id number]]],PROVIDER:[TOKEN:[[id number]]],PROVIDER:[TOKEN:[[id number]]],PROVIDER:[TOKEN:[[id number]]]

## 2022-06-13 ENCOUNTER — APPOINTMENT (OUTPATIENT)
Dept: CARDIOTHORACIC SURGERY | Facility: CLINIC | Age: 77
End: 2022-06-13

## 2022-06-19 ENCOUNTER — RX RENEWAL (OUTPATIENT)
Age: 77
End: 2022-06-19

## 2022-06-20 ENCOUNTER — APPOINTMENT (OUTPATIENT)
Dept: CARDIOLOGY | Facility: CLINIC | Age: 77
End: 2022-06-20
Payer: MEDICARE

## 2022-06-20 PROCEDURE — 93294 REM INTERROG EVL PM/LDLS PM: CPT

## 2022-06-20 PROCEDURE — 93296 REM INTERROG EVL PM/IDS: CPT

## 2022-06-22 ENCOUNTER — APPOINTMENT (OUTPATIENT)
Dept: CARDIOLOGY | Facility: CLINIC | Age: 77
End: 2022-06-22
Payer: MEDICARE

## 2022-06-22 ENCOUNTER — NON-APPOINTMENT (OUTPATIENT)
Age: 77
End: 2022-06-22

## 2022-06-22 VITALS
SYSTOLIC BLOOD PRESSURE: 122 MMHG | DIASTOLIC BLOOD PRESSURE: 75 MMHG | OXYGEN SATURATION: 99 % | HEIGHT: 63 IN | BODY MASS INDEX: 28.88 KG/M2 | HEART RATE: 86 BPM | WEIGHT: 163 LBS

## 2022-06-22 PROCEDURE — 99214 OFFICE O/P EST MOD 30 MIN: CPT

## 2022-06-22 PROCEDURE — 93000 ELECTROCARDIOGRAM COMPLETE: CPT

## 2022-06-22 NOTE — HISTORY OF PRESENT ILLNESS
[FreeTextEntry1] : Yolette presents to the office today for a follow up cardiovascular evaluation.\par \par She is now 76 years old, with a history of aortic stenosis, an intermittent right bundle branch block pattern on her electrocardiogram, hypertension, a dyslipidemia, a thyroid nodule, secondary hypothyroidism, iron deficiency anemia, and renal cell carcinoma (status post resection).  She has had bioprosthetic aortic valve replacement. Her postop course was complicated by complete heart block, for which a pacemaker was eventually implanted.  Routine interrogation of her pacemaker revealed asymptomatic atrial fibrillation. She has been anticoagulated.\par \par She went to see Dr. Hayes for a physical in November, feeling well at that time.  Dr. Hayes suggested a number of scans, including a CT of the chest.  The CT was notable for GGO, for which a pulmonary evaluation is planned.\par \par She had not felt 100% since December 2021.  She presented to the emergency room on 4/18/2022, with shortness of breath, and acute diastolic heart failure.  She was diuresed successfully with intravenous Lasix.  A transesophageal echocardiogram demonstrated mild to moderate mitral vegetation, mild mitral stenosis, mildly elevated bio AVR gradients, RV enlargement and RV dysfunction, with severe tricuspid regurgitation.  Tricuspid valve appeared tethered with mall coaptation of the tricuspid valve leaflets due to a severely dilated annulus, without obvious impingement from the pacemaker lead.  She was evaluated by Dr. Robles, and has outpatient follow-up scheduled for intervention regarding her tricuspid valve.\par Her symptoms have also correlated with the presence of atrial fibrillation.  She is now status post cardioversion as well.  She was discharged home on 4/29.  Her cardiac medications at discharge were amiodarone 200, aspirin 81, Eliquis 5 twice daily, Toprol , simvastatin 20, torsemide 20 twice daily, and valsartan 320.\par \par I last saw her in 5/22.\par Though she was feeling better at last visit, she then became weak with dizziness and falls. \par She was admitted 5/13/22-5/17 with ADAMS in the setting of overdiuresis. Her creatinine was 1.4 at time of dc.\par \par She then had falls and dizziness/weakness in early June 2022 and was found to have a Hb of 4.6. She got PRBCs and responded appropriately. She had an EGD and Colon (though poor prep) without active bleeding.\par She was sent home on torsemide 10 mg.\par \par Since dc, she feels better, though still with occasional dyspnea on exertion. Her HB is now 9.6, and her creatinine 1.4, unchanged from prior.\par  Her blood pressure has generally been in the 100-120 range at home.

## 2022-06-22 NOTE — DISCUSSION/SUMMARY
[FreeTextEntry1] : Yolette was recently admitted with acute decompensated right heart failure, and was found to have severe tricuspid regurgitation (with a dilated annulus) and atrial fibrillation.  She is now status post cardioversion, and remains in a sinus rhythm with ventricular pacing today.\par \par She has more recently been admitted with weakness in the setting of ADAMS and overdiuresis, and then with significant blood loss anemia with a normal GI evaluation.\par \par Fortunately, she is doing better today, other than some mild dyspnea. Her Hb and creatinine are stable, as of last week. Her BP is much better overall, and she will remain on Toprol XL 50, and torsemide 10 daily. Her weights have been stable. She will stay on Eliquis and amiodarone. She remains in a SR with ventricular pacing.\par \par She has an appointment to follow-up with the structural heart team next month, to evaluate less invasive options for tricuspid regurgitation.  She will continue to monitor her blood pressure and daily weights at home.  If no issues, I will see her again in 2 months. [EKG obtained to assist in diagnosis and management of assessed problem(s)] : EKG obtained to assist in diagnosis and management of assessed problem(s)

## 2022-06-27 ENCOUNTER — APPOINTMENT (OUTPATIENT)
Dept: CARDIOTHORACIC SURGERY | Facility: CLINIC | Age: 77
End: 2022-06-27

## 2022-06-27 ENCOUNTER — NON-APPOINTMENT (OUTPATIENT)
Age: 77
End: 2022-06-27

## 2022-06-27 VITALS
HEIGHT: 63 IN | HEART RATE: 69 BPM | TEMPERATURE: 98.2 F | BODY MASS INDEX: 28.53 KG/M2 | SYSTOLIC BLOOD PRESSURE: 107 MMHG | RESPIRATION RATE: 18 BRPM | DIASTOLIC BLOOD PRESSURE: 67 MMHG | WEIGHT: 161 LBS | OXYGEN SATURATION: 99 %

## 2022-06-27 PROCEDURE — 99214 OFFICE O/P EST MOD 30 MIN: CPT

## 2022-06-27 RX ORDER — AMIODARONE HYDROCHLORIDE 200 MG/1
200 TABLET ORAL DAILY
Refills: 0 | Status: DISCONTINUED | COMMUNITY
Start: 2022-05-03 | End: 2022-06-27

## 2022-06-27 RX ORDER — VALSARTAN 160 MG/1
160 TABLET, COATED ORAL
Qty: 90 | Refills: 3 | Status: DISCONTINUED | COMMUNITY
Start: 2017-03-23 | End: 2022-06-27

## 2022-06-28 NOTE — REVIEW OF SYSTEMS
"  Requested Prescriptions   Pending Prescriptions Disp Refills     azelastine (ASTELIN) 0.1 % nasal spray 30 mL 4   Last Written Prescription Date:  11/14/2018  Last Fill Quantity: 30 ml,  # refills: 4   Last office visit: 11/7/2019 with prescribing provider:     Future Office Visit:   Next 5 appointments (look out 90 days)    Dec 20, 2019 12:25 PM CST  Office Visit with Reina Mason [facility] [address] [place] [phone]  [phone]             Antihistamines Protocol Failed - 12/15/2019  7:53 PM        Failed - Patient is 3-64 years of age     Apply weight-based dosing for peds patients age 3 - 12 years of age.    Forward request to provider for patients under the age of 3 or over the age of 64.          Passed - Recent (12 mo) or future (30 days) visit within the authorizing provider's specialty     Patient has had an office visit with the authorizing provider or a provider within the authorizing providers department within the previous 12 mos or has a future within next 30 days. See \"Patient Info\" tab in inbasket, or \"Choose Columns\" in Meds & Orders section of the refill encounter.              Passed - Medication is active on med list      Prescription approved per [facility] Refill Protocol.  Laura Beckford RN      " [Fever] : no fever [Chills] : no chills [Chest Discomfort] : no chest discomfort [Palpitations] : no palpitations [Orthopnea] : no orthopnea [PND] : no PND [Abdominal Pain] : no abdominal pain [Change in Appetite] : no change in appetite [Dizziness] : no dizziness

## 2022-06-28 NOTE — HISTORY OF PRESENT ILLNESS
[FreeTextEntry1] : Mrs. Weston is a 76 year old female referred by Dr. Jones for evaluation of tricuspid regurgitation. She was previously evaluated by the structural team in 2016 for AS and enrollment in the Low Risk LEEANNA trial. She was excluded from the trial due to anatomic features noted on her CT. She subsequently underwent a surgical AVR with Dr. Kauffman (#21 Magna) on 7/29/16. Her post operative course was complicated by complete heart block for which she underwent PPM placement. Device interrogation subsequently reported asymptomatic PAF, for which she was started on Eliquis. \par \par She presented to the ED here at [facility] on 4/18 with dyspnea and was diagnosed as having a CHF exacerbation. A TIM during that hospitalization demonstrated torrential tricuspid regurgitation. She underwent cardioversion while hospitalized. She presented again to the ED in May for dehydration and ADAMS, her diuretics were adjusted, and she was discharged home. She presented to the ED again on 6/1/22 at [facility] for anemia (Hgb 4.6) requiring transfusion of 3u PRBC. An inpatient GI evaluation (endoscopy and colonoscopy) and nuclear medicine scan were all negative for bleeding. Her anemia was presumed to be due to an iron deficiency and volume overload. She has followed with a Hematologist (Dr. Fournier) for such. Her Hgb has been stable at 9.6 and her Eliquis was resumed prior to discharge in June. \par \par Following discharge she reports symptomatic improvement since her anemia was treated. She has some persistent fatigue and exertional dyspnea and she is able to walk approximately one third of a mile before feeling tired. She has no angina, PND, orthopnea or dizziness. She has LE edema. \par \par Her past medical history includes previous aortic stenosis (s/p SAVR), an intermittent right bundle branch block pattern on her electrocardiogram (s/p PPM), hypertension, dyslipidemia, a thyroid nodule, secondary hypothyroidism, iron deficiency anemia, and renal cell carcinoma (status post resection). \par \par I saw her initially in the hospital in April when she was admitted with anemia and a presumed GIB; she was admitted twice since, once with (per her report) complications secondary to possible over diuresis and again with anemia and possible GIB, all as noted above. That said, she reports that all GI evaluations have been negative and not demonstrated a definitive source of bleeding. An additional "theory, they were speculating at that point" was that it is secondary to her being on Eliquis. She is experiencing "at times fatigue and shortness of breath" and "dizzy with quick movements when I am in bed" and notes "I don't have the stamina I used to have". Her symptoms began in December 2021. She notes feeling "great, very good" after her SAVR in 2016. She weighs herself daily and notes the variance has not been more than 0.6 pounds. \par \par

## 2022-06-28 NOTE — PHYSICAL EXAM
[Prosthetic Aortic Valve] : prosthetic aortic valve heard [II] : a grade 2 [___ +] : bilateral [unfilled]U+ pretibial pitting edema [de-identified] : No JVD [de-identified] : BLE trace edema

## 2022-06-28 NOTE — CARDIOLOGY SUMMARY
[de-identified] : NSR 75\par 1st degree AVB\par V paced [de-identified] : 4/22/22\par TIM\par EF 60-65%, Bioprosthetic AV, mGr 26 mmHg, pGr 46 mmHg, DVI 0.3, mild/moderate mitral regurgitation, torrential tricuspid regurgitation  [de-identified] : 7/15/2016\par LM normal, pLAD 40%, Cx mild disease, RCA mild disease  [de-identified] : 7/29/2016\par Surgical AVR #21 Magna bovine pericardial

## 2022-06-28 NOTE — DISCUSSION/SUMMARY
[FreeTextEntry1] : Mrs Weston has severe TR with fatigue and GONZALES (NYHA II). Complicating matters, she has an RV lead and is essentially pacer dependent (>98% RV paced); she also has PAF. I have reviewed her TIM (from April) with Dr Fischer in detail which reveals severe RVE (with decreased function) and severe TV annular dilatation with malcoaptation of the TV leaflets--this anatomy is not amenable to TV SCAR. As such, I would recommend EPS evaluation (she has an appointment with Dr Bergman on 7/25, I will inquire about pushing it up sooner) to discuss consideration for lead extraction (placed in 2006) and alternative pacing options (i.e. AV MICRA or a CS/LV lead). After seeing EPS, I will see her for follow up and hope to be enrolling patients in TRISCEND II at that time (TTVR using the EVOQUE transcatheter tricuspid valve replacement). She will continue daily weights, and I advised it was OK to take an extra half tablet of Torsemide as needed (for weight gain or edema). Ultimately, she will need to be seen by our CHF colleagues when we are ready to move forward with further evaluation for the TRISCEND II study, for confirmation that she is medically optimized on appropriate GDMT for her right-heart failure.\par I will notify Sparkle Jones and Pacheco of our impressions and recommendations

## 2022-07-18 NOTE — PATIENT PROFILE ADULT - IS PATIENT POST-MENOPAUSAL?
Spoke to patient he is willing to wait until [id number] [facility] gets back from vacation. We can use a ER hold slot for him with [facility]. Can you call and schedule him please?   thanks yes

## 2022-07-22 NOTE — ED ADULT TRIAGE NOTE - BMI (KG/M2)
consider increasing nightly dose of chlordiazepoxide consider increasing nightly dose of chlordiazepoxide 35.3

## 2022-07-25 ENCOUNTER — NON-APPOINTMENT (OUTPATIENT)
Age: 77
End: 2022-07-25

## 2022-07-25 ENCOUNTER — APPOINTMENT (OUTPATIENT)
Dept: ELECTROPHYSIOLOGY | Facility: CLINIC | Age: 77
End: 2022-07-25

## 2022-07-25 VITALS
WEIGHT: 161 LBS | HEIGHT: 63 IN | OXYGEN SATURATION: 99 % | SYSTOLIC BLOOD PRESSURE: 161 MMHG | DIASTOLIC BLOOD PRESSURE: 80 MMHG | HEART RATE: 68 BPM | BODY MASS INDEX: 28.53 KG/M2

## 2022-07-25 PROCEDURE — 99213 OFFICE O/P EST LOW 20 MIN: CPT | Mod: 25

## 2022-07-25 PROCEDURE — 93280 PM DEVICE PROGR EVAL DUAL: CPT

## 2022-07-25 PROCEDURE — 93000 ELECTROCARDIOGRAM COMPLETE: CPT | Mod: 59

## 2022-07-25 RX ORDER — CHLORHEXIDINE GLUCONATE 4 %
325 (65 FE) LIQUID (ML) TOPICAL 3 TIMES DAILY
Refills: 0 | Status: DISCONTINUED | COMMUNITY
Start: 2022-05-03 | End: 2022-07-25

## 2022-07-27 ENCOUNTER — NON-APPOINTMENT (OUTPATIENT)
Age: 77
End: 2022-07-27

## 2022-07-27 ENCOUNTER — APPOINTMENT (OUTPATIENT)
Dept: CARDIOLOGY | Facility: CLINIC | Age: 77
End: 2022-07-27

## 2022-07-27 VITALS
BODY MASS INDEX: 27.82 KG/M2 | OXYGEN SATURATION: 98 % | DIASTOLIC BLOOD PRESSURE: 76 MMHG | WEIGHT: 157 LBS | HEIGHT: 63 IN | HEART RATE: 69 BPM | SYSTOLIC BLOOD PRESSURE: 146 MMHG

## 2022-07-27 PROCEDURE — 99214 OFFICE O/P EST MOD 30 MIN: CPT

## 2022-07-27 PROCEDURE — 93000 ELECTROCARDIOGRAM COMPLETE: CPT

## 2022-07-27 RX ORDER — METOPROLOL SUCCINATE 100 MG/1
100 TABLET, EXTENDED RELEASE ORAL
Qty: 180 | Refills: 0 | Status: DISCONTINUED | COMMUNITY
Start: 2021-12-06 | End: 2022-07-27

## 2022-07-27 RX ORDER — TORSEMIDE 20 MG/1
20 TABLET ORAL
Qty: 60 | Refills: 0 | Status: DISCONTINUED | COMMUNITY
Start: 2022-04-29 | End: 2022-07-27

## 2022-07-27 RX ORDER — VALSARTAN 320 MG/1
320 TABLET, COATED ORAL
Qty: 90 | Refills: 0 | Status: DISCONTINUED | COMMUNITY
Start: 2022-02-21 | End: 2022-07-27

## 2022-07-27 NOTE — DISCUSSION/SUMMARY
[EKG obtained to assist in diagnosis and management of assessed problem(s)] : EKG obtained to assist in diagnosis and management of assessed problem(s) [FreeTextEntry1] : In summary, Ms. Weston is a 76 year old woman with a history of hypertension, hyperlipidemia, hypothyroidism, iron deficiency anemia, renal cell carcinoma status post partial nephrectomy in the past, aortic stenosis status post bioprosthetic aortic valve replacement in July 2016 which was complicated by post operative complete heart block status post a dual chamber [employer] pacemaker on August 3, 2016, paroxysmal atrial fibrillation, has been recently taken off anticoagulation secondary to recurrent anemia requiring transfusions and severe tricuspid regurgitation. She is being evaluated for enrollment in the TRISCEND II study. We discussed with the patient and her son regarding extraction of her transvenous pacemaker system with a leadless pacemaker implant to allow for assessment of her tricuspid regurgitation and possible transcatheter tricuspid valve replacement. The risks and benefits were discussed. The procedures, outcomes and risks of extraction were reviewed. Risks discussed included but were not limited to bleeding, vascular tear, tamponade, perforation, valvular damage, need for emergent surgery, pneumothorax and death. We informed her that once we have extracted her transvenous system, she would be further evaluated by the structural heart team for her tricuspid regurgitation. After all questions were answered, the patient would like to proceed with extraction and reimplant of a leadless pacemaker.\par \par We also discussed stroke prophylaxis in the setting of atrial fibrillation. She has had a recent bleed requiring transfusions and is currently off anticoagulation. She has a CHADS-VASc score of 4. She may benefit from a future Watchman implant as she is not a long-term anticoagulation candidate. However, she would require short term anticoagulation post Watchman which we would need to determine from her hematologist. For now she will continue amiodarone which has been maintaining her in sinus rhythm. We will first proceed with the extraction and leadless pacemaker reimplant so her tricuspid regurgitation can be addressed. Once that has been dealt with, we can then reassess her candidacy for a Watchman implant. We will arrange for her extraction and leadless pacemaker reimplant to be scheduled.\par \par \par I, Dr. Bergman, personally performed the evaluation and management (E/M) services for this established patient who presents today with new problems/exacerbation of an existing condition. That E/M includes conducting the examination, assessing all new/exacerbated conditions, and establishing a new plan of care. Today, my ACP, Reyna Cee PA-C, was here to observe my evaluation and management services for this new problem/exacerbated condition to be followed going forward.\par \par \par

## 2022-07-27 NOTE — HISTORY OF PRESENT ILLNESS
[FreeTextEntry1] : I had the pleasure of seeing Yolette Weston today for evaluation for pacemaker extraction in the arrhythmia clinic at [facility]. As you well know, she is a pleasant 76 year old woman with a history of hypertension, hyperlipidemia, hypothyroidism, iron deficiency anemia, renal cell carcinoma status post partial nephrectomy in the past, aortic stenosis status post bioprosthetic aortic valve replacement in July 2016 which was complicated by post operative complete heart block status post a dual chamber Medtronic pacemaker on August 3, 2016, paroxysmal atrial fibrillation, has been recently taken off anticoagulation secondary to recurrent anemia requiring transfusions and severe tricuspid regurgitation. Patient was seen by the structural heart team and is being evaluated for possible enrollment in the TRISCEND II study. She is following up in clinic today to discuss pacemaker extraction with a MICRA implant. Patient reports that she has been feeling fatigue in recent months. She was diagnosed with significant anemia which required several units of PRBC transfusion. She had a gastrointestinal evaluation in June 2022 including a colonoscopy and endoscopy which did not reveal a source of bleeding. She was eventually restarted on Eliquis however about 2-3 weeks her Eliquis was again discontinued because of a drop in her hemoglobin. She is followed by her hematologist Dr. Fournier. \par \par The patient was cardioverted on April 22, 2022 and placed on amiodarone for rhythm control. She has maintained sinus rhythm with no recurrence of atrial fibrillation since then. An Echocardiogram from April 19, 2022 demonstrated an EF of 66%, severe right atrial enlargement, right ventricular enlargement with decreased right ventricular systolic function, mal-coaptation of the tricuspid valve with severe tricuspid regurgitation, severely dilated left atrium and moderate mitral stenosis. \par \par Today in clinic the patient is without complaints and feels well. She denies chest pain, palpitations, near syncope or syncope. She reports some peripheral edema and some dyspnea however this has improved. Her blood pressure today is 145/80 with a pulse of 68 bpm and regular. Her device interrogation shows normal device function. The atrial lead impedance is 437 ohms with a p wave amplitude of 2.3 mV and a threshold of 0.5V @ 0.4ms. The RV lead impedance is 456 ohms with a threshold of 1V @ 0.4ms and an R wave amplitude unable to be measured as she is pacemaker dependent. The battery longevity is 4.5 years. There were no events on the device interrogation. Her ECG today shows sinus rhythm with ventricular pacing at 69 bpm.\par \par \par Device Information:\par Pulse Generator: Medtronic Advisa  MRI [id number]  SN: [id number]  Implanted 8/3/2016\par RA Lead: Medtronic [id number]  SN: [id number]  implanted 8/3/2016\par RV Lead: Medtronic [id number]  SN: [id number]  implanted 8/3/2016\par \par

## 2022-07-27 NOTE — DISCUSSION/SUMMARY
[FreeTextEntry1] : Yolette was recently admitted with acute decompensated right heart failure, and was found to have severe tricuspid regurgitation (with a dilated annulus) and atrial fibrillation.  She is now status post cardioversion, and remains in a sinus rhythm with ventricular pacing today.\par \par She has more recently been admitted with weakness in the setting of ADAMS and overdiuresis, and then with significant blood loss anemia with a normal GI evaluation.\par \par Fortunately, she is doing much better today. Her Hb and creatinine are stable, as of last week. Her BP is much better overall, and she will remain on Toprol XL 50, and torsemide 5 daily. Her weights have been stable. She will stay on amiodarone. She is off eliquis because of her bleeding issues in the short term, and the idea of a watchman was discussed with her yesterday by Dr. Bergman. \par \par She is having her ppm removed with a leadless implant next week; we will then set her up for re-eval for minimally invasive tricuspid replacement. She will continue to monitor her blood pressure and daily weights at home. If her K and Creatinine have normalized, we will add back valsartan.  If no issues, I will see her again in 1-2 months. [EKG obtained to assist in diagnosis and management of assessed problem(s)] : EKG obtained to assist in diagnosis and management of assessed problem(s)

## 2022-07-27 NOTE — HISTORY OF PRESENT ILLNESS
[FreeTextEntry1] : Yolette presents to the office today for a follow up cardiovascular evaluation.\par \par She is now 76 years old, with a history of aortic stenosis, an intermittent right bundle branch block pattern on her electrocardiogram, hypertension, a dyslipidemia, a thyroid nodule, secondary hypothyroidism, iron deficiency anemia, and renal cell carcinoma (status post resection).  She has had bioprosthetic aortic valve replacement. Her postop course was complicated by complete heart block, for which a pacemaker was eventually implanted.  Routine interrogation of her pacemaker revealed asymptomatic atrial fibrillation. She has been anticoagulated.\par \par She went to see Dr. Hayes for a physical in November, feeling well at that time.  Dr. Hayes suggested a number of scans, including a CT of the chest.  The CT was notable for GGO, for which a pulmonary evaluation is planned.\par \par She had not felt 100% since December 2021.  She presented to the emergency room on 4/18/2022, with shortness of breath, and acute diastolic heart failure.  She was diuresed successfully with intravenous Lasix.  A transesophageal echocardiogram demonstrated mild to moderate mitral vegetation, mild mitral stenosis, mildly elevated bio AVR gradients, RV enlargement and RV dysfunction, with severe tricuspid regurgitation.  Tricuspid valve appeared tethered with mall coaptation of the tricuspid valve leaflets due to a severely dilated annulus, without obvious impingement from the pacemaker lead.  She was evaluated by Dr. Robles, and has outpatient follow-up scheduled for intervention regarding her tricuspid valve.\par Her symptoms have also correlated with the presence of atrial fibrillation.  She is now status post cardioversion as well.  She was discharged home on 4/29.  Her cardiac medications at discharge were amiodarone 200, aspirin 81, Eliquis 5 twice daily, Toprol , simvastatin 20, torsemide 20 twice daily, and valsartan 320.\par \par I last saw her in 6/22.\par Though she was feeling better at last visit, she then became weak with dizziness and falls. \par She was admitted 5/13/22-5/17 with ADAMS in the setting of overdiuresis. Her creatinine was 1.4 at time of dc.\par She then had falls and dizziness/weakness in early June 2022 and was found to have a Hb of 4.6. She got PRBCs and responded appropriately. She had an EGD and Colon (though poor prep) without active bleeding.\par She was sent home on torsemide 10 mg.\par \par She feels well overall. She saw Dr. Robles on 6/27/22 with severe TR and was referred to EP, with eventual consideration for the TRISECND II (transcatheter TV replacement). She saw EP and has been recommended to have a lead extraction and leadless PPM implant.\par She has no dyspnea or chest pain. \par \par Her blood pressure has been a little higher than usual. She is not off iron infusions, and has not gotten a transfusion in 4 weeks. She remains off of eliquis. She is taking torsemide 5 mg po daily.

## 2022-07-27 NOTE — PHYSICAL EXAM
[Well Developed] : well developed [Well Nourished] : well nourished [No Acute Distress] : no acute distress [Normal Conjunctiva] : normal conjunctiva [Normal Venous Pressure] : normal venous pressure [Normal S1, S2] : normal S1, S2 [No Rub] : no rub [No Gallop] : no gallop [Murmur] : murmur [Clear Lung Fields] : clear lung fields [Good Air Entry] : good air entry [No Respiratory Distress] : no respiratory distress  [Soft] : abdomen soft [Non Tender] : non-tender [Normal Bowel Sounds] : normal bowel sounds [Normal Gait] : normal gait [No Cyanosis] : no cyanosis [Edema ___] : edema [unfilled] [No Rash] : no rash [Moves all extremities] : moves all extremities [No Focal Deficits] : no focal deficits [Normal Speech] : normal speech [Alert and Oriented] : alert and oriented [Normal memory] : normal memory [de-identified] : + [de-identified] : III/VI HSM heard best at LSB, II/VI diastolic murmur heard best at apex

## 2022-07-28 ENCOUNTER — NON-APPOINTMENT (OUTPATIENT)
Age: 77
End: 2022-07-28

## 2022-07-29 ENCOUNTER — OUTPATIENT (OUTPATIENT)
Dept: OUTPATIENT SERVICES | Facility: HOSPITAL | Age: 77
LOS: 1 days | End: 2022-07-29
Payer: MEDICARE

## 2022-07-29 ENCOUNTER — OUTPATIENT (OUTPATIENT)
Dept: OUTPATIENT SERVICES | Facility: HOSPITAL | Age: 77
LOS: 1 days | End: 2022-07-29

## 2022-07-29 VITALS
RESPIRATION RATE: 16 BRPM | SYSTOLIC BLOOD PRESSURE: 119 MMHG | WEIGHT: 156.97 LBS | HEIGHT: 63 IN | DIASTOLIC BLOOD PRESSURE: 76 MMHG | HEART RATE: 66 BPM | TEMPERATURE: 99 F | OXYGEN SATURATION: 96 %

## 2022-07-29 DIAGNOSIS — Z01.818 ENCOUNTER FOR OTHER PREPROCEDURAL EXAMINATION: ICD-10-CM

## 2022-07-29 DIAGNOSIS — T82.110A BREAKDOWN (MECHANICAL) OF CARDIAC ELECTRODE, INITIAL ENCOUNTER: ICD-10-CM

## 2022-07-29 DIAGNOSIS — Z90.5 ACQUIRED ABSENCE OF KIDNEY: Chronic | ICD-10-CM

## 2022-07-29 DIAGNOSIS — Z41.9 ENCOUNTER FOR PROCEDURE FOR PURPOSES OTHER THAN REMEDYING HEALTH STATE, UNSPECIFIED: Chronic | ICD-10-CM

## 2022-07-29 DIAGNOSIS — Z29.9 ENCOUNTER FOR PROPHYLACTIC MEASURES, UNSPECIFIED: ICD-10-CM

## 2022-07-29 DIAGNOSIS — Z98.89 OTHER SPECIFIED POSTPROCEDURAL STATES: Chronic | ICD-10-CM

## 2022-07-29 DIAGNOSIS — Z95.2 PRESENCE OF PROSTHETIC HEART VALVE: Chronic | ICD-10-CM

## 2022-07-29 DIAGNOSIS — Z90.721 ACQUIRED ABSENCE OF OVARIES, UNILATERAL: Chronic | ICD-10-CM

## 2022-07-29 DIAGNOSIS — E11.9 TYPE 2 DIABETES MELLITUS WITHOUT COMPLICATIONS: ICD-10-CM

## 2022-07-29 DIAGNOSIS — I48.91 UNSPECIFIED ATRIAL FIBRILLATION: ICD-10-CM

## 2022-07-29 DIAGNOSIS — Z11.52 ENCOUNTER FOR SCREENING FOR COVID-19: ICD-10-CM

## 2022-07-29 LAB
A1C WITH ESTIMATED AVERAGE GLUCOSE RESULT: 4.9 % — SIGNIFICANT CHANGE UP (ref 4–5.6)
ANION GAP SERPL CALC-SCNC: 13 MMOL/L — SIGNIFICANT CHANGE UP (ref 5–17)
BUN SERPL-MCNC: 30 MG/DL — HIGH (ref 7–23)
CALCIUM SERPL-MCNC: 10.4 MG/DL — SIGNIFICANT CHANGE UP (ref 8.4–10.5)
CHLORIDE SERPL-SCNC: 103 MMOL/L — SIGNIFICANT CHANGE UP (ref 96–108)
CO2 SERPL-SCNC: 27 MMOL/L — SIGNIFICANT CHANGE UP (ref 22–31)
CREAT SERPL-MCNC: 1.21 MG/DL — SIGNIFICANT CHANGE UP (ref 0.5–1.3)
EGFR: 47 ML/MIN/1.73M2 — LOW
ESTIMATED AVERAGE GLUCOSE: 94 MG/DL — SIGNIFICANT CHANGE UP (ref 68–114)
GLUCOSE SERPL-MCNC: 107 MG/DL — HIGH (ref 70–99)
HCT VFR BLD CALC: 36.6 % — SIGNIFICANT CHANGE UP (ref 34.5–45)
HGB BLD-MCNC: 11.5 G/DL — SIGNIFICANT CHANGE UP (ref 11.5–15.5)
MCHC RBC-ENTMCNC: 26.5 PG — LOW (ref 27–34)
MCHC RBC-ENTMCNC: 31.4 GM/DL — LOW (ref 32–36)
MCV RBC AUTO: 84.3 FL — SIGNIFICANT CHANGE UP (ref 80–100)
NRBC # BLD: 0 /100 WBCS — SIGNIFICANT CHANGE UP (ref 0–0)
PLATELET # BLD AUTO: 262 K/UL — SIGNIFICANT CHANGE UP (ref 150–400)
POTASSIUM SERPL-MCNC: 4.2 MMOL/L — SIGNIFICANT CHANGE UP (ref 3.5–5.3)
POTASSIUM SERPL-SCNC: 4.2 MMOL/L — SIGNIFICANT CHANGE UP (ref 3.5–5.3)
RBC # BLD: 4.34 M/UL — SIGNIFICANT CHANGE UP (ref 3.8–5.2)
RBC # FLD: 17 % — HIGH (ref 10.3–14.5)
SARS-COV-2 RNA SPEC QL NAA+PROBE: SIGNIFICANT CHANGE UP
SODIUM SERPL-SCNC: 143 MMOL/L — SIGNIFICANT CHANGE UP (ref 135–145)
WBC # BLD: 7.24 K/UL — SIGNIFICANT CHANGE UP (ref 3.8–10.5)
WBC # FLD AUTO: 7.24 K/UL — SIGNIFICANT CHANGE UP (ref 3.8–10.5)

## 2022-07-29 PROCEDURE — 85027 COMPLETE CBC AUTOMATED: CPT

## 2022-07-29 PROCEDURE — U0005: CPT

## 2022-07-29 PROCEDURE — 71046 X-RAY EXAM CHEST 2 VIEWS: CPT | Mod: 26

## 2022-07-29 PROCEDURE — 36415 COLL VENOUS BLD VENIPUNCTURE: CPT

## 2022-07-29 PROCEDURE — 71046 X-RAY EXAM CHEST 2 VIEWS: CPT

## 2022-07-29 PROCEDURE — 83036 HEMOGLOBIN GLYCOSYLATED A1C: CPT

## 2022-07-29 PROCEDURE — C9803: CPT

## 2022-07-29 PROCEDURE — 80048 BASIC METABOLIC PNL TOTAL CA: CPT

## 2022-07-29 PROCEDURE — G0463: CPT

## 2022-07-29 PROCEDURE — U0003: CPT

## 2022-07-29 RX ORDER — CEFUROXIME AXETIL 250 MG
1500 TABLET ORAL ONCE
Refills: 0 | Status: COMPLETED | OUTPATIENT
Start: 2022-08-02 | End: 2022-08-02

## 2022-07-29 RX ORDER — APIXABAN 2.5 MG/1
1 TABLET, FILM COATED ORAL
Qty: 0 | Refills: 0 | DISCHARGE

## 2022-07-29 NOTE — H&P PST ADULT - NSCAFFEAMTFREQ_GEN_ALL_CORE_SD
1-2 cups/cans per day
Is This A New Presentation, Or A Follow-Up?: Skin Lesions
Have Your Skin Lesions Been Treated?: not been treated

## 2022-07-29 NOTE — H&P PST ADULT - NEGATIVE GASTROINTESTINAL SYMPTOMS
no nausea/no vomiting/no change in bowel habits/no abdominal pain/no melena/no hematochezia/no jaundice

## 2022-07-29 NOTE — H&P PST ADULT - LAST ECHOCARDIOGRAM
4/2/22 EF 60-65%,  mild-mod MR, RV PPM wire crossing the TV valve which may be contributory to tricuspid regurgitant.

## 2022-07-29 NOTE — H&P PST ADULT - NSANTHNECKRD_ENT_A_CORE
CV[facility] - [facility]ITICAL [facility]ARE SERVICE     PROGRESS NOTE    Patient: Trino Brown Date: 5/10/2018   male, 59 year old  Admit Date: 3/25/2018   Attending: Brian Guerrero DO Primary Care Physician: Scarlet Rico MD        ICU admission Date: 3/25/2018      Principle Problem: LVAD-BTT (status 7), Right pleural Empyema, Acute on Chronic Hypercapnic Respiratory Failure, Failure to Thrive                                                                                      SUMMARY & PLAN     Summary        60 y/o M w/ PMHx notable for Smithfield syndrome, NICM s/p LVAD, AICD, Asthma, Afib/flutter, CHF, CKD (stage II), anxiety, and chronic hypercarbic/hypoxic respiratory failure who was admitted with SOB, hypoxia, and failure to thrive.  His hospital course has otherwise been notable for ongoing hypercarbic respiratory failure, pleural effusion s/p pleurex catheter placement (04/02 - removed), MSSA bacteremia, volume overload, DANNY, and protein calorie malnutrition/failure to thrive. Over the last few weeks pt has been slowly improving from deconditioning standpoint as well as fluid status standpoint. His respiratory status is mostly the same. Left sided effusion continues.      Plan       Renal function slightly improved. Recommend holding off with diuresis for one more day.                         Aggressive PT/OT  Take trips out of CVICU  BIPAP at night    -----------------------------    Best Practice:  - VTE: SCD/MARIA EUGENIA  - SUP: pantoprazole 40 mg po bid  - Glycemic control:    - LDA assessment and Removal: PIV  - Nutrition: 2.0 Calorie/Fluid Restricted (05/06/18 0800)   - Last BM: 1 (small, formed) (05/06/18 1314)  - Therapy/mobilization: PT/OT*      ACTIVE PROBLEM LIST    Acute/Chronic Hypoxic/Hypercarbic Respiratory Failure  Anxiety   Infected Pleural Effusions - MSSA - s/p pleurex on 3/12 now removed, and left thoracentesis 4/3  MSSA bacteremia - treating with cefazolin   DANNY resolved  Anemia of chronic  disease  Insomnia  Low back pain  Adjustment disorder  Panic attacks  Protein calorie malnutrition         Disposition: CVICU  Code Status:Full           Interval    Family meeting went well. No acute events overnight.           Infusions:   • sodium chloride 0.9% infusion        Prn Med:  sodium chloride, melatonin, clonazePAM, clonazePAM, acetaminophen, polyethylene glycol, albuterol, traMADol, diclofenac, potassium chloride, potassium chloride, potassium chloride, potassium chloride, sodium chloride (PF), bisacodyl       Vital Last Value 24 Hour Range   Temperature 97.1 °F (36.2 °C) (05/10/18 0700) Temp  Min: 96.6 °F (35.9 °C)  Max: 97.9 °F (36.6 °C)   Pulse 96 (05/10/18 0901) Pulse  Min: 94  Max: 108   Respiratory 28 (05/10/18 0400) Resp  Min: 26  Max: 30   Non-Invasive  Blood Pressure 120/84 (05/10/18 0901) BP  Min: 120/84  Max: 120/84   Pulse Oximetry 95 % (05/10/18 0901) SpO2  Min: 92 %  Max: 98 %   Arterial   Blood Pressure 103/89 (04/30/18 0800) No Data Recorded      SvO2       I/O last 3 completed shifts:  In: 3832 [P.O.:2000; Blood:1000; NG/GT:832]  Out: 1050 [Urine:1050]  No intake/output data recorded.    Current Lines drains and Catheters:  Peripheral IV 05/07/18 Right Forearm 20 (Active)   Site Assessment WDL 5/10/2018  4:00 AM   Dressing Type Transparent 5/10/2018  4:00 AM   Dressing Activity Monitored 5/10/2018  4:00 AM   Dressing Changed On   05/07/18 5/10/2018  4:00 AM   Line Status Blood return noted 5/10/2018  4:00 AM   Interventions Capped IV 5/10/2018  4:00 AM       Physical Exam:  Neurologic:A&O4, moves all extremities   Neck:supple  Chest:coarse  Heart:vad audible   Abdomen:slightly distended  Extremities:perfused   Skin:warm          Pertinent Reviewed: Allergies, Medications, Labs, Imaging and Physician and Nursing Notes      ACCS Attestation    This patient is critically ill as documented above. I evaluated the patient and reviewed imaging and laboratory data. Critical care services I  provided [id number] (followup hospital care, level III).    Brian Guerrero,   5/10/2018   No

## 2022-07-29 NOTE — H&P PST ADULT - NSANTHOBSERVEDRD_ENT_A_CORE
notified, consent okay, he states that he will giver her the information, he request his wife results to be mail, results mailed today  How Severe Is Your Cyst?: mild Is This A New Presentation, Or A Follow-Up?: Cyst No

## 2022-07-29 NOTE — H&P PST ADULT - NSICDXPASTSURGICALHX_GEN_ALL_CORE_FT
PAST SURGICAL HISTORY:  Elective surgery PPM  2016    H/O aortic valve replacement 2016    H/O hand surgery left due to injury    H/O partial nephrectomy right 2015    S/P T&A (status post tonsillectomy and adenoidectomy) as a child    Status post unilateral salpingo-oophorectomy 1980's     PAST SURGICAL HISTORY:  Elective surgery PPM  8/2016    H/O aortic valve replacement 5/2016    H/O hand surgery left due to injury    H/O partial nephrectomy right 5/015    S/P T&A (status post tonsillectomy and adenoidectomy) as a child    Status post unilateral salpingo-oophorectomy 1980's

## 2022-07-29 NOTE — H&P PST ADULT - PROBLEM SELECTOR PLAN 1
Pacemaker Lead Extraction, Pacemaker Implant (Medtronic) on 8/2/22  Pre-op instructions, including Chlorhexidine soap, provided - all questions answered  Labs and CXR done at [facility] swab done at [facility]

## 2022-07-29 NOTE — H&P PST ADULT - ASSESSMENT
GHANSHYAMI VTE 2.0 SCORE [CLOT updated 2019]    AGE RELATED RISK FACTORS                                                       MOBILITY RELATED FACTORS  [ ] Age 41-60 years                                            (1 Point)                    [ ] Bed rest                                                        (1 Point)  [ ] Age: 61-74 years                                           (2 Points)                  [ ] Plaster cast                                                   (2 Points)  [x ] Age= 75 years                                              (3 Points)                    [ ] Bed bound for more than 72 hours                 (2 Points)    DISEASE RELATED RISK FACTORS                                               GENDER SPECIFIC FACTORS  [ x] Edema in the lower extremities                       (1 Point)              [ ] Pregnancy                                                     (1 Point)  [ ] Varicose veins                                               (1 Point)                     [ ] Post-partum < 6 weeks                                   (1 Point)             [x ] BMI > 25 Kg/m2                                            (1 Point)                     [ ] Hormonal therapy  or oral contraception          (1 Point)                 [ ] Sepsis (in the previous month)                        (1 Point)               [ ] History of pregnancy complications                 (1 point)  [ ] Pneumonia or serious lung disease                                               [ ] Unexplained or recurrent                     (1 Point)           (in the previous month)                               (1 Point)  [ ] Abnormal pulmonary function test                     (1 Point)                 SURGERY RELATED RISK FACTORS  [ ] Acute myocardial infarction                              (1 Point)               [ ]  Section                                             (1 Point)  [ ] Congestive heart failure (in the previous month)  (1 Point)      [ ] Minor surgery                                                  (1 Point)   [ ] Inflammatory bowel disease                             (1 Point)               [ ] Arthroscopic surgery                                        (2 Points)  [ ] Central venous access                                      (2 Points)                [ x] General surgery lasting more than 45 minutes (2 points)  [ x] Malignancy- Present or previous                   (2 Points)                [ ] Elective arthroplasty                                         (5 points)    [ ] Stroke (in the previous month)                          (5 Points)                                                                                                                                                           HEMATOLOGY RELATED FACTORS                                                 TRAUMA RELATED RISK FACTORS  [ ] Prior episodes of VTE                                     (3 Points)                [ ] Fracture of the hip, pelvis, or leg                       (5 Points)  [ ] Positive family history for VTE                         (3 Points)             [ ] Acute spinal cord injury (in the previous month)  (5 Points)  [ ] Prothrombin 59104 A                                     (3 Points)               [ ] Paralysis  (less than 1 month)                             (5 Points)  [ ] Factor V Leiden                                             (3 Points)                  [ ] Multiple Trauma within 1 month                        (5 Points)  [ ] Lupus anticoagulants                                     (3 Points)                                                           [ ] Anticardiolipin antibodies                               (3 Points)                                                       [ ] High homocysteine in the blood                      (3 Points)                                             [ ] Other congenital or acquired thrombophilia      (3 Points)                                                [ ] Heparin induced thrombocytopenia                  (3 Points)                                     Total Score [        9  ]

## 2022-07-29 NOTE — H&P PST ADULT - HISTORY OF PRESENT ILLNESS
4.6 HG  no covid 75 yo female, PMH HTN, HLD, hypothyroid, iron deficiency anemia, renal cell carcinoma s/p partial nephrectomy 5/2015, aortic stenosis s/p bioprosthetic AVR 7/29/16, complete heart block s/p PPM 8/3/2016,  paroxysmal a-fib (was on Eliquis), chronic HFpEF., Pt. reports that since April of this year, she has GONZALES, had an admission for heart failure - Echo 4/22/22 - EF 60-65%,  mild-mod MR, RV PPM wire crossing the TV valve which may be contributory to tricuspid regurgitant. Pt.  returned to ER on June with H&H 4.6/15.6 - received 3 units of PRBC, CT A/P negative, s/p endoscopy - mild gastritis, s/p colonoscopy with poor prep - scattered diverticulosis. Received one more PRBC about 2 weeks ago and 2 iron infusions (being followed by hematology). Pt. is scheduled for Pacemaker Lead Extraction, Pacemaker Implant (medtronic) on 8/2/2. Pt. denies COVID-19 infection in 2020 through 2022, denies close contact with anyone that has been ill, no fever, cough, dyspnea in past two weeks.    Pt. had COVID-19 testing done today at [facility]    Pt. was taken off Eliquis, remains on Aspirin 81 mg.

## 2022-07-29 NOTE — H&P PST ADULT - NSICDXPASTMEDICALHX_GEN_ALL_CORE_FT
PAST MEDICAL HISTORY:  Anemia has    Aortic stenosis s/p AVR    Cancer of kidney, left     Dyslipidemia     Hiatal hernia     Hypertension x10 years    Hypothyroidism     Right bundle branch block (RBBB)     Type II diabetes mellitus well controlled     PAST MEDICAL HISTORY:  Anemia between June and July 2022 has received a total of 4 units PRBC and 2 iron infusions    Aortic stenosis s/p AVR    Cancer of kidney, left 2015    Dyslipidemia     Hiatal hernia     History of complete heart block     Hypertension x10 years    Hypothyroidism     Post-surgical complete heart block 2016    Right bundle branch block (RBBB)     Type II diabetes mellitus well controlled

## 2022-08-01 ENCOUNTER — TRANSCRIPTION ENCOUNTER (OUTPATIENT)
Age: 77
End: 2022-08-01

## 2022-08-02 ENCOUNTER — TRANSCRIPTION ENCOUNTER (OUTPATIENT)
Age: 77
End: 2022-08-02

## 2022-08-02 ENCOUNTER — INPATIENT (INPATIENT)
Facility: HOSPITAL | Age: 77
LOS: 0 days | Discharge: HOME | End: 2022-08-03
Attending: INTERNAL MEDICINE | Admitting: INTERNAL MEDICINE
Payer: COMMERCIAL

## 2022-08-02 VITALS
HEART RATE: 65 BPM | OXYGEN SATURATION: 98 % | DIASTOLIC BLOOD PRESSURE: 82 MMHG | HEIGHT: 63 IN | WEIGHT: 156.97 LBS | TEMPERATURE: 98 F | SYSTOLIC BLOOD PRESSURE: 152 MMHG | RESPIRATION RATE: 16 BRPM

## 2022-08-02 DIAGNOSIS — Z90.5 ACQUIRED ABSENCE OF KIDNEY: Chronic | ICD-10-CM

## 2022-08-02 DIAGNOSIS — Z98.89 OTHER SPECIFIED POSTPROCEDURAL STATES: Chronic | ICD-10-CM

## 2022-08-02 DIAGNOSIS — Z90.721 ACQUIRED ABSENCE OF OVARIES, UNILATERAL: Chronic | ICD-10-CM

## 2022-08-02 DIAGNOSIS — Z41.9 ENCOUNTER FOR PROCEDURE FOR PURPOSES OTHER THAN REMEDYING HEALTH STATE, UNSPECIFIED: Chronic | ICD-10-CM

## 2022-08-02 DIAGNOSIS — T82.110A BREAKDOWN (MECHANICAL) OF CARDIAC ELECTRODE, INITIAL ENCOUNTER: ICD-10-CM

## 2022-08-02 DIAGNOSIS — Z95.2 PRESENCE OF PROSTHETIC HEART VALVE: Chronic | ICD-10-CM

## 2022-08-02 LAB
BLD GP AB SCN SERPL QL: NEGATIVE — SIGNIFICANT CHANGE UP
RH IG SCN BLD-IMP: POSITIVE — SIGNIFICANT CHANGE UP

## 2022-08-02 PROCEDURE — 93010 ELECTROCARDIOGRAM REPORT: CPT

## 2022-08-02 PROCEDURE — 33235 REMOVAL PACEMAKER ELECTRODE: CPT

## 2022-08-02 PROCEDURE — 93286 PERI-PX EVAL PM/LDLS PM IP: CPT | Mod: 26,59

## 2022-08-02 PROCEDURE — 33274 TCAT INSJ/RPL PERM LDLS PM: CPT | Mod: Q0

## 2022-08-02 PROCEDURE — 33233 REMOVAL OF PM GENERATOR: CPT

## 2022-08-02 PROCEDURE — 99221 1ST HOSP IP/OBS SF/LOW 40: CPT | Mod: 25

## 2022-08-02 DEVICE — SHEATH GLIDELIGHT LASER 14FR
Type: IMPLANTABLE DEVICE | Status: NON-FUNCTIONAL
Removed: 2022-08-02

## 2022-08-02 DEVICE — KIT BRIDGE ACESSORY
Type: IMPLANTABLE DEVICE | Status: NON-FUNCTIONAL
Removed: 2022-08-02

## 2022-08-02 DEVICE — SYS PACEMAKER TRANSCATH MICRA AV SNGL
Type: IMPLANTABLE DEVICE | Status: NON-FUNCTIONAL
Removed: 2022-08-02

## 2022-08-02 DEVICE — IMPLANTABLE DEVICE
Type: IMPLANTABLE DEVICE | Status: NON-FUNCTIONAL
Removed: 2022-08-02

## 2022-08-02 DEVICE — KIT A-LINE 1LUM 20G X 12CM SAFE KIT
Type: IMPLANTABLE DEVICE | Status: NON-FUNCTIONAL
Removed: 2022-08-02

## 2022-08-02 DEVICE — DEVICE LOCKING LOCKING LLD EZ CLEARS
Type: IMPLANTABLE DEVICE | Status: NON-FUNCTIONAL
Removed: 2022-08-02

## 2022-08-02 DEVICE — INTRO CATH MICRA 23FR
Type: IMPLANTABLE DEVICE | Status: NON-FUNCTIONAL
Removed: 2022-08-02

## 2022-08-02 DEVICE — GUIDEWIRE AMPLATZ SUPER-STIFF 3MM J .035" X 145CM
Type: IMPLANTABLE DEVICE | Status: NON-FUNCTIONAL
Removed: 2022-08-02

## 2022-08-02 RX ORDER — ASPIRIN/CALCIUM CARB/MAGNESIUM 324 MG
81 TABLET ORAL DAILY
Refills: 0 | Status: DISCONTINUED | OUTPATIENT
Start: 2022-08-02 | End: 2022-08-03

## 2022-08-02 RX ORDER — ACETAMINOPHEN 500 MG
650 TABLET ORAL EVERY 6 HOURS
Refills: 0 | Status: DISCONTINUED | OUTPATIENT
Start: 2022-08-02 | End: 2022-08-03

## 2022-08-02 RX ORDER — ASPIRIN/CALCIUM CARB/MAGNESIUM 324 MG
1 TABLET ORAL
Qty: 0 | Refills: 0 | DISCHARGE

## 2022-08-02 RX ORDER — FERROUS SULFATE 325(65) MG
325 TABLET ORAL DAILY
Refills: 0 | Status: DISCONTINUED | OUTPATIENT
Start: 2022-08-02 | End: 2022-08-03

## 2022-08-02 RX ORDER — DEXTROSE 50 % IN WATER 50 %
25 SYRINGE (ML) INTRAVENOUS ONCE
Refills: 0 | Status: DISCONTINUED | OUTPATIENT
Start: 2022-08-02 | End: 2022-08-03

## 2022-08-02 RX ORDER — GLUCAGON INJECTION, SOLUTION 0.5 MG/.1ML
1 INJECTION, SOLUTION SUBCUTANEOUS ONCE
Refills: 0 | Status: DISCONTINUED | OUTPATIENT
Start: 2022-08-02 | End: 2022-08-03

## 2022-08-02 RX ORDER — METFORMIN HYDROCHLORIDE 850 MG/1
1 TABLET ORAL
Qty: 0 | Refills: 0 | DISCHARGE

## 2022-08-02 RX ORDER — SODIUM CHLORIDE 9 MG/ML
1000 INJECTION, SOLUTION INTRAVENOUS
Refills: 0 | Status: DISCONTINUED | OUTPATIENT
Start: 2022-08-02 | End: 2022-08-03

## 2022-08-02 RX ORDER — AMIODARONE HYDROCHLORIDE 400 MG/1
200 TABLET ORAL DAILY
Refills: 0 | Status: DISCONTINUED | OUTPATIENT
Start: 2022-08-02 | End: 2022-08-03

## 2022-08-02 RX ORDER — ACETAMINOPHEN 500 MG
1000 TABLET ORAL ONCE
Refills: 0 | Status: COMPLETED | OUTPATIENT
Start: 2022-08-02 | End: 2022-08-02

## 2022-08-02 RX ORDER — DEXTROSE 50 % IN WATER 50 %
12.5 SYRINGE (ML) INTRAVENOUS ONCE
Refills: 0 | Status: DISCONTINUED | OUTPATIENT
Start: 2022-08-02 | End: 2022-08-03

## 2022-08-02 RX ORDER — LEVOTHYROXINE SODIUM 125 MCG
1 TABLET ORAL
Qty: 0 | Refills: 0 | DISCHARGE

## 2022-08-02 RX ORDER — LEVOTHYROXINE SODIUM 125 MCG
112 TABLET ORAL DAILY
Refills: 0 | Status: DISCONTINUED | OUTPATIENT
Start: 2022-08-02 | End: 2022-08-03

## 2022-08-02 RX ORDER — SODIUM CHLORIDE 9 MG/ML
3 INJECTION INTRAMUSCULAR; INTRAVENOUS; SUBCUTANEOUS EVERY 8 HOURS
Refills: 0 | Status: DISCONTINUED | OUTPATIENT
Start: 2022-08-02 | End: 2022-08-02

## 2022-08-02 RX ORDER — SIMVASTATIN 20 MG/1
20 TABLET, FILM COATED ORAL AT BEDTIME
Refills: 0 | Status: DISCONTINUED | OUTPATIENT
Start: 2022-08-02 | End: 2022-08-03

## 2022-08-02 RX ORDER — METOPROLOL TARTRATE 50 MG
50 TABLET ORAL DAILY
Refills: 0 | Status: DISCONTINUED | OUTPATIENT
Start: 2022-08-02 | End: 2022-08-03

## 2022-08-02 RX ORDER — OXYCODONE HYDROCHLORIDE 5 MG/1
5 TABLET ORAL EVERY 6 HOURS
Refills: 0 | Status: DISCONTINUED | OUTPATIENT
Start: 2022-08-02 | End: 2022-08-03

## 2022-08-02 RX ORDER — CHLORHEXIDINE GLUCONATE 213 G/1000ML
1 SOLUTION TOPICAL ONCE
Refills: 0 | Status: DISCONTINUED | OUTPATIENT
Start: 2022-08-02 | End: 2022-08-02

## 2022-08-02 RX ORDER — AMIODARONE HYDROCHLORIDE 400 MG/1
1 TABLET ORAL
Qty: 0 | Refills: 0 | DISCHARGE

## 2022-08-02 RX ORDER — PANTOPRAZOLE SODIUM 20 MG/1
40 TABLET, DELAYED RELEASE ORAL
Refills: 0 | Status: DISCONTINUED | OUTPATIENT
Start: 2022-08-02 | End: 2022-08-03

## 2022-08-02 RX ORDER — INSULIN LISPRO 100/ML
VIAL (ML) SUBCUTANEOUS
Refills: 0 | Status: DISCONTINUED | OUTPATIENT
Start: 2022-08-02 | End: 2022-08-03

## 2022-08-02 RX ORDER — DEXTROSE 50 % IN WATER 50 %
15 SYRINGE (ML) INTRAVENOUS ONCE
Refills: 0 | Status: DISCONTINUED | OUTPATIENT
Start: 2022-08-02 | End: 2022-08-03

## 2022-08-02 RX ORDER — POTASSIUM CHLORIDE 20 MEQ
1 PACKET (EA) ORAL
Qty: 0 | Refills: 0 | DISCHARGE

## 2022-08-02 RX ORDER — LIDOCAINE HCL 20 MG/ML
0.2 VIAL (ML) INJECTION ONCE
Refills: 0 | Status: DISCONTINUED | OUTPATIENT
Start: 2022-08-02 | End: 2022-08-02

## 2022-08-02 RX ORDER — SIMVASTATIN 20 MG/1
1 TABLET, FILM COATED ORAL
Qty: 0 | Refills: 0 | DISCHARGE

## 2022-08-02 RX ADMIN — Medication 400 MILLIGRAM(S): at 15:50

## 2022-08-02 RX ADMIN — OXYCODONE HYDROCHLORIDE 5 MILLIGRAM(S): 5 TABLET ORAL at 22:59

## 2022-08-02 RX ADMIN — Medication 1000 MILLIGRAM(S): at 16:43

## 2022-08-02 RX ADMIN — Medication 1: at 18:47

## 2022-08-02 RX ADMIN — SIMVASTATIN 20 MILLIGRAM(S): 20 TABLET, FILM COATED ORAL at 22:08

## 2022-08-02 NOTE — DISCHARGE NOTE PROVIDER - NSDCMRMEDTOKEN_GEN_ALL_CORE_FT
amiodarone 200 mg oral tablet: 1 tab(s) orally once a day  Aspirin Enteric Coated 81 mg oral delayed release tablet: 1 tab(s) orally once a day at 12pm  ferrous sulfate 325 mg (65 mg elemental iron) oral tablet: 1 tab(s) orally once a day  levothyroxine 112 mcg (0.112 mg) oral tablet: 1 tab(s) orally once a day  metFORMIN 500 mg oral tablet, extended release: 1 tab(s) orally 2 times a day  metoprolol succinate 50 mg oral tablet, extended release: 1 tab(s) orally once a day  Potassium Chloride (Eqv-Klor-Con M10) 10 mEq oral tablet, extended release: 1 tab(s) orally 2 times a day  Protonix 40 mg oral delayed release tablet: 1 tab(s) orally once a day  simvastatin 20 mg oral tablet: 1 tab(s) orally once a day (at bedtime)  torsemide 20 mg oral tablet: 0.5 tab(s) orally once a day   acetaminophen 325 mg oral tablet: 2 tab(s) orally every 6 hours, As needed, Mild Pain (1 - 3)  amiodarone 200 mg oral tablet: 1 tab(s) orally once a day  Aspirin Enteric Coated 81 mg oral delayed release tablet: 1 tab(s) orally once a day at 12pm  ferrous sulfate 325 mg (65 mg elemental iron) oral tablet: 1 tab(s) orally once a day  levothyroxine 112 mcg (0.112 mg) oral tablet: 1 tab(s) orally once a day  metFORMIN 500 mg oral tablet, extended release: 1 tab(s) orally 2 times a day  metoprolol succinate 50 mg oral tablet, extended release: 1 tab(s) orally once a day  Potassium Chloride (Eqv-Klor-Con M10) 10 mEq oral tablet, extended release: 1 tab(s) orally 2 times a day  Protonix 40 mg oral delayed release tablet: 1 tab(s) orally once a day  simvastatin 20 mg oral tablet: 1 tab(s) orally once a day (at bedtime)  torsemide 20 mg oral tablet: 0.5 tab(s) orally once a day   acetaminophen 325 mg oral tablet: 2 tab(s) orally every 6 hours, As needed, Mild Pain (1 - 3)  amiodarone 200 mg oral tablet: 1 tab(s) orally once a day  Aspirin Enteric Coated 81 mg oral delayed release tablet: 1 tab(s) orally once a day at 12pm  ferrous sulfate 325 mg (65 mg elemental iron) oral tablet: 1 tab(s) orally once a day  levothyroxine 112 mcg (0.112 mg) oral tablet: 1 tab(s) orally once a day  metFORMIN 500 mg oral tablet, extended release: 1 tab(s) orally 2 times a day  metoprolol succinate 50 mg oral tablet, extended release: 1 tab(s) orally once a day  oxyCODONE 5 mg oral tablet: 1 tab(s) orally every 6 hours, As needed, Moderate Pain (4 - 6) MDD:4  Potassium Chloride (Eqv-Klor-Con M10) 10 mEq oral tablet, extended release: 1 tab(s) orally 2 times a day  Protonix 40 mg oral delayed release tablet: 1 tab(s) orally once a day  simvastatin 20 mg oral tablet: 1 tab(s) orally once a day (at bedtime)  torsemide 20 mg oral tablet: 0.5 tab(s) orally once a day

## 2022-08-02 NOTE — DISCHARGE NOTE PROVIDER - PROVIDER TOKENS
PROVIDER:[TOKEN:[[id number]],FOLLOWUP:[2 weeks],ESTABLISHEDPATIENT:[T]] PROVIDER:[TOKEN:[[id number]],SCHEDULEDAPPT:[08/17/2022],SCHEDULEDAPPTTIME:[11:40 AM],ESTABLISHEDPATIENT:[T]]

## 2022-08-02 NOTE — DISCHARGE NOTE PROVIDER - NSDCFUADDINST_GEN_ALL_CORE_FT
* Appointment: You must have a follow up appointment with the electrophysiology (EP) department at [facility] ([facility]) in 7-10 days after discharge to check your wound and healing. If you do not receive an appointment before discharge, call the EP office ([phone]) the day after you are discharged home to make one.     After your wound check appointment, you may return to the [facility] EP office for pacemaker monitoring on a periodic basis, or you may follow with your regular cardiologist. Some patients can have their pacemaker monitored over the phone. Please ask your doctor about remote monitoring of your pacemaker.     * Incision care (where your cut was made): If you have a bandage, it usually is removed before you leave the hospital. The clear surgical glue will naturally fall off your skin in 5-10 days. Do not scrub, rub, or pick at the surgical glue.     Do not get the incision wet for at least 5 days.  During that time, you may use a washcloth to clean the other areas of your body. After the 5 days, you can get the incision wet in a shower, using mild soap and a gentle water stream, and then pat dry with a clean towel.      Do not put any ointments or lotions on the incision. No soaking your incision in a bath, pool or any water for at least 4 -6 weeks. (Your doctor will let you know after 4 weeks when you can.)    Wear loose clothing around the incision for 1-2 weeks.  You may find it more comfortable to sleep on your back or on the side opposite where your device was place.    If you have discomfort at the incision, take the pain medication as discussed and prescribed.   Avoid taking pain medications called NSAIDS, such as Motrin, Advil and Aleve.     Call your doctor immediately if you have any signs of infection, such as fever; chills; redness; swelling; increased soreness; warmth or drainage at the incision site.      *Activity:   Do not raise the arm that is on the same side as your incision until you are seen and evaluated in the EP office for your wound check appointment.   Do not raise it above your shoulder.  Do not stretch, reach, pull, or push with that arm. This means that you need to be very careful when you do simple things, like brushing your hair, or putting on a shirt. This will help keep the leads in place as they heal inside the heart.     * Lifting and Exercise: Do not lift anything more than 10 pounds, such as heavy pocketbooks, suitcases, children, dogs, cats, groceries, and laundry until you are seen and evaluated in the EP office for you wound check appointment.   You should not do any vigorous exercise for 12 weeks. Certain activities may be limited even longer, such as golf, tennis, and swimming.     *Sexual activity: For 2-4 weeks after your implant, you cannot put weight on your arm or incision. You may participate in sexual activity that does not interfere with your incision healing. Please speak to your EP doctor and nurse practitioner if you have questions or concerns.    * Driving: No driving for at least until your wound check EP appointment. Then, please discuss with your EP doctor and nurse practitioner about driving.     You will receive more instructions about activity, driving, lifting and exercise at your EP wound check appointment.     * Continue your medications as discussed with you today.  Please bring a list of your medications and all discharge paperwork to your next doctor’s appointment.     Call your doctor immediately if you have hiccups for a long time, fainting, dizziness, lightheadedness, palpitations, chest pain or the same symptoms that you had before the procedure.      * ID Card: Do carry your pacemaker identification (ID) card with you at all times. You will need to show it to all of your other doctors, your dentist, and other health care providers. The card gives information about the pacemaker company and type of pacemaker you have. If you did not get a card or booklet, please ask your doctor or nurse. You may also consider buying a medical ID bracelet or necklace. Ask your doctor how to get one.    *Please read the pacemaker booklet written by your pacemaker company for the most up-to-date information.  * General statements about pacemakers and electrical and magnetic interference:    * It is safe to use most household appliances that are in good working condition.     * Working with heavy running engines, such as machinery and trucks, arc-welding, and some other electrical tools can cause problems with your pacemaker. You need to speak with your doctor.     * Some shopping security systems can interfere with your pacemaker. It is safe to quickly walk through them, but you should not stand near them.    * Show your pacemaker identification card to the security officers, and walk around airport security systems or other metal detectors. The metal in your pacemaker will be detected. You should ask for a security hand search, and avoid the hand held security wands.      * Do not have a MRI unless you have a MRI safe device. Discuss this with your EP healthcare team.     * Do not carry mp3 players or cell phones in pockets or pocketbooks close to your chest.   * Use cell phones on the ear opposite the side of your pacemaker site.    If you have any questions or need to contact the Electrophysiology Office, please call:  [phone].  When the office is closed there is an answering service who will page the cardiology team provider on call. The cardiology provider will then call you back.

## 2022-08-02 NOTE — CHART NOTE - NSCHARTNOTEFT_GEN_A_CORE
Patient is s/p PPM extraction and AV micra implant today.  The right femoral groin stitch was removed around 6:40 PM.  There was a small amount of bleeding from the right femoral groin site after removal of stitch. Pressure was applied, a hemostasis pad was applied and hemostasis was achieved. There was no further bleeding and no hematoma present. Pulses in the RIGHT lower extremity are palpable. The Right groin is soft and non tender.  Patient does not endorse any complaints.  Groin site percautions were reviewed with the patient.

## 2022-08-02 NOTE — PRE-OP CHECKLIST - 1.
Emotional support and pre op teaching provided to patient and son Shan with verbalized understanding.

## 2022-08-02 NOTE — DISCHARGE NOTE PROVIDER - CARE PROVIDERS DIRECT ADDRESSES
,DirectAddress_Unknown ,[email or web address]

## 2022-08-02 NOTE — DISCHARGE NOTE PROVIDER - NSDCCPCAREPLAN_GEN_ALL_CORE_FT
PRINCIPAL DISCHARGE DIAGNOSIS  Diagnosis: Complete heart block  Assessment and Plan of Treatment: Successful Pacemaker/Lead extraction,   Successfull implantation of VDD leadles pacemaker      SECONDARY DISCHARGE DIAGNOSES  Diagnosis: Tricuspid regurgitation  Assessment and Plan of Treatment:     Diagnosis: HTN (hypertension)  Assessment and Plan of Treatment: Continue with your blood pressure medications; eat a heart healthy diet with low salt diet; exercise regularly (consult with your physician or cardiologist first); maintain a heart healthy weight; if you smoke - quit (A resource to help you stop smoking is the [facility] [employer] – phone number [phone].); include healthy ways to manage stress. Continue to follow with your primary care physician or cardiologist.      Diagnosis: HLD (hyperlipidemia)  Assessment and Plan of Treatment: Goal is to keep LDL<70. Continue with your cholesterol medications as prescribed. Eat a heart healthy diet that is low in saturated fats and salt, and includes whole grains, fruits, vegetables and lean protein; exercise regularly (consult with your physician or cardiologist first); maintain a heart healthy weight; if you smoke - quit (A resource to help you stop smoking is the [facility] [employer] – phone number [phone].). Continue to follow with your primary physician or cardiologist.

## 2022-08-02 NOTE — DISCHARGE NOTE PROVIDER - HOSPITAL COURSE
Patient is On 8/2, you are s/p successful Pacemaker/Lead extraction, and successfull implantation of VDD leadles pacemaker via left anterior chest wall.  Pressure dressing removed in 24 hours.  Site benign. Pain controlled with oral pain medication.   Chest xray without any evidence of pneumothorax***.  Patient stable for discharge with outpatient follow up   77 yo female, PMH HTN, HLD, hypothyroid, iron deficiency anemia, renal cell carcinoma s/p partial nephrectomy 5/2015, aortic stenosis s/p bioprosthetic AVR 7/29/16, complete heart block s/p PPM 8/3/2016,  paroxysmal a-fib (was on Eliquis), chronic HFpEF., Pt. reports that since April of this year, she has GONZALES, had an admission for heart failure - Echo 4/22/22 - EF 60-65%,  mild-mod MR, RV PPM wire crossing the TV valve which may be contributory to tricuspid regurgitant. Pt.  returned to ER on June with H&H 4.6/15.6 - received 3 units of PRBC, CT A/P negative, s/p endoscopy - mild gastritis, s/p colonoscopy with poor prep - scattered diverticulosis. Received one more PRBC about 2 weeks ago and 2 iron infusions (being followed by hematology). Pt. is scheduled for Pacemaker Lead Extraction, Pacemaker Implant (medtronic) on 8/2/2. Pt. denies COVID-19 infection in 2020 through 2022, denies close contact with anyone that has been ill, no fever, cough, dyspnea in past two weeks.    8/2 s/p PPM extraction and AV micra implant via RFV access.  Patient tolerated well.  RFV/LACW sites benign.  8/3 No acute overnight events, no arrhythmic events reported on telemetry.  Patient feels well today.  RFV/LACW sites remain benign.  CXR confirming device position and is without evidence of pneumothorax.  Patient remains hemodynamically stable and had otherwise uneventful hospital course.  Case discussed with Dr. Bergman.  Patient is now medically stable for discharge home today

## 2022-08-02 NOTE — DISCHARGE NOTE PROVIDER - CARE PROVIDER_API CALL
Efra Jones)  Cardiovascular Disease; Internal Medicine  [address], [place] [id number]  Phone: [phone]  Fax: [phone]  Established Patient  Follow Up Time: 2 weeks   Lillian Bergman (MD)  Cardiac Electrophysiology; Cardiovascular Disease; Internal Medicine  [address] [place] [address]  Phone: [phone]  Fax: [phone]  Established Patient  Scheduled Appointment: 08/17/2022 11:40 AM

## 2022-08-02 NOTE — DISCHARGE NOTE PROVIDER - NSDCFUSCHEDAPPT_GEN_ALL_CORE_FT
[facility]  CARDIOLOGY [address] [place] [address]  Scheduled Appointment: 09/19/2022    Efra Jones  [facility]  CARDIOLOGY [address] [place] [address]  Scheduled Appointment: 09/28/2022     [facility]  ELECTROPH [address]  Scheduled Appointment: 08/17/2022    [facility]  CARDIOLOGY [address] [facility]  Scheduled Appointment: 09/19/2022    Efra Jones  [facility]  CARDIOLOGY [address] [place] [address]  Scheduled Appointment: 09/28/2022

## 2022-08-02 NOTE — DISCHARGE NOTE PROVIDER - NSDCCPTREATMENT_GEN_ALL_CORE_FT
PRINCIPAL PROCEDURE  Procedure: Implantation of leadless pacemaker  Findings and Treatment: Successful Pacemaker/Lead extraction,   Successfull implantation of VDD leadles pacemaker

## 2022-08-03 ENCOUNTER — TRANSCRIPTION ENCOUNTER (OUTPATIENT)
Age: 77
End: 2022-08-03

## 2022-08-03 VITALS
SYSTOLIC BLOOD PRESSURE: 128 MMHG | TEMPERATURE: 98 F | DIASTOLIC BLOOD PRESSURE: 50 MMHG | HEART RATE: 75 BPM | RESPIRATION RATE: 16 BRPM | OXYGEN SATURATION: 94 %

## 2022-08-03 PROBLEM — I97.89 OTHER POSTPROCEDURAL COMPLICATIONS AND DISORDERS OF THE CIRCULATORY SYSTEM, NOT ELSEWHERE CLASSIFIED: Chronic | Status: ACTIVE | Noted: 2022-07-29

## 2022-08-03 PROBLEM — Z86.79 PERSONAL HISTORY OF OTHER DISEASES OF THE CIRCULATORY SYSTEM: Chronic | Status: ACTIVE | Noted: 2022-07-29

## 2022-08-03 LAB
ANION GAP SERPL CALC-SCNC: 12 MMOL/L — SIGNIFICANT CHANGE UP (ref 5–17)
BASOPHILS # BLD AUTO: 0.02 K/UL — SIGNIFICANT CHANGE UP (ref 0–0.2)
BASOPHILS NFR BLD AUTO: 0.2 % — SIGNIFICANT CHANGE UP (ref 0–2)
BUN SERPL-MCNC: 22 MG/DL — SIGNIFICANT CHANGE UP (ref 7–23)
CALCIUM SERPL-MCNC: 9.8 MG/DL — SIGNIFICANT CHANGE UP (ref 8.4–10.5)
CHLORIDE SERPL-SCNC: 105 MMOL/L — SIGNIFICANT CHANGE UP (ref 96–108)
CO2 SERPL-SCNC: 25 MMOL/L — SIGNIFICANT CHANGE UP (ref 22–31)
CREAT SERPL-MCNC: 1.03 MG/DL — SIGNIFICANT CHANGE UP (ref 0.5–1.3)
EGFR: 57 ML/MIN/1.73M2 — LOW
EOSINOPHIL # BLD AUTO: 0.01 K/UL — SIGNIFICANT CHANGE UP (ref 0–0.5)
EOSINOPHIL NFR BLD AUTO: 0.1 % — SIGNIFICANT CHANGE UP (ref 0–6)
GLUCOSE SERPL-MCNC: 200 MG/DL — HIGH (ref 70–99)
HCT VFR BLD CALC: 32.5 % — LOW (ref 34.5–45)
HGB BLD-MCNC: 10.5 G/DL — LOW (ref 11.5–15.5)
IMM GRANULOCYTES NFR BLD AUTO: 0.5 % — SIGNIFICANT CHANGE UP (ref 0–1.5)
LYMPHOCYTES # BLD AUTO: 0.66 K/UL — LOW (ref 1–3.3)
LYMPHOCYTES # BLD AUTO: 6 % — LOW (ref 13–44)
MAGNESIUM SERPL-MCNC: 1.6 MG/DL — SIGNIFICANT CHANGE UP (ref 1.6–2.6)
MCHC RBC-ENTMCNC: 27.1 PG — SIGNIFICANT CHANGE UP (ref 27–34)
MCHC RBC-ENTMCNC: 32.3 GM/DL — SIGNIFICANT CHANGE UP (ref 32–36)
MCV RBC AUTO: 83.8 FL — SIGNIFICANT CHANGE UP (ref 80–100)
MONOCYTES # BLD AUTO: 0.54 K/UL — SIGNIFICANT CHANGE UP (ref 0–0.9)
MONOCYTES NFR BLD AUTO: 4.9 % — SIGNIFICANT CHANGE UP (ref 2–14)
NEUTROPHILS # BLD AUTO: 9.72 K/UL — HIGH (ref 1.8–7.4)
NEUTROPHILS NFR BLD AUTO: 88.3 % — HIGH (ref 43–77)
NRBC # BLD: 0 /100 WBCS — SIGNIFICANT CHANGE UP (ref 0–0)
PLATELET # BLD AUTO: 205 K/UL — SIGNIFICANT CHANGE UP (ref 150–400)
POTASSIUM SERPL-MCNC: 3.9 MMOL/L — SIGNIFICANT CHANGE UP (ref 3.5–5.3)
POTASSIUM SERPL-SCNC: 3.9 MMOL/L — SIGNIFICANT CHANGE UP (ref 3.5–5.3)
RBC # BLD: 3.88 M/UL — SIGNIFICANT CHANGE UP (ref 3.8–5.2)
RBC # FLD: 17.2 % — HIGH (ref 10.3–14.5)
SODIUM SERPL-SCNC: 142 MMOL/L — SIGNIFICANT CHANGE UP (ref 135–145)
WBC # BLD: 11.01 K/UL — HIGH (ref 3.8–10.5)
WBC # FLD AUTO: 11.01 K/UL — HIGH (ref 3.8–10.5)

## 2022-08-03 PROCEDURE — 71046 X-RAY EXAM CHEST 2 VIEWS: CPT

## 2022-08-03 PROCEDURE — 86900 BLOOD TYPING SEROLOGIC ABO: CPT

## 2022-08-03 PROCEDURE — 85025 COMPLETE CBC W/AUTO DIFF WBC: CPT

## 2022-08-03 PROCEDURE — C1894: CPT

## 2022-08-03 PROCEDURE — 82962 GLUCOSE BLOOD TEST: CPT

## 2022-08-03 PROCEDURE — 33235 REMOVAL PACEMAKER ELECTRODE: CPT

## 2022-08-03 PROCEDURE — 36415 COLL VENOUS BLD VENIPUNCTURE: CPT

## 2022-08-03 PROCEDURE — C2629: CPT

## 2022-08-03 PROCEDURE — 86850 RBC ANTIBODY SCREEN: CPT

## 2022-08-03 PROCEDURE — 83735 ASSAY OF MAGNESIUM: CPT

## 2022-08-03 PROCEDURE — C9399: CPT

## 2022-08-03 PROCEDURE — C1773: CPT

## 2022-08-03 PROCEDURE — 80048 BASIC METABOLIC PNL TOTAL CA: CPT

## 2022-08-03 PROCEDURE — 33274 TCAT INSJ/RPL PERM LDLS PM: CPT

## 2022-08-03 PROCEDURE — 99233 SBSQ HOSP IP/OBS HIGH 50: CPT

## 2022-08-03 PROCEDURE — C1786: CPT

## 2022-08-03 PROCEDURE — C1885: CPT

## 2022-08-03 PROCEDURE — P9047: CPT

## 2022-08-03 PROCEDURE — 86901 BLOOD TYPING SEROLOGIC RH(D): CPT

## 2022-08-03 PROCEDURE — 71046 X-RAY EXAM CHEST 2 VIEWS: CPT | Mod: 26

## 2022-08-03 PROCEDURE — 93010 ELECTROCARDIOGRAM REPORT: CPT

## 2022-08-03 PROCEDURE — 76000 FLUOROSCOPY <1 HR PHYS/QHP: CPT

## 2022-08-03 PROCEDURE — 93005 ELECTROCARDIOGRAM TRACING: CPT

## 2022-08-03 PROCEDURE — 86923 COMPATIBILITY TEST ELECTRIC: CPT

## 2022-08-03 PROCEDURE — C1889: CPT

## 2022-08-03 PROCEDURE — C1769: CPT

## 2022-08-03 PROCEDURE — 99222 1ST HOSP IP/OBS MODERATE 55: CPT

## 2022-08-03 RX ORDER — OXYCODONE HYDROCHLORIDE 5 MG/1
1 TABLET ORAL
Qty: 12 | Refills: 0
Start: 2022-08-03 | End: 2022-08-05

## 2022-08-03 RX ORDER — METOPROLOL TARTRATE 50 MG
1 TABLET ORAL
Qty: 0 | Refills: 0 | DISCHARGE
Start: 2022-08-03

## 2022-08-03 RX ORDER — ACETAMINOPHEN 500 MG
2 TABLET ORAL
Qty: 0 | Refills: 0 | DISCHARGE
Start: 2022-08-03

## 2022-08-03 RX ADMIN — Medication 650 MILLIGRAM(S): at 07:24

## 2022-08-03 RX ADMIN — Medication 112 MICROGRAM(S): at 04:50

## 2022-08-03 RX ADMIN — PANTOPRAZOLE SODIUM 40 MILLIGRAM(S): 20 TABLET, DELAYED RELEASE ORAL at 04:50

## 2022-08-03 RX ADMIN — OXYCODONE HYDROCHLORIDE 5 MILLIGRAM(S): 5 TABLET ORAL at 00:00

## 2022-08-03 RX ADMIN — Medication 10 MILLIGRAM(S): at 04:50

## 2022-08-03 RX ADMIN — Medication 50 MILLIGRAM(S): at 04:50

## 2022-08-03 RX ADMIN — Medication 81 MILLIGRAM(S): at 04:49

## 2022-08-03 RX ADMIN — Medication 1: at 07:28

## 2022-08-03 RX ADMIN — AMIODARONE HYDROCHLORIDE 200 MILLIGRAM(S): 400 TABLET ORAL at 04:50

## 2022-08-03 NOTE — CONSULT NOTE ADULT - ASSESSMENT
75 yo female, PMH HTN, HLD, hypothyroid, iron deficiency anemia, renal cell carcinoma s/p partial nephrectomy 5/2015, aortic stenosis s/p bioprosthetic AVR 7/29/16, complete heart block s/p PPM 8/3/2016,  paroxysmal a-fib (was on Eliquis), chronic HFpEF., Pt. reports that since April of this year, she has GONZALES, had an admission for heart failure - Echo 4/22/22 - EF 60-65%,  mild-mod MR, RV PPM wire crossing the TV valve which may be contributory to tricuspid regurgitant.     - Tolerated extraction and micra without complication  - Continue aspirin  - Off AC for GIB.  She is being considered and worked up for Watchman  - Continue amio 200 QD  - Continue statin drug  - Continue torsemide 10 Qd  - Continue Toprol 50 QD  - Outpatient follow up in our office for continued care.  She is being worked up for Watchman and then possibly tricuspid clip

## 2022-08-03 NOTE — PROGRESS NOTE ADULT - ASSESSMENT
Assessment: Patient is a 77 yo female, PMH HTN, HLD, hypothyroid, iron deficiency anemia, renal cell carcinoma s/p partial nephrectomy 5/2015, aortic stenosis s/p bioprosthetic AVR 7/29/16, complete heart block s/p PPM 8/3/2016,  paroxysmal a-fib (was on Eliquis), chronic HFpEF. Patient is s/p successful PPM extraction and AV micra implant on 8/2.    Plan:   CVS: HTN, HLD, aortic stenosis s/p bioprosthetic AVR, CHB s/p PPM, A.fib  - s/p successful PPM extraction and AV micra implant on 8/2.   - c/w ASA 81 mg   - c/w amiodarone 200 mg   - c/w metoprolol 50 mg   - c/w simvastatin 20 mg   - c/w Torsemide 10  - Access site and Post Micra implant wound instruction discussed with patient  - Patient is no longer on Eliquis 2/2 hx of massive GI bleed.  - Pain medication as needed  - s/p Right groin stitch removal. Groin site percautions  - FU in EP office

## 2022-08-03 NOTE — CONSULT NOTE ADULT - SUBJECTIVE AND OBJECTIVE BOX
[facility] - John Ng, Robson Hopkins, Marvin Casey Savella  Office Number: [phone]    Initial Consult Note    CHIEF COMPLAINT: Patient is a 76y old  Female who presents with a chief complaint of Tricuspid regurgitation and Complete heart block       HPI:  75 yo female, PMH HTN, HLD, hypothyroid, iron deficiency anemia, renal cell carcinoma s/p partial nephrectomy 5/2015, aortic stenosis s/p bioprosthetic AVR 7/29/16, complete heart block s/p PPM 8/3/2016,  paroxysmal a-fib (was on Eliquis), chronic HFpEF., Pt. reports that since April of this year, she has GONZALES, had an admission for heart failure - Echo 4/22/22 - EF 60-65%,  mild-mod MR, RV PPM wire crossing the TV valve which may be contributory to tricuspid regurgitant. Pt.  returned to ER on June with H&H 4.6/15.6 - received 3 units of PRBC, CT A/P negative, s/p endoscopy - mild gastritis, s/p colonoscopy with poor prep - scattered diverticulosis. Received one more PRBC about 2 weeks ago and 2 iron infusions (being followed by hematology). Pt. is scheduled for Pacemaker Lead Extraction, Pacemaker Implant (medtronic) on 8/2/2. Pt. denies COVID-19 infection in 2020 through 2022, denies close contact with anyone that has been ill, no fever, cough, dyspnea in past two weeks.    Pt. had COVID-19 testing done today at [facility]    Pt. was taken off Eliquis, remains on Aspirin 81 mg.    she is s/p ppm lead extraction and micra placement.  she tolerated well with no cardiac complaints.  she denies chest pains, increased dyspnea, PND, orthopnea, or LE swelling this AM.       PAST MEDICAL & SURGICAL HISTORY:  Hypertension  x10 years      Dyslipidemia      Hiatal hernia      Type II diabetes mellitus  well controlled      Aortic stenosis  s/p AVR      Cancer of kidney, left  2015      Right bundle branch block (RBBB)      Anemia  between June and July 2022 has received a total of 4 units PRBC and 2 iron infusions      Hypothyroidism      History of complete heart block      Post-surgical complete heart block  2016      Status post unilateral salpingo-oophorectomy  1980&#x27;s      S/P T&amp;A (status post tonsillectomy and adenoidectomy)  as a child      H/O hand surgery  left due to injury      H/O partial nephrectomy  right 5/015      H/O aortic valve replacement  5/2016      Elective surgery  PPM  8/2016          SOCIAL HISTORY:  No tobacco, ethanol, or drug abuse.    FAMILY HISTORY:  FH: renal cell carcinoma (Child)      No family history of acute MI or sudden cardiac death.    MEDICATIONS  (STANDING):  aMIOdarone    Tablet 200 milliGRAM(s) Oral daily  aspirin enteric coated 81 milliGRAM(s) Oral daily  dextrose 5%. 1000 milliLiter(s) (50 mL/Hr) IV Continuous <Continuous>  dextrose 5%. 1000 milliLiter(s) (100 mL/Hr) IV Continuous <Continuous>  dextrose 50% Injectable 25 Gram(s) IV Push once  dextrose 50% Injectable 12.5 Gram(s) IV Push once  dextrose 50% Injectable 25 Gram(s) IV Push once  ferrous    sulfate 325 milliGRAM(s) Oral daily  glucagon  Injectable 1 milliGRAM(s) IntraMuscular once  insulin lispro (ADMELOG) corrective regimen sliding scale   SubCutaneous Before meals and at bedtime  levothyroxine 112 MICROGram(s) Oral daily  metoprolol succinate ER 50 milliGRAM(s) Oral daily  pantoprazole    Tablet 40 milliGRAM(s) Oral before breakfast  simvastatin 20 milliGRAM(s) Oral at bedtime  torsemide 10 milliGRAM(s) Oral daily    MEDICATIONS  (PRN):  acetaminophen     Tablet .. 650 milliGRAM(s) Oral every 6 hours PRN Mild Pain (1 - 3)  dextrose Oral Gel 15 Gram(s) Oral once PRN Blood Glucose LESS THAN 70 milliGRAM(s)/deciliter  oxyCODONE    IR 5 milliGRAM(s) Oral every 6 hours PRN Moderate Pain (4 - 6)      Allergies    sulfa drugs (Short breath)    Intolerances        REVIEW OF SYSTEMS:       All other review of systems is negative unless indicated above    VITAL SIGNS:   Vital Signs Last 24 Hrs  T(C): 36.6 (03 Aug 2022 04:23), Max: 36.6 (02 Aug 2022 10:32)  T(F): 97.8 (03 Aug 2022 04:23), Max: 97.9 (02 Aug 2022 10:32)  HR: 76 (03 Aug 2022 04:23) (50 - 76)  BP: 110/58 (03 Aug 2022 04:23) (95/52 - 152/82)  BP(mean): 75 (03 Aug 2022 04:23) (69 - 98)  RR: 18 (03 Aug 2022 04:23) (14 - 18)  SpO2: 96% (03 Aug 2022 04:23) (94% - 100%)    Parameters below as of 03 Aug 2022 04:23  Patient On (Oxygen Delivery Method): room air        I&O's Summary      On Exam:    Constitutional: NAD, alert and oriented x 3  Lungs:  Non-labored, breath sounds are clear bilaterally, No wheezing, rales or rhonchi  Cardiovascular: RRR.  S1 and S2 positive.  1/6 systolic murmur  Gastrointestinal: Bowel Sounds present, soft, nontender.   Lymph: No peripheral edema. No cervical lymphadenopathy.  Neurological: Alert, no focal deficits  Skin: No rashes or ulcers   Psych:  Mood & affect appropriate.    LABS: All Labs Reviewed:                        10.5   11.01 )-----------( 205      ( 03 Aug 2022 05:32 )             32.5     03 Aug 2022 05:32    142    |  105    |  22     ----------------------------<  200    3.9     |  25     |  1.03     Ca    9.8        03 Aug 2022 05:32  Mg     1.6       03 Aug 2022 05:32            Blood Culture:         RADIOLOGY:    EKG:

## 2022-08-03 NOTE — DISCHARGE NOTE NURSING/CASE MANAGEMENT/SOCIAL WORK - PATIENT PORTAL LINK FT
You can access the FollowMyHealth Patient Portal offered by [facility] by registering at the following website: [email or web address]. By joining [employer]’s FollowMyHealth portal, you will also be able to view your health information using other applications (apps) compatible with our system.

## 2022-08-03 NOTE — PROGRESS NOTE ADULT - SUBJECTIVE AND OBJECTIVE BOX
Interval: Patient seen and examined at bedside.  Patient does not endorse any new complaints.  Access site remains stable.  Patient denies any chest pain, shortness of breath, palpitations, nausea, vomiting or headache.  No acute events overnight      MEDICATIONS  (STANDING):  aMIOdarone    Tablet 200 milliGRAM(s) Oral daily  aspirin enteric coated 81 milliGRAM(s) Oral daily  dextrose 5%. 1000 milliLiter(s) (50 mL/Hr) IV Continuous <Continuous>  dextrose 5%. 1000 milliLiter(s) (100 mL/Hr) IV Continuous <Continuous>  dextrose 50% Injectable 25 Gram(s) IV Push once  dextrose 50% Injectable 12.5 Gram(s) IV Push once  dextrose 50% Injectable 25 Gram(s) IV Push once  ferrous    sulfate 325 milliGRAM(s) Oral daily  glucagon  Injectable 1 milliGRAM(s) IntraMuscular once  insulin lispro (ADMELOG) corrective regimen sliding scale   SubCutaneous Before meals and at bedtime  levothyroxine 112 MICROGram(s) Oral daily  metoprolol succinate ER 50 milliGRAM(s) Oral daily  pantoprazole    Tablet 40 milliGRAM(s) Oral before breakfast  simvastatin 20 milliGRAM(s) Oral at bedtime  torsemide 10 milliGRAM(s) Oral daily    MEDICATIONS  (PRN):  acetaminophen     Tablet .. 650 milliGRAM(s) Oral every 6 hours PRN Mild Pain (1 - 3)  dextrose Oral Gel 15 Gram(s) Oral once PRN Blood Glucose LESS THAN 70 milliGRAM(s)/deciliter  oxyCODONE    IR 5 milliGRAM(s) Oral every 6 hours PRN Moderate Pain (4 - 6)      Allergies    sulfa drugs (Short breath)    Intolerances    Vital Signs Last 24 Hrs  T(C): 36.6 (02 Aug 2022 18:05), Max: 36.6 (02 Aug 2022 10:32)  T(F): 97.8 (02 Aug 2022 18:05), Max: 97.9 (02 Aug 2022 10:32)  HR: 74 (02 Aug 2022 23:05) (50 - 75)  BP: 123/84 (02 Aug 2022 23:05) (95/52 - 152/82)  BP(mean): 98 (02 Aug 2022 23:05) (69 - 98)  RR: 18 (02 Aug 2022 23:05) (14 - 18)  SpO2: 98% (02 Aug 2022 23:05) (94% - 100%)    Parameters below as of 02 Aug 2022 23:05  Patient On (Oxygen Delivery Method): room air    GENERAL: In no apparent distress, well nourished, and hydrated.  HEART: IRRegular rate and rhythm; No murmurs, rubs, or gallops.  PULMONARY: Clear to auscultation and perfusion.  No rales, wheezing, or rhonchi bilaterally.  ABDOMEN: Soft, Nontender, Nondistended; Bowel sounds present  EXTREMITIES:  2+ Peripheral Pulses, No clubbing, cyanosis, or edema  Right groin with dressing (hemostasis pad) in place. No bleeding or hematoma, no bleeding; right groin stitch removed  NEUROLOGICAL: Grossly nonfocal    EKG: Ventricular paced rhythm @ 60 pm

## 2022-08-03 NOTE — DISCHARGE NOTE NURSING/CASE MANAGEMENT/SOCIAL WORK - NSDCPEFALRISK_GEN_ALL_CORE
For information on Fall & Injury Prevention, visit: [email or web address] OR  [email or web address] OR  https://www.cdc.gov/steadi/patient.html

## 2022-08-11 ENCOUNTER — TRANSCRIPTION ENCOUNTER (OUTPATIENT)
Age: 77
End: 2022-08-11

## 2022-08-17 ENCOUNTER — APPOINTMENT (OUTPATIENT)
Dept: ELECTROPHYSIOLOGY | Facility: CLINIC | Age: 77
End: 2022-08-17

## 2022-08-17 ENCOUNTER — NON-APPOINTMENT (OUTPATIENT)
Age: 77
End: 2022-08-17

## 2022-08-17 VITALS — DIASTOLIC BLOOD PRESSURE: 79 MMHG | HEART RATE: 72 BPM | OXYGEN SATURATION: 99 % | SYSTOLIC BLOOD PRESSURE: 133 MMHG

## 2022-08-17 PROCEDURE — 93000 ELECTROCARDIOGRAM COMPLETE: CPT | Mod: 59

## 2022-08-17 PROCEDURE — 99024 POSTOP FOLLOW-UP VISIT: CPT

## 2022-08-17 PROCEDURE — 93279 PRGRMG DEV EVAL PM/LDLS PM: CPT

## 2022-08-25 ENCOUNTER — APPOINTMENT (OUTPATIENT)
Dept: CARDIOTHORACIC SURGERY | Facility: CLINIC | Age: 77
End: 2022-08-25

## 2022-08-25 ENCOUNTER — OUTPATIENT (OUTPATIENT)
Dept: OUTPATIENT SERVICES | Facility: HOSPITAL | Age: 77
LOS: 1 days | End: 2022-08-25
Payer: MEDICARE

## 2022-08-25 ENCOUNTER — NON-APPOINTMENT (OUTPATIENT)
Age: 77
End: 2022-08-25

## 2022-08-25 VITALS
SYSTOLIC BLOOD PRESSURE: 127 MMHG | HEART RATE: 61 BPM | WEIGHT: 151.1 LBS | TEMPERATURE: 98.1 F | DIASTOLIC BLOOD PRESSURE: 69 MMHG | BODY MASS INDEX: 26.77 KG/M2 | HEIGHT: 63 IN | RESPIRATION RATE: 18 BRPM | OXYGEN SATURATION: 99 %

## 2022-08-25 DIAGNOSIS — Z90.5 ACQUIRED ABSENCE OF KIDNEY: Chronic | ICD-10-CM

## 2022-08-25 DIAGNOSIS — I35.0 NONRHEUMATIC AORTIC (VALVE) STENOSIS: ICD-10-CM

## 2022-08-25 DIAGNOSIS — Z98.89 OTHER SPECIFIED POSTPROCEDURAL STATES: Chronic | ICD-10-CM

## 2022-08-25 DIAGNOSIS — Z90.721 ACQUIRED ABSENCE OF OVARIES, UNILATERAL: Chronic | ICD-10-CM

## 2022-08-25 DIAGNOSIS — Z95.2 PRESENCE OF PROSTHETIC HEART VALVE: Chronic | ICD-10-CM

## 2022-08-25 DIAGNOSIS — Z41.9 ENCOUNTER FOR PROCEDURE FOR PURPOSES OTHER THAN REMEDYING HEALTH STATE, UNSPECIFIED: Chronic | ICD-10-CM

## 2022-08-25 LAB
ALBUMIN SERPL ELPH-MCNC: 4.6 G/DL
ALP BLD-CCNC: 61 U/L
ALT SERPL-CCNC: 22 U/L
ANION GAP SERPL CALC-SCNC: 17 MMOL/L
APTT BLD: 33 SEC
AST SERPL-CCNC: 23 U/L
BASOPHILS # BLD AUTO: 0.05 K/UL
BASOPHILS NFR BLD AUTO: 0.7 %
BILIRUB SERPL-MCNC: 0.4 MG/DL
BUN SERPL-MCNC: 32 MG/DL
CALCIUM SERPL-MCNC: 11.1 MG/DL
CHLORIDE SERPL-SCNC: 103 MMOL/L
CK MB BLD-MCNC: 2.2 NG/ML
CO2 SERPL-SCNC: 26 MMOL/L
CREAT SERPL-MCNC: 1.45 MG/DL
EGFR: 38 ML/MIN/1.73M2
EOSINOPHIL # BLD AUTO: 0.28 K/UL
EOSINOPHIL NFR BLD AUTO: 4 %
GGT SERPL-CCNC: 48 U/L
GLUCOSE SERPL-MCNC: 95 MG/DL
HCT VFR BLD CALC: 39.3 %
HGB BLD-MCNC: 12.9 G/DL
IMM GRANULOCYTES NFR BLD AUTO: 0.3 %
INR PPP: 0.98 RATIO
LYMPHOCYTES # BLD AUTO: 1.53 K/UL
LYMPHOCYTES NFR BLD AUTO: 22.1 %
MAN DIFF?: NORMAL
MCHC RBC-ENTMCNC: 27.4 PG
MCHC RBC-ENTMCNC: 32.8 GM/DL
MCV RBC AUTO: 83.6 FL
MONOCYTES # BLD AUTO: 0.71 K/UL
MONOCYTES NFR BLD AUTO: 10.3 %
NEUTROPHILS # BLD AUTO: 4.33 K/UL
NEUTROPHILS NFR BLD AUTO: 62.6 %
NT-PROBNP SERPL-MCNC: 1303 PG/ML
PLATELET # BLD AUTO: 215 K/UL
POTASSIUM SERPL-SCNC: 5.1 MMOL/L
PROT SERPL-MCNC: 7.3 G/DL
PT BLD: 11.5 SEC
RBC # BLD: 4.7 M/UL
RBC # FLD: 17.9 %
SODIUM SERPL-SCNC: 146 MMOL/L
URATE SERPL-MCNC: 10.6 MG/DL
WBC # FLD AUTO: 6.92 K/UL

## 2022-08-25 PROCEDURE — 93306 TTE W/DOPPLER COMPLETE: CPT

## 2022-08-25 PROCEDURE — 93000 ELECTROCARDIOGRAM COMPLETE: CPT

## 2022-08-25 PROCEDURE — 93306 TTE W/DOPPLER COMPLETE: CPT | Mod: 26

## 2022-08-25 PROCEDURE — 99215 OFFICE O/P EST HI 40 MIN: CPT

## 2022-08-25 RX ORDER — POTASSIUM CHLORIDE 750 MG/1
10 TABLET, FILM COATED, EXTENDED RELEASE ORAL
Qty: 90 | Refills: 0 | Status: DISCONTINUED | COMMUNITY
Start: 2022-05-26 | End: 2022-08-25

## 2022-08-25 RX ORDER — METFORMIN ER 500 MG 500 MG/1
500 TABLET ORAL
Qty: 180 | Refills: 0 | Status: DISCONTINUED | COMMUNITY
Start: 2022-05-26 | End: 2022-08-25

## 2022-08-25 RX ORDER — METHYLPREDNISOLONE 4 MG/1
4 TABLET ORAL
Qty: 21 | Refills: 0 | Status: DISCONTINUED | COMMUNITY
Start: 2021-12-30 | End: 2022-08-25

## 2022-08-27 NOTE — PROGRESS NOTE ADULT - ASSESSMENT
Encounter Date: 8/26/2022       History     Chief Complaint   Patient presents with    Knee Pain     Patient is a 34-year-old female with no significant past medical history presenting to the emergency department for atraumatic left knee pain.  Patient states that her symptoms began upon waking up this morning.  She states that her pain is significantly worsened throughout the course of the day and is now causing her to have limitations with ambulation.  The pain is exacerbated with standing and with ranging her knee.  She denies any recent trauma to the joint.  Denies any shortness of breath, chest pain, fevers/chills, nausea, vomiting, diarrhea.  Denies any recent illness.  Denies any intravenous drug use.  No weakness or numbness to the left leg.        Review of patient's allergies indicates:  No Known Allergies  No past medical history on file.  No past surgical history on file.  No family history on file.     Review of Systems   Constitutional: Negative for chills and fever.   HENT: Negative for sore throat.    Respiratory: Negative for shortness of breath.    Cardiovascular: Negative for chest pain.   Gastrointestinal: Negative for abdominal pain, diarrhea, nausea and vomiting.   Genitourinary: Negative for dysuria.   Musculoskeletal: Negative for back pain.        Left knee pain   Skin: Negative for rash and wound.   Neurological: Negative for weakness.   Hematological: Does not bruise/bleed easily.       Physical Exam     Initial Vitals [08/26/22 2324]   BP Pulse Resp Temp SpO2   (!) 128/99 88 16 98.3 °F (36.8 °C) 98 %      MAP       --         Physical Exam    Nursing note and vitals reviewed.  Constitutional: She appears well-developed and well-nourished.   HENT:   Head: Normocephalic and atraumatic.   Right Ear: External ear normal.   Left Ear: External ear normal.   Eyes: Conjunctivae and EOM are normal.   Neck:   Normal range of motion.  Cardiovascular: Normal rate.   Pulmonary/Chest: Breath sounds  normal.   Abdominal: Abdomen is soft. There is no abdominal tenderness.   Musculoskeletal:      Cervical back: Normal range of motion.      Comments: Left knee with full active and passive range of motion although painful.  No overlying warmth or skin breakdown.  Infrapatellar effusion that is tender to palpation.     Neurological: She is alert and oriented to person, place, and time.   Skin: Skin is warm. Capillary refill takes less than 2 seconds.   Psychiatric: She has a normal mood and affect.         ED Course   Procedures  Labs Reviewed   COMPREHENSIVE METABOLIC PANEL - Abnormal; Notable for the following components:       Result Value    Glucose Level 130 (*)     All other components within normal limits   CBC WITH DIFFERENTIAL - Abnormal; Notable for the following components:    Hct 36.5 (*)     All other components within normal limits   SEDIMENTATION RATE, AUTOMATED - Normal   CBC W/ AUTO DIFFERENTIAL    Narrative:     The following orders were created for panel order CBC auto differential.  Procedure                               Abnormality         Status                     ---------                               -----------         ------                     CBC with Differential[[id number]]        Abnormal            Final result                 Please view results for these tests on the individual orders.   C-REACTIVE PROTEIN          Imaging Results          X-Ray Knee 3 View Left (Preliminary result)  Result time 08/27/22 00:28:20    ED Interpretation by Richard Garcia MD (08/27/22 00:28:20, [facility] - Emergency Dept, Emergency Medicine)    No evidence of fracture.  Small infrapatellar effusion.                               Medications   ibuprofen tablet 600 mg (has no administration in time range)   LIDOcaine 5 % patch 1 patch (has no administration in time range)     Medical Decision Making:   Initial Assessment:   34-year-old female presenting to the emerged from for atraumatic left  knee pain.  Patient denies any recent injury.  She reports a busy week at work in which he spends lot of time standing up.  Patient's pain began today and has worsened throughout the day.  No infectious symptoms.    Differential Diagnosis:   Differential diagnosis includes but is not limited to osteoarthritis, gout, septic arthritis, prepatellar bursitis  Clinical Tests:   Lab Tests: Ordered and Reviewed  Radiological Study: Ordered and Reviewed  ED Management:  See ED course for updates.             ED Course as of 08/27/22 0034   Sat Aug 27, 2022   0031 Ultrasound assessment showed a small infrapatellar effusion.  No soft tissue cobblestoning concerning for cellulitis.  She is afebrile.  WBC 9.3.  Sed rate is 16.  CRP is unfortunately a send out.  Discussed performing a knee arthrocentesis for synovial fluid analysis to rule out gout and septic arthritis.  Given the patient's physical exam as well as blood work analysis, it is unlikely that she has septic arthritis in that joint.  Patient wanted like to defer any arthrocentesis at this time.  She is opting for symptomatic management and outpatient follow-up.  Given strict return precautions to the emergency department which patient understands and agrees.  All questions answered. [SH]      ED Course User Index  [SH] Richard Garcia MD             Clinical Impression:   Final diagnoses:  [M25.562] Left knee pain  [M25.562] Acute pain of left knee (Primary)          ED Disposition Condition    Discharge Stable        ED Prescriptions     None        Follow-up Information     Follow up With Specialties Details Why Contact Info    Felicita Fletcher MD Family Medicine  Follow-up 1st thing this coming week for re-evaluation. [address] [facility] [address]  [phone]      [facility] - Emergency Dept Emergency Medicine  As needed, If symptoms worsen [address] [facility] [address]  [phone]           Richard Garcia MD  08/27/22 0035     76 year old female with hx HTN, AS s/p bio AVR with post-op HB requiring PPM, non obstructive CAD on cath in 2016, AF (on eliquis), clear cell RCC s/p partial nephrectomy, hypothyroidism, p/w SOB, leg swelling, abdominal swelling. EP consulted for evaluation of persistent Afib that started in 12/2022. TTE revealed severe TR. Now s/p TIM guided cardioversion on 4/22/22 and started on amiodarone load.     #Persistent Afib  #Severe TR    - TIM reviewed with echo attending, no significant pacer wire impingement on the septal leaflet  - Pt is maintaining SR  - c/w amiodarone load as ordered: 400mg BID x 7 days, then 400mg QD x 28 days, then 200mg QD as maintenance (s/p received 800mg as of this morning)  - I discussed with patient plans for short term amiodarone including risks/side effects. Discussed need for outpatient monitoring of PFT/TFT/LFT/opthalmology monitoring while on amiodarone.  - c/w Eliquis 5 BID  - Structural team (Dr. Robles) to see pt for potential tricuspid valve intervention in future.   - Follow up with Dr. Bergman in 6 weeks (our office will call pt on Monday with appointment)   - EPS will sign off, reconsult as needed  - Plan discussed with Medicine ACP.     OTTO Shen NP-C  [phone]

## 2022-09-02 NOTE — REASON FOR VISIT
[Structural Heart and Valve Disease] : structural heart and valve disease [Other: ____] : [unfilled] [FreeTextEntry3] : Dr. Jones

## 2022-09-02 NOTE — CARDIOLOGY SUMMARY
[de-identified] : V paced 62 [de-identified] : 4/22/22\par TIM\par EF 60-65%, Bioprosthetic AV, mGr 26 mmHg, pGr 46 mmHg, DVI 0.3, mild/moderate mitral regurgitation, torrential tricuspid regurgitation \par  [de-identified] : 7/29/2016\par Surgical AVR #21 Magna bovine pericardial

## 2022-09-02 NOTE — PHYSICAL EXAM
[Well Developed] : well developed [Well Nourished] : well nourished [Normal Rate] : normal [Rhythm Regular] : regular [II] : a grade 2 [I] : a grade 1 [No Pitting Edema] : no pitting edema present [Clear Lung Fields] : clear lung fields [Soft] : abdomen soft [Non Tender] : non-tender [Normal Gait] : normal gait [Normal] : alert and oriented, normal memory [de-identified] : trace bilateral LE edema [de-identified] : Left upper chest wall steri strips

## 2022-09-02 NOTE — REVIEW OF SYSTEMS
[Dyspnea on exertion] : dyspnea during exertion [Lower Ext Edema] : lower extremity edema [Negative] : Heme/Lymph [Fever] : no fever [Chills] : no chills [Feeling Fatigued] : not feeling fatigued [Chest Discomfort] : no chest discomfort [Palpitations] : no palpitations [Orthopnea] : no orthopnea [PND] : no PND [Abdominal Pain] : no abdominal pain [Change in Appetite] : no change in appetite [Dizziness] : no dizziness

## 2022-09-02 NOTE — HISTORY OF PRESENT ILLNESS
[FreeTextEntry1] : Mrs. Weston returns to clinic today for follow up evaluation of tricuspid regurgitation and discussion of her potential enrollment in the TRISCEND II clinical trial (for TTVR). \par \par To review, she underwent a prior surgical AVR with Dr. Kauffman (#21 Magna) on 7/29/16. Her post operative course was complicated by complete heart block for which she underwent PPM placement. Device interrogation subsequently reported asymptomatic PAF, for which she was started on Eliquis. Her past medical history includes previous aortic stenosis (s/p SAVR), an intermittent right bundle branch block pattern on her electrocardiogram (s/p PPM), hypertension, dyslipidemia, a thyroid nodule, secondary hypothyroidism, iron deficiency anemia, and renal cell carcinoma (status post resection). \par \par She underwent lead extraction with Dr. Bergman with Micra implant on 8/2. Her Torsemide was increased from 5mg to 10mg following Micra placement on 8/3 for reports of a 3lb weight gain. She has remained on that dose with her weight back to baseline. She is feeling well overall with some reports of mild exertional dyspnea and intermittent edema. She has no angina, PND, orthopnea. She has occasional positional dizziness when she first gets out of bed in the morning with no syncopal episodes or falls. \par \par She notes "feeling better" since her Hb has improved--following "2 infusions of iron" with her Hematologist (Dr Hurt on [facility] in [place]). Her lead was extracted on 8/2 and she now has a MICRA, as noted above. She feels "a little dizziness" and "shortness of breath at times if I exert myself" such as walking briskly in the hospital halls. She feels winded at the top of a FOS. She has no angina or syncope. Her appetite has "been better" since her hospitalization in July. Bathroom habits are normal. As noted, she was admitted in early August for the lead extraction and noted a 3 pound weight gain the day after discharge--for which Dr Jones increased her Torsemide from 5mg to 10mg daily (on 8/4/22). She notes "I immediately lost those 3 pounds and she has continued with the 10mg daily dose. She looks well and is in good spirits today in the office.\par \par I have reviewed her TTE done this afternoon which preliminarily demonstrates Grade 3 (severe) TR, moderate degeneration of her bioAVR (mean gradient 27 mmHg, DVI 0.32), mild MR, and mild PHTN. \par \par \par \par \par

## 2022-09-02 NOTE — DISCUSSION/SUMMARY
[FreeTextEntry1] : Yolette has symptomatic severe TR (NYHA II) and a recent RV lead extraction with AV Micra placement. She appears medically optimized and euvolemic on exam today. We had a detailed discussion regarding transcatheter treatment options, including SCAR and TTVR, both of which are part of clinical trials at this time. Based on her TIM in April (albeit done when she still had an RV pacing lead), her TV annular dilatation and leaflet malcoaptation did not appear amenable to SCAR--as such, we will consent her for TRISCEND II today and she will be scheduled for a cardiac CT and TIM (with Dr Fischer) to complete the screening evaluation for that study. As I explained to her (and her son by phone) in detail, all imaging will be reviewed by the Heart Team once completed and an optimal treatment strategy recommended. If she were deemed not anatomically suitable for TTVR (as part of TRISCEND II), we would then screen her for SCAR (as part of CLASP II TR).

## 2022-09-12 ENCOUNTER — APPOINTMENT (OUTPATIENT)
Dept: CARDIOTHORACIC SURGERY | Facility: CLINIC | Age: 77
End: 2022-09-12

## 2022-09-12 ENCOUNTER — RESULT REVIEW (OUTPATIENT)
Age: 77
End: 2022-09-12

## 2022-09-12 ENCOUNTER — APPOINTMENT (OUTPATIENT)
Dept: CARDIOLOGY | Facility: CLINIC | Age: 77
End: 2022-09-12

## 2022-09-12 ENCOUNTER — OUTPATIENT (OUTPATIENT)
Dept: OUTPATIENT SERVICES | Facility: HOSPITAL | Age: 77
LOS: 1 days | End: 2022-09-12
Payer: MEDICARE

## 2022-09-12 DIAGNOSIS — Z98.89 OTHER SPECIFIED POSTPROCEDURAL STATES: Chronic | ICD-10-CM

## 2022-09-12 DIAGNOSIS — Z41.9 ENCOUNTER FOR PROCEDURE FOR PURPOSES OTHER THAN REMEDYING HEALTH STATE, UNSPECIFIED: Chronic | ICD-10-CM

## 2022-09-12 DIAGNOSIS — I07.1 RHEUMATIC TRICUSPID INSUFFICIENCY: ICD-10-CM

## 2022-09-12 DIAGNOSIS — Z90.5 ACQUIRED ABSENCE OF KIDNEY: Chronic | ICD-10-CM

## 2022-09-12 DIAGNOSIS — Z95.2 PRESENCE OF PROSTHETIC HEART VALVE: Chronic | ICD-10-CM

## 2022-09-12 DIAGNOSIS — Z90.721 ACQUIRED ABSENCE OF OVARIES, UNILATERAL: Chronic | ICD-10-CM

## 2022-09-12 PROCEDURE — 75572 CT HRT W/3D IMAGE: CPT | Mod: 26,MH

## 2022-09-12 PROCEDURE — 75572 CT HRT W/3D IMAGE: CPT | Mod: MH

## 2022-09-25 ENCOUNTER — OUTPATIENT (OUTPATIENT)
Dept: OUTPATIENT SERVICES | Facility: HOSPITAL | Age: 77
LOS: 1 days | End: 2022-09-25
Payer: MEDICARE

## 2022-09-25 DIAGNOSIS — Z98.89 OTHER SPECIFIED POSTPROCEDURAL STATES: Chronic | ICD-10-CM

## 2022-09-25 DIAGNOSIS — Z95.2 PRESENCE OF PROSTHETIC HEART VALVE: Chronic | ICD-10-CM

## 2022-09-25 DIAGNOSIS — Z41.9 ENCOUNTER FOR PROCEDURE FOR PURPOSES OTHER THAN REMEDYING HEALTH STATE, UNSPECIFIED: Chronic | ICD-10-CM

## 2022-09-25 DIAGNOSIS — Z11.52 ENCOUNTER FOR SCREENING FOR COVID-19: ICD-10-CM

## 2022-09-25 DIAGNOSIS — Z90.5 ACQUIRED ABSENCE OF KIDNEY: Chronic | ICD-10-CM

## 2022-09-25 DIAGNOSIS — Z90.721 ACQUIRED ABSENCE OF OVARIES, UNILATERAL: Chronic | ICD-10-CM

## 2022-09-25 LAB — SARS-COV-2 RNA SPEC QL NAA+PROBE: SIGNIFICANT CHANGE UP

## 2022-09-25 PROCEDURE — U0003: CPT

## 2022-09-25 PROCEDURE — U0005: CPT

## 2022-09-25 PROCEDURE — C9803: CPT

## 2022-09-27 ENCOUNTER — TRANSCRIPTION ENCOUNTER (OUTPATIENT)
Age: 77
End: 2022-09-27

## 2022-09-28 ENCOUNTER — NON-APPOINTMENT (OUTPATIENT)
Age: 77
End: 2022-09-28

## 2022-09-28 ENCOUNTER — APPOINTMENT (OUTPATIENT)
Dept: CARDIOLOGY | Facility: CLINIC | Age: 77
End: 2022-09-28

## 2022-09-28 VITALS
DIASTOLIC BLOOD PRESSURE: 74 MMHG | HEART RATE: 65 BPM | WEIGHT: 150 LBS | SYSTOLIC BLOOD PRESSURE: 152 MMHG | HEIGHT: 63 IN | BODY MASS INDEX: 26.58 KG/M2 | OXYGEN SATURATION: 100 %

## 2022-09-28 PROCEDURE — 93000 ELECTROCARDIOGRAM COMPLETE: CPT

## 2022-09-28 PROCEDURE — 99214 OFFICE O/P EST MOD 30 MIN: CPT

## 2022-09-28 NOTE — DISCUSSION/SUMMARY
[FreeTextEntry1] : Yolette was recently admitted with acute decompensated right heart failure, and was found to have severe tricuspid regurgitation (with a dilated annulus) and atrial fibrillation.  She is now status post cardioversion, and remains in a sinus rhythm. \par \par She is most recently s/p lead extraction and implantation of a leadless pacemaker.\par \par Fortunately, she is doing much better today. Her Hb and creatinine have been stable.  Her blood pressure is a little elevated, though this is likely due to dietary indiscretion over the holidays, and some anxiety given her upcoming TIM.  She will remain on her current dose of metoprolol, along with torsemide 10.\par \par She will stay on amiodarone. She is off eliquis because of her bleeding issues in the short term, and the idea of a watchman has been discussed if more PAF.\par \par She will continue to monitor her blood pressures at home.  She is having blood work this Friday.  If her pressure remains elevated, we will add back valsartan.\par She will continue to follow-up with Dr. Margaret banks, regarding minimally invasive options for tricuspid valve.\par If no issues, I will see her again in 2-3 months. [EKG obtained to assist in diagnosis and management of assessed problem(s)] : EKG obtained to assist in diagnosis and management of assessed problem(s)

## 2022-09-28 NOTE — HISTORY OF PRESENT ILLNESS
[FreeTextEntry1] : Yolette presents to the office today for a follow up cardiovascular evaluation.\par \par She is now 77 years old, with a history of aortic stenosis, an intermittent right bundle branch block pattern on her electrocardiogram, hypertension, a dyslipidemia, a thyroid nodule, secondary hypothyroidism, iron deficiency anemia, and renal cell carcinoma (status post resection).  She has had bioprosthetic aortic valve replacement. Her postop course was complicated by complete heart block, for which a pacemaker was eventually implanted.  Routine interrogation of her pacemaker revealed asymptomatic atrial fibrillation. She has been anticoagulated.\par \par She went to see Dr. Hayes for a physical in November, feeling well at that time.  Dr. Hayes suggested a number of scans, including a CT of the chest.  The CT was notable for GGO, for which a pulmonary evaluation is planned.\par \par She had not felt 100% since December 2021.  She presented to the emergency room on 4/18/2022, with shortness of breath, and acute diastolic heart failure.  She was diuresed successfully with intravenous Lasix.  A transesophageal echocardiogram demonstrated mild to moderate mitral vegetation, mild mitral stenosis, mildly elevated bio AVR gradients, RV enlargement and RV dysfunction, with severe tricuspid regurgitation.  Tricuspid valve appeared tethered with mall coaptation of the tricuspid valve leaflets due to a severely dilated annulus, without obvious impingement from the pacemaker lead.  She was evaluated by Dr. Robles, and has outpatient follow-up scheduled for intervention regarding her tricuspid valve.\par Her symptoms have also correlated with the presence of atrial fibrillation.  She is now status post cardioversion as well.  She was discharged home on 4/29.  Her cardiac medications at discharge were amiodarone 200, aspirin 81, Eliquis 5 twice daily, Toprol , simvastatin 20, torsemide 20 twice daily, and valsartan 320.\par \par I last saw her in 7/22.\par \par She was admitted 5/13/22-5/17 with [facility] in the setting of overdiuresis. Her creatinine was 1.4 at time of dc.\par She then had falls and dizziness/weakness in early June 2022 and was found to have a Hb of 4.6. She got PRBCs and responded appropriately. She had an EGD and Colon (though poor prep) without active bleeding.\par She was sent home on torsemide 10 mg.\par \par She feels well overall. She saw Dr. Robles on 6/27/22 with severe TR and was referred to EP, with eventual consideration for the TRISECND II (transcatheter TV replacement). She saw EP and is now s/p lead extraction and leadless PPM implant on 8/2/22.\par She has no dyspnea, except on extreme exertion. She has no chest pain\par \par Her blood pressure has been a little higher than usual, which she has noted after Julia Camacho.  She has not been gaining water weight.  She is currently on torsemide 10.  She has lost weight overall.  She is having a TIM tomorrow.\par She had a recent EKG at her primary doctor's office, and there was concern for atrial fibrillation.  She is in a sinus rhythm today.

## 2022-09-29 ENCOUNTER — APPOINTMENT (OUTPATIENT)
Dept: CV DIAGNOSITCS | Facility: HOSPITAL | Age: 77
End: 2022-09-29

## 2022-09-29 ENCOUNTER — OUTPATIENT (OUTPATIENT)
Dept: OUTPATIENT SERVICES | Facility: HOSPITAL | Age: 77
LOS: 1 days | End: 2022-09-29
Payer: MEDICARE

## 2022-09-29 VITALS
HEART RATE: 64 BPM | OXYGEN SATURATION: 100 % | TEMPERATURE: 97 F | RESPIRATION RATE: 18 BRPM | SYSTOLIC BLOOD PRESSURE: 118 MMHG | DIASTOLIC BLOOD PRESSURE: 69 MMHG

## 2022-09-29 VITALS
HEART RATE: 63 BPM | DIASTOLIC BLOOD PRESSURE: 65 MMHG | RESPIRATION RATE: 16 BRPM | OXYGEN SATURATION: 100 % | SYSTOLIC BLOOD PRESSURE: 136 MMHG

## 2022-09-29 DIAGNOSIS — Z41.9 ENCOUNTER FOR PROCEDURE FOR PURPOSES OTHER THAN REMEDYING HEALTH STATE, UNSPECIFIED: Chronic | ICD-10-CM

## 2022-09-29 DIAGNOSIS — Z98.89 OTHER SPECIFIED POSTPROCEDURAL STATES: Chronic | ICD-10-CM

## 2022-09-29 DIAGNOSIS — Z95.2 PRESENCE OF PROSTHETIC HEART VALVE: Chronic | ICD-10-CM

## 2022-09-29 DIAGNOSIS — Z90.5 ACQUIRED ABSENCE OF KIDNEY: Chronic | ICD-10-CM

## 2022-09-29 DIAGNOSIS — I07.1 RHEUMATIC TRICUSPID INSUFFICIENCY: ICD-10-CM

## 2022-09-29 DIAGNOSIS — Z90.721 ACQUIRED ABSENCE OF OVARIES, UNILATERAL: Chronic | ICD-10-CM

## 2022-09-29 LAB — GLUCOSE BLDC GLUCOMTR-MCNC: 109 MG/DL — HIGH (ref 70–99)

## 2022-09-29 PROCEDURE — 82962 GLUCOSE BLOOD TEST: CPT

## 2022-09-29 PROCEDURE — 93312 ECHO TRANSESOPHAGEAL: CPT | Mod: 26

## 2022-09-29 PROCEDURE — 93325 DOPPLER ECHO COLOR FLOW MAPG: CPT | Mod: 26

## 2022-09-29 PROCEDURE — 76377 3D RENDER W/INTRP POSTPROCES: CPT

## 2022-09-29 PROCEDURE — 93320 DOPPLER ECHO COMPLETE: CPT | Mod: 26

## 2022-09-29 PROCEDURE — 93312 ECHO TRANSESOPHAGEAL: CPT

## 2022-09-29 PROCEDURE — 93320 DOPPLER ECHO COMPLETE: CPT

## 2022-09-29 PROCEDURE — 76377 3D RENDER W/INTRP POSTPROCES: CPT | Mod: 26

## 2022-09-29 PROCEDURE — 93325 DOPPLER ECHO COLOR FLOW MAPG: CPT

## 2022-09-29 NOTE — PRE-ANESTHESIA EVALUATION ADULT - NSANTHPMHFT_GEN_ALL_CORE
AS s/p AVR c/b CHB s/p PPM, HTN, DM on orals, RCC s/p resection, pAF on AC s/p DCCV, anemia 2/2 ?GIB.    ET> 1 flight of stairs

## 2022-10-03 ENCOUNTER — TRANSCRIPTION ENCOUNTER (OUTPATIENT)
Age: 77
End: 2022-10-03

## 2022-10-12 ENCOUNTER — TRANSCRIPTION ENCOUNTER (OUTPATIENT)
Age: 77
End: 2022-10-12

## 2022-11-01 ENCOUNTER — APPOINTMENT (OUTPATIENT)
Dept: MAMMOGRAPHY | Facility: CLINIC | Age: 77
End: 2022-11-01

## 2022-11-01 ENCOUNTER — APPOINTMENT (OUTPATIENT)
Dept: ULTRASOUND IMAGING | Facility: CLINIC | Age: 77
End: 2022-11-01

## 2022-11-01 PROCEDURE — 77067 SCR MAMMO BI INCL CAD: CPT

## 2022-11-01 PROCEDURE — 77063 BREAST TOMOSYNTHESIS BI: CPT

## 2022-11-01 PROCEDURE — 76641 ULTRASOUND BREAST COMPLETE: CPT | Mod: 50

## 2022-11-02 ENCOUNTER — NON-APPOINTMENT (OUTPATIENT)
Age: 77
End: 2022-11-02

## 2022-11-11 ENCOUNTER — APPOINTMENT (OUTPATIENT)
Dept: CARDIOTHORACIC SURGERY | Facility: CLINIC | Age: 77
End: 2022-11-11

## 2022-11-11 VITALS
OXYGEN SATURATION: 100 % | DIASTOLIC BLOOD PRESSURE: 77 MMHG | SYSTOLIC BLOOD PRESSURE: 128 MMHG | HEART RATE: 61 BPM | RESPIRATION RATE: 16 BRPM | BODY MASS INDEX: 25.52 KG/M2 | HEIGHT: 63 IN | WEIGHT: 144 LBS

## 2022-11-11 DIAGNOSIS — I48.91 UNSPECIFIED ATRIAL FIBRILLATION: ICD-10-CM

## 2022-11-11 DIAGNOSIS — Z85.528 PERSONAL HISTORY OF OTHER MALIGNANT NEOPLASM OF KIDNEY: ICD-10-CM

## 2022-11-11 DIAGNOSIS — E11.9 TYPE 2 DIABETES MELLITUS W/OUT COMPLICATIONS: ICD-10-CM

## 2022-11-11 DIAGNOSIS — I49.3 VENTRICULAR PREMATURE DEPOLARIZATION: ICD-10-CM

## 2022-11-11 DIAGNOSIS — I45.10 UNSPECIFIED RIGHT BUNDLE-BRANCH BLOCK: ICD-10-CM

## 2022-11-11 DIAGNOSIS — E89.0 POSTPROCEDURAL HYPOTHYROIDISM: ICD-10-CM

## 2022-11-11 DIAGNOSIS — I44.2 ATRIOVENTRICULAR BLOCK, COMPLETE: ICD-10-CM

## 2022-11-11 DIAGNOSIS — E78.5 HYPERLIPIDEMIA, UNSPECIFIED: ICD-10-CM

## 2022-11-11 PROCEDURE — 99214 OFFICE O/P EST MOD 30 MIN: CPT

## 2022-11-11 PROCEDURE — 93000 ELECTROCARDIOGRAM COMPLETE: CPT

## 2022-11-11 NOTE — CONSULT LETTER
[Dear  ___] : Dear  [unfilled], [Courtesy Letter:] : I had the pleasure of seeing your patient, [unfilled], in my office today. [Please see my note below.] : Please see my note below. [Consult Closing:] : Thank you very much for allowing me to participate in the care of this patient.  If you have any questions, please do not hesitate to contact me. [Sincerely,] : Sincerely, [FreeTextEntry2] : Dr.Steven Jones [FreeTextEntry3] : Dr.William UNIQUE Eugene\par Attending surgeon\par [facility]\par  \par Cardiovascular & Thoracic Surgery\par [facility] [employer]\par

## 2022-11-11 NOTE — REVIEW OF SYSTEMS
[Feeling Tired] : feeling tired [Lower Ext Edema] : lower extremity edema [Shortness Of Breath] : shortness of breath [SOB on Exertion] : shortness of breath during exertion [Negative] : Heme/Lymph

## 2022-11-11 NOTE — HISTORY OF PRESENT ILLNESS
[FreeTextEntry1] : Ms. Weston is here today for discussion of her possible inclusion in the [place] CLASP II-F clinical trial for Tricuspid Regurgitation. She was previously undergoing evaluation for TRISCEND, but she was deemed not anatomically suitable.\par \par Today she is feeling ok, but she still has GONZALES, which she finds is worse later in the day. Her shortness of breath for the most part is with activity, however she states that she does get SOB at rest at times. Her SOB is brought on after walking "a block or so", doing house work, or going up the stairs. She also feels easily fatigued with activity. She denies any CP, but does get pedal edema. She will feel dizzy at times when changing position in bed. She sleeps with 2 pillows at night, and has not woken up due to her SOB.Patient states that by the end of the night, she has pitting edema.\par \par  Her Synthroid was recently down titrated to 100 mcg from 112 mcg. She mentioned that her Eliquis was not restarted after her hospitalization with the GIB. \par \par NYHA Class III\par \par

## 2022-11-11 NOTE — ASSESSMENT
[FreeTextEntry1] : Ms. Weston is here today for discussion of her possible inclusion in the [place] CLASP II-F clinical trial for Tricuspid Regurgitation. She was previously undergoing evaluation for TRISCEND, but she was deemed not anatomically suitable.\par \par Today she is feeling ok, but she still has GONZALES, which she finds is worse later in the day. Her shortness of breath for the most part is with activity, however she states that she does get SOB at rest at times. Her SOB is brought on after walking "a block or so", doing house work, or going up the stairs. She also feels easily fatigued with activity. She denies any CP, but does get pedal edema. She will feel dizzy at times when changing position in bed. She sleeps with 2 pillows at night, and has not woken up due to her SOB.Patient states that by the end of the night, she has pitting edema.\par \par  Her Synthroid was recently down titrated to 100 mcg from 112 mcg. She mentioned that her Eliquis was not restarted after her hospitalization with the GIB. \par \par NYHA III\par \par \par \par Dr.William Eugene  reviewed the cardiac imaging, medical records and reports with patient and discussed the case. TIM 10/3: Grade 5 TR, Quadricuspid TV, Mild MR \par \par Dr.William Eugene  discussed the risks , benefits and alternatives to surgery. Risks included but not limited to  bleeding , stroke, Myocardial Infarction, kidney problems,Blood transfusion ,permanent  pacemaker implantation,  infections and death. Dr.William Eugene  quoted a (intermediate/high ) operative mortality and complication risks for open TVR . Dr.William Eugene also discussed the various approaches in detail ( Open TVR Vs SCAR Vs CLASP trial ) .Dr.William Eugene feel that the patient will benefit and is a candidate for a CLASP II trail   . All questions and concerns were addressed and patient agrees to proceed with the plan. \par \par \par Plan:\par 1) CLASP II trial work up \par 2) May return to clinic on PRN basis \par

## 2022-11-11 NOTE — END OF VISIT
[FreeTextEntry3] : \par \par Scribe Attestation:\par I personally scribed for REAGAN SALTER on Nov 11 2022 12:45PM . \par \par \par \par \par Physician Attestation:\par Documented by GASTON MEDINA acting as a scribe for JOIE REAGAN KENNA 11/11/2022 . \par                 All medical record entries made by the Scribe were at my, REAGAN SALTER , direction and personally dictated by me on 11/11/2022 . I have reviewed the chart and agree that the record accurately reflects my personal performance of the history, physical exam, assessment and plan\par

## 2022-11-11 NOTE — PHYSICAL EXAM
[General Appearance - Alert] : alert [General Appearance - In No Acute Distress] : in no acute distress [General Appearance - Well Nourished] : well nourished [General Appearance - Well Developed] : well developed [Sclera] : the sclera and conjunctiva were normal [Outer Ear] : the ears and nose were normal in appearance [Hearing Threshold Finger Rub Not [place]] : hearing was normal [Both Tympanic Membranes Were Examined] : both tympanic membranes were normal [Neck Appearance] : the appearance of the neck was normal [] : no respiratory distress [Respiration, Rhythm And Depth] : normal respiratory rhythm and effort [Auscultation Breath Sounds / Voice Sounds] : lungs were clear to auscultation bilaterally [Apical Impulse] : the apical impulse was normal [Heart Rate And Rhythm] : heart rate was normal and rhythm regular [Heart Sounds] : normal S1 and S2 [Systolic grade ___/6] : A grade [unfilled]/6 systolic murmur was heard. [Examination Of The Chest] : the chest was normal in appearance [2+] : left 2+ [Breast Appearance] : normal in appearance [Bowel Sounds] : normal bowel sounds [Abdomen Soft] : soft [No CVA Tenderness] : no ~M costovertebral angle tenderness [Abnormal Walk] : normal gait [Involuntary Movements] : no involuntary movements were seen [Skin Color & Pigmentation] : normal skin color and pigmentation [Skin Turgor] : normal skin turgor [No Focal Deficits] : no focal deficits [Oriented To Time, Place, And Person] : oriented to person, place, and time [Impaired Insight] : insight and judgment were intact [Affect] : the affect was normal [Mood] : the mood was normal [Memory Recent] : recent memory was not impaired [Memory Remote] : remote memory was not impaired [FreeTextEntry1] : Deferred

## 2022-11-15 NOTE — PATIENT PROFILE ADULT - NSPROPTRIGHTSUPPORTPERSON_GEN_A_NUR
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

## 2022-11-16 ENCOUNTER — APPOINTMENT (OUTPATIENT)
Dept: CARDIOLOGY | Facility: CLINIC | Age: 77
End: 2022-11-16

## 2022-11-22 ENCOUNTER — APPOINTMENT (OUTPATIENT)
Dept: ELECTROPHYSIOLOGY | Facility: CLINIC | Age: 77
End: 2022-11-22

## 2022-11-22 VITALS — OXYGEN SATURATION: 99 % | DIASTOLIC BLOOD PRESSURE: 79 MMHG | SYSTOLIC BLOOD PRESSURE: 161 MMHG | HEART RATE: 60 BPM

## 2022-11-22 PROCEDURE — 93000 ELECTROCARDIOGRAM COMPLETE: CPT | Mod: 59

## 2022-11-22 PROCEDURE — 93279 PRGRMG DEV EVAL PM/LDLS PM: CPT

## 2022-11-26 ENCOUNTER — NON-APPOINTMENT (OUTPATIENT)
Age: 77
End: 2022-11-26

## 2022-11-27 NOTE — PHYSICAL EXAM
[Well Developed] : well developed [Well Nourished] : well nourished [No Acute Distress] : no acute distress [Normal S1, S2] : normal S1, S2 [Murmur] : murmur [Clear Lung Fields] : clear lung fields [Good Air Entry] : good air entry [Edema ___] : edema [unfilled] [Normal] : alert and oriented, normal memory [Normal Rate] : normal [Rhythm Regular] : regular [II] : a grade 2 [___ +] : bilateral [unfilled]U+ pitting edema to the ankles [de-identified] : I/VI ROSETTA

## 2022-11-27 NOTE — DISCUSSION/SUMMARY
[FreeTextEntry1] : Yolette has symptomatic severe TR with at least NYHA II symptoms. She was evaluated for TRISCEND II but screen-failed for TTVR based on her right-heart anatomy. As such, we are offering consideration for TV SCAR as an alternative, and she has consented for the CLASP II TR study. She is to see my CTS colleague Dr Eugene today as well. No changes were made to her medications. We will prepare to submit her for consideration by the study screening committee and notify her as soon as we have feedback to discuss next steps.

## 2022-11-27 NOTE — HISTORY OF PRESENT ILLNESS
[FreeTextEntry1] : Ms. Weston is here today for discussion of her possible inclusion in the [employer] CLASP II-F clinical trial for patients with symptomatic severe Tricuspid Regurgitation. She was previously evaluated for the TRISCEND II Pivotal Trial (TTVR), but she was deemed not anatomically suitable for the device (the EVOQUE valve from [employer]). Based on her most recent imaging, Dr Fischer and I agreed that it would be reasonable to screen her for TV SCAR.\par \par Since her prior visit, she still has GONZALES, which she finds is worse later in the day. Her shortness of breath for the most part is with activity, however she states that she does get SOB at rest at times. Her SOB is brought on after walking "a block or so", doing house work, or going up the stairs (NYHA II). She also feels easily fatigued with activity. She denies angina but does have intermittent pedal edema. She will feel dizzy at times when changing her position in bed. She sleeps with 2 pillows at night and reports no PND or orthopnea. She often has edema that is most prominent in the morning. \par \par She reports no major clinical or medication changes since her last visit. Her Synthroid was recently down titrated to 100mcg from 112mcg. She mentioned that her Eliquis was not restarted after her hospitalization with the GIB. She otherwise appears clinically stable as compared to her last visit.

## 2022-11-27 NOTE — REVIEW OF SYSTEMS
[Feeling Fatigued] : feeling fatigued [SOB] : shortness of breath [Dyspnea on exertion] : dyspnea during exertion [Lower Ext Edema] : lower extremity edema [Negative] : Heme/Lymph [Chest Discomfort] : no chest discomfort [Palpitations] : no palpitations [Orthopnea] : no orthopnea

## 2022-11-27 NOTE — CARDIOLOGY SUMMARY
[de-identified] : SR with Demand V pacing (MICRA) [de-identified] : TIM 10/3: Grade 5 TR, Quadricuspid TV, Mild MR

## 2022-12-06 ENCOUNTER — RX RENEWAL (OUTPATIENT)
Age: 77
End: 2022-12-06

## 2022-12-15 ENCOUNTER — OUTPATIENT (OUTPATIENT)
Dept: OUTPATIENT SERVICES | Facility: HOSPITAL | Age: 77
LOS: 1 days | End: 2022-12-15
Payer: MEDICARE

## 2022-12-15 DIAGNOSIS — Z95.2 PRESENCE OF PROSTHETIC HEART VALVE: Chronic | ICD-10-CM

## 2022-12-15 DIAGNOSIS — Z41.9 ENCOUNTER FOR PROCEDURE FOR PURPOSES OTHER THAN REMEDYING HEALTH STATE, UNSPECIFIED: Chronic | ICD-10-CM

## 2022-12-15 DIAGNOSIS — I35.0 NONRHEUMATIC AORTIC (VALVE) STENOSIS: ICD-10-CM

## 2022-12-15 DIAGNOSIS — Z98.89 OTHER SPECIFIED POSTPROCEDURAL STATES: Chronic | ICD-10-CM

## 2022-12-15 DIAGNOSIS — Z90.5 ACQUIRED ABSENCE OF KIDNEY: Chronic | ICD-10-CM

## 2022-12-15 DIAGNOSIS — Z90.721 ACQUIRED ABSENCE OF OVARIES, UNILATERAL: Chronic | ICD-10-CM

## 2022-12-15 PROCEDURE — 93306 TTE W/DOPPLER COMPLETE: CPT | Mod: 26

## 2022-12-15 PROCEDURE — 93306 TTE W/DOPPLER COMPLETE: CPT

## 2022-12-28 LAB
ALBUMIN SERPL ELPH-MCNC: 4.9 G/DL
ALP BLD-CCNC: 69 U/L
ALT SERPL-CCNC: 32 U/L
ANION GAP SERPL CALC-SCNC: 16 MMOL/L
AST SERPL-CCNC: 27 U/L
BASOPHILS # BLD AUTO: 0.05 K/UL
BASOPHILS NFR BLD AUTO: 0.6 %
BILIRUB SERPL-MCNC: 0.6 MG/DL
BUN SERPL-MCNC: 35 MG/DL
CALCIUM SERPL-MCNC: 11 MG/DL
CHLORIDE SERPL-SCNC: 103 MMOL/L
CK MB BLD-MCNC: 2.1 NG/ML
CO2 SERPL-SCNC: 27 MMOL/L
CREAT SERPL-MCNC: 1.37 MG/DL
EGFR: 40 ML/MIN/1.73M2
EOSINOPHIL # BLD AUTO: 0.29 K/UL
EOSINOPHIL NFR BLD AUTO: 3.8 %
GGT SERPL-CCNC: 47 U/L
GLUCOSE SERPL-MCNC: 98 MG/DL
HCT VFR BLD CALC: 40.9 %
HGB BLD-MCNC: 13.6 G/DL
IMM GRANULOCYTES NFR BLD AUTO: 0.9 %
INR PPP: 1 RATIO
LYMPHOCYTES # BLD AUTO: 1.92 K/UL
LYMPHOCYTES NFR BLD AUTO: 24.9 %
MAN DIFF?: NORMAL
MCHC RBC-ENTMCNC: 29 PG
MCHC RBC-ENTMCNC: 33.3 GM/DL
MCV RBC AUTO: 87.2 FL
MONOCYTES # BLD AUTO: 0.78 K/UL
MONOCYTES NFR BLD AUTO: 10.1 %
NEUTROPHILS # BLD AUTO: 4.61 K/UL
NEUTROPHILS NFR BLD AUTO: 59.7 %
PLATELET # BLD AUTO: 295 K/UL
POTASSIUM SERPL-SCNC: 5.2 MMOL/L
PROT SERPL-MCNC: 7.8 G/DL
PT BLD: 11.6 SEC
RBC # BLD: 4.69 M/UL
RBC # FLD: 14.7 %
SODIUM SERPL-SCNC: 145 MMOL/L
URATE SERPL-MCNC: 10 MG/DL
WBC # FLD AUTO: 7.72 K/UL

## 2022-12-30 ENCOUNTER — APPOINTMENT (OUTPATIENT)
Dept: CARDIOLOGY | Facility: CLINIC | Age: 77
End: 2022-12-30
Payer: MEDICARE

## 2022-12-30 ENCOUNTER — NON-APPOINTMENT (OUTPATIENT)
Age: 77
End: 2022-12-30

## 2022-12-30 VITALS
SYSTOLIC BLOOD PRESSURE: 148 MMHG | HEART RATE: 62 BPM | DIASTOLIC BLOOD PRESSURE: 77 MMHG | OXYGEN SATURATION: 100 % | HEIGHT: 63 IN | WEIGHT: 148 LBS | BODY MASS INDEX: 26.22 KG/M2

## 2022-12-30 VITALS — DIASTOLIC BLOOD PRESSURE: 77 MMHG | SYSTOLIC BLOOD PRESSURE: 138 MMHG

## 2022-12-30 PROCEDURE — 99214 OFFICE O/P EST MOD 30 MIN: CPT

## 2022-12-30 PROCEDURE — 93000 ELECTROCARDIOGRAM COMPLETE: CPT

## 2022-12-30 RX ORDER — AMLODIPINE BESYLATE 10 MG/1
10 TABLET ORAL
Qty: 90 | Refills: 3 | Status: DISCONTINUED | COMMUNITY
Start: 2018-11-19 | End: 2022-12-30

## 2022-12-30 NOTE — DISCUSSION/SUMMARY
[FreeTextEntry1] : In 4/22, Yolette was admitted with acute decompensated right heart failure, and was found to have severe tricuspid regurgitation (with a dilated annulus) and atrial fibrillation.  She is now status post cardioversion, and remains in a sinus rhythm. \par She is most recently s/p lead extraction and implantation of a leadless pacemaker.\par \par Fortunately, she is doing much better today. Her Hb and creatinine have been stable.  Her blood pressure has been better controlled. She will remain on her current dose of metoprolol, along with torsemide 10.\par \par She will stay on amiodarone (LFTs and thyroid function have been ok, as well as PFTS/eye exams). She is off eliquis because of her bleeding issues in the short term, and the idea of a watchman has been discussed if more PAF.\par \par She will continue to monitor her blood pressures at home. \par She will continue to follow-up with Dr. Margaret banks, regarding minimally invasive options for tricuspid valve (CLASP study versus more open surgical approach)\par If no issues, I will see her again in 3 months. [EKG obtained to assist in diagnosis and management of assessed problem(s)] : EKG obtained to assist in diagnosis and management of assessed problem(s)

## 2022-12-30 NOTE — HISTORY OF PRESENT ILLNESS
[FreeTextEntry1] : Yolette presents to the office today for a follow up cardiovascular evaluation.\par \par She is now 77 years old, with a history of aortic stenosis, an intermittent right bundle branch block pattern on her electrocardiogram, hypertension, a dyslipidemia, a thyroid nodule, secondary hypothyroidism, iron deficiency anemia, and renal cell carcinoma (status post resection).  She has had bioprosthetic aortic valve replacement. Her postop course was complicated by complete heart block, for which a pacemaker was eventually implanted.  Routine interrogation of her pacemaker revealed asymptomatic atrial fibrillation. She has been anticoagulated.\par \par She went to see Dr. Hayes for a physical in November, feeling well at that time.  Dr. Hayes suggested a number of scans, including a CT of the chest.  The CT was notable for GGO, for which a pulmonary evaluation is planned.\par \par She had not felt 100% since December 2021.  She presented to the emergency room on 4/18/2022, with shortness of breath, and acute diastolic heart failure.  She was diuresed successfully with intravenous Lasix.  A transesophageal echocardiogram demonstrated mild to moderate mitral vegetation, mild mitral stenosis, mildly elevated bio AVR gradients, RV enlargement and RV dysfunction, with severe tricuspid regurgitation.  Tricuspid valve appeared tethered with mall coaptation of the tricuspid valve leaflets due to a severely dilated annulus, without obvious impingement from the pacemaker lead.  She was evaluated by Dr. Robles, and has outpatient follow-up scheduled for intervention regarding her tricuspid valve.\par Her symptoms have also correlated with the presence of atrial fibrillation.  She is now status post cardioversion as well.  She was discharged home on 4/29.  Her cardiac medications at discharge were amiodarone 200, aspirin 81, Eliquis 5 twice daily, Toprol , simvastatin 20, torsemide 20 twice daily, and valsartan 320.\par \par I last saw her in 9/22.\par \par She was admitted 5/13/22-5/17 with [facility] in the setting of overdiuresis. Her creatinine was 1.4 at time of dc.\par She then had falls and dizziness/weakness in early June 2022 and was found to have a Hb of 4.6. She got PRBCs and responded appropriately. She had an EGD and Colon (though poor prep) without active bleeding.\par She was sent home on torsemide 10 mg.\par \par She feels well overall. She initially  saw Dr. Robles on 6/27/22 with severe TR and was referred to EP and is now s/p lead extraction and leadless PPM implant on 8/2/22.\par More recently, she has been following up with him. She was deemed to be a poorly suitable candidate for the TRISCEND study (given RV anatomy) and is being screened for the CLASP trial (a transcatheter edge to edge repair).\par A repeat echocardiogram on 12/15 showed severe TR, normal RV and LV function (with a dilated RV) and moderately elevated bioAVR gradients. \par \par She is doing well today. She has no dyspnea, except on extreme exertion. She has no chest pain\par Her weight has been stable. Her SBP has generally been controlled. She has no edema, and is taking torsemide 10.\par \par She is in a sinus rhythm today.

## 2023-01-15 ENCOUNTER — RX RENEWAL (OUTPATIENT)
Age: 78
End: 2023-01-15

## 2023-01-17 ENCOUNTER — TRANSCRIPTION ENCOUNTER (OUTPATIENT)
Age: 78
End: 2023-01-17

## 2023-01-30 ENCOUNTER — OFFICE (OUTPATIENT)
Dept: URBAN - METROPOLITAN AREA CLINIC 109 | Facility: CLINIC | Age: 78
Setting detail: OPHTHALMOLOGY
End: 2023-01-30
Payer: MEDICARE

## 2023-01-30 DIAGNOSIS — H40.013: ICD-10-CM

## 2023-01-30 DIAGNOSIS — H25.13: ICD-10-CM

## 2023-01-30 PROCEDURE — 92133 CPTRZD OPH DX IMG PST SGM ON: CPT | Performed by: OPHTHALMOLOGY

## 2023-01-30 PROCEDURE — 99213 OFFICE O/P EST LOW 20 MIN: CPT | Performed by: OPHTHALMOLOGY

## 2023-01-30 PROCEDURE — 92020 GONIOSCOPY: CPT | Performed by: OPHTHALMOLOGY

## 2023-01-30 ASSESSMENT — PACHYMETRY
OS_CT_CORRECTION: 3
OD_CT_UM: 513
OD_CT_CORRECTION: 2
OS_CT_UM: 509

## 2023-01-30 ASSESSMENT — REFRACTION_CURRENTRX
OD_OVR_VA: 20/
OS_VPRISM_DIRECTION: PROGS
OD_VPRISM_DIRECTION: PROGS
OD_CYLINDER: -1.75
OD_AXIS: 100
OS_SPHERE: -2.75
OS_OVR_VA: 20/
OD_ADD: +2.25
OD_SPHERE: -1.75
OS_ADD: +2.25

## 2023-01-30 ASSESSMENT — TONOMETRY
OD_IOP_MMHG: 16
OS_IOP_MMHG: 16

## 2023-01-30 ASSESSMENT — REFRACTION_AUTOREFRACTION
OS_AXIS: 119
OS_CYLINDER: -0.50
OD_CYLINDER: -2.00
OS_SPHERE: -1.00
OD_AXIS: 97
OD_SPHERE: -2.25

## 2023-01-30 ASSESSMENT — CONFRONTATIONAL VISUAL FIELD TEST (CVF)
OS_FINDINGS: FULL
OD_FINDINGS: FULL

## 2023-01-30 ASSESSMENT — VISUAL ACUITY
OS_BCVA: 20/25-2
OD_BCVA: 20/30-1

## 2023-01-30 ASSESSMENT — SPHEQUIV_DERIVED
OS_SPHEQUIV: -1.25
OD_SPHEQUIV: -3.25

## 2023-02-13 ENCOUNTER — NON-APPOINTMENT (OUTPATIENT)
Age: 78
End: 2023-02-13

## 2023-02-22 ENCOUNTER — TRANSCRIPTION ENCOUNTER (OUTPATIENT)
Age: 78
End: 2023-02-22

## 2023-03-13 ENCOUNTER — APPOINTMENT (OUTPATIENT)
Dept: CARDIOTHORACIC SURGERY | Facility: HOSPITAL | Age: 78
End: 2023-03-13
Payer: MEDICARE

## 2023-03-13 ENCOUNTER — OUTPATIENT (OUTPATIENT)
Dept: OUTPATIENT SERVICES | Facility: HOSPITAL | Age: 78
LOS: 1 days | End: 2023-03-13
Payer: MEDICARE

## 2023-03-13 ENCOUNTER — APPOINTMENT (OUTPATIENT)
Dept: CARDIOTHORACIC SURGERY | Facility: CLINIC | Age: 78
End: 2023-03-13
Payer: MEDICARE

## 2023-03-13 VITALS
BODY MASS INDEX: 25.69 KG/M2 | WEIGHT: 145 LBS | OXYGEN SATURATION: 100 % | SYSTOLIC BLOOD PRESSURE: 120 MMHG | HEART RATE: 58 BPM | DIASTOLIC BLOOD PRESSURE: 68 MMHG | RESPIRATION RATE: 18 BRPM | HEIGHT: 63 IN

## 2023-03-13 DIAGNOSIS — Z98.89 OTHER SPECIFIED POSTPROCEDURAL STATES: Chronic | ICD-10-CM

## 2023-03-13 DIAGNOSIS — I35.0 NONRHEUMATIC AORTIC (VALVE) STENOSIS: ICD-10-CM

## 2023-03-13 DIAGNOSIS — Z90.721 ACQUIRED ABSENCE OF OVARIES, UNILATERAL: Chronic | ICD-10-CM

## 2023-03-13 DIAGNOSIS — R06.02 SHORTNESS OF BREATH: ICD-10-CM

## 2023-03-13 DIAGNOSIS — I50.9 HEART FAILURE, UNSPECIFIED: ICD-10-CM

## 2023-03-13 DIAGNOSIS — Z90.5 ACQUIRED ABSENCE OF KIDNEY: Chronic | ICD-10-CM

## 2023-03-13 DIAGNOSIS — Z95.2 PRESENCE OF PROSTHETIC HEART VALVE: Chronic | ICD-10-CM

## 2023-03-13 DIAGNOSIS — Z41.9 ENCOUNTER FOR PROCEDURE FOR PURPOSES OTHER THAN REMEDYING HEALTH STATE, UNSPECIFIED: Chronic | ICD-10-CM

## 2023-03-13 PROCEDURE — 76377 3D RENDER W/INTRP POSTPROCES: CPT

## 2023-03-13 PROCEDURE — 99214 OFFICE O/P EST MOD 30 MIN: CPT

## 2023-03-13 PROCEDURE — 93356 MYOCRD STRAIN IMG SPCKL TRCK: CPT

## 2023-03-13 PROCEDURE — 93306 TTE W/DOPPLER COMPLETE: CPT

## 2023-03-13 PROCEDURE — 93355 ECHO TRANSESOPHAGEAL (TEE): CPT

## 2023-03-13 PROCEDURE — 93000 ELECTROCARDIOGRAM COMPLETE: CPT

## 2023-03-13 PROCEDURE — 93306 TTE W/DOPPLER COMPLETE: CPT | Mod: 26

## 2023-03-13 PROCEDURE — 76377 3D RENDER W/INTRP POSTPROCES: CPT | Mod: 26

## 2023-03-13 NOTE — REVIEW OF SYSTEMS
[Fever] : no fever [Chills] : no chills [Chest Discomfort] : no chest discomfort [Lower Ext Edema] : no extremity edema [Palpitations] : no palpitations [Orthopnea] : no orthopnea [PND] : no PND [Abdominal Pain] : no abdominal pain [Change in Appetite] : no change in appetite [Dizziness] : no dizziness

## 2023-03-13 NOTE — DISCUSSION/SUMMARY
[EKG obtained to assist in diagnosis and management of assessed problem(s)] : EKG obtained to assist in diagnosis and management of assessed problem(s) [FreeTextEntry1] : Yolette has severe TR with persistent NYHA II symptoms despite GDMT (including 10mg of Torsemide daily). Given the progression of TR on her TTE today (clearly now severe), she has been consented for the CLASP II TR study. I had a detailed discussion with her regarding the potential risks and benefits of T-SCAR and answered all of her questions in detail. She will require a repeat TIM, which will be scheduled with Dr Fischer. She appears well compensated, her BP is at goal on GDMT, and I am making no changes to her regimen at this time. I will notify Dr Jones of our impressions and plan.

## 2023-03-13 NOTE — HISTORY OF PRESENT ILLNESS
[FreeTextEntry1] : Ms. Weston returns to clinic today for follow up evaluation of tricuspid regurgitation. She was previously evaluated for the TRISCEND II Pivotal Trial (TTVR with the EVOQUE valve from [employer]), but she was deemed not anatomically suitable for the device (her RA/RV size were too small by CT). Dr Fischer and I agreed that it would be reasonable to screen her for T-SCAR, however she screen-failed as per the [facility] (ECL) when follow up imaging showed her TR to be in the moderate range (as per the ECL assessment). She has continued to follow up with Dr. Jones closely since that time and returns today for repeat assessment of her TR and discussion of treatment options. If her TTE meets severity criteria, we would reconsider inclusion in the [employer] CLASP II TR clinical trial for patients with symptomatic severe Tricuspid Regurgitation. I have reviewed her echocardiogram done today with Dr. Fischer which preliminarily demonstrates severe TR.\par \par She remains active but notes a mild worsening of her fatigue and exertional dyspnea (NYHA II). She has no angina, PND, orthopnea, or dizziness. She has had no recent hospitalizations or ED visits. She has intermittent pedal edema primarily toward the end of the day. \par \par Yolette reports persistent fatigue and GONZALES, as noted above. She reports minor edema usually at night, also as noted. Her appetite is "OK" and bathroom habits normal. She has no angina. She has "touches of dizziness" when she moves quickly but no syncope. Her Metoprolol was recently increased (1.5 months ago) from 50mg to 75mg daily for better HTN management. She is in good spirits today in the office, as usual. She is eager to move forward with treatment if feasible at this time.

## 2023-03-13 NOTE — CARDIOLOGY SUMMARY
[de-identified] : NSR 60\par IVCD\par PAC\par 1st degree AVB ( ms) [de-identified] : 12/15/22\par LVEF 65-70%, mild MR, Bioprosthetic AVR, valve well seated, mGr 24 mmHg, PGr 44 mmHg, ROJELIO 1.2 sqcm, moderate AS, no AI. RV severely dilated with normal function, severe Grade 3 TR\par Micra PPM in the RV.  none

## 2023-03-31 ENCOUNTER — NON-APPOINTMENT (OUTPATIENT)
Age: 78
End: 2023-03-31

## 2023-03-31 ENCOUNTER — APPOINTMENT (OUTPATIENT)
Dept: CARDIOLOGY | Facility: CLINIC | Age: 78
End: 2023-03-31
Payer: MEDICARE

## 2023-03-31 VITALS
HEART RATE: 58 BPM | BODY MASS INDEX: 26.23 KG/M2 | OXYGEN SATURATION: 95 % | SYSTOLIC BLOOD PRESSURE: 149 MMHG | DIASTOLIC BLOOD PRESSURE: 76 MMHG | WEIGHT: 148.04 LBS | HEIGHT: 63 IN

## 2023-03-31 PROCEDURE — 99214 OFFICE O/P EST MOD 30 MIN: CPT

## 2023-03-31 PROCEDURE — 93000 ELECTROCARDIOGRAM COMPLETE: CPT

## 2023-03-31 NOTE — DISCUSSION/SUMMARY
[FreeTextEntry1] : In 4/22, Yolette was admitted with acute decompensated right heart failure, and was found to have severe tricuspid regurgitation (with a dilated annulus) and atrial fibrillation.  She is now status post cardioversion, and remains in a sinus rhythm. This was followed by lead extraction and implantation of a leadless pacemaker.\par \par Fortunately, she is doing much better today. Her Hb and creatinine have been stable (most recently 1.16, with a K of 5.5).  Her blood pressure is a little high today. She will remain on her current dose of metoprolol, along with torsemide 10.\par \par She will stay on amiodarone (LFTs and thyroid function have been ok, as well as PFTS/eye exams). She is off eliquis because of her bleeding issues in the short term, and the idea of a watchman has been discussed if more PAF.\par \par She will continue to monitor her blood pressures at home. She needs to cut back on her high salt foods. If it remains elevated, we will need to add amlodipine or hydralazine. Her Hyperkalemia limits re-initiation of valsartan.\par She will continue to follow-up with Dr. Margaret banks, regarding minimally invasive options for tricuspid valve (CLASP study versus more open surgical approach).\par \par If no issues, I will see her again in 3 months. [EKG obtained to assist in diagnosis and management of assessed problem(s)] : EKG obtained to assist in diagnosis and management of assessed problem(s)

## 2023-03-31 NOTE — HISTORY OF PRESENT ILLNESS
[FreeTextEntry1] : Yolette presents to the office today for a follow up cardiovascular evaluation.\par \par She is now 77 years old, with a history of aortic stenosis, an intermittent right bundle branch block pattern on her electrocardiogram, hypertension, a dyslipidemia, a thyroid nodule, secondary hypothyroidism, iron deficiency anemia, and renal cell carcinoma (status post resection).  She has had bioprosthetic aortic valve replacement. Her postop course was complicated by complete heart block, for which a pacemaker was eventually implanted.  Routine interrogation of her pacemaker revealed asymptomatic atrial fibrillation. She has been anticoagulated.\par \par She went to see Dr. Hayes for a physical in November, feeling well at that time.  Dr. Hayes suggested a number of scans, including a CT of the chest.  The CT was notable for GGO, for which a pulmonary evaluation is planned.\par \par She had not felt 100% since December 2021.  She presented to the emergency room on 4/18/2022, with shortness of breath, and acute diastolic heart failure.  She was diuresed successfully with intravenous Lasix.  A transesophageal echocardiogram demonstrated mild to moderate mitral vegetation, mild mitral stenosis, mildly elevated bio AVR gradients, RV enlargement and RV dysfunction, with severe tricuspid regurgitation.  Tricuspid valve appeared tethered with mall coaptation of the tricuspid valve leaflets due to a severely dilated annulus, without obvious impingement from the pacemaker lead.  She was evaluated by Dr. Robles, and has outpatient follow-up scheduled for intervention regarding her tricuspid valve.\par Her symptoms have also correlated with the presence of atrial fibrillation.  She is now status post cardioversion as well.  She was discharged home on 4/29.  Her cardiac medications at discharge were amiodarone 200, aspirin 81, Eliquis 5 twice daily, Toprol , simvastatin 20, torsemide 20 twice daily, and valsartan 320.\par \par I last saw her in 12/22.\par \par She was admitted 5/13/22-5/17 with [facility] in the setting of overdiuresis. Her creatinine was 1.4 at time of dc.\par She then had falls and dizziness/weakness in early June 2022 and was found to have a Hb of 4.6. She got PRBCs and responded appropriately. She had an EGD and Colon (though poor prep) without active bleeding.\par She was sent home on torsemide 10 mg.\par \par She feels well overall other than some fatigue. She initially  saw Dr. Robles on 6/27/22 with severe TR and was referred to EP and is now s/p lead extraction and leadless PPM implant on 8/2/22.\par More recently, she has been following up with him. She was deemed to be a poorly suitable candidate for the TRISCEND study (given RV anatomy) and is hopefully a candidate for the CLASP trial (a transcatheter edge to edge repair).\par A repeat echocardiogram on 12/15 showed severe TR, normal RV and LV function (with a dilated RV) and moderately elevated bioAVR gradients; this was confirmed on an echocardiogram in 3/23.\par She has mild edema of her legs here and there. Her breathing is at baseline.\par Her weight has been stable, within 3 pounds. Her SBP has generally been controlled. \par

## 2023-04-12 NOTE — DISCHARGE NOTE NURSING/CASE MANAGEMENT/SOCIAL WORK - NSTRANSFERBELONGINGSDISPO_GEN_A_NUR
with patient Topical Metronidazole Counseling: Metronidazole is a topical antibiotic medication. You may experience burning, stinging, redness, or allergic reactions.  Please call our office if you develop any problems from using this medication.

## 2023-04-20 ENCOUNTER — TRANSCRIPTION ENCOUNTER (OUTPATIENT)
Age: 78
End: 2023-04-20

## 2023-04-27 LAB
ALBUMIN SERPL ELPH-MCNC: 4.3 G/DL
ALP BLD-CCNC: 62 U/L
ALT SERPL-CCNC: 26 U/L
ANION GAP SERPL CALC-SCNC: 15 MMOL/L
AST SERPL-CCNC: 26 U/L
BASOPHILS # BLD AUTO: 0.03 K/UL
BASOPHILS NFR BLD AUTO: 0.5 %
BILIRUB SERPL-MCNC: 0.5 MG/DL
BUN SERPL-MCNC: 35 MG/DL
CALCIUM SERPL-MCNC: 10.4 MG/DL
CHLORIDE SERPL-SCNC: 104 MMOL/L
CK SERPL-CCNC: 36 U/L
CO2 SERPL-SCNC: 26 MMOL/L
CREAT SERPL-MCNC: 1.22 MG/DL
EGFR: 46 ML/MIN/1.73M2
EOSINOPHIL # BLD AUTO: 0.26 K/UL
EOSINOPHIL NFR BLD AUTO: 4.3 %
GGT SERPL-CCNC: 49 U/L
GLUCOSE SERPL-MCNC: 98 MG/DL
HCT VFR BLD CALC: 38.6 %
HGB BLD-MCNC: 12.6 G/DL
IMM GRANULOCYTES NFR BLD AUTO: 0.2 %
INR PPP: 1.06 RATIO
LYMPHOCYTES # BLD AUTO: 1.62 K/UL
LYMPHOCYTES NFR BLD AUTO: 27 %
MAN DIFF?: NORMAL
MCHC RBC-ENTMCNC: 28.6 PG
MCHC RBC-ENTMCNC: 32.6 GM/DL
MCV RBC AUTO: 87.7 FL
MONOCYTES # BLD AUTO: 0.76 K/UL
MONOCYTES NFR BLD AUTO: 12.7 %
NEUTROPHILS # BLD AUTO: 3.31 K/UL
NEUTROPHILS NFR BLD AUTO: 55.3 %
NT-PROBNP SERPL-MCNC: 1408 PG/ML
PLATELET # BLD AUTO: 250 K/UL
POTASSIUM SERPL-SCNC: 5.3 MMOL/L
PROT SERPL-MCNC: 7.3 G/DL
PT BLD: 12.3 SEC
RBC # BLD: 4.4 M/UL
RBC # FLD: 14.6 %
SODIUM SERPL-SCNC: 145 MMOL/L
URATE SERPL-MCNC: 9 MG/DL
WBC # FLD AUTO: 5.99 K/UL

## 2023-05-15 ENCOUNTER — NON-APPOINTMENT (OUTPATIENT)
Age: 78
End: 2023-05-15

## 2023-05-15 ENCOUNTER — APPOINTMENT (OUTPATIENT)
Dept: CARDIOLOGY | Facility: CLINIC | Age: 78
End: 2023-05-15
Payer: MEDICARE

## 2023-05-15 PROCEDURE — 93294 REM INTERROG EVL PM/LDLS PM: CPT

## 2023-05-15 PROCEDURE — 93296 REM INTERROG EVL PM/IDS: CPT

## 2023-06-15 ENCOUNTER — RX RENEWAL (OUTPATIENT)
Age: 78
End: 2023-06-15

## 2023-07-12 ENCOUNTER — NON-APPOINTMENT (OUTPATIENT)
Age: 78
End: 2023-07-12

## 2023-07-12 ENCOUNTER — APPOINTMENT (OUTPATIENT)
Dept: CARDIOLOGY | Facility: CLINIC | Age: 78
End: 2023-07-12
Payer: MEDICARE

## 2023-07-12 VITALS
SYSTOLIC BLOOD PRESSURE: 144 MMHG | WEIGHT: 152 LBS | HEART RATE: 59 BPM | OXYGEN SATURATION: 100 % | BODY MASS INDEX: 26.93 KG/M2 | DIASTOLIC BLOOD PRESSURE: 74 MMHG | HEIGHT: 63 IN

## 2023-07-12 PROCEDURE — 93000 ELECTROCARDIOGRAM COMPLETE: CPT

## 2023-07-12 PROCEDURE — 99214 OFFICE O/P EST MOD 30 MIN: CPT

## 2023-07-12 NOTE — DISCUSSION/SUMMARY
[EKG obtained to assist in diagnosis and management of assessed problem(s)] : EKG obtained to assist in diagnosis and management of assessed problem(s) [FreeTextEntry1] : In 4/22, Yolette was admitted with acute decompensated right heart failure, and was found to have severe tricuspid regurgitation (with a dilated annulus) and atrial fibrillation.  She is now status post cardioversion, and remains in a sinus rhythm. This was followed by lead extraction and implantation of a leadless pacemaker.\par \par Fortunately, she is doing well today, other than some mild dyspnea and intermittent edema.  Her blood pressure is a little high today. She will remain on her current dose of metoprolol, along with torsemide 10.\par \par She will stay on amiodarone (LFTs and thyroid function have been ok, as well as PFTS/eye exams). She is off eliquis because of her bleeding issues in the short term, and the idea of a watchman has been discussed if more PAF.\par \par She will continue to monitor her blood pressures at home. She needs to cut back on her high salt foods. If it remains elevated, we will need to add amlodipine or hydralazine. Her Hyperkalemia limits re-initiation of valsartan.\par She will continue to follow-up with Dr. Robles regarding minimally invasive options for tricuspid valve (CLASP study versus more open surgical approach).\par We will repeat an echocardiogram at the end of the year, also to look at her elevated bio AVR gradients.\par \par If no issues, I will see her again in 3 months.

## 2023-07-12 NOTE — HISTORY OF PRESENT ILLNESS
[FreeTextEntry1] : Yolette presents to the office today for a follow up cardiovascular evaluation.\par \par She is now 77 years old, with a history of aortic stenosis, an intermittent right bundle branch block pattern on her electrocardiogram, hypertension, a dyslipidemia, a thyroid nodule, secondary hypothyroidism, iron deficiency anemia, and renal cell carcinoma (status post resection).  She has had bioprosthetic aortic valve replacement. Her postop course was complicated by complete heart block, for which a pacemaker was eventually implanted.  Routine interrogation of her pacemaker revealed asymptomatic atrial fibrillation. She has been anticoagulated.\par \par She went to see Dr. Hayes for a physical in November, feeling well at that time.  Dr. Hayes suggested a number of scans, including a CT of the chest.  The CT was notable for GGO, for which a pulmonary evaluation is planned.\par \par She had not felt 100% since December 2021.  She presented to the emergency room on 4/18/2022, with shortness of breath, and acute diastolic heart failure.  She was diuresed successfully with intravenous Lasix.  A transesophageal echocardiogram demonstrated mild to moderate mitral vegetation, mild mitral stenosis, mildly elevated bio AVR gradients, RV enlargement and RV dysfunction, with severe tricuspid regurgitation.  Tricuspid valve appeared tethered with mall coaptation of the tricuspid valve leaflets due to a severely dilated annulus, without obvious impingement from the pacemaker lead.  She was evaluated by Dr. Robles, and has outpatient follow-up scheduled for intervention regarding her tricuspid valve.\par Her symptoms have also correlated with the presence of atrial fibrillation.  She is now status post cardioversion as well.  She was discharged home on 4/29.  Her cardiac medications at discharge were amiodarone 200, aspirin 81, Eliquis 5 twice daily, Toprol , simvastatin 20, torsemide 20 twice daily, and valsartan 320.\par \par I last saw her in 3/23.\par \par She was admitted 5/13/22-5/17 with [facility] in the setting of overdiuresis. Her creatinine was 1.4 at time of dc.\par She then had falls and dizziness/weakness in early June 2022 and was found to have a Hb of 4.6. She got PRBCs and responded appropriately. She had an EGD and Colon (though poor prep) without active bleeding.\par She was sent home on torsemide 10 mg.\par \par She feels well overall other than some fatigue. She initially saw Dr. Robles on 6/27/22 with severe TR and was referred to EP and is now s/p lead extraction and leadless PPM implant on 8/2/22.\par More recently, she has been following up with him. She was deemed to be a poorly suitable candidate for the TRISCEND study (given RV anatomy) and is hopefully a candidate for the CLASP trial (a transcatheter edge to edge repair).\par A repeat echocardiogram on 12/15 showed severe TR, normal RV and LV function (with a dilated RV) and moderately elevated bioAVR gradients; this was confirmed on an echocardiogram in 3/23.\par She has mild edema of her legs here and there. Her breathing is at baseline.\par Her weight has been stable, within 3 pounds. Her SBP has generally been controlled. \par

## 2023-08-01 NOTE — DISCHARGE NOTE NURSING/CASE MANAGEMENT/SOCIAL WORK - FLU SEASON?
No
I was present for and supervised the key and critical aspects of the procedures performed during the care of the patient.

## 2023-08-03 ENCOUNTER — TRANSCRIPTION ENCOUNTER (OUTPATIENT)
Age: 78
End: 2023-08-03

## 2023-08-14 ENCOUNTER — NON-APPOINTMENT (OUTPATIENT)
Age: 78
End: 2023-08-14

## 2023-08-14 ENCOUNTER — APPOINTMENT (OUTPATIENT)
Dept: CARDIOLOGY | Facility: CLINIC | Age: 78
End: 2023-08-14
Payer: MEDICARE

## 2023-08-14 PROCEDURE — 93296 REM INTERROG EVL PM/IDS: CPT

## 2023-08-14 PROCEDURE — 93294 REM INTERROG EVL PM/LDLS PM: CPT

## 2023-09-20 ENCOUNTER — TRANSCRIPTION ENCOUNTER (OUTPATIENT)
Age: 78
End: 2023-09-20

## 2023-09-21 ENCOUNTER — TRANSCRIPTION ENCOUNTER (OUTPATIENT)
Age: 78
End: 2023-09-21

## 2023-10-13 ENCOUNTER — TRANSCRIPTION ENCOUNTER (OUTPATIENT)
Age: 78
End: 2023-10-13

## 2023-10-16 ENCOUNTER — TRANSCRIPTION ENCOUNTER (OUTPATIENT)
Age: 78
End: 2023-10-16

## 2023-10-17 ENCOUNTER — TRANSCRIPTION ENCOUNTER (OUTPATIENT)
Age: 78
End: 2023-10-17

## 2023-10-23 ENCOUNTER — APPOINTMENT (OUTPATIENT)
Dept: CARDIOLOGY | Facility: CLINIC | Age: 78
End: 2023-10-23
Payer: MEDICARE

## 2023-10-23 VITALS
BODY MASS INDEX: 28.53 KG/M2 | SYSTOLIC BLOOD PRESSURE: 175 MMHG | OXYGEN SATURATION: 99 % | HEIGHT: 63 IN | WEIGHT: 161 LBS | HEART RATE: 58 BPM | DIASTOLIC BLOOD PRESSURE: 80 MMHG

## 2023-10-23 VITALS — DIASTOLIC BLOOD PRESSURE: 78 MMHG | SYSTOLIC BLOOD PRESSURE: 160 MMHG

## 2023-10-23 PROCEDURE — 99214 OFFICE O/P EST MOD 30 MIN: CPT

## 2023-10-23 PROCEDURE — 93000 ELECTROCARDIOGRAM COMPLETE: CPT

## 2023-10-26 ENCOUNTER — APPOINTMENT (OUTPATIENT)
Dept: CARDIOTHORACIC SURGERY | Facility: CLINIC | Age: 78
End: 2023-10-26

## 2023-11-03 ENCOUNTER — OFFICE (OUTPATIENT)
Dept: URBAN - METROPOLITAN AREA CLINIC 109 | Facility: CLINIC | Age: 78
Setting detail: OPHTHALMOLOGY
End: 2023-11-03
Payer: MEDICARE

## 2023-11-03 DIAGNOSIS — H25.13: ICD-10-CM

## 2023-11-03 DIAGNOSIS — E11.9: ICD-10-CM

## 2023-11-03 DIAGNOSIS — H43.393: ICD-10-CM

## 2023-11-03 DIAGNOSIS — H40.013: ICD-10-CM

## 2023-11-03 PROCEDURE — 92083 EXTENDED VISUAL FIELD XM: CPT | Performed by: OPHTHALMOLOGY

## 2023-11-03 PROCEDURE — 92014 COMPRE OPH EXAM EST PT 1/>: CPT | Performed by: OPHTHALMOLOGY

## 2023-11-03 PROCEDURE — 92133 CPTRZD OPH DX IMG PST SGM ON: CPT | Performed by: OPHTHALMOLOGY

## 2023-11-03 ASSESSMENT — CONFRONTATIONAL VISUAL FIELD TEST (CVF)
OD_FINDINGS: FULL
OS_FINDINGS: FULL

## 2023-11-03 ASSESSMENT — SPHEQUIV_DERIVED
OD_SPHEQUIV: -2.375
OS_SPHEQUIV: -2.25

## 2023-11-03 ASSESSMENT — REFRACTION_CURRENTRX
OS_ADD: +2.25
OS_VPRISM_DIRECTION: PROGS
OD_VPRISM_DIRECTION: PROGS
OD_SPHERE: -1.00
OD_OVR_VA: 20/
OD_CYLINDER: -1.75
OS_CYLINDER: -0.50
OD_AXIS: 094
OD_ADD: +2.25
OS_AXIS: 080
OS_OVR_VA: 20/
OS_SPHERE: -2.25

## 2023-11-03 ASSESSMENT — REFRACTION_AUTOREFRACTION
OD_SPHERE: -1.25
OD_AXIS: 104
OS_AXIS: 065
OS_CYLINDER: -0.50
OS_SPHERE: -2.00
OD_CYLINDER: -2.25

## 2023-11-06 LAB
ALBUMIN SERPL ELPH-MCNC: 4.2 G/DL
ALP BLD-CCNC: 82 U/L
ALT SERPL-CCNC: 36 U/L
ANION GAP SERPL CALC-SCNC: 11 MMOL/L
AST SERPL-CCNC: 41 U/L
BILIRUB SERPL-MCNC: 0.5 MG/DL
BUN SERPL-MCNC: 33 MG/DL
CALCIUM SERPL-MCNC: 10.5 MG/DL
CHLORIDE SERPL-SCNC: 106 MMOL/L
CO2 SERPL-SCNC: 26 MMOL/L
CREAT SERPL-MCNC: 1.24 MG/DL
EGFR: 45 ML/MIN/1.73M2
GLUCOSE SERPL-MCNC: 122 MG/DL
NT-PROBNP SERPL-MCNC: 1035 PG/ML
POTASSIUM SERPL-SCNC: 5.8 MMOL/L
PROT SERPL-MCNC: 7.4 G/DL
SODIUM SERPL-SCNC: 144 MMOL/L

## 2023-11-13 ENCOUNTER — NON-APPOINTMENT (OUTPATIENT)
Age: 78
End: 2023-11-13

## 2023-11-13 ENCOUNTER — APPOINTMENT (OUTPATIENT)
Dept: CARDIOLOGY | Facility: CLINIC | Age: 78
End: 2023-11-13
Payer: MEDICARE

## 2023-11-13 PROCEDURE — 93296 REM INTERROG EVL PM/IDS: CPT

## 2023-11-13 PROCEDURE — 93294 REM INTERROG EVL PM/LDLS PM: CPT

## 2023-11-15 ENCOUNTER — RESULT CHARGE (OUTPATIENT)
Age: 78
End: 2023-11-15

## 2023-11-15 ENCOUNTER — NON-APPOINTMENT (OUTPATIENT)
Age: 78
End: 2023-11-15

## 2023-11-22 ENCOUNTER — APPOINTMENT (OUTPATIENT)
Dept: MAMMOGRAPHY | Facility: CLINIC | Age: 78
End: 2023-11-22
Payer: MEDICARE

## 2023-11-22 ENCOUNTER — APPOINTMENT (OUTPATIENT)
Dept: ULTRASOUND IMAGING | Facility: CLINIC | Age: 78
End: 2023-11-22
Payer: MEDICARE

## 2023-11-22 PROCEDURE — 77067 SCR MAMMO BI INCL CAD: CPT

## 2023-11-22 PROCEDURE — 77063 BREAST TOMOSYNTHESIS BI: CPT

## 2023-11-22 PROCEDURE — 76641 ULTRASOUND BREAST COMPLETE: CPT | Mod: 50,GY

## 2023-12-01 NOTE — PHYSICAL THERAPY INITIAL EVALUATION ADULT - HEALTH SCREEN CRITERIA
pt with reports of RUQ pain x few days. pt with no vomiting, some intermittent lower back pain. afebrile, no other complaints. yes

## 2023-12-14 ENCOUNTER — APPOINTMENT (OUTPATIENT)
Dept: CARDIOLOGY | Facility: CLINIC | Age: 78
End: 2023-12-14
Payer: MEDICARE

## 2023-12-14 PROCEDURE — 93279 PRGRMG DEV EVAL PM/LDLS PM: CPT

## 2024-01-18 ENCOUNTER — RX RENEWAL (OUTPATIENT)
Age: 79
End: 2024-01-18

## 2024-01-31 ENCOUNTER — NON-APPOINTMENT (OUTPATIENT)
Age: 79
End: 2024-01-31

## 2024-01-31 ENCOUNTER — APPOINTMENT (OUTPATIENT)
Dept: CARDIOLOGY | Facility: CLINIC | Age: 79
End: 2024-01-31
Payer: MEDICARE

## 2024-01-31 VITALS
SYSTOLIC BLOOD PRESSURE: 156 MMHG | HEIGHT: 63 IN | WEIGHT: 160 LBS | OXYGEN SATURATION: 100 % | DIASTOLIC BLOOD PRESSURE: 79 MMHG | HEART RATE: 63 BPM | BODY MASS INDEX: 28.35 KG/M2

## 2024-01-31 VITALS — SYSTOLIC BLOOD PRESSURE: 142 MMHG | DIASTOLIC BLOOD PRESSURE: 72 MMHG

## 2024-01-31 DIAGNOSIS — I48.0 PAROXYSMAL ATRIAL FIBRILLATION: ICD-10-CM

## 2024-01-31 PROCEDURE — 99214 OFFICE O/P EST MOD 30 MIN: CPT

## 2024-01-31 PROCEDURE — 93000 ELECTROCARDIOGRAM COMPLETE: CPT

## 2024-01-31 RX ORDER — VALSARTAN 160 MG/1
160 TABLET, COATED ORAL
Qty: 90 | Refills: 0 | Status: DISCONTINUED | COMMUNITY
Start: 2023-10-23 | End: 2024-01-31

## 2024-01-31 NOTE — DISCUSSION/SUMMARY
[FreeTextEntry1] : In 4/22, Yolette was admitted with acute decompensated right heart failure, and was found to have severe tricuspid regurgitation (with a dilated annulus) and atrial fibrillation.  She is now status post cardioversion, and has remained in a sinus rhythm. This was followed by lead extraction and implantation of a leadless pacemaker.  Fortunately, she is doing well today, other than some mild dyspnea on extreme exertion.  Her blood pressure is high today. Her K was high on valsartan, and she will remain off. She will remain on her current dose of metoprolol, along with torsemide 10. I am adding hydralazine 25 bid.   She will stay on amiodarone (LFTs and thyroid function have been ok, as well as PFTS/eye exams). She is off eliquis because of her bleeding issues in the short term, and the idea of a watchman has been discussed if more PAF.  She will continue to follow-up with Dr. Robles regarding minimally invasive options for tricuspid valve (CLASP study versus more open surgical approach).  We will repeat an echocardiogram, to reevaluate her elevated bio AVR gradients.  If no issues, I will see her again in 3 months.  She will let me know how the blood pressure settles. [EKG obtained to assist in diagnosis and management of assessed problem(s)] : EKG obtained to assist in diagnosis and management of assessed problem(s)

## 2024-01-31 NOTE — HISTORY OF PRESENT ILLNESS
[FreeTextEntry1] : Yolette presents to the office today for a follow up cardiovascular evaluation.  She is now 78 years old, with a history of aortic stenosis, an intermittent right bundle branch block pattern on her electrocardiogram, hypertension, a dyslipidemia, a thyroid nodule, secondary hypothyroidism, iron deficiency anemia, and renal cell carcinoma (status post resection).  She has had bioprosthetic aortic valve replacement. Her postop course was complicated by complete heart block, for which a pacemaker was eventually implanted.  Routine interrogation of her pacemaker revealed asymptomatic atrial fibrillation. She has been anticoagulated.  She went to see Dr. Hayes for a physical in November, feeling well at that time.  Dr. Hayes suggested a number of scans, including a CT of the chest.  The CT was notable for GGO, for which a pulmonary evaluation is planned.  She had not felt 100% since December 2021.  She presented to the emergency room on 4/18/2022, with shortness of breath, and acute diastolic heart failure.  She was diuresed successfully with intravenous Lasix.  A transesophageal echocardiogram demonstrated mild to moderate mitral vegetation, mild mitral stenosis, mildly elevated bio AVR gradients, RV enlargement and RV dysfunction, with severe tricuspid regurgitation.  Tricuspid valve appeared tethered with mall coaptation of the tricuspid valve leaflets due to a severely dilated annulus, without obvious impingement from the pacemaker lead.  She was evaluated by Dr. Robles, and has outpatient follow-up scheduled for intervention regarding her tricuspid valve. Her symptoms have also correlated with the presence of atrial fibrillation.  She is now status post cardioversion as well.  She was discharged home on 4/29.  Her cardiac medications at discharge were amiodarone 200, aspirin 81, Eliquis 5 twice daily, Toprol , simvastatin 20, torsemide 20 twice daily, and valsartan 320.  I last saw her in 10/23.  She was admitted 5/13/22-5/17/22 with [facility] in the setting of overdiuresis. Her creatinine was 1.4 at time of dc. She then had falls and dizziness/weakness in early June 2022 and was found to have a Hb of 4.6. She got PRBCs and responded appropriately. She had an EGD and Colon (though poor prep) without active bleeding.  She initially saw Dr. Robles on 6/27/22 with severe TR and was referred to EP and is now s/p lead extraction and leadless PPM implant on 8/2/22. More recently, she has been following up with him. She was deemed to be a poorly suitable candidate for the TRISCEND study (given RV anatomy) and is hopefully a candidate for the CLASP trial (a transcatheter edge to edge repair). A repeat echocardiogram on 12/15 showed severe TR, normal RV and LV function (with a dilated RV) and moderately elevated bioAVR gradients; this was confirmed on an echocardiogram in 3/23.  Overall, she is doing well. She has no significant dyspnea, though mild dyspnea on exertion. Her SBP has been higher than usual, which may be stress related. Her edema has resolved.

## 2024-02-10 ENCOUNTER — RX RENEWAL (OUTPATIENT)
Age: 79
End: 2024-02-10

## 2024-02-10 RX ORDER — AMIODARONE HYDROCHLORIDE 200 MG/1
200 TABLET ORAL DAILY
Qty: 90 | Refills: 2 | Status: ACTIVE | COMMUNITY
Start: 2022-05-03 | End: 1900-01-01

## 2024-02-23 ENCOUNTER — APPOINTMENT (OUTPATIENT)
Dept: CARDIOLOGY | Facility: CLINIC | Age: 79
End: 2024-02-23
Payer: MEDICARE

## 2024-02-23 PROCEDURE — 93306 TTE W/DOPPLER COMPLETE: CPT

## 2024-03-18 ENCOUNTER — APPOINTMENT (OUTPATIENT)
Dept: CARDIOLOGY | Facility: CLINIC | Age: 79
End: 2024-03-18
Payer: MEDICARE

## 2024-03-18 ENCOUNTER — NON-APPOINTMENT (OUTPATIENT)
Age: 79
End: 2024-03-18

## 2024-03-18 PROCEDURE — 93296 REM INTERROG EVL PM/IDS: CPT

## 2024-03-18 PROCEDURE — 93294 REM INTERROG EVL PM/LDLS PM: CPT

## 2024-03-22 ENCOUNTER — APPOINTMENT (OUTPATIENT)
Dept: CARDIOLOGY | Facility: CLINIC | Age: 79
End: 2024-03-22
Payer: MEDICARE

## 2024-03-22 ENCOUNTER — NON-APPOINTMENT (OUTPATIENT)
Age: 79
End: 2024-03-22

## 2024-03-22 VITALS
HEART RATE: 67 BPM | OXYGEN SATURATION: 100 % | DIASTOLIC BLOOD PRESSURE: 84 MMHG | SYSTOLIC BLOOD PRESSURE: 154 MMHG | HEIGHT: 63 IN

## 2024-03-22 VITALS — SYSTOLIC BLOOD PRESSURE: 150 MMHG | DIASTOLIC BLOOD PRESSURE: 76 MMHG

## 2024-03-22 PROCEDURE — 93000 ELECTROCARDIOGRAM COMPLETE: CPT

## 2024-03-22 PROCEDURE — 99214 OFFICE O/P EST MOD 30 MIN: CPT

## 2024-03-22 NOTE — HISTORY OF PRESENT ILLNESS
[FreeTextEntry1] : Yolette presents to the office today for a follow up cardiovascular evaluation.  She is now 78 years old, with a history of aortic stenosis, an intermittent right bundle branch block pattern on her electrocardiogram, hypertension, a dyslipidemia, a thyroid nodule, secondary hypothyroidism, iron deficiency anemia, and renal cell carcinoma (status post resection).  She has had bioprosthetic aortic valve replacement. Her postop course was complicated by complete heart block, for which a pacemaker was eventually implanted.  Routine interrogation of her pacemaker revealed asymptomatic atrial fibrillation. She has been anticoagulated.  She went to see Dr. Hayes for a physical in November, feeling well at that time.  Dr. Hayes suggested a number of scans, including a CT of the chest.  The CT was notable for GGO, for which a pulmonary evaluation is planned.  She had not felt 100% since December 2021.  She presented to the emergency room on 4/18/2022, with shortness of breath, and acute diastolic heart failure.  She was diuresed successfully with intravenous Lasix.  A transesophageal echocardiogram demonstrated mild to moderate mitral vegetation, mild mitral stenosis, mildly elevated bio AVR gradients, RV enlargement and RV dysfunction, with severe tricuspid regurgitation.  Tricuspid valve appeared tethered with mall coaptation of the tricuspid valve leaflets due to a severely dilated annulus, without obvious impingement from the pacemaker lead.  She was evaluated by Dr. Robles, and has outpatient follow-up scheduled for intervention regarding her tricuspid valve. Her symptoms have also correlated with the presence of atrial fibrillation.  She is now status post cardioversion as well.  She was discharged home on 4/29.  Her cardiac medications at discharge were amiodarone 200, aspirin 81, Eliquis 5 twice daily, Toprol , simvastatin 20, torsemide 20 twice daily, and valsartan 320.  I last saw her in 1/23.  She was admitted 5/13/22-5/17/22 with [facility] in the setting of overdiuresis. Her creatinine was 1.4 at time of dc. She then had falls and dizziness/weakness in early June 2022 and was found to have a Hb of 4.6. She got PRBCs and responded appropriately. She had an EGD and Colon (though poor prep) without active bleeding.  She initially saw Dr. Robles on 6/27/22 with severe TR and was referred to EP and is now s/p lead extraction and leadless PPM implant on 8/2/22. More recently, she has been following up with him. She was deemed to be a poorly suitable candidate for the TRISCEND study (given RV anatomy) and is hopefully a candidate for the CLASP trial (a transcatheter edge to edge repair). A repeat echocardiogram on 12/15 showed severe TR, normal RV and LV function (with a dilated RV) and moderately elevated bioAVR gradients; this was confirmed on an echocardiogram in 3/23.  Overall, she is doing ok. She feels that her breathing has slowed down, and she has been short of breath shopping. Her SBP has been higher than usual, which may be stress related. Her edema has resolved.  Repeat echo with severe TR, moderate MS, and an increase in bioprosthetic aortic valve gradients.

## 2024-03-22 NOTE — DISCUSSION/SUMMARY
[FreeTextEntry1] : In 4/22, Yolette was admitted with acute decompensated right heart failure, and was found to have severe tricuspid regurgitation (with a dilated annulus) and atrial fibrillation.  She is now status post cardioversion, and has remained in a sinus rhythm. This was followed by lead extraction and implantation of a leadless pacemaker.  She reports being more short of breath as of late, and her echocardiograms confirm severe TR and an elevation in bioprosthetic AV gradient.  Her blood pressure is again high today. Her K was high on valsartan, and she will remain off. She will remain on her current dose of metoprolol, along with torsemide 10. I am increasing her hydralazine to 50 bid.  She will stay on amiodarone (LFTs and thyroid function have been ok, as well as PFTS/eye exams). She is off eliquis because of her bleeding issues in the short term, and the idea of a watchman has been discussed if more PAF.  She will continue to follow-up with Dr. Robles regarding minimally invasive options for tricuspid valve (CLASP study versus more open surgical approach). She has an appt with him on Monday.  If no issues, I will see her again in 3 months.  She will let me know how the blood pressure settles. [EKG obtained to assist in diagnosis and management of assessed problem(s)] : EKG obtained to assist in diagnosis and management of assessed problem(s)

## 2024-03-22 NOTE — PHYSICAL EXAM
[Well Developed] : well developed [Well Nourished] : well nourished [No Acute Distress] : no acute distress [Normal Conjunctiva] : normal conjunctiva [Normal Venous Pressure] : normal venous pressure [No Carotid Bruit] : no carotid bruit [Normal S1, S2] : normal S1, S2 [No Rub] : no rub [No Gallop] : no gallop [Clear Lung Fields] : clear lung fields [Good Air Entry] : good air entry [No Respiratory Distress] : no respiratory distress  [Soft] : abdomen soft [Non Tender] : non-tender [No Masses/organomegaly] : no masses/organomegaly [Normal Bowel Sounds] : normal bowel sounds [Normal Gait] : normal gait [No Edema] : no edema [No Clubbing] : no clubbing [No Cyanosis] : no cyanosis [No Varicosities] : no varicosities [No Rash] : no rash [No Skin Lesions] : no skin lesions [Moves all extremities] : moves all extremities [No Focal Deficits] : no focal deficits [Normal Speech] : normal speech [Alert and Oriented] : alert and oriented [Normal memory] : normal memory [de-identified] : +sm at ru/aib

## 2024-03-25 ENCOUNTER — APPOINTMENT (OUTPATIENT)
Dept: CARDIOTHORACIC SURGERY | Facility: CLINIC | Age: 79
End: 2024-03-25
Payer: MEDICARE

## 2024-03-25 VITALS
SYSTOLIC BLOOD PRESSURE: 117 MMHG | HEART RATE: 58 BPM | BODY MASS INDEX: 28.77 KG/M2 | HEIGHT: 63 IN | RESPIRATION RATE: 16 BRPM | DIASTOLIC BLOOD PRESSURE: 75 MMHG | OXYGEN SATURATION: 100 % | WEIGHT: 162.4 LBS

## 2024-03-25 DIAGNOSIS — I07.1 RHEUMATIC TRICUSPID INSUFFICIENCY: ICD-10-CM

## 2024-03-25 DIAGNOSIS — I35.0 NONRHEUMATIC AORTIC (VALVE) STENOSIS: ICD-10-CM

## 2024-03-25 DIAGNOSIS — Z95.2 PRESENCE OF PROSTHETIC HEART VALVE: ICD-10-CM

## 2024-03-25 DIAGNOSIS — I50.810 RIGHT HEART FAILURE, UNSPECIFIED: ICD-10-CM

## 2024-03-25 DIAGNOSIS — R06.00 DYSPNEA, UNSPECIFIED: ICD-10-CM

## 2024-03-25 PROCEDURE — 99214 OFFICE O/P EST MOD 30 MIN: CPT

## 2024-03-25 RX ORDER — AMOXICILLIN 500 MG/1
500 TABLET, FILM COATED ORAL
Qty: 12 | Refills: 0 | Status: DISCONTINUED | COMMUNITY
Start: 2023-02-03 | End: 2024-03-25

## 2024-03-25 RX ORDER — METOPROLOL SUCCINATE 25 MG/1
25 TABLET, EXTENDED RELEASE ORAL
Refills: 0 | Status: ACTIVE | COMMUNITY
Start: 2024-03-25

## 2024-03-26 PROBLEM — I07.1 TRICUSPID REGURGITATION: Status: ACTIVE | Noted: 2022-08-25

## 2024-03-26 PROBLEM — R06.00 DYSPNEA: Status: ACTIVE | Noted: 2022-05-11

## 2024-03-26 PROBLEM — I50.810 CHF WITH RIGHT HEART FAILURE: Status: ACTIVE | Noted: 2022-05-11

## 2024-03-26 NOTE — HISTORY OF PRESENT ILLNESS
[FreeTextEntry1] : Ms. Weston returns to clinic today for follow up evaluation of tricuspid regurgitation. She was previously evaluated for the TRISCEND II Pivotal Trial (TTVR with the EVOQUE valve from [place]), but she was deemed not anatomically suitable for the device (her RA/RV size were too small by CT). Dr Fischer and I agreed that it would be reasonable to screen her for T-SCAR (CLASP II TR trial), however she screen-failed as per the Echo Core Lab (ECL) when follow up imaging showed her TR to be in the moderate range (as per the ECL assessment) after optimization of GDMT. She has continued to follow up with our team and Dr. Jones closely since that time and returns today for repeat assessment of her TR and discussion of treatment options. She had a prior SAVR (21mm MAGNA) with Dr Kauffman in 2016. She also has an AV-MICRA.  Since her last visit she reports that her symptoms have been getting significantly worse. She has increasing fatigue, and any activity causes her to become significantly SOB. Her symptoms resolve after resting a few minutes. She is able to walk about half block before she becomes SOB, even if she walks slowly. She has on flight of stairs in her house and has to stop every couple of steps to rest. She denies any CP but will get pedal edema by the end of the night.  Repeat echocardiogram today was reviewed with Dr Kiki Fischer and demonstrates severe TR on the TTE from 2/23/2024 with tethering of the leaflets. Dr Fischer went back and reviewed her TIM from 9/29/2022 and feels that TTVR may offer a better treatment option than T-SCAR, based on her TV anatomy. Dr Fischer notes that the septal leaflet is curled and on the shorter end, leading to her impression that she will have a better result with TTVR. As such, given that we are expecting to have access to the EVOQUE tricuspid valve replacement system in the next few months, we will re-screen her (which will require a repeat CT and TIM) at that time. While she previously screen-failed for TTVR based on RA and RV sizing, that CT was done in September 2022, and her anatomy may have changed (i.e. her RA and RV dimensions may be larger). Also of note, she has a prior AVR, and her AV gradients are peak/mean 49/20mmHg with a DVI= 0.37, which are in the moderate range for degeneration. The gradients and DVI are the same as they were on 3/13/2023.  She notes feeling significantly more fatigued than prior and has experienced a progression of GONZALES with functional decline. She notes when shopping at [employer] she has to stop multiple times to rest "and catch my breath" which "was not the case before"; she feels dyspneic with activities at home as well. It has begun to impact her lifestyle, and she opted not to take a trip to [place] ([facility]) with her [employer] due to her symptoms. She notes "I want to be as well as I can possibly be" and is hoping we will be able to offer some intervention for her TR. An extra tablet of Metoprolol was added several months ago. Hydralazine was added a month ago for HTN management. She takes 10mg of Torsemide once daily; she "very rarely" takes an extra tablet if she has edema (once every couple of months).

## 2024-03-26 NOTE — CARDIOLOGY SUMMARY
[de-identified] : None today; prior NSR with demand V-pacing (MICRA) [de-identified] : 12/15/22  LVEF 65-70%, mild MR, Bioprosthetic AVR, valve well seated, mGr 24 mmHg, PGr 44 mmHg, ROJELIO 1.2 sqcm, moderate AS, no AI. RV severely dilated with normal function, severe Grade 3 TR  Micra PPM in the RV.

## 2024-03-26 NOTE — REVIEW OF SYSTEMS
[Feeling Fatigued] : feeling fatigued [SOB] : shortness of breath [Dyspnea on exertion] : dyspnea during exertion [Lower Ext Edema] : lower extremity edema [Negative] : Heme/Lymph [Fever] : no fever [Chills] : no chills [Chest Discomfort] : no chest discomfort [Palpitations] : no palpitations [Syncope] : no syncope

## 2024-03-26 NOTE — PHYSICAL EXAM
[Normal S1, S2] : normal S1, S2 [Murmur] : murmur [Clear Lung Fields] : clear lung fields [Good Air Entry] : good air entry [No Respiratory Distress] : no respiratory distress  [No Edema] : no edema [Normal] : alert and oriented, normal memory [de-identified] : III/VI Crescendo-Decrescendo murmur

## 2024-04-05 ENCOUNTER — RX RENEWAL (OUTPATIENT)
Age: 79
End: 2024-04-05

## 2024-04-17 ENCOUNTER — RX RENEWAL (OUTPATIENT)
Age: 79
End: 2024-04-17

## 2024-04-22 ENCOUNTER — RX RENEWAL (OUTPATIENT)
Age: 79
End: 2024-04-22

## 2024-05-11 ENCOUNTER — RX RENEWAL (OUTPATIENT)
Age: 79
End: 2024-05-11

## 2024-05-11 RX ORDER — TORSEMIDE 10 MG/1
10 TABLET ORAL
Qty: 180 | Refills: 1 | Status: ACTIVE | COMMUNITY
Start: 2022-05-03 | End: 1900-01-01

## 2024-06-02 ENCOUNTER — RX RENEWAL (OUTPATIENT)
Age: 79
End: 2024-06-02

## 2024-06-02 RX ORDER — HYDRALAZINE HYDROCHLORIDE 50 MG/1
50 TABLET ORAL TWICE DAILY
Qty: 90 | Refills: 2 | Status: ACTIVE | COMMUNITY
Start: 2024-01-31 | End: 1900-01-01

## 2024-06-06 ENCOUNTER — APPOINTMENT (OUTPATIENT)
Dept: CARDIOTHORACIC SURGERY | Facility: CLINIC | Age: 79
End: 2024-06-06
Payer: MEDICARE

## 2024-06-06 VITALS
HEIGHT: 63 IN | WEIGHT: 162 LBS | DIASTOLIC BLOOD PRESSURE: 78 MMHG | BODY MASS INDEX: 28.7 KG/M2 | OXYGEN SATURATION: 98 % | RESPIRATION RATE: 16 BRPM | HEART RATE: 60 BPM | SYSTOLIC BLOOD PRESSURE: 133 MMHG

## 2024-06-06 PROCEDURE — 99215 OFFICE O/P EST HI 40 MIN: CPT

## 2024-06-06 RX ORDER — HYDRALAZINE HYDROCHLORIDE 25 MG/1
25 TABLET ORAL TWICE DAILY
Qty: 60 | Refills: 2 | Status: DISCONTINUED | COMMUNITY
Start: 2021-07-14 | End: 2024-06-06

## 2024-06-06 RX ORDER — POTASSIUM CHLORIDE 1500 MG/1
20 TABLET, EXTENDED RELEASE ORAL DAILY
Refills: 0 | Status: ACTIVE | COMMUNITY
Start: 2024-04-05

## 2024-06-06 RX ORDER — POTASSIUM CHLORIDE 1500 MG/1
20 TABLET, EXTENDED RELEASE ORAL DAILY
Qty: 180 | Refills: 2 | Status: DISCONTINUED | COMMUNITY
Start: 2022-05-03 | End: 2024-06-06

## 2024-06-06 NOTE — PHYSICAL EXAM
[Normal S1, S2] : normal S1, S2 [Murmur] : murmur [Clear Lung Fields] : clear lung fields [Good Air Entry] : good air entry [Edema ___] : edema [unfilled] [Normal] : alert and oriented, normal memory [No Acute Distress] : no acute distress [de-identified] : II/VI ROSETTA

## 2024-06-06 NOTE — REVIEW OF SYSTEMS
[Feeling Fatigued] : feeling fatigued [SOB] : shortness of breath [Dyspnea on exertion] : dyspnea during exertion [Lower Ext Edema] : lower extremity edema [Negative] : Heme/Lymph [Orthopnea] : no orthopnea [Wheezing] : no wheezing [Abdominal Pain] : no abdominal pain [Nausea] : no nausea [Vomiting] : no vomiting [Dysphagia] : no dysphagia [Diarrhea] : diarrhea [Constipation] : no constipation

## 2024-06-06 NOTE — DISCUSSION/SUMMARY
[Patient] : the patient [FreeTextEntry4] : Severe Tricuspid Regurgitation [FreeTextEntry1] : Ms. Weston continues to have worsening symptoms related to her Tricuspid regurgitation. There was recent FDA approval for Transcatheter Tricuspid interventions, which I discussed with the patient. She was previously enrolled in the Triscend Clinical trial but was anatomically excluded for TTVR. We will evaluate her for Tricuspid SCAR. I explained to her the differences between the two procedures, the recovery and any other questions she may have had. To start I will arrange for her to have a TIM with Dr. Kiki Fischer and to go for routine labs this afternoon (CBC, CMP and Pro-BNP). Once TIM is complete, I will review with Patricia Fischer and Margaret to come up with the best plan, then we will discuss with Ms. Weston. She was happy with this plan. She is following up with Dr. Jones the end of this month, so I will also let him know the plan. Her only other complaint is that she often feels thirsty. I explained that she may need to slowly increase her fluid intake, but she needs to be careful due to her TR. She currently appears Euvolemic. She is on Torsemide 20 mg daily which has kept her in a good state, so I will not change anything at this visit.

## 2024-06-06 NOTE — REASON FOR VISIT
[Structural Heart and Valve Disease] : structural heart and valve disease [FreeTextEntry3] : Dr. Jones

## 2024-06-06 NOTE — HISTORY OF PRESENT ILLNESS
[FreeTextEntry1] : Ms. Weston returns to our [facility] today for a follow up of her Tricuspid Regurgitation. She has been previously evaluated by our team, and was found to have Severe TR, however there was no commercially available THV Devices to treat her TR. When we last saw her 3 months ago, she had noted a worsening GONZALES and fatigue, but she looked medically optimized from a CHF and BP standpoint.  Today she comes in stating that her symptoms have progressed since we saw her in March. She is more fatigued, and she has more shortness of breath, especially with stairs. When walking she has to take more frequent stops to rest, so she has begun walking slower to try and avoid her symptoms. She will also get pedal edema. She denies any chest pain or dizziness. She also has had no nocturnal dyspnea.

## 2024-06-07 ENCOUNTER — RX RENEWAL (OUTPATIENT)
Age: 79
End: 2024-06-07

## 2024-06-07 RX ORDER — PANTOPRAZOLE 40 MG/1
40 TABLET, DELAYED RELEASE ORAL DAILY
Qty: 90 | Refills: 3 | Status: ACTIVE | COMMUNITY
Start: 2023-06-15 | End: 1900-01-01

## 2024-06-11 LAB
ALBUMIN SERPL ELPH-MCNC: 4.3 G/DL
ALP BLD-CCNC: 69 U/L
ALT SERPL-CCNC: 53 U/L
ANION GAP SERPL CALC-SCNC: 15 MMOL/L
AST SERPL-CCNC: 53 U/L
BILIRUB SERPL-MCNC: 0.5 MG/DL
BUN SERPL-MCNC: 31 MG/DL
CALCIUM SERPL-MCNC: 10.1 MG/DL
CHLORIDE SERPL-SCNC: 105 MMOL/L
CO2 SERPL-SCNC: 26 MMOL/L
CREAT SERPL-MCNC: 1.22 MG/DL
EGFR: 46 ML/MIN/1.73M2
GLUCOSE SERPL-MCNC: 118 MG/DL
HCT VFR BLD CALC: 37.9 %
HGB BLD-MCNC: 12.5 G/DL
MCHC RBC-ENTMCNC: 28 PG
MCHC RBC-ENTMCNC: 33 GM/DL
MCV RBC AUTO: 85 FL
NT-PROBNP SERPL-MCNC: 1056 PG/ML
PLATELET # BLD AUTO: 223 K/UL
POTASSIUM SERPL-SCNC: 5.4 MMOL/L
PROT SERPL-MCNC: 7.3 G/DL
RBC # BLD: 4.46 M/UL
RBC # FLD: 16 %
SODIUM SERPL-SCNC: 147 MMOL/L
WBC # FLD AUTO: 6.05 K/UL

## 2024-06-21 ENCOUNTER — APPOINTMENT (OUTPATIENT)
Dept: CARDIOLOGY | Facility: CLINIC | Age: 79
End: 2024-06-21
Payer: MEDICARE

## 2024-06-21 ENCOUNTER — NON-APPOINTMENT (OUTPATIENT)
Age: 79
End: 2024-06-21

## 2024-06-21 VITALS — DIASTOLIC BLOOD PRESSURE: 73 MMHG | OXYGEN SATURATION: 98 % | SYSTOLIC BLOOD PRESSURE: 119 MMHG | HEART RATE: 65 BPM

## 2024-06-21 VITALS — DIASTOLIC BLOOD PRESSURE: 68 MMHG | SYSTOLIC BLOOD PRESSURE: 130 MMHG

## 2024-06-21 DIAGNOSIS — R94.31 ABNORMAL ELECTROCARDIOGRAM [ECG] [EKG]: ICD-10-CM

## 2024-06-21 DIAGNOSIS — I38 ENDOCARDITIS, VALVE UNSPECIFIED: ICD-10-CM

## 2024-06-21 DIAGNOSIS — I10 ESSENTIAL (PRIMARY) HYPERTENSION: ICD-10-CM

## 2024-06-21 PROCEDURE — 99214 OFFICE O/P EST MOD 30 MIN: CPT

## 2024-06-21 PROCEDURE — 93000 ELECTROCARDIOGRAM COMPLETE: CPT

## 2024-06-21 NOTE — PHYSICAL EXAM
[Well Developed] : well developed [Well Nourished] : well nourished [No Acute Distress] : no acute distress [Normal Conjunctiva] : normal conjunctiva [Normal Venous Pressure] : normal venous pressure [No Carotid Bruit] : no carotid bruit [Normal S1, S2] : normal S1, S2 [No Rub] : no rub [No Gallop] : no gallop [Clear Lung Fields] : clear lung fields [Good Air Entry] : good air entry [No Respiratory Distress] : no respiratory distress  [Soft] : abdomen soft [Non Tender] : non-tender [No Masses/organomegaly] : no masses/organomegaly [Normal Bowel Sounds] : normal bowel sounds [Normal Gait] : normal gait [No Edema] : no edema [No Cyanosis] : no cyanosis [No Clubbing] : no clubbing [No Varicosities] : no varicosities [No Rash] : no rash [No Skin Lesions] : no skin lesions [Moves all extremities] : moves all extremities [No Focal Deficits] : no focal deficits [Normal Speech] : normal speech [Alert and Oriented] : alert and oriented [Normal memory] : normal memory [de-identified] : +sm at ru/aib

## 2024-06-21 NOTE — DISCUSSION/SUMMARY
[FreeTextEntry1] : In 4/22, Yolette was admitted with acute decompensated right heart failure, and was found to have severe tricuspid regurgitation (with a dilated annulus) and atrial fibrillation.  She is now status post cardioversion, and has remained in a sinus rhythm. This was followed by lead extraction and implantation of a leadless pacemaker.  She reports being more short of breath as of late, and her echocardiograms confirm severe TR and an elevation in bioprosthetic AV gradient.  Her blood pressure is better today. Her K was high on valsartan, and she will remain off. She will remain on her current dose of metoprolol, along with torsemide 10. She will continue hydralazine.  She will stay on amiodarone. Her LFTs (though in the 50's recently) and thyroid function have been ok, as well as PFTS/eye exams). She is off eliquis because of her bleeding issues in the short term, and the idea of a watchman has been discussed if more PAF.  She will continue to follow-up with Dr. Robles regarding minimally invasive options for tricuspid valve (CLASP study versus more open surgical approach). She has TIM scheduled in July 2024.  If no issues, I will see her again in 3-5 months. [EKG obtained to assist in diagnosis and management of assessed problem(s)] : EKG obtained to assist in diagnosis and management of assessed problem(s)

## 2024-06-21 NOTE — HISTORY OF PRESENT ILLNESS
[FreeTextEntry1] : Yolette presents to the office today for a follow up cardiovascular evaluation.  She is now 78 years old, with a history of aortic stenosis, an intermittent right bundle branch block pattern on her electrocardiogram, hypertension, a dyslipidemia, a thyroid nodule, secondary hypothyroidism, iron deficiency anemia, and renal cell carcinoma (status post resection).  She has had bioprosthetic aortic valve replacement. Her postop course was complicated by complete heart block, for which a pacemaker was eventually implanted.  Routine interrogation of her pacemaker revealed asymptomatic atrial fibrillation. She was on anticoagulated at this time.  She went to see Dr. Hayes for a physical in November, feeling well at that time.  Dr. Hayes suggested a number of scans, including a CT of the chest.  The CT was notable for GGO, for which a pulmonary evaluation is planned.  She had not felt 100% since December 2021.  She presented to the emergency room on 4/18/2022, with shortness of breath, and acute diastolic heart failure.  She was diuresed successfully with intravenous Lasix.  A transesophageal echocardiogram demonstrated mild to moderate mitral vegetation, mild mitral stenosis, mildly elevated bio AVR gradients, RV enlargement and RV dysfunction, with severe tricuspid regurgitation.  Tricuspid valve appeared tethered with mall coaptation of the tricuspid valve leaflets due to a severely dilated annulus, without obvious impingement from the pacemaker lead.  She was evaluated by Dr. Robles, and has outpatient follow-up scheduled for intervention regarding her tricuspid valve. Her symptoms have also correlated with the presence of atrial fibrillation.  She is now status post cardioversion as well.  She was discharged home on 4/29.  Her cardiac medications at discharge were amiodarone 200, aspirin 81, Eliquis 5 twice daily, Toprol , simvastatin 20, torsemide 20 twice daily, and valsartan 320.  She was admitted 5/13/22-5/17/22 with [facility] in the setting of overdiuresis. Her creatinine was 1.4 at time of dc. She then had falls and dizziness/weakness in early June 2022 and was found to have a Hb of 4.6. She got PRBCs and responded appropriately. She had an EGD and Colon (though poor prep) without active bleeding.  She initially saw Dr. Robles on 6/27/22 with severe TR and was referred to EP and is now s/p lead extraction and leadless PPM implant on 8/2/22. More recently, she has been following up with him. She was deemed to be a poorly suitable candidate for the TRISCEND study (given RV anatomy) and is hopefully a candidate for the CLASP trial (a transcatheter edge to edge repair). A repeat echocardiogram on 12/15 showed severe TR, normal RV and LV function (with a dilated RV) and moderately elevated bioAVR gradients; this was confirmed on an echocardiogram in 3/23.  I last saw her in 3/24.  Overall, she is doing ok. She feels that her breathing has slowed down, and she has been short of breath shopping. Her SBP is better overall. Her edema has resolved.  Repeat echo with severe TR, moderate MS, and an increase in bioprosthetic aortic valve gradients. She last saw Dr. Tian team this month, and has a repeat TIM scheduled in July 2024.

## 2024-06-25 ENCOUNTER — NON-APPOINTMENT (OUTPATIENT)
Age: 79
End: 2024-06-25

## 2024-06-25 ENCOUNTER — APPOINTMENT (OUTPATIENT)
Dept: CARDIOLOGY | Facility: CLINIC | Age: 79
End: 2024-06-25
Payer: MEDICARE

## 2024-06-25 PROCEDURE — 93294 REM INTERROG EVL PM/LDLS PM: CPT

## 2024-06-25 PROCEDURE — 93296 REM INTERROG EVL PM/IDS: CPT

## 2024-07-08 ENCOUNTER — APPOINTMENT (OUTPATIENT)
Dept: CV DIAGNOSITCS | Facility: HOSPITAL | Age: 79
End: 2024-07-08

## 2024-07-08 ENCOUNTER — RESULT REVIEW (OUTPATIENT)
Age: 79
End: 2024-07-08

## 2024-07-08 ENCOUNTER — OUTPATIENT (OUTPATIENT)
Dept: OUTPATIENT SERVICES | Facility: HOSPITAL | Age: 79
LOS: 1 days | End: 2024-07-08
Payer: MEDICARE

## 2024-07-08 VITALS
DIASTOLIC BLOOD PRESSURE: 72 MMHG | SYSTOLIC BLOOD PRESSURE: 155 MMHG | OXYGEN SATURATION: 99 % | RESPIRATION RATE: 14 BRPM | HEART RATE: 59 BPM

## 2024-07-08 VITALS — WEIGHT: 162.04 LBS | HEIGHT: 62.99 IN

## 2024-07-08 DIAGNOSIS — Z90.721 ACQUIRED ABSENCE OF OVARIES, UNILATERAL: Chronic | ICD-10-CM

## 2024-07-08 DIAGNOSIS — I07.1 RHEUMATIC TRICUSPID INSUFFICIENCY: ICD-10-CM

## 2024-07-08 DIAGNOSIS — Z98.89 UNDEFINED: Chronic | ICD-10-CM

## 2024-07-08 DIAGNOSIS — Z95.2 PRESENCE OF PROSTHETIC HEART VALVE: Chronic | ICD-10-CM

## 2024-07-08 DIAGNOSIS — Z90.5 ACQUIRED ABSENCE OF KIDNEY: Chronic | ICD-10-CM

## 2024-07-08 DIAGNOSIS — Z41.9 ENCOUNTER FOR PROCEDURE FOR PURPOSES OTHER THAN REMEDYING HEALTH STATE, UNSPECIFIED: Chronic | ICD-10-CM

## 2024-07-08 PROCEDURE — 76377 3D RENDER W/INTRP POSTPROCES: CPT | Mod: 26

## 2024-07-08 PROCEDURE — 93320 DOPPLER ECHO COMPLETE: CPT | Mod: 26

## 2024-07-08 PROCEDURE — 93325 DOPPLER ECHO COLOR FLOW MAPG: CPT | Mod: 26

## 2024-07-08 PROCEDURE — 93325 DOPPLER ECHO COLOR FLOW MAPG: CPT

## 2024-07-08 PROCEDURE — 76377 3D RENDER W/INTRP POSTPROCES: CPT

## 2024-07-08 PROCEDURE — 93312 ECHO TRANSESOPHAGEAL: CPT | Mod: 26

## 2024-07-08 PROCEDURE — 93320 DOPPLER ECHO COMPLETE: CPT

## 2024-07-08 PROCEDURE — 93312 ECHO TRANSESOPHAGEAL: CPT

## 2024-07-22 ENCOUNTER — APPOINTMENT (OUTPATIENT)
Dept: CARDIOLOGY | Facility: CLINIC | Age: 79
End: 2024-07-22

## 2024-07-23 ENCOUNTER — TRANSCRIPTION ENCOUNTER (OUTPATIENT)
Age: 79
End: 2024-07-23

## 2024-08-05 ENCOUNTER — RX RENEWAL (OUTPATIENT)
Age: 79
End: 2024-08-05

## 2024-08-08 ENCOUNTER — TRANSCRIPTION ENCOUNTER (OUTPATIENT)
Age: 79
End: 2024-08-08

## 2024-09-09 ENCOUNTER — OFFICE (OUTPATIENT)
Dept: URBAN - METROPOLITAN AREA CLINIC 109 | Facility: CLINIC | Age: 79
Setting detail: OPHTHALMOLOGY
End: 2024-09-09
Payer: MEDICARE

## 2024-09-09 DIAGNOSIS — E11.9: ICD-10-CM

## 2024-09-09 DIAGNOSIS — H25.13: ICD-10-CM

## 2024-09-09 DIAGNOSIS — H43.393: ICD-10-CM

## 2024-09-09 DIAGNOSIS — H40.013: ICD-10-CM

## 2024-09-09 PROCEDURE — 99213 OFFICE O/P EST LOW 20 MIN: CPT | Performed by: OPHTHALMOLOGY

## 2024-09-09 PROCEDURE — 92083 EXTENDED VISUAL FIELD XM: CPT | Performed by: OPHTHALMOLOGY

## 2024-09-09 PROCEDURE — 92133 CPTRZD OPH DX IMG PST SGM ON: CPT | Performed by: OPHTHALMOLOGY

## 2024-09-09 PROCEDURE — 92020 GONIOSCOPY: CPT | Performed by: OPHTHALMOLOGY

## 2024-09-09 ASSESSMENT — CONFRONTATIONAL VISUAL FIELD TEST (CVF)
OD_FINDINGS: FULL
OS_FINDINGS: FULL

## 2024-09-17 ENCOUNTER — OFFICE (OUTPATIENT)
Dept: URBAN - METROPOLITAN AREA CLINIC 109 | Facility: CLINIC | Age: 79
Setting detail: OPHTHALMOLOGY
End: 2024-09-17
Payer: MEDICARE

## 2024-09-17 DIAGNOSIS — H40.013: ICD-10-CM

## 2024-09-17 DIAGNOSIS — H25.13: ICD-10-CM

## 2024-09-17 DIAGNOSIS — H52.7: ICD-10-CM

## 2024-09-17 DIAGNOSIS — H43.393: ICD-10-CM

## 2024-09-17 PROCEDURE — 92250 FUNDUS PHOTOGRAPHY W/I&R: CPT | Performed by: OPTOMETRIST

## 2024-09-17 PROCEDURE — 92015 DETERMINE REFRACTIVE STATE: CPT | Performed by: OPTOMETRIST

## 2024-09-17 PROCEDURE — 92014 COMPRE OPH EXAM EST PT 1/>: CPT | Performed by: OPTOMETRIST

## 2024-09-17 ASSESSMENT — CONFRONTATIONAL VISUAL FIELD TEST (CVF)
OS_FINDINGS: FULL
OD_FINDINGS: FULL

## 2024-09-24 ENCOUNTER — APPOINTMENT (OUTPATIENT)
Dept: CARDIOLOGY | Facility: CLINIC | Age: 79
End: 2024-09-24

## 2024-09-30 ENCOUNTER — NON-APPOINTMENT (OUTPATIENT)
Age: 79
End: 2024-09-30

## 2024-09-30 ENCOUNTER — APPOINTMENT (OUTPATIENT)
Dept: CARDIOLOGY | Facility: CLINIC | Age: 79
End: 2024-09-30
Payer: MEDICARE

## 2024-09-30 PROCEDURE — 93296 REM INTERROG EVL PM/IDS: CPT

## 2024-09-30 PROCEDURE — 93294 REM INTERROG EVL PM/LDLS PM: CPT

## 2024-10-15 NOTE — DISCHARGE NOTE PROVIDER - NSDCFUSCHEDAPPT_GEN_ALL_CORE_FT
Detail Level: Generalized Detail Level: Detailed Caleb Robles  [facility]  CTSURG [address]  Scheduled Appointment: 06/13/2022    [facility]  [address] [address]  Scheduled Appointment: 06/20/2022    Efra Jones  [facility]  CARDIOLOGY [address]  Scheduled Appointment: 06/22/2022

## 2024-10-21 ENCOUNTER — NON-APPOINTMENT (OUTPATIENT)
Age: 79
End: 2024-10-21

## 2024-10-21 ENCOUNTER — APPOINTMENT (OUTPATIENT)
Dept: CARDIOLOGY | Facility: CLINIC | Age: 79
End: 2024-10-21
Payer: MEDICARE

## 2024-10-21 VITALS
HEIGHT: 63 IN | OXYGEN SATURATION: 99 % | HEART RATE: 57 BPM | WEIGHT: 161 LBS | BODY MASS INDEX: 28.53 KG/M2 | SYSTOLIC BLOOD PRESSURE: 168 MMHG | DIASTOLIC BLOOD PRESSURE: 76 MMHG

## 2024-10-21 VITALS — DIASTOLIC BLOOD PRESSURE: 75 MMHG | SYSTOLIC BLOOD PRESSURE: 157 MMHG

## 2024-10-21 DIAGNOSIS — R06.00 DYSPNEA, UNSPECIFIED: ICD-10-CM

## 2024-10-21 DIAGNOSIS — I38 ENDOCARDITIS, VALVE UNSPECIFIED: ICD-10-CM

## 2024-10-21 DIAGNOSIS — I10 ESSENTIAL (PRIMARY) HYPERTENSION: ICD-10-CM

## 2024-10-21 DIAGNOSIS — I35.0 NONRHEUMATIC AORTIC (VALVE) STENOSIS: ICD-10-CM

## 2024-10-21 PROCEDURE — 99214 OFFICE O/P EST MOD 30 MIN: CPT

## 2024-10-21 PROCEDURE — 93000 ELECTROCARDIOGRAM COMPLETE: CPT

## 2024-10-24 ENCOUNTER — OUTPATIENT (OUTPATIENT)
Dept: OUTPATIENT SERVICES | Facility: HOSPITAL | Age: 79
LOS: 1 days | End: 2024-10-24
Payer: MEDICARE

## 2024-10-24 ENCOUNTER — APPOINTMENT (OUTPATIENT)
Dept: CARDIOLOGY | Facility: CLINIC | Age: 79
End: 2024-10-24

## 2024-10-24 ENCOUNTER — NON-APPOINTMENT (OUTPATIENT)
Age: 79
End: 2024-10-24

## 2024-10-24 DIAGNOSIS — Z98.89 OTHER SPECIFIED POSTPROCEDURAL STATES: Chronic | ICD-10-CM

## 2024-10-24 DIAGNOSIS — Z41.9 ENCOUNTER FOR PROCEDURE FOR PURPOSES OTHER THAN REMEDYING HEALTH STATE, UNSPECIFIED: Chronic | ICD-10-CM

## 2024-10-24 DIAGNOSIS — Z00.00 ENCOUNTER FOR GENERAL ADULT MEDICAL EXAMINATION WITHOUT ABNORMAL FINDINGS: ICD-10-CM

## 2024-10-24 DIAGNOSIS — Z90.721 ACQUIRED ABSENCE OF OVARIES, UNILATERAL: Chronic | ICD-10-CM

## 2024-10-24 DIAGNOSIS — I07.1 RHEUMATIC TRICUSPID INSUFFICIENCY: ICD-10-CM

## 2024-10-24 DIAGNOSIS — Z90.5 ACQUIRED ABSENCE OF KIDNEY: Chronic | ICD-10-CM

## 2024-10-24 DIAGNOSIS — Z95.2 PRESENCE OF PROSTHETIC HEART VALVE: Chronic | ICD-10-CM

## 2024-10-24 PROCEDURE — 74174 CTA ABD&PLVS W/CONTRAST: CPT | Mod: 26,MH

## 2024-10-24 PROCEDURE — 75574 CT ANGIO HRT W/3D IMAGE: CPT | Mod: 26,MH

## 2024-10-24 PROCEDURE — 71275 CT ANGIOGRAPHY CHEST: CPT | Mod: 26,MH

## 2024-10-28 ENCOUNTER — TRANSCRIPTION ENCOUNTER (OUTPATIENT)
Age: 79
End: 2024-10-28

## 2024-11-09 ENCOUNTER — RX RENEWAL (OUTPATIENT)
Age: 79
End: 2024-11-09

## 2024-11-11 ENCOUNTER — TRANSCRIPTION ENCOUNTER (OUTPATIENT)
Age: 79
End: 2024-11-11

## 2024-11-19 ENCOUNTER — NON-APPOINTMENT (OUTPATIENT)
Age: 79
End: 2024-11-19

## 2024-11-19 ENCOUNTER — RESULT CHARGE (OUTPATIENT)
Age: 79
End: 2024-11-19

## 2024-11-20 PROCEDURE — 75574 CT ANGIO HRT W/3D IMAGE: CPT

## 2024-11-20 PROCEDURE — 71275 CT ANGIOGRAPHY CHEST: CPT

## 2024-11-20 PROCEDURE — 74174 CTA ABD&PLVS W/CONTRAST: CPT

## 2024-11-20 NOTE — CONSULT NOTE ADULT - CONSULT REASON
Rah Buckley is calling to request a refill on the following medication(s):    Last Visit Date (If Applicable):  10/29/2024    Next Visit Date:    5/7/2025    Medication Request:  Requested Prescriptions     Pending Prescriptions Disp Refills    buPROPion (WELLBUTRIN XL) 300 MG extended release tablet 30 tablet 1     Sig: Take 1 tablet by mouth every morning               
Initial Anesthesia Post-op Note    Patient: Hazel Brunson  Procedure(s) Performed: COLONOSCOPY WITH POSSIBLE BIOPSY  Anesthesia type: MAC    Last 24 I/O:     Intake/Output Summary (Last 24 hours) at 7/20/2020 1020  Last data filed at 7/20/2020 1019  Gross per 24 hour   Intake 500 ml   Output --   Net 500 ml         Patient Location: Patient location: GI B.  Post-op Vital Signs:stable  Level of Consciousness: sedated  Respiratory Status: spontaneous ventilation and room air  Cardiovascular stable  Hydration: euvolemic  Pain Management: well controlled  Handoff: Handoff to receiving nurse was performed and questions were answered  Nausea: None  Airway Patency:patent  Post-op Assessment: no complications and patient tolerated procedure well with no complications    
afib
dyspnea
Tricuspid regurgitation

## 2024-11-27 ENCOUNTER — APPOINTMENT (OUTPATIENT)
Dept: ULTRASOUND IMAGING | Facility: CLINIC | Age: 79
End: 2024-11-27
Payer: MEDICARE

## 2024-11-27 ENCOUNTER — APPOINTMENT (OUTPATIENT)
Dept: MAMMOGRAPHY | Facility: CLINIC | Age: 79
End: 2024-11-27
Payer: MEDICARE

## 2024-11-27 PROCEDURE — 76641 ULTRASOUND BREAST COMPLETE: CPT | Mod: 50,GA

## 2024-11-27 PROCEDURE — 77063 BREAST TOMOSYNTHESIS BI: CPT | Mod: 26

## 2024-11-27 PROCEDURE — 77067 SCR MAMMO BI INCL CAD: CPT | Mod: 26,TC

## 2024-12-16 ENCOUNTER — OUTPATIENT (OUTPATIENT)
Dept: OUTPATIENT SERVICES | Facility: HOSPITAL | Age: 79
LOS: 1 days | End: 2024-12-16
Payer: MEDICARE

## 2024-12-16 VITALS
DIASTOLIC BLOOD PRESSURE: 67 MMHG | WEIGHT: 171.08 LBS | HEIGHT: 63 IN | TEMPERATURE: 98 F | OXYGEN SATURATION: 100 % | RESPIRATION RATE: 17 BRPM | SYSTOLIC BLOOD PRESSURE: 116 MMHG | HEART RATE: 60 BPM

## 2024-12-16 DIAGNOSIS — I07.1 RHEUMATIC TRICUSPID INSUFFICIENCY: ICD-10-CM

## 2024-12-16 DIAGNOSIS — Z98.89 OTHER SPECIFIED POSTPROCEDURAL STATES: Chronic | ICD-10-CM

## 2024-12-16 DIAGNOSIS — Z95.2 PRESENCE OF PROSTHETIC HEART VALVE: Chronic | ICD-10-CM

## 2024-12-16 DIAGNOSIS — Z90.5 ACQUIRED ABSENCE OF KIDNEY: Chronic | ICD-10-CM

## 2024-12-16 DIAGNOSIS — Z01.818 ENCOUNTER FOR OTHER PREPROCEDURAL EXAMINATION: ICD-10-CM

## 2024-12-16 DIAGNOSIS — E11.9 TYPE 2 DIABETES MELLITUS WITHOUT COMPLICATIONS: ICD-10-CM

## 2024-12-16 DIAGNOSIS — Z90.721 ACQUIRED ABSENCE OF OVARIES, UNILATERAL: Chronic | ICD-10-CM

## 2024-12-16 DIAGNOSIS — Z41.9 ENCOUNTER FOR PROCEDURE FOR PURPOSES OTHER THAN REMEDYING HEALTH STATE, UNSPECIFIED: Chronic | ICD-10-CM

## 2024-12-16 LAB
ALBUMIN SERPL ELPH-MCNC: 4 G/DL — SIGNIFICANT CHANGE UP (ref 3.3–5)
ALP SERPL-CCNC: 67 U/L — SIGNIFICANT CHANGE UP (ref 40–120)
ALT FLD-CCNC: 36 U/L — SIGNIFICANT CHANGE UP (ref 10–45)
AST SERPL-CCNC: 39 U/L — SIGNIFICANT CHANGE UP (ref 10–40)
BILIRUB DIRECT SERPL-MCNC: 0.2 MG/DL — SIGNIFICANT CHANGE UP (ref 0–0.3)
BILIRUB INDIRECT FLD-MCNC: 0.5 MG/DL — SIGNIFICANT CHANGE UP (ref 0.2–1)
BILIRUB SERPL-MCNC: 0.7 MG/DL — SIGNIFICANT CHANGE UP (ref 0.2–1.2)
PROT SERPL-MCNC: 7.1 G/DL — SIGNIFICANT CHANGE UP (ref 6–8.3)

## 2024-12-16 PROCEDURE — 80076 HEPATIC FUNCTION PANEL: CPT

## 2024-12-16 PROCEDURE — 87640 STAPH A DNA AMP PROBE: CPT

## 2024-12-16 PROCEDURE — 83036 HEMOGLOBIN GLYCOSYLATED A1C: CPT

## 2024-12-16 PROCEDURE — 86901 BLOOD TYPING SEROLOGIC RH(D): CPT

## 2024-12-16 PROCEDURE — 80048 BASIC METABOLIC PNL TOTAL CA: CPT

## 2024-12-16 PROCEDURE — 86900 BLOOD TYPING SEROLOGIC ABO: CPT

## 2024-12-16 PROCEDURE — 87641 MR-STAPH DNA AMP PROBE: CPT

## 2024-12-16 PROCEDURE — 86923 COMPATIBILITY TEST ELECTRIC: CPT

## 2024-12-16 PROCEDURE — 85025 COMPLETE CBC W/AUTO DIFF WBC: CPT

## 2024-12-16 PROCEDURE — 93880 EXTRACRANIAL BILAT STUDY: CPT | Mod: 26

## 2024-12-16 PROCEDURE — G0463: CPT

## 2024-12-16 PROCEDURE — 86850 RBC ANTIBODY SCREEN: CPT

## 2024-12-16 PROCEDURE — 93880 EXTRACRANIAL BILAT STUDY: CPT

## 2024-12-16 RX ORDER — CEFUROXIME AXETIL 250 MG
1500 TABLET ORAL ONCE
Refills: 0 | Status: COMPLETED | OUTPATIENT
Start: 2025-01-06 | End: 2025-01-06

## 2024-12-16 NOTE — H&P PST ADULT - HISTORY OF PRESENT ILLNESS
Aortic valve Replacement  2016  pacemaker change to micra 2022flu 2024    cardiac angio 2024     vaccines  flu 2024  tetanus TDaP  2024  RSV 2023  Pneumovax 2023  Shingles 2023  79 year old female with hx of hypothyroidism ,Diabetes Mellitus type2 (oral meds), Benign Hypertension, Aortic  Valve disease ( Aortic valve Replacement  2016) Complete heart block pacemaker placed 2016. Dx with tricuspid valve disease had  pacemaker  changed to micra 2022. Now for tricuspid valve surgery with cardiac bypass.        vaccines  flu 2024  tetanus TDaP  2024  RSV 2023  Pneumovax 2023  Shingles 2023  79 year old female with hx of hypothyroidism ,Diabetes Mellitus type 2 (oral meds), Benign Hypertension, Aortic  Valve disease ( Aortic valve Replacement  2016) Complete heart block pacemaker placed 2016. Dx with tricuspid valve disease had  pacemaker  changed to micra 2022. Now for tricuspid valve surgery with cardiac bypass.        vaccines  flu 2024  tetanus TDaP  2024  RSV 2023  Pneumovax 2023  Shingles 2023

## 2024-12-16 NOTE — H&P PST ADULT - ATTENDING COMMENTS
Yolette reports no new issues since her last visit. Her most prominent symptoms are fatigue, GONZALES, and LE edema. Risks and benefits of TTVR were discussed with her and her son in detail and all questions were answered. Proceed as planned.  Caleb Robles MD

## 2024-12-16 NOTE — H&P PST ADULT - ASSESSMENT
DASI: walk stationary bike Mets 6  Symptoms : Denies SOB, GONZALES, palpitations  Airway : no airway abnormalities , denies prior anesthesia complications   Mallampati : 3  Denies loose teeth     Corneal abrasion risk : Denies     CAPRINI SCORE    AGE RELATED RISK FACTORS                                                             [ ] Age 41-60 years                                            (1 Point)  [ ] Age: 61-74 years                                           (2 Points)                 [x ] Age= 75 years                                                (3 Points)             DISEASE RELATED RISK FACTORS                                                       [ ] Edema in the lower extremities                 (1 Point)                     [ ] Varicose veins                                               (1 Point)                                 [x ] BMI > 25 Kg/m2                                            (1 Point)                                  [ ] Serious infection (ie PNA)                            (1 Point)                     [ ] Lung disease ( COPD, Emphysema)            (1 Point)                                                                          [ ] Acute myocardial infarction                         (1 Point)                  [ ] Congestive heart failure (in the previous month)  (1 Point)         [ ] Inflammatory bowel disease                            (1 Point)                  [ ] Central venous access, PICC or Port               (2 points)       (within the last month)                                                                [ ] Stroke (in the previous month)                        (5 Points)    [ ] Previous or present malignancy                       (2 points)                                                                                                                                                         HEMATOLOGY RELATED FACTORS                                                         [ ] Prior episodes of VTE                                     (3 Points)                     [ ] Positive family history for VTE                      (3 Points)                  [ ] Prothrombin 77613 A                                     (3 Points)                     [ ] Factor V Leiden                                                (3 Points)                        [ ] Lupus anticoagulants                                      (3 Points)                                                           [ ] Anticardiolipin antibodies                              (3 Points)                                                       [ ] High homocysteine in the blood                   (3 Points)                                             [ ] Other congenital or acquired thrombophilia      (3 Points)                                                [ ] Heparin induced thrombocytopenia                  (3 Points)                                        MOBILITY RELATED FACTORS  [ ] Bed rest                                                         (1 Point)  [ ] Plaster cast                                                    (2 points)  [ ] Bed bound for more than 72 hours           (2 Points)    GENDER SPECIFIC FACTORS  [ ] Pregnancy or had a baby within the last month   (1 Point)  [ ] Post-partum < 6 weeks                                   (1 Point)  [ ] Hormonal therapy  or oral contraception   (1 Point)  [ ] History of pregnancy complications              (1 point)  [ ] Unexplained or recurrent              (1 Point)    OTHER RISK FACTORS                                           (1 Point)  [ ] BMI >40, smoking, diabetes requiring insulin, chemotherapy  blood transfusions and length of surgery over 2 hours    SURGERY RELATED RISK FACTORS  [ ]  Section within the last month     (1 Point)  [ ] Minor surgery                                                  (1 Point)  [ ] Arthroscopic surgery                                       (2 Points)  [x ] Planned major surgery lasting more            (2 Points)      than 45 minutes     [ ] Elective hip or knee joint replacement       (5 points)       surgery                                                TRAUMA RELATED RISK FACTORS  [ ] Fracture of the hip, pelvis, or leg                       (5 Points)  [ ] Spinal cord injury resulting in paralysis             (5 points)       (in the previous month)    [ ] Paralysis  (less than 1 month)                             (5 Points)  [ ] Multiple Trauma within 1 month                        (5 Points)    Total Score [      5 ]    Caprini Score 0-2: Low Risk, NO VTE prophylaxis required for most patients, encourage ambulation  Caprini Score 3-6: Moderate Risk , pharmacologic VTE prophylaxis is indicated for most patients (in the absence of contraindications)  Caprini Score Greater than or =7: High risk, pharmocologic VTE prophylaxis indicated for most patients (in the absence of contraindications)

## 2024-12-16 NOTE — H&P PST ADULT - NSICDXPASTSURGICALHX_GEN_ALL_CORE_FT
PAST SURGICAL HISTORY:  H/O aortic valve replacement 5/2016    H/O hand surgery left due to injury    H/O partial nephrectomy right 5/015    S/P T&A (status post tonsillectomy and adenoidectomy) as a child    Status post unilateral salpingo-oophorectomy 1980's

## 2024-12-16 NOTE — H&P PST ADULT - NSICDXPASTMEDICALHX_GEN_ALL_CORE_FT
PAST MEDICAL HISTORY:  Anemia between June and July 2022 has received a total of 4 units PRBC and 2 iron infusions    Aortic stenosis s/p AVR    Cancer of kidney, left 2015    Dyslipidemia     Hiatal hernia     History of complete heart block     Hypertension x10 years    Hypothyroidism     Right bundle branch block (RBBB)     Tricuspid insufficiency     Type II diabetes mellitus well controlled

## 2024-12-17 PROBLEM — I97.89 OTHER POSTPROCEDURAL COMPLICATIONS AND DISORDERS OF THE CIRCULATORY SYSTEM, NOT ELSEWHERE CLASSIFIED: Chronic | Status: INACTIVE | Noted: 2022-07-29 | Resolved: 2024-12-16

## 2024-12-18 ENCOUNTER — TRANSCRIPTION ENCOUNTER (OUTPATIENT)
Age: 79
End: 2024-12-18

## 2024-12-30 ENCOUNTER — APPOINTMENT (OUTPATIENT)
Dept: CARDIOLOGY | Facility: CLINIC | Age: 79
End: 2024-12-30

## 2025-01-03 PROBLEM — I07.1 RHEUMATIC TRICUSPID INSUFFICIENCY: Chronic | Status: ACTIVE | Noted: 2024-12-16

## 2025-01-06 ENCOUNTER — INPATIENT (INPATIENT)
Facility: HOSPITAL | Age: 80
LOS: 0 days | Discharge: HOME HEALTH | DRG: 267 | End: 2025-01-07
Attending: INTERNAL MEDICINE | Admitting: INTERNAL MEDICINE
Payer: MEDICARE

## 2025-01-06 ENCOUNTER — APPOINTMENT (OUTPATIENT)
Dept: CARDIOTHORACIC SURGERY | Facility: HOSPITAL | Age: 80
End: 2025-01-06

## 2025-01-06 ENCOUNTER — RESULT REVIEW (OUTPATIENT)
Age: 80
End: 2025-01-06

## 2025-01-06 VITALS
WEIGHT: 169.76 LBS | TEMPERATURE: 99 F | DIASTOLIC BLOOD PRESSURE: 77 MMHG | HEIGHT: 62.99 IN | HEART RATE: 69 BPM | RESPIRATION RATE: 16 BRPM | SYSTOLIC BLOOD PRESSURE: 164 MMHG | OXYGEN SATURATION: 99 %

## 2025-01-06 DIAGNOSIS — Z98.89 OTHER SPECIFIED POSTPROCEDURAL STATES: Chronic | ICD-10-CM

## 2025-01-06 DIAGNOSIS — Z90.721 ACQUIRED ABSENCE OF OVARIES, UNILATERAL: Chronic | ICD-10-CM

## 2025-01-06 DIAGNOSIS — Z90.5 ACQUIRED ABSENCE OF KIDNEY: ICD-10-CM

## 2025-01-06 DIAGNOSIS — E03.9 HYPOTHYROIDISM, UNSPECIFIED: ICD-10-CM

## 2025-01-06 DIAGNOSIS — Z95.4 PRESENCE OF OTHER HEART-VALVE REPLACEMENT: ICD-10-CM

## 2025-01-06 DIAGNOSIS — I07.1 RHEUMATIC TRICUSPID INSUFFICIENCY: ICD-10-CM

## 2025-01-06 DIAGNOSIS — Z41.9 ENCOUNTER FOR PROCEDURE FOR PURPOSES OTHER THAN REMEDYING HEALTH STATE, UNSPECIFIED: Chronic | ICD-10-CM

## 2025-01-06 DIAGNOSIS — Z90.5 ACQUIRED ABSENCE OF KIDNEY: Chronic | ICD-10-CM

## 2025-01-06 DIAGNOSIS — Z95.2 PRESENCE OF PROSTHETIC HEART VALVE: Chronic | ICD-10-CM

## 2025-01-06 LAB
GLUCOSE BLDC GLUCOMTR-MCNC: 112 MG/DL — HIGH (ref 70–99)
GLUCOSE BLDC GLUCOMTR-MCNC: 134 MG/DL — HIGH (ref 70–99)
GLUCOSE BLDC GLUCOMTR-MCNC: 148 MG/DL — HIGH (ref 70–99)
GLUCOSE BLDC GLUCOMTR-MCNC: 208 MG/DL — HIGH (ref 70–99)

## 2025-01-06 PROCEDURE — 99232 SBSQ HOSP IP/OBS MODERATE 35: CPT | Mod: FS

## 2025-01-06 PROCEDURE — 0646T TTVI/RPLCMT W/PRSTC VLV PERQ: CPT

## 2025-01-06 PROCEDURE — 93355 ECHO TRANSESOPHAGEAL (TEE): CPT

## 2025-01-06 PROCEDURE — 93010 ELECTROCARDIOGRAM REPORT: CPT

## 2025-01-06 DEVICE — IMPLANTABLE DEVICE: Type: IMPLANTABLE DEVICE | Status: FUNCTIONAL

## 2025-01-06 DEVICE — WIRE GD SAFARI2 275CM XSML CRV: Type: IMPLANTABLE DEVICE | Status: FUNCTIONAL

## 2025-01-06 DEVICE — GWIRE GUID  0.035INX150CM: Type: IMPLANTABLE DEVICE | Status: FUNCTIONAL

## 2025-01-06 DEVICE — INTRO AGLIS NXT MED CURL 8.5FR X  71: Type: IMPLANTABLE DEVICE | Status: FUNCTIONAL

## 2025-01-06 DEVICE — SHEATH INTRODUCER TERUMO PINNACLE CORONARY 5FR X 10CM X 0.038" MINI WIRE: Type: IMPLANTABLE DEVICE | Status: FUNCTIONAL

## 2025-01-06 DEVICE — CATH DIAG 5F JR4 100CM: Type: IMPLANTABLE DEVICE | Status: FUNCTIONAL

## 2025-01-06 DEVICE — SHEATH INTRO DRYSEAL FLEX 24FR 33CM: Type: IMPLANTABLE DEVICE | Status: FUNCTIONAL

## 2025-01-06 DEVICE — INTRODUCER CHECK-FLO SET 0.038" X 14FR X 13CM: Type: IMPLANTABLE DEVICE | Status: FUNCTIONAL

## 2025-01-06 DEVICE — KIT A-LINE 1LUM 20G X 12CM SAFE KIT: Type: IMPLANTABLE DEVICE | Status: FUNCTIONAL

## 2025-01-06 DEVICE — SHEATH INTRODUCER TERUMO PINNACLE CORONARY 8FR X 10CM X 0.038" MINI WIRE: Type: IMPLANTABLE DEVICE | Status: FUNCTIONAL

## 2025-01-06 DEVICE — SUT PERCLOSE PROGLIDE 6FR: Type: IMPLANTABLE DEVICE | Status: FUNCTIONAL

## 2025-01-06 RX ORDER — PANTOPRAZOLE SODIUM 40 MG/1
1 TABLET, DELAYED RELEASE ORAL
Refills: 0 | DISCHARGE

## 2025-01-06 RX ORDER — TORSEMIDE 10 MG/1
10 TABLET ORAL DAILY
Refills: 0 | Status: DISCONTINUED | OUTPATIENT
Start: 2025-01-06 | End: 2025-01-07

## 2025-01-06 RX ORDER — HYDROMORPHONE HCL 4 MG
0.5 TABLET ORAL
Refills: 0 | Status: DISCONTINUED | OUTPATIENT
Start: 2025-01-06 | End: 2025-01-06

## 2025-01-06 RX ORDER — ATORVASTATIN CALCIUM 40 MG/1
10 TABLET, FILM COATED ORAL AT BEDTIME
Refills: 0 | Status: DISCONTINUED | OUTPATIENT
Start: 2025-01-06 | End: 2025-01-07

## 2025-01-06 RX ORDER — SODIUM CHLORIDE 9 MG/ML
3 INJECTION, SOLUTION INTRAMUSCULAR; INTRAVENOUS; SUBCUTANEOUS EVERY 8 HOURS
Refills: 0 | Status: DISCONTINUED | OUTPATIENT
Start: 2025-01-06 | End: 2025-01-06

## 2025-01-06 RX ORDER — ONDANSETRON 4 MG/1
4 TABLET ORAL ONCE
Refills: 0 | Status: DISCONTINUED | OUTPATIENT
Start: 2025-01-06 | End: 2025-01-06

## 2025-01-06 RX ORDER — METOPROLOL TARTRATE 100 MG/1
1 TABLET, FILM COATED ORAL
Refills: 0 | DISCHARGE

## 2025-01-06 RX ORDER — METOPROLOL TARTRATE 50 MG
75 TABLET ORAL DAILY
Refills: 0 | Status: DISCONTINUED | OUTPATIENT
Start: 2025-01-06 | End: 2025-01-06

## 2025-01-06 RX ORDER — HYDROMORPHONE HCL 4 MG
1 TABLET ORAL
Refills: 0 | Status: DISCONTINUED | OUTPATIENT
Start: 2025-01-06 | End: 2025-01-06

## 2025-01-06 RX ORDER — DEXTROSE MONOHYDRATE 25 G/50ML
15 INJECTION, SOLUTION INTRAVENOUS ONCE
Refills: 0 | Status: DISCONTINUED | OUTPATIENT
Start: 2025-01-06 | End: 2025-01-07

## 2025-01-06 RX ORDER — CHLORHEXIDINE GLUCONATE 1.2 MG/ML
1 RINSE ORAL ONCE
Refills: 0 | Status: DISCONTINUED | OUTPATIENT
Start: 2025-01-06 | End: 2025-01-06

## 2025-01-06 RX ORDER — GLUCAGON INJECTION, SOLUTION 0.5 MG/.1ML
1 INJECTION, SOLUTION SUBCUTANEOUS ONCE
Refills: 0 | Status: DISCONTINUED | OUTPATIENT
Start: 2025-01-06 | End: 2025-01-07

## 2025-01-06 RX ORDER — AMIODARONE HYDROCHLORIDE 200 MG/1
200 TABLET ORAL DAILY
Refills: 0 | Status: DISCONTINUED | OUTPATIENT
Start: 2025-01-06 | End: 2025-01-07

## 2025-01-06 RX ORDER — CYANOCOBALAMIN/FOLIC AC/VIT B6 1-2.2-25MG
1 TABLET ORAL
Refills: 0 | DISCHARGE

## 2025-01-06 RX ORDER — LIDOCAINE HYDROCHLORIDE 10 MG/ML
0.2 INJECTION INFILTRATION; PERINEURAL ONCE
Refills: 0 | Status: DISCONTINUED | OUTPATIENT
Start: 2025-01-06 | End: 2025-01-06

## 2025-01-06 RX ORDER — ASPIRIN 81 MG
81 TABLET, DELAYED RELEASE (ENTERIC COATED) ORAL DAILY
Refills: 0 | Status: DISCONTINUED | OUTPATIENT
Start: 2025-01-06 | End: 2025-01-07

## 2025-01-06 RX ORDER — PANTOPRAZOLE 40 MG/1
40 TABLET, DELAYED RELEASE ORAL
Refills: 0 | Status: DISCONTINUED | OUTPATIENT
Start: 2025-01-06 | End: 2025-01-07

## 2025-01-06 RX ORDER — METOPROLOL TARTRATE 50 MG
75 TABLET ORAL DAILY
Refills: 0 | Status: DISCONTINUED | OUTPATIENT
Start: 2025-01-07 | End: 2025-01-07

## 2025-01-06 RX ORDER — GABAPENTIN 300 MG/1
200 CAPSULE ORAL ONCE
Refills: 0 | Status: COMPLETED | OUTPATIENT
Start: 2025-01-06 | End: 2025-01-06

## 2025-01-06 RX ORDER — LEVOTHYROXINE SODIUM 150 MCG
1 TABLET ORAL
Refills: 0 | DISCHARGE

## 2025-01-06 RX ORDER — INSULIN LISPRO 100/ML
VIAL (ML) SUBCUTANEOUS
Refills: 0 | Status: DISCONTINUED | OUTPATIENT
Start: 2025-01-06 | End: 2025-01-07

## 2025-01-06 RX ORDER — ACETAMINOPHEN 80 MG/.8ML
1000 SOLUTION/ DROPS ORAL ONCE
Refills: 0 | Status: COMPLETED | OUTPATIENT
Start: 2025-01-06 | End: 2025-01-06

## 2025-01-06 RX ORDER — POTASSIUM CHLORIDE 600 MG/1
20 TABLET, EXTENDED RELEASE ORAL
Refills: 0 | DISCHARGE

## 2025-01-06 RX ORDER — DEXTROSE MONOHYDRATE 25 G/50ML
25 INJECTION, SOLUTION INTRAVENOUS ONCE
Refills: 0 | Status: DISCONTINUED | OUTPATIENT
Start: 2025-01-06 | End: 2025-01-07

## 2025-01-06 RX ORDER — BIOTIN 10 MG
1 TABLET ORAL
Refills: 0 | DISCHARGE

## 2025-01-06 RX ORDER — HYDRALAZINE HYDROCHLORIDE 10 MG/1
1 TABLET ORAL
Refills: 0 | DISCHARGE

## 2025-01-06 RX ORDER — SODIUM CHLORIDE 9 MG/ML
1000 INJECTION, SOLUTION INTRAVENOUS
Refills: 0 | Status: DISCONTINUED | OUTPATIENT
Start: 2025-01-06 | End: 2025-01-07

## 2025-01-06 RX ORDER — LEVOTHYROXINE SODIUM 175 UG/1
75 TABLET ORAL DAILY
Refills: 0 | Status: DISCONTINUED | OUTPATIENT
Start: 2025-01-06 | End: 2025-01-07

## 2025-01-06 RX ORDER — CEFAZOLIN SODIUM 1 G
2000 VIAL (EA) INJECTION EVERY 8 HOURS
Refills: 0 | Status: COMPLETED | OUTPATIENT
Start: 2025-01-06 | End: 2025-01-06

## 2025-01-06 RX ADMIN — GABAPENTIN 200 MILLIGRAM(S): 300 CAPSULE ORAL at 07:43

## 2025-01-06 RX ADMIN — ATORVASTATIN CALCIUM 10 MILLIGRAM(S): 40 TABLET, FILM COATED ORAL at 21:32

## 2025-01-06 RX ADMIN — ACETAMINOPHEN 1000 MILLIGRAM(S): 80 SOLUTION/ DROPS ORAL at 07:42

## 2025-01-06 RX ADMIN — ACETAMINOPHEN 1000 MILLIGRAM(S): 80 SOLUTION/ DROPS ORAL at 07:46

## 2025-01-06 RX ADMIN — Medication 4: at 17:25

## 2025-01-06 RX ADMIN — Medication 100 MILLIGRAM(S): at 17:25

## 2025-01-06 RX ADMIN — Medication 81 MILLIGRAM(S): at 17:25

## 2025-01-06 RX ADMIN — Medication 100 MILLIGRAM(S): at 21:32

## 2025-01-06 NOTE — PROGRESS NOTE ADULT - PROBLEM SELECTOR PLAN 2
s/p 1/6/25 TTVR  postop course unremarkable  tx 2cohen fl/ VSS; V. paced @ 58; left groin stable with dressing  ck ekg/ echo in am  no mcot - pt has hx of PPM   resume preop meds  renal consulted for hx for RCC- left partial nephrectomy 2015  discharge planning- home tue if stable overnight

## 2025-01-06 NOTE — PROGRESS NOTE ADULT - ASSESSMENT
79 year old female with hx of hypothyroidism ,Diabetes Mellitus type 2 (oral meds), Benign Hypertension, Aortic  Valve disease ( Aortic valve Replacement  2016) Complete heart block pacemaker placed 2016. Dx with tricuspid valve disease had  pacemaker  changed to micra 2022. Now for tricuspid valve surgery.  s/p 1/6/25 TTVR  postop course unremarkable  tx 2cohen fl/ VSS; V. paced @ 58; left groin stable with dressing  ck ekg/ echo in am  no mcot - pt has hx of PPM   resume preop meds  renal consulted for hx for RCC- left partial nephrectomy 2015  discharge planning- home tue if stable overnight      79 year old female with hx of hypothyroidism ,Diabetes Mellitus type 2 (oral meds), Benign Hypertension, Aortic  Valve disease ( Aortic valve Replacement  2016) Complete heart block pacemaker placed 2016. Dx with tricuspid valve disease had  pacemaker  changed to micra 2022. Now for tricuspid valve surgery.  s/p 1/6/25 TTVR  postop course unremarkable  tx 2cohen fl/ VSS; V. paced @ 58; left groin stable with dressing  ck ekg/ echo in am  no mcot - pt has hx of PPM   resume preop meds  renal consulted for hx for RCC- left partial nephrectomy 2015  discharge planning- home tue if stable overnight     ·

## 2025-01-06 NOTE — CHART NOTE - NSCHARTNOTESELECT_GEN_ALL_CORE
-You can call [phone] to find an [facility] physical therapy location or you can go to any physical therapist that accepts your insurance.   -Follow up with orthopedic  -Take Naproxen 500 mg twice daily with meals as needed for pain    
Structural Heart Post Op Check/Event Note

## 2025-01-06 NOTE — PROGRESS NOTE ADULT - SUBJECTIVE AND OBJECTIVE BOX
VITAL SIGNS    Telemetry:    Vital Signs Last 24 Hrs  T(C): 36.5 (01-06-25 @ 15:00), Max: 37 (01-06-25 @ 06:37)  T(F): 97.7 (01-06-25 @ 15:00), Max: 98.6 (01-06-25 @ 06:37)  HR: 59 (01-06-25 @ 15:00) (59 - 69)  BP: 106/61 (01-06-25 @ 15:00) (95/54 - 164/77)  RR: 18 (01-06-25 @ 15:00) (16 - 18)  SpO2: 95% (01-06-25 @ 15:00) (92% - 99%)             Daily Height in cm: 160 (06 Jan 2025 07:00)    Daily   Admit Wt: Drug Dosing Weight  Height (cm): 160 (06 Jan 2025 07:00)  Weight (kg): 77 (06 Jan 2025 07:00)  BMI (kg/m2): 30.1 (06 Jan 2025 07:00)  BSA (m2): 1.8 (06 Jan 2025 07:00)      CAPILLARY BLOOD GLUCOSE      POCT Blood Glucose.: 134 mg/dL (06 Jan 2025 11:43)  POCT Blood Glucose.: 112 mg/dL (06 Jan 2025 06:17)          LABS:  cret              aMIOdarone    Tablet 200 milliGRAM(s) Oral daily  aspirin enteric coated 81 milliGRAM(s) Oral daily  atorvastatin 10 milliGRAM(s) Oral at bedtime  ceFAZolin   IVPB 2000 milliGRAM(s) IV Intermittent every 8 hours  dextrose 5%. 1000 milliLiter(s) IV Continuous <Continuous>  dextrose 50% Injectable 25 Gram(s) IV Push once  dextrose Oral Gel 15 Gram(s) Oral once PRN  glucagon  Injectable 1 milliGRAM(s) IntraMuscular once  insulin lispro (ADMELOG) corrective regimen sliding scale   SubCutaneous three times a day before meals  levothyroxine 75 MICROGram(s) Oral daily  pantoprazole    Tablet 40 milliGRAM(s) Oral before breakfast  torsemide 10 milliGRAM(s) Oral daily      PHYSICAL EXAM    Subjective: "Hi.   Neurology: alert and oriented x 3, nonfocal, no gross deficits  CV : tele:  RSR  Sternal Wound :  CDI with dressing , Stable  Lungs: clear. RR easy, unlabored   Abdomen: soft, nontender, nondistended, positive bowel sounds, bowel movement   Neg N/V/D   :  pt voiding without difficulty   Extremities:   STARR; edema, neg calf tenderness.   PPP bilaterally      PW:  Chest tubes:                 VITAL SIGNS    Telemetry:  v.PACED 58-59   Vital Signs Last 24 Hrs  T(C): 36.5 (01-06-25 @ 15:00), Max: 37 (01-06-25 @ 06:37)  T(F): 97.7 (01-06-25 @ 15:00), Max: 98.6 (01-06-25 @ 06:37)  HR: 59 (01-06-25 @ 15:00) (59 - 69)  BP: 106/61 (01-06-25 @ 15:00) (95/54 - 164/77)  RR: 18 (01-06-25 @ 15:00) (16 - 18)  SpO2: 95% (01-06-25 @ 15:00) (92% - 99%)             Daily Height in cm: 160 (06 Jan 2025 07:00)    Daily   Admit Wt: Drug Dosing Weight  Height (cm): 160 (06 Jan 2025 07:00)  Weight (kg): 77 (06 Jan 2025 07:00)  BMI (kg/m2): 30.1 (06 Jan 2025 07:00)  BSA (m2): 1.8 (06 Jan 2025 07:00)      CAPILLARY BLOOD GLUCOSE      POCT Blood Glucose.: 134 mg/dL (06 Jan 2025 11:43)  POCT Blood Glucose.: 112 mg/dL (06 Jan 2025 06:17)          LABS:  cret              aMIOdarone    Tablet 200 milliGRAM(s) Oral daily  aspirin enteric coated 81 milliGRAM(s) Oral daily  atorvastatin 10 milliGRAM(s) Oral at bedtime  ceFAZolin   IVPB 2000 milliGRAM(s) IV Intermittent every 8 hours  dextrose 5%. 1000 milliLiter(s) IV Continuous <Continuous>  dextrose 50% Injectable 25 Gram(s) IV Push once  dextrose Oral Gel 15 Gram(s) Oral once PRN  glucagon  Injectable 1 milliGRAM(s) IntraMuscular once  insulin lispro (ADMELOG) corrective regimen sliding scale   SubCutaneous three times a day before meals  levothyroxine 75 MICROGram(s) Oral daily  pantoprazole    Tablet 40 milliGRAM(s) Oral before breakfast  torsemide 10 milliGRAM(s) Oral daily      PHYSICAL EXAM    Subjective: "Hi.   Neurology: alert and oriented x 3, nonfocal, no gross deficits  CV : tele:  RSR    Lungs: clear. RR easy, unlabored   Abdomen: soft, nontender, nondistended, positive bowel sounds, bowel movement   Neg N/V/D   :  pt voiding without difficulty   Extremities:   STARR; edema, neg calf tenderness.   PPP bilaterally                      VITAL SIGNS    Telemetry:  v.PACED 58-59   Vital Signs Last 24 Hrs  T(C): 36.5 (01-06-25 @ 15:00), Max: 37 (01-06-25 @ 06:37)  T(F): 97.7 (01-06-25 @ 15:00), Max: 98.6 (01-06-25 @ 06:37)  HR: 59 (01-06-25 @ 15:00) (59 - 69)  BP: 106/61 (01-06-25 @ 15:00) (95/54 - 164/77)  RR: 18 (01-06-25 @ 15:00) (16 - 18)  SpO2: 95% (01-06-25 @ 15:00) (92% - 99%)             Daily Height in cm: 160 (06 Jan 2025 07:00)    Daily   Admit Wt: Drug Dosing Weight  Height (cm): 160 (06 Jan 2025 07:00)  Weight (kg): 77 (06 Jan 2025 07:00)  BMI (kg/m2): 30.1 (06 Jan 2025 07:00)  BSA (m2): 1.8 (06 Jan 2025 07:00)      CAPILLARY BLOOD GLUCOSE      POCT Blood Glucose.: 134 mg/dL (06 Jan 2025 11:43)  POCT Blood Glucose.: 112 mg/dL (06 Jan 2025 06:17)          LABS:  cret        aMIOdarone    Tablet 200 milliGRAM(s) Oral daily  aspirin enteric coated 81 milliGRAM(s) Oral daily  atorvastatin 10 milliGRAM(s) Oral at bedtime  ceFAZolin   IVPB 2000 milliGRAM(s) IV Intermittent every 8 hours  dextrose 5%. 1000 milliLiter(s) IV Continuous <Continuous>  dextrose 50% Injectable 25 Gram(s) IV Push once  dextrose Oral Gel 15 Gram(s) Oral once PRN  glucagon  Injectable 1 milliGRAM(s) IntraMuscular once  insulin lispro (ADMELOG) corrective regimen sliding scale   SubCutaneous three times a day before meals  levothyroxine 75 MICROGram(s) Oral daily  pantoprazole    Tablet 40 milliGRAM(s) Oral before breakfast  torsemide 10 milliGRAM(s) Oral daily      PHYSICAL EXAM    Subjective: "I feel good."   Neurology: alert and oriented x 3, nonfocal, no gross deficits  CV : tele:  V. paced 58-59  Lungs: clear. RR easy, unlabored   Abdomen: soft, nontender, nondistended, positive bowel sounds, neg bowel movement   Neg N/V/D   :  pt voiding without difficulty   Extremities:   STARR; trace LE edema, neg calf tenderness.   PPP bilaterally; left groin cdi w/ dressing - neg hematoma/bleeding

## 2025-01-06 NOTE — CONSULT NOTE ADULT - SUBJECTIVE AND OBJECTIVE BOX
HPI: Ms. Weston is a 79 year-old woman with history of multiple medical issues inlcuding hypertension, type 2 diabetes mellitus, renal cell carcinoma s/p partial nephrectomy, and aortic stenosis. She presented today for scheduled transcatheter TVR with Dr. Caleb Robles. She is known to me from the office setting; I saw her 24 as a new consult for CKD.        Medical History  HTN  Diabetes mellitus type 2  Hypothyroid  renal cell carcinoma   Aortic Stenosis  right bundle branch block  Atrial Fibrillation  Tricuspid regurgitation        Surgical History  Aortic Valve Replacement  PPM - leadless  Left partial nephrectomy     Family History  ADPKD -   at 47; son (+)ADPKD==>renal transplant     Social History  Tobacco: Never smoker.  Alcohol: Denies Usage  Drug: Denies Usage     Allergies  Allergy: sulfa (Drug)    SOCIAL HISTORY:  Denies ETOh,Smoking,     FAMILY HISTORY:  FH: renal cell carcinoma (Child)    REVIEW OF SYSTEMS:  CONSTITUTIONAL: No weakness, fevers or chills  EYES/ENT: No visual changes;  No vertigo or throat pain   NECK: No pain or stiffness  RESPIRATORY: No cough, wheezing, hemoptysis; No shortness of breath  CARDIOVASCULAR: No chest pain or palpitations  GASTROINTESTINAL: No abdominal or epigastric pain. No nausea, vomiting, or hematemesis; No diarrhea or constipation. No melena or hematochezia.  GENITOURINARY: No dysuria, frequency or hematuria  NEUROLOGICAL: No numbness or weakness  SKIN: No itching, burning, rashes, or lesions   All other review of systems is negative unless indicated above.    VITAL:  T(C): , Max: 37 (25 @ 06:37)  T(F): , Max: 98.6 (25 @ 06:37)  HR: 60 (25 @ 11:10)  BP: 105/53 (25 @ 11:10)  BP(mean): 76 (25 @ 11:10)  RR: 18 (25 @ 11:10)  SpO2: 98% (25 @ 11:10)  Wt(kg): --    PHYSICAL EXAM:  Constitutional: NAD, Alert  HEENT: NCAT, MMM  Neck: Supple, No JVD  Respiratory: CTA-b/l  Cardiovascular: RRR s1s2, no m/r/g  Gastrointestinal: BS+, soft, NT/ND  Extremities: No peripheral edema b/l  Neurological: no focal deficits; strength grossly intact  Back: no CVAT b/l  Skin: No rashes, no nevi    LABS:  Basic Metabolic Panel (12.16.24 @ 12:32)   Sodium: 145 mmol/L  Potassium: 4.4 mmol/L  Chloride: 107 mmol/L  Carbon Dioxide: 23 mmol/L  Anion Gap: 15 mmol/L  Blood Urea Nitrogen: 30 mg/dL  Creatinine: 1.19 mg/dL  Glucose: 99 mg/dL  Calcium: 9.8 mg/dL  eGFR: 47: The estimated glomerular filtration rate (eGFR) calculation is based on       ASSESSMENT:  (1)Renal - CKD3a - from hypertension/atherosclerotic disease, parencyhmal loss from RCC resection, and from component of chronic prerenal azotemia in association with valvular disease  (2)CV - acceptable BP/volume  (3)Structural Heart - POD#0 s/p TTVR    RECOMMEND:  (1)Meds as ordered/dose new meds for GFR 60ml/min  (2)BMP in a.m.  (3)Can f/u at my [place] office 25 at 920a as previously scheduled    Thank you for involving [facility] in this patient's care.    With warm regards,    John Rocha MD   [facility]  Office: [phone]  Cell: [phone]             HPI: Ms. Weston is a 79 year-old woman with history of multiple medical issues inlcuding hypertension, type 2 diabetes mellitus, renal cell carcinoma s/p partial nephrectomy, and aortic stenosis. She presented today for scheduled transcatheter TVR with Dr. Caleb Robles. She is known to me from the office setting; I saw her 24 as a new consult for CKD.    Seen in PACU. Ms. Weston attests to shortness of breath and generalized weakness prior to her TTVR. She feels well at present, s/p procedure today.    Medical History  HTN  Diabetes mellitus type 2  Hypothyroid  renal cell carcinoma   Aortic Stenosis  right bundle branch block  Atrial Fibrillation  Tricuspid regurgitation        Surgical History  Aortic Valve Replacement  PPM - leadless  Left partial nephrectomy     Family History  ADPKD -   at 47; son (+)ADPKD==>renal transplant     Social History  Tobacco: Never smoker.  Alcohol: Denies Usage  Drug: Denies Usage     Allergies  Allergy: sulfa (Drug)    SOCIAL HISTORY:  Denies ETOh,Smoking,     FAMILY HISTORY:  FH: renal cell carcinoma (Child)    REVIEW OF SYSTEMS:  CONSTITUTIONAL: No weakness, fevers or chills  EYES/ENT: No visual changes;  No vertigo or throat pain   NECK: No pain or stiffness  RESPIRATORY: No cough, wheezing, hemoptysis; No shortness of breath  CARDIOVASCULAR: No chest pain or palpitations  GASTROINTESTINAL: No abdominal or epigastric pain. No nausea, vomiting, or hematemesis; No diarrhea or constipation. No melena or hematochezia.  GENITOURINARY: No dysuria, frequency or hematuria  NEUROLOGICAL: No numbness or weakness  SKIN: No itching, burning, rashes, or lesions   All other review of systems is negative unless indicated above.    VITAL:  T(C): , Max: 37 (25 @ 06:37)  T(F): , Max: 98.6 (25 @ 06:37)  HR: 60 (25 @ 11:10)  BP: 105/53 (25 @ 11:10)  BP(mean): 76 (25 @ 11:10)  RR: 18 (25 @ 11:10)  SpO2: 98% (25 @ 11:10)  Wt(kg): --    PHYSICAL EXAM:  Constitutional: NAD, Alert  HEENT: NCAT, MMM  Neck: Supple, No JVD  Respiratory: CTA-b/l  Cardiovascular: RRR s1s2, no m/r/g  Gastrointestinal: BS+, soft, NT/ND  Extremities: No peripheral edema b/l  Neurological: no focal deficits; strength grossly intact  Back: no CVAT b/l  Skin: No rashes, no nevi    LABS:  Basic Metabolic Panel (12.16.24 @ 12:32)   Sodium: 145 mmol/L  Potassium: 4.4 mmol/L  Chloride: 107 mmol/L  Carbon Dioxide: 23 mmol/L  Anion Gap: 15 mmol/L  Blood Urea Nitrogen: 30 mg/dL  Creatinine: 1.19 mg/dL  Glucose: 99 mg/dL  Calcium: 9.8 mg/dL  eGFR: 47: The estimated glomerular filtration rate (eGFR) calculation is based on       ASSESSMENT:  (1)Renal - CKD3a - from hypertension/atherosclerotic disease, parencyhmal loss from RCC resection, and from component of chronic prerenal azotemia in association with valvular disease  (2)CV - acceptable BP/volume  (3)Structural Heart - POD#0 s/p TTVR    RECOMMEND:  (1)Meds as ordered/dose new meds for GFR 60ml/min  (2)BMP in a.m.  (3)Can f/u at my [place] office 25 at 920a as previously scheduled    Thank you for involving [facility] in this patient's care.    With warm regards,    John Rocha MD   [facility]  Office: [phone]  Cell: [phone]             HPI: Ms. Weston is a 79 year-old woman with history of multiple medical issues inlcuding hypertension, type 2 diabetes mellitus, renal cell carcinoma s/p partial nephrectomy, and aortic stenosis. She presented today for scheduled transcatheter TVR with Dr. Caleb Robles. She is known to me from the office setting; I saw her 24 as a new consult for CKD.    Seen in PACU. Ms. Weston attests to shortness of breath and generalized weakness prior to her TTVR. She feels well at present, s/p procedure today.    Medical History  HTN  Diabetes mellitus type 2  Hypothyroid  renal cell carcinoma   Aortic Stenosis  right bundle branch block  Atrial Fibrillation  Tricuspid regurgitation        Surgical History  Aortic Valve Replacement  PPM - leadless  Left partial nephrectomy     Family History  ADPKD -   at 47; son (+)ADPKD==>renal transplant     Social History  Tobacco: Never smoker.  Alcohol: Denies Usage  Drug: Denies Usage     Allergies  Allergy: sulfa (Drug)    SOCIAL HISTORY:  Denies ETOh,Smoking,     FAMILY HISTORY:  FH: renal cell carcinoma (Child)    REVIEW OF SYSTEMS:  CONSTITUTIONAL: No weakness, fevers or chills  EYES/ENT: No visual changes;  No vertigo or throat pain   NECK: No pain or stiffness  RESPIRATORY: No cough, wheezing, hemoptysis; No shortness of breath  CARDIOVASCULAR: No chest pain or palpitations  GASTROINTESTINAL: No abdominal or epigastric pain. No nausea, vomiting, or hematemesis; No diarrhea or constipation. No melena or hematochezia.  GENITOURINARY: No dysuria, frequency or hematuria  NEUROLOGICAL: No numbness or weakness  SKIN: No itching, burning, rashes, or lesions   All other review of systems is negative unless indicated above.    VITAL:  T(C): , Max: 37 (25 @ 06:37)  T(F): , Max: 98.6 (25 @ 06:37)  HR: 60 (25 @ 11:10)  BP: 105/53 (25 @ 11:10)  BP(mean): 76 (25 @ 11:10)  RR: 18 (25 @ 11:10)  SpO2: 98% (25 @ 11:10)  Wt(kg): --    PHYSICAL EXAM:  Constitutional: NAD, Alert  HEENT: NCAT, MMM  Neck: Supple, No JVD  Respiratory: CTA-b/l  Cardiovascular: RRR s1s2, no m/r/g  Gastrointestinal: BS+, soft, NT/ND  Extremities: No peripheral edema b/l  Neurological: no focal deficits; strength grossly intact  Back: no CVAT b/l  Skin: No rashes, no nevi    LABS:  Basic Metabolic Panel (12.16.24 @ 12:32)   Sodium: 145 mmol/L  Potassium: 4.4 mmol/L  Chloride: 107 mmol/L  Carbon Dioxide: 23 mmol/L  Anion Gap: 15 mmol/L  Blood Urea Nitrogen: 30 mg/dL  Creatinine: 1.19 mg/dL  Glucose: 99 mg/dL  Calcium: 9.8 mg/dL  eGFR: 47: The estimated glomerular filtration rate (eGFR) calculation is based on       ASSESSMENT:  (1)Renal - CKD3a - from hypertension/atherosclerotic disease, parencyhmal loss from RCC resection, and from component of chronic prerenal azotemia in association with valvular disease  (2)CV - acceptable BP/volume  (3)Structural Heart - POD#0 s/p TTVR    RECOMMEND:  (1)Meds as ordered/dose new meds for GFR 50ml/min  (2)BMP in a.m.  (3)Can f/u at my [place] office 25 at 920a as previously scheduled    Thank you for involving [facility] in this patient's care.    With warm regards,    John Rocha MD   [facility]  Office: [phone]  Cell: [phone]

## 2025-01-06 NOTE — PRE-ANESTHESIA EVALUATION ADULT - NSRADCARDRESULTSFT_GEN_ALL_CORE
< from: TIM W or WO Ultrasound Enhancing Agent (07.08.24 @ 08:34) >    CONCLUSIONS:      1. Left ventricular cavity is normal in size. Left ventricular systolic function is normal with an ejection fraction visually estimated at 60 to 65 %. There are no regional wall motion abnormalities seen.   2. Severely enlarged right ventricular cavity size and normal right ventricular systolic function.   3. A bioprosthetic valve replacement is present in the aortic position. The peak transaortic velocity is 3.34 m/s, peak transaortic gradient is 44.6 mmHg and mean transaortic gradient is 23.8 mmHg with an LVOT/aortic valve VTI ratio of 0.35. The aortic valve acceleration time is 134 msec. The effective orifice area is estimated at 0.90 cm² by the continuity equation. There is no intravalvular regurgitation. There is no paravalvular regurgitation. There is possibly some degeneration of the aortic bioprosthesis. However, hemodynamic measurements are similar to studies dating back to 2022. The intra-operative mean gradient was 16mmHg. There are no interval studies between 2016 and 2022 to compare to.   4. There is calcification of the mitral valve annulus. There is calcific mitral annular and leaflet disease. The transmitral peak gradient is 7.5 mmHg and mean gradient is 3.60 mmHg at a heart rate of 62 bpm. The MVOA by 3D planimetry= 2.63cm2. There is mild mitral regurgitation. The pulmonary veins are systolic dominant in the right and mildly blunted in the left. The total average 3D VCA= 13mm2.   5. The tricuspid valve is quadricuspid. The leaflets are tethered with malcoaptation.      The peak/mean diastolic tricuspid gradients= 3/0.9mmHg, HR= 68bpm. There is torrential tricuspid regurgitation.      The tricuspid regurgitation jet is extensive extending nearly the entire anterior to posterior extent of the valve coaptation with regurgitation between the [id number], P1 and P2 coaptations. The coaptation gaps range between 5-9mm. The etiology is secondary/functional tricuspid regurgitation (both atrial and ventricular).   6. Pulmonary artery systolic pressure may be underestimated due to severe tricuspid regurgitation.   7. Compared to the transthoracic echocardiogram performed on 3/13/2023, the tricuspid regurgitation is now torrential.    < end of copied text >

## 2025-01-06 NOTE — PATIENT PROFILE ADULT - FALL HARM RISK - UNIVERSAL INTERVENTIONS
Bed in lowest position, wheels locked, appropriate side rails in place/Call bell, personal items and telephone in reach/Instruct patient to call for assistance before getting out of bed or chair/Non-slip footwear when patient is out of bed/[place] to call system/Physically safe environment - no spills, clutter or unnecessary equipment/Purposeful Proactive Rounding/Room/bathroom lighting operational, light cord in reach

## 2025-01-06 NOTE — CHART NOTE - NSCHARTNOTEFT_GEN_A_CORE
Signout as per Dr. Robles as follows:    Procedure: TTVR  Anesthesia Type: General  Valve: 48 mm Castanon Evoque THV    Access:  Left femoral Vein 24 F catheter closed with Mattress suture    Arrived to PACU at 1110 s/p TAVR under conscious sedation  Patient is alert and oriented and answering questions appropriately  Denies pain or SOB.    T(C): 36.4 (01-06-25 @ 11:00), Max: 37 (01-06-25 @ 06:37)  HR: 60 (01-06-25 @ 11:10) (59 - 69)  BP: 105/53 (01-06-25 @ 11:10) (105/51 - 164/77)  RR: 18 (01-06-25 @ 11:10) (16 - 18)  SpO2: 98% (01-06-25 @ 11:10) (97% - 99%)    Pre OP EKG:    Paced  Post Op EKG:  Paced                        aMIOdarone    Tablet 200 milliGRAM(s) Oral daily  aspirin enteric coated 81 milliGRAM(s) Oral daily  atorvastatin 10 milliGRAM(s) Oral at bedtime  ceFAZolin   IVPB 2000 milliGRAM(s) IV Intermittent every 8 hours  cefuroxime  IVPB 1500 milliGRAM(s) IV Intermittent once  dextrose 5%. 1000 milliLiter(s) IV Continuous <Continuous>  dextrose 50% Injectable 25 Gram(s) IV Push once  dextrose Oral Gel 15 Gram(s) Oral once PRN  glucagon  Injectable 1 milliGRAM(s) IntraMuscular once  HYDROmorphone  Injectable 0.5 milliGRAM(s) IV Push every 10 minutes PRN  HYDROmorphone  Injectable 1 milliGRAM(s) IV Push every 10 minutes PRN  insulin lispro (ADMELOG) corrective regimen sliding scale   SubCutaneous three times a day before meals  levothyroxine 75 MICROGram(s) Oral daily  metoprolol tartrate 75 milliGRAM(s) Oral daily  ondansetron Injectable 4 milliGRAM(s) IV Push once PRN  pantoprazole    Tablet 40 milliGRAM(s) Oral before breakfast  torsemide 10 milliGRAM(s) Oral daily      Physical Exam:  Gen: A/Ox3, TRACY, NAD, Follows Commands  Pulm: CTA anteriorly and laterally  Card: S1S2 RRR, No Murmur noted  Abd: soft non tender +BS  b/l groin - C/D/I with dressing, Soft, Non Tender, no hematoma or bleeding. +2 pulses   Neuro: Awake, Non-focal    Plan   77 y/o female s/p TTVR with 48 mm Evoque THV for severe TR, Chronic HFpEF    1.) S/P TTVR , Monitor Pulse and Neuro per protocol, continue to monitor access site    - Resume ASA in 6 hours   2.) Antibiotics: Ancef 2 Grams mg IV every 8 hours x 2 doses as ordered  3.) Echo and 12 lead ECG ordered for AM  4.) Patient has PPM, no need for MCOT on discharge  5.) Home medications Reviewed:          Restarted:                levothyroxine 75 mcg po daily                simvastatin 20 mg po qHS                torsemide 10 mg po daily for her HFpEF                metoprolol tartrate 75 mg po daily to start 1/7/2025                amiodarone 200 mg po daily                Protonix 40 mg QD      Signout given to [facility] @

## 2025-01-07 ENCOUNTER — RESULT REVIEW (OUTPATIENT)
Age: 80
End: 2025-01-07

## 2025-01-07 ENCOUNTER — RX RENEWAL (OUTPATIENT)
Age: 80
End: 2025-01-07

## 2025-01-07 ENCOUNTER — TRANSCRIPTION ENCOUNTER (OUTPATIENT)
Age: 80
End: 2025-01-07

## 2025-01-07 VITALS
RESPIRATION RATE: 18 BRPM | DIASTOLIC BLOOD PRESSURE: 61 MMHG | SYSTOLIC BLOOD PRESSURE: 109 MMHG | OXYGEN SATURATION: 100 % | TEMPERATURE: 98 F | HEART RATE: 57 BPM

## 2025-01-07 LAB
ANION GAP SERPL CALC-SCNC: 13 MMOL/L — SIGNIFICANT CHANGE UP (ref 5–17)
BUN SERPL-MCNC: 27 MG/DL — HIGH (ref 7–23)
CALCIUM SERPL-MCNC: 9.4 MG/DL — SIGNIFICANT CHANGE UP (ref 8.4–10.5)
CHLORIDE SERPL-SCNC: 105 MMOL/L — SIGNIFICANT CHANGE UP (ref 96–108)
CO2 SERPL-SCNC: 25 MMOL/L — SIGNIFICANT CHANGE UP (ref 22–31)
CREAT SERPL-MCNC: 1.26 MG/DL — SIGNIFICANT CHANGE UP (ref 0.5–1.3)
EGFR: 44 ML/MIN/1.73M2 — LOW
GLUCOSE BLDC GLUCOMTR-MCNC: 130 MG/DL — HIGH (ref 70–99)
GLUCOSE BLDC GLUCOMTR-MCNC: 137 MG/DL — HIGH (ref 70–99)
GLUCOSE SERPL-MCNC: 126 MG/DL — HIGH (ref 70–99)
HCT VFR BLD CALC: 29 % — LOW (ref 34.5–45)
HGB BLD-MCNC: 9.3 G/DL — LOW (ref 11.5–15.5)
MCHC RBC-ENTMCNC: 28.5 PG — SIGNIFICANT CHANGE UP (ref 27–34)
MCHC RBC-ENTMCNC: 32.1 G/DL — SIGNIFICANT CHANGE UP (ref 32–36)
MCV RBC AUTO: 89 FL — SIGNIFICANT CHANGE UP (ref 80–100)
NRBC # BLD: 0 /100 WBCS — SIGNIFICANT CHANGE UP (ref 0–0)
PLATELET # BLD AUTO: 140 K/UL — LOW (ref 150–400)
POTASSIUM SERPL-MCNC: 4.2 MMOL/L — SIGNIFICANT CHANGE UP (ref 3.5–5.3)
POTASSIUM SERPL-SCNC: 4.2 MMOL/L — SIGNIFICANT CHANGE UP (ref 3.5–5.3)
RBC # BLD: 3.26 M/UL — LOW (ref 3.8–5.2)
RBC # FLD: 16.3 % — HIGH (ref 10.3–14.5)
SODIUM SERPL-SCNC: 143 MMOL/L — SIGNIFICANT CHANGE UP (ref 135–145)
WBC # BLD: 8.91 K/UL — SIGNIFICANT CHANGE UP (ref 3.8–10.5)
WBC # FLD AUTO: 8.91 K/UL — SIGNIFICANT CHANGE UP (ref 3.8–10.5)

## 2025-01-07 PROCEDURE — L8699: CPT

## 2025-01-07 PROCEDURE — C1760: CPT

## 2025-01-07 PROCEDURE — 99223 1ST HOSP IP/OBS HIGH 75: CPT

## 2025-01-07 PROCEDURE — C1769: CPT

## 2025-01-07 PROCEDURE — 93355 ECHO TRANSESOPHAGEAL (TEE): CPT

## 2025-01-07 PROCEDURE — 93010 ELECTROCARDIOGRAM REPORT: CPT

## 2025-01-07 PROCEDURE — C9399: CPT

## 2025-01-07 PROCEDURE — 86923 COMPATIBILITY TEST ELECTRIC: CPT

## 2025-01-07 PROCEDURE — 76000 FLUOROSCOPY <1 HR PHYS/QHP: CPT

## 2025-01-07 PROCEDURE — 82962 GLUCOSE BLOOD TEST: CPT

## 2025-01-07 PROCEDURE — 93005 ELECTROCARDIOGRAM TRACING: CPT

## 2025-01-07 PROCEDURE — 36415 COLL VENOUS BLD VENIPUNCTURE: CPT

## 2025-01-07 PROCEDURE — 85027 COMPLETE CBC AUTOMATED: CPT

## 2025-01-07 PROCEDURE — 86900 BLOOD TYPING SEROLOGIC ABO: CPT

## 2025-01-07 PROCEDURE — C1894: CPT

## 2025-01-07 PROCEDURE — 86901 BLOOD TYPING SEROLOGIC RH(D): CPT

## 2025-01-07 PROCEDURE — 93306 TTE W/DOPPLER COMPLETE: CPT | Mod: 26

## 2025-01-07 PROCEDURE — 86850 RBC ANTIBODY SCREEN: CPT

## 2025-01-07 PROCEDURE — 93306 TTE W/DOPPLER COMPLETE: CPT

## 2025-01-07 PROCEDURE — C1887: CPT

## 2025-01-07 PROCEDURE — C1766: CPT

## 2025-01-07 PROCEDURE — 80048 BASIC METABOLIC PNL TOTAL CA: CPT

## 2025-01-07 RX ADMIN — AMIODARONE HYDROCHLORIDE 200 MILLIGRAM(S): 200 TABLET ORAL at 06:24

## 2025-01-07 RX ADMIN — PANTOPRAZOLE 40 MILLIGRAM(S): 40 TABLET, DELAYED RELEASE ORAL at 06:24

## 2025-01-07 RX ADMIN — TORSEMIDE 10 MILLIGRAM(S): 10 TABLET ORAL at 06:24

## 2025-01-07 RX ADMIN — Medication 75 MILLIGRAM(S): at 06:25

## 2025-01-07 RX ADMIN — LEVOTHYROXINE SODIUM 75 MICROGRAM(S): 175 TABLET ORAL at 06:24

## 2025-01-07 NOTE — DISCHARGE NOTE PROVIDER - NSDCFUSCHEDAPPT_GEN_ALL_CORE_FT
[facility]  CARDIOLOGY [address] [place] [address]  Scheduled Appointment: 01/27/2025    Efra Jones  [facility]  CARDIOLOGY [address] [place] [address]  Scheduled Appointment: 02/20/2025

## 2025-01-07 NOTE — DISCHARGE NOTE PROVIDER - HOSPITAL COURSE
79 year old female with hx of hypothyroidism ,Diabetes Mellitus type 2 (oral meds), Benign Hypertension, Aortic  Valve disease ( Aortic valve Replacement  2016) Complete heart block pacemaker placed 2016. Dx with tricuspid valve disease had  pacemaker  changed to micra 2022. Now for tricuspid valve surgery.  s/p 1/6/25 TTVR  postop course unremarkable  tx 2cohen fl/ VSS; V. paced @ 58; left groin stable with dressing  ck ekg/ echo in am  no mcot - pt has hx of PPM   resume preop meds  renal consulted for hx for RCC- left partial nephrectomy 2015 1/7 VSS  EKG -  TTE      Left groin stitch removed

## 2025-01-07 NOTE — DISCHARGE NOTE NURSING/CASE MANAGEMENT/SOCIAL WORK - NSDCPEFALRISK_GEN_ALL_CORE
For information on Fall & Injury Prevention, visit: [email or web address] OR  [email or web address] OR  https://www.cdc.gov/steadi/patient.html

## 2025-01-07 NOTE — DISCHARGE NOTE NURSING/CASE MANAGEMENT/SOCIAL WORK - PATIENT PORTAL LINK FT
You can access the FollowMyHealth Patient Portal offered by [facility] by registering at the following website: [email or web address]. By joining [facility]’s FollowMyHealth portal, you will also be able to view your health information using other applications (apps) compatible with our system.

## 2025-01-07 NOTE — DISCHARGE NOTE PROVIDER - CARE PROVIDER_API CALL
Caleb Robles  Interventional Cardiology  [address] [place] [id number]  Phone: [phone]  Fax: [phone]  Established Patient  Scheduled Appointment: 01/14/2025 01:00 PM

## 2025-01-07 NOTE — DISCHARGE NOTE PROVIDER - NSDCPNSUBOBJ_GEN_ALL_CORE
General: WN/WD NAD  Neurology: A&Ox3, nonfocal, STARR x 4  Respiratory: CTA B/L  CV: RRR, S1S2, no murmur  Abdominal: Soft, NT, ND no palpable mass  MSK: trace to 1+ edema b/l  + peripheral pulses, FROM all 4 extremity.  left groin stitch removed slight oozing pressure dress  Vital Signs Last 24 Hrs  T(C): 36.8 (07 Jan 2025 06:33), Max: 37.1 (06 Jan 2025 16:30)  T(F): 98.2 (07 Jan 2025 06:33), Max: 98.8 (06 Jan 2025 16:30)  HR: 63 (07 Jan 2025 07:34) (58 - 63)  BP: 112/63 (07 Jan 2025 06:33) (95/54 - 120/58)  BP(mean): 77 (06 Jan 2025 16:30) (72 - 84)  RR: 18 (07 Jan 2025 06:33) (18 - 18)  SpO2: 97% (07 Jan 2025 06:33) (92% - 99%)    Parameters below as of 07 Jan 2025 06:33  Patient On (Oxygen Delivery Method): room air

## 2025-01-07 NOTE — CONSULT NOTE ADULT - SUBJECTIVE AND OBJECTIVE BOX
[facility] - Robson Lopez, Marvin Casey Savella, Cohen  Office Number: [phone]    Initial Consult Note    CHIEF COMPLAINT: Patient is a 79y old  Female who presents with a chief complaint of tricuspid valve surgery     HPI:  79 year old female with hx of hypothyroidism ,Diabetes Mellitus type 2 (oral meds), Hypertension, Aortic  Valve disease ( Aortic valve Replacement  2016) Complete heart block pacemaker placed 2016. Dx with tricuspid valve disease had  pacemaker  changed to micra 2022. Now for tricuspid valve replacement  She is now s/p transcatheter TV replacement  She is doing very well this AM and has no complaints  She is on RA      PAST MEDICAL & SURGICAL HISTORY:  Hypertension  x10 years      Dyslipidemia      Hiatal hernia      Type II diabetes mellitus  well controlled      Aortic stenosis  s/p AVR      Cancer of kidney, left  2015      Right bundle branch block (RBBB)      Anemia  between June and July 2022 has received a total of 4 units PRBC and 2 iron infusions      Hypothyroidism      History of complete heart block      Tricuspid insufficiency      Status post unilateral salpingo-oophorectomy  1980's      S/P T&A (status post tonsillectomy and adenoidectomy)  as a child      H/O hand surgery  left due to injury      H/O partial nephrectomy  right 5/015      H/O aortic valve replacement  5/2016          SOCIAL HISTORY:  No tobacco, ethanol, or drug abuse.    FAMILY HISTORY:  FH: renal cell carcinoma (Child)      No family history of acute MI or sudden cardiac death.    MEDICATIONS  (STANDING):  aMIOdarone    Tablet 200 milliGRAM(s) Oral daily  aspirin enteric coated 81 milliGRAM(s) Oral daily  atorvastatin 10 milliGRAM(s) Oral at bedtime  dextrose 5%. 1000 milliLiter(s) (50 mL/Hr) IV Continuous <Continuous>  dextrose 50% Injectable 25 Gram(s) IV Push once  glucagon  Injectable 1 milliGRAM(s) IntraMuscular once  insulin lispro (ADMELOG) corrective regimen sliding scale   SubCutaneous three times a day before meals  levothyroxine 75 MICROGram(s) Oral daily  metoprolol succinate ER 75 milliGRAM(s) Oral daily  pantoprazole    Tablet 40 milliGRAM(s) Oral before breakfast  torsemide 10 milliGRAM(s) Oral daily    MEDICATIONS  (PRN):  dextrose Oral Gel 15 Gram(s) Oral once PRN Blood Glucose LESS THAN 70 milliGRAM(s)/deciliter      Allergies    sulfa drugs (Short breath)    Intolerances        REVIEW OF SYSTEMS:    CONSTITUTIONAL: No weakness, fevers or chills  EYES/ENT: No visual changes;  No vertigo or throat pain   NECK: No pain or stiffness  RESPIRATORY: No cough, wheezing, hemoptysis; No shortness of breath  CARDIOVASCULAR: No chest pain or palpitations  GASTROINTESTINAL: No abdominal pain. No nausea, vomiting, or hematemesis; No diarrhea or constipation. No melena or hematochezia.  GENITOURINARY: No dysuria, frequency or hematuria  NEUROLOGICAL: No numbness or weakness  SKIN: No itching or rash  All other review of systems is negative unless indicated above    VITAL SIGNS:   Vital Signs Last 24 Hrs  T(C): 36.8 (07 Jan 2025 06:33), Max: 37.1 (06 Jan 2025 16:30)  T(F): 98.2 (07 Jan 2025 06:33), Max: 98.8 (06 Jan 2025 16:30)  HR: 63 (07 Jan 2025 07:34) (58 - 63)  BP: 112/63 (07 Jan 2025 06:33) (95/54 - 120/58)  BP(mean): 77 (06 Jan 2025 16:30) (72 - 84)  RR: 18 (07 Jan 2025 06:33) (18 - 18)  SpO2: 97% (07 Jan 2025 06:33) (92% - 99%)    Parameters below as of 07 Jan 2025 06:33  Patient On (Oxygen Delivery Method): room air        I&O's Summary      On Exam:    Constitutional: NAD, alert and oriented x 3  Lungs:  Non-labored, breath sounds are clear bilaterally, No wheezing, rales or rhonchi  Cardiovascular: RRR.  S1 and S2 positive.  No murmurs, rubs, gallops or clicks  Gastrointestinal: Bowel Sounds present, soft, nontender.   Lymph: trace-1+ peripheral edema. No cervical lymphadenopathy.  Neurological: Alert, no focal deficits  Skin: No rashes or ulcers   Psych:  Mood & affect appropriate.    LABS: All Labs Reviewed:                        9.3    8.91  )-----------( 140      ( 07 Jan 2025 06:33 )             29.0     07 Jan 2025 06:32    143    |  105    |  27     ----------------------------<  126    4.2     |  25     |  1.26     Ca    9.4        07 Jan 2025 06:32            Blood Culture:         RADIOLOGY:    EKG:     Intraop TMI:  CONCLUSIONS:   1. TIM imaging and guidance were provided during transcatheter tricuspid valve replacement (TTVR): EVOQUE.   2. 42mm Evoque transcathetertricuspid valve (TTVR) which was well positioned an seated.      The peak/mean diastolic tricuspid gradients= 2.3/1.5mmHg (HR= 64bpm)      There was trace intravalvular tricuspid regurgitation (total avereage 3D VCA= 9mm2). There was trace paravalvular tricuspid regurgitation (total avereage 3D VCA of the two trace neighboring paravalvular jets= 4mm2).   3. The right ventricle was severely dilated with moderately reduced function.   4. Please see the rest of the post-procedural findings below.  Pre-Procedure Note:  Of note, compared to the TIM from 7/2024, the tricuspid regurgitation was worse.  TV annuluar and RV basal measurements were confirmed by 3D.    Structural heart 1/6/25:  Conclusions     TTVR was performed with a 48mm EVOQUE valve via percutaneous LFV  access in the setting of symptomatic torrential (Grade 5) tricuspid insufficiency. The patient has CHRONIC RIGHT-SIDED  CONGESTIVE HEART FAILURE; the Serum Pro-BNP was 1056 pg/mL on 6/26/24 and 1035 pg/mL on 11/6/23. There was  trivial PVL and no intravalvular TR post TTVR; please refer to the intraoperative TIM report for additional details.  The patient was extubated in hybrid OR 14, neurologically intact, and hemodynamically stable upon transfer to the PACU.      Follow-Up   Resume a low dose aspirin in 6 hours (Eliquis was recently stopped due  to bleeding, she was taking it for PAF); resume Amiodarone; resume the heart failure regimen (Metoprolol, Hydralazine,  and Torsemide) as hemodynamics allow; resume Simvastatin this evening; TTE in the morning; continued close outpatient cardiac follow up with Dr Jones, as prior.

## 2025-01-07 NOTE — CONSULT NOTE ADULT - ASSESSMENT
Yolette is a 79yoF PMH severe TR, HTN, AVR, CHB s/p PPM now with micra who is s/p percutaneous tricuspid valve replacement.     #Severe TR s/p percutaneous tricuspid valve replacement:  - For TTE this AM  - Doing very well, remains on RA  - Remains on ASA 81mg and statin  - metoprolol Succinate resumed  - BP stable  - Torsemide 10mg resumed. Appears volume up today, would consider 40mg Iv lasix x 1 prior to DC. Cr is stable    #pAF: Not on AC due to bleeding  - On ASA  - Home Amiodarone resumed.

## 2025-01-07 NOTE — DISCHARGE NOTE PROVIDER - NSDCMRMEDTOKEN_GEN_ALL_CORE_FT
amiodarone 200 mg oral tablet: 1 tab(s) orally once a day  Aspirin Enteric Coated 81 mg oral delayed release tablet: 1 tab(s) orally once a day at 12pm  Biotin: 1 tab(s) orally once a day  hydrALAZINE 50 mg oral tablet: 1 tab(s) orally 3 times a day  levothyroxine 75 mcg (0.075 mg) oral tablet: 1 tab(s) orally once a day  metFORMIN 500 mg oral tablet, extended release: 1 tab(s) orally 2 times a day  metoprolol tartrate 75 mg oral tablet: 1 tab(s) orally once a day  Multiple Vitamins oral tablet: 1 tab(s) orally once a day  potassium chloride: 20 milliequivalent(s) orally once a day  Protonix 40 mg oral delayed release tablet: 1 tab(s) orally once a day  simvastatin 20 mg oral tablet: 1 tab(s) orally once a day (at bedtime)  torsemide 20 mg oral tablet: 0.5 tab(s) orally once a day

## 2025-01-07 NOTE — DISCHARGE NOTE PROVIDER - PROVIDER TOKENS
PROVIDER:[TOKEN:[[id number]],SCHEDULEDAPPT:[01/14/2025],SCHEDULEDAPPTTIME:[01:00 PM],ESTABLISHEDPATIENT:[T]]

## 2025-01-07 NOTE — DISCHARGE NOTE PROVIDER - NSDCCPCAREPLAN_GEN_ALL_CORE_FT
PRINCIPAL DISCHARGE DIAGNOSIS  Diagnosis: Tricuspid insufficiency  Assessment and Plan of Treatment: take meds as prescribed   ambulate at home at least 4x daily  shower daily

## 2025-01-07 NOTE — DISCHARGE NOTE NURSING/CASE MANAGEMENT/SOCIAL WORK - FINANCIAL ASSISTANCE
[facility] provides services at a reduced cost to those who are determined to be eligible through [facility]’s financial assistance program. Information regarding [facility]’s financial assistance program can be found by going to [email or web address] or by calling [phone].

## 2025-01-07 NOTE — PROGRESS NOTE ADULT - SUBJECTIVE AND OBJECTIVE BOX
Overnight events noted      VITAL:  T(C): , Max: 37.1 (01-06-25 @ 16:30)  T(F): , Max: 98.8 (01-06-25 @ 16:30)  HR: 63 (01-07-25 @ 07:34)  BP: 112/63 (01-07-25 @ 06:33)  BP(mean): 77 (01-06-25 @ 16:30)  RR: 18 (01-07-25 @ 06:33)  SpO2: 97% (01-07-25 @ 06:33)  Wt(kg): --      PHYSICAL EXAM:  Constitutional: NAD, Alert  HEENT: NCAT, MMM  Neck: Supple, No JVD  Respiratory: CTA-b/l  Cardiovascular: RRR s1s2, no m/r/g  Gastrointestinal: BS+, soft, NT/ND  Extremities: No peripheral edema b/l  Neurological: no focal deficits; strength grossly intact  Back: no CVAT b/l  Skin: No rashes, no nevi    LABS:                        9.3    8.91  )-----------( 140      ( 07 Jan 2025 06:33 )             29.0     Na(143)/K(4.2)/Cl(105)/HCO3(25)/BUN(27)/Cr(1.26)Glu(126)/Ca(9.4)/Mg(--)/PO4(--)    01-07 @ 06:32        IMPRESSION: 79F w/ HTN, DM2, RCC-partial nephrectomy, AS-AVR, and severe TR; now s/p TTVR 1/6/24    (1)Renal - CKD3a - from hypertension/atherosclerotic disease, parencyhmal loss from RCC resection, and from component of chronic prerenal azotemia in association with valvular disease. Stable  (2)CV - acceptable BP/volume  (3)Structural Heart - POD#1 s/p TTVR    RECOMMEND:  (1)Meds as ordered/dose new meds for GFR 50ml/min  (2)Can f/u at my [employer] office 5/2/25 at 920a as previously scheduled        John Rocha MD  [facility]  Office/on call physician: [phone]  Cell (7a-7p): [phone]       Overnight events noted      VITAL:  T(C): , Max: 37.1 (01-06-25 @ 16:30)  T(F): , Max: 98.8 (01-06-25 @ 16:30)  HR: 63 (01-07-25 @ 07:34)  BP: 112/63 (01-07-25 @ 06:33)  BP(mean): 77 (01-06-25 @ 16:30)  RR: 18 (01-07-25 @ 06:33)  SpO2: 97% (01-07-25 @ 06:33)  Wt(kg): --      PHYSICAL EXAM:  Constitutional: NAD, Alert  HEENT: NCAT, MMM  Neck: Supple, No JVD  Respiratory: CTA-b/l  Cardiovascular: RRR s1s2, no m/r/g  Gastrointestinal: BS+, soft, NT/ND  Extremities: No peripheral edema b/l  Neurological: no focal deficits; strength grossly intact  Back: no CVAT b/l  Skin: No rashes, no nevi    LABS:                        9.3    8.91  )-----------( 140      ( 07 Jan 2025 06:33 )             29.0     Na(143)/K(4.2)/Cl(105)/HCO3(25)/BUN(27)/Cr(1.26)Glu(126)/Ca(9.4)/Mg(--)/PO4(--)    01-07 @ 06:32        IMPRESSION: 79F w/ HTN, DM2, RCC-partial nephrectomy, AS-AVR, and severe TR; now s/p TTVR 1/6/24    (1)Renal - CKD3a - from hypertension/atherosclerotic disease, parencyhmal loss from RCC resection, and from component of chronic prerenal azotemia in association with valvular disease. Stable  (2)CV - acceptable BP/volume  (3)Structural Heart - POD#1 s/p TTVR    RECOMMEND:  (1)Torsemide and standing KCl as were being taken prior to admission  (2)No objection to discharge; can f/u at [employer] office 5/2/25 at 920a as previously scheduled        John Rocha MD  [facility]  Office/on call physician: [phone]  Cell (7a-7p): [phone]       no pain, no sob      VITAL:  T(C): , Max: 37.1 (01-06-25 @ 16:30)  T(F): , Max: 98.8 (01-06-25 @ 16:30)  HR: 63 (01-07-25 @ 07:34)  BP: 112/63 (01-07-25 @ 06:33)  BP(mean): 77 (01-06-25 @ 16:30)  RR: 18 (01-07-25 @ 06:33)  SpO2: 97% (01-07-25 @ 06:33)  Wt(kg): --      PHYSICAL EXAM:  Constitutional: NAD, Alert  HEENT: NCAT, MMM  Neck: Supple, No JVD  Respiratory: CTA-b/l  Cardiovascular: RRR s1s2, no m/r/g  Gastrointestinal: BS+, soft, NT/ND  Extremities: No peripheral edema b/l  Neurological: no focal deficits; strength grossly intact  Back: no CVAT b/l  Skin: No rashes, no nevi    LABS:                        9.3    8.91  )-----------( 140      ( 07 Jan 2025 06:33 )             29.0     Na(143)/K(4.2)/Cl(105)/HCO3(25)/BUN(27)/Cr(1.26)Glu(126)/Ca(9.4)/Mg(--)/PO4(--)    01-07 @ 06:32        IMPRESSION: 79F w/ HTN, DM2, RCC-partial nephrectomy, AS-AVR, and severe TR; now s/p TTVR 1/6/24    (1)Renal - CKD3a - from hypertension/atherosclerotic disease, parencyhmal loss from RCC resection, and from component of chronic prerenal azotemia in association with valvular disease. Stable  (2)CV - acceptable BP/volume  (3)Structural Heart - POD#1 s/p TTVR    RECOMMEND:  (1)Torsemide and standing KCl as were being taken prior to admission  (2)No objection to discharge; f/u with PCP in 1-2 weeks; BMP at that time (d/w'd patient). Can f/u at [facility] office 5/2/25 at 920a as previously scheduled        John Rocha MD  [facility]  Office/on call physician: [phone]  Cell (7a-7p): [phone]

## 2025-01-08 ENCOUNTER — APPOINTMENT (OUTPATIENT)
Dept: CARE COORDINATION | Facility: HOME HEALTH | Age: 80
End: 2025-01-08

## 2025-01-08 DIAGNOSIS — Z95.4 PRESENCE OF OTHER HEART-VALVE REPLACEMENT: ICD-10-CM

## 2025-01-10 ENCOUNTER — NON-APPOINTMENT (OUTPATIENT)
Age: 80
End: 2025-01-10

## 2025-01-13 ENCOUNTER — APPOINTMENT (OUTPATIENT)
Dept: CARDIOLOGY | Facility: CLINIC | Age: 80
End: 2025-01-13

## 2025-01-13 PROCEDURE — 93296 REM INTERROG EVL PM/IDS: CPT

## 2025-01-13 PROCEDURE — 93294 REM INTERROG EVL PM/LDLS PM: CPT

## 2025-01-14 ENCOUNTER — APPOINTMENT (OUTPATIENT)
Dept: CARDIOTHORACIC SURGERY | Facility: CLINIC | Age: 80
End: 2025-01-14

## 2025-01-14 VITALS
TEMPERATURE: 98.1 F | DIASTOLIC BLOOD PRESSURE: 77 MMHG | OXYGEN SATURATION: 97 % | SYSTOLIC BLOOD PRESSURE: 137 MMHG | RESPIRATION RATE: 16 BRPM | HEART RATE: 60 BPM

## 2025-01-14 PROCEDURE — 99214 OFFICE O/P EST MOD 30 MIN: CPT

## 2025-01-15 LAB
ALBUMIN SERPL ELPH-MCNC: 4.1 G/DL
ALP BLD-CCNC: 82 U/L
ALT SERPL-CCNC: 25 U/L
ANION GAP SERPL CALC-SCNC: 16 MMOL/L
AST SERPL-CCNC: 45 U/L
BILIRUB SERPL-MCNC: 1.9 MG/DL
BUN SERPL-MCNC: 32 MG/DL
CALCIUM SERPL-MCNC: 10 MG/DL
CHLORIDE SERPL-SCNC: 105 MMOL/L
CO2 SERPL-SCNC: 26 MMOL/L
CREAT SERPL-MCNC: 1.46 MG/DL
EGFR: 37 ML/MIN/1.73M2
GLUCOSE SERPL-MCNC: 110 MG/DL
NT-PROBNP SERPL-MCNC: 8113 PG/ML
POTASSIUM SERPL-SCNC: 4.6 MMOL/L
PROT SERPL-MCNC: 7.2 G/DL
SODIUM SERPL-SCNC: 147 MMOL/L

## 2025-01-27 ENCOUNTER — NON-APPOINTMENT (OUTPATIENT)
Age: 80
End: 2025-01-27

## 2025-01-27 ENCOUNTER — APPOINTMENT (OUTPATIENT)
Dept: CARDIOLOGY | Facility: CLINIC | Age: 80
End: 2025-01-27
Payer: MEDICARE

## 2025-01-27 VITALS
OXYGEN SATURATION: 100 % | WEIGHT: 156 LBS | BODY MASS INDEX: 27.64 KG/M2 | HEART RATE: 64 BPM | DIASTOLIC BLOOD PRESSURE: 83 MMHG | SYSTOLIC BLOOD PRESSURE: 155 MMHG | HEIGHT: 63 IN

## 2025-01-27 VITALS — DIASTOLIC BLOOD PRESSURE: 72 MMHG | SYSTOLIC BLOOD PRESSURE: 142 MMHG

## 2025-01-27 DIAGNOSIS — I10 ESSENTIAL (PRIMARY) HYPERTENSION: ICD-10-CM

## 2025-01-27 DIAGNOSIS — I38 ENDOCARDITIS, VALVE UNSPECIFIED: ICD-10-CM

## 2025-01-27 DIAGNOSIS — R94.31 ABNORMAL ELECTROCARDIOGRAM [ECG] [EKG]: ICD-10-CM

## 2025-01-27 PROCEDURE — 99214 OFFICE O/P EST MOD 30 MIN: CPT

## 2025-01-27 PROCEDURE — 93000 ELECTROCARDIOGRAM COMPLETE: CPT

## 2025-01-31 ENCOUNTER — RX RENEWAL (OUTPATIENT)
Age: 80
End: 2025-01-31

## 2025-02-06 ENCOUNTER — TRANSCRIPTION ENCOUNTER (OUTPATIENT)
Age: 80
End: 2025-02-06

## 2025-02-13 ENCOUNTER — NON-APPOINTMENT (OUTPATIENT)
Age: 80
End: 2025-02-13

## 2025-02-17 NOTE — H&P ADULT - PROBLEM SELECTOR PLAN 4
Orders for admission, patient is aware of plan and ready to go upstairs. Any questions, please call ED RN Araseli at extension [phone].     Patient Covid vaccination status: Unvaccinated     COVID Test Ordered in ED: None    COVID Suspicion at Admission: N/A    Running Infusions:      Mental Status/LOC at time of transport: [id number]    Other pertinent information:   CIWA score: N/A   NIH score:  N/A         - cont eliquis, metoprolol

## 2025-02-20 ENCOUNTER — APPOINTMENT (OUTPATIENT)
Dept: CARDIOLOGY | Facility: CLINIC | Age: 80
End: 2025-02-20
Payer: MEDICARE

## 2025-02-20 ENCOUNTER — NON-APPOINTMENT (OUTPATIENT)
Age: 80
End: 2025-02-20

## 2025-02-20 VITALS
OXYGEN SATURATION: 100 % | HEIGHT: 63 IN | HEART RATE: 59 BPM | DIASTOLIC BLOOD PRESSURE: 76 MMHG | BODY MASS INDEX: 27.11 KG/M2 | WEIGHT: 153 LBS | SYSTOLIC BLOOD PRESSURE: 154 MMHG

## 2025-02-20 VITALS — SYSTOLIC BLOOD PRESSURE: 138 MMHG | DIASTOLIC BLOOD PRESSURE: 78 MMHG

## 2025-02-20 DIAGNOSIS — R06.00 DYSPNEA, UNSPECIFIED: ICD-10-CM

## 2025-02-20 DIAGNOSIS — I38 ENDOCARDITIS, VALVE UNSPECIFIED: ICD-10-CM

## 2025-02-20 PROCEDURE — 93000 ELECTROCARDIOGRAM COMPLETE: CPT

## 2025-02-20 PROCEDURE — 99214 OFFICE O/P EST MOD 30 MIN: CPT

## 2025-02-20 RX ORDER — APIXABAN 2.5 MG/1
2.5 TABLET, FILM COATED ORAL
Qty: 60 | Refills: 5 | Status: ACTIVE | COMMUNITY
Start: 2025-02-20 | End: 1900-01-01

## 2025-03-05 ENCOUNTER — APPOINTMENT (OUTPATIENT)
Dept: CARDIOTHORACIC SURGERY | Facility: CLINIC | Age: 80
End: 2025-03-05
Payer: MEDICARE

## 2025-03-05 ENCOUNTER — RESULT REVIEW (OUTPATIENT)
Age: 80
End: 2025-03-05

## 2025-03-05 ENCOUNTER — OUTPATIENT (OUTPATIENT)
Dept: OUTPATIENT SERVICES | Facility: HOSPITAL | Age: 80
LOS: 1 days | End: 2025-03-05
Payer: MEDICARE

## 2025-03-05 VITALS
HEART RATE: 59 BPM | RESPIRATION RATE: 16 BRPM | DIASTOLIC BLOOD PRESSURE: 75 MMHG | SYSTOLIC BLOOD PRESSURE: 121 MMHG | TEMPERATURE: 98.1 F | OXYGEN SATURATION: 100 %

## 2025-03-05 DIAGNOSIS — Z90.721 ACQUIRED ABSENCE OF OVARIES, UNILATERAL: Chronic | ICD-10-CM

## 2025-03-05 DIAGNOSIS — I35.0 NONRHEUMATIC AORTIC (VALVE) STENOSIS: ICD-10-CM

## 2025-03-05 DIAGNOSIS — Z98.89 OTHER SPECIFIED POSTPROCEDURAL STATES: Chronic | ICD-10-CM

## 2025-03-05 DIAGNOSIS — Z95.0 PRESENCE OF CARDIAC PACEMAKER: ICD-10-CM

## 2025-03-05 DIAGNOSIS — I50.810 RIGHT HEART FAILURE, UNSPECIFIED: ICD-10-CM

## 2025-03-05 DIAGNOSIS — Z95.2 PRESENCE OF PROSTHETIC HEART VALVE: Chronic | ICD-10-CM

## 2025-03-05 DIAGNOSIS — Z90.5 ACQUIRED ABSENCE OF KIDNEY: Chronic | ICD-10-CM

## 2025-03-05 PROCEDURE — 93306 TTE W/DOPPLER COMPLETE: CPT | Mod: 26

## 2025-03-05 PROCEDURE — 93306 TTE W/DOPPLER COMPLETE: CPT

## 2025-03-05 PROCEDURE — 99215 OFFICE O/P EST HI 40 MIN: CPT

## 2025-03-11 ENCOUNTER — NON-APPOINTMENT (OUTPATIENT)
Age: 80
End: 2025-03-11

## 2025-03-12 PROBLEM — Z95.0 PACEMAKER: Status: ACTIVE | Noted: 2025-03-12

## 2025-04-14 ENCOUNTER — APPOINTMENT (OUTPATIENT)
Dept: CARDIOLOGY | Facility: CLINIC | Age: 80
End: 2025-04-14
Payer: MEDICARE

## 2025-04-14 ENCOUNTER — NON-APPOINTMENT (OUTPATIENT)
Age: 80
End: 2025-04-14

## 2025-04-14 PROCEDURE — 93294 REM INTERROG EVL PM/LDLS PM: CPT

## 2025-04-14 PROCEDURE — 93296 REM INTERROG EVL PM/IDS: CPT

## 2025-04-30 ENCOUNTER — RX RENEWAL (OUTPATIENT)
Age: 80
End: 2025-04-30

## 2025-04-30 NOTE — DISCHARGE NOTE PROVIDER - NSDCQMCOGNITION_NEU_ALL_CORE
Abdi called into the office stating that she was able to get a [employer] letter for \"top\" transgender surgery from her PCP but she has to get one from her mental health provider as well. She is asking if this provider is agreeable?     Please advise; she was scheduled last on 03/12/25. Scheduled to return on 06/13/25.   No difficulties

## 2025-05-09 ENCOUNTER — NON-APPOINTMENT (OUTPATIENT)
Age: 80
End: 2025-05-09

## 2025-05-09 ENCOUNTER — APPOINTMENT (OUTPATIENT)
Dept: CARDIOLOGY | Facility: CLINIC | Age: 80
End: 2025-05-09
Payer: MEDICARE

## 2025-05-09 VITALS
OXYGEN SATURATION: 100 % | SYSTOLIC BLOOD PRESSURE: 119 MMHG | DIASTOLIC BLOOD PRESSURE: 68 MMHG | HEIGHT: 63 IN | HEART RATE: 64 BPM

## 2025-05-09 VITALS — WEIGHT: 149 LBS | BODY MASS INDEX: 26.39 KG/M2

## 2025-05-09 DIAGNOSIS — R94.31 ABNORMAL ELECTROCARDIOGRAM [ECG] [EKG]: ICD-10-CM

## 2025-05-09 DIAGNOSIS — I38 ENDOCARDITIS, VALVE UNSPECIFIED: ICD-10-CM

## 2025-05-09 PROCEDURE — 99214 OFFICE O/P EST MOD 30 MIN: CPT

## 2025-05-09 PROCEDURE — 93000 ELECTROCARDIOGRAM COMPLETE: CPT

## 2025-05-26 ENCOUNTER — RX RENEWAL (OUTPATIENT)
Age: 80
End: 2025-05-26

## 2025-05-29 ENCOUNTER — RX RENEWAL (OUTPATIENT)
Age: 80
End: 2025-05-29

## 2025-06-03 RX ORDER — METOPROLOL SUCCINATE 25 MG/1
25 TABLET, EXTENDED RELEASE ORAL DAILY
Qty: 90 | Refills: 3 | Status: ACTIVE | COMMUNITY
Start: 2025-06-03

## 2025-07-02 ENCOUNTER — OFFICE (OUTPATIENT)
Dept: URBAN - METROPOLITAN AREA CLINIC 109 | Facility: CLINIC | Age: 80
Setting detail: OPHTHALMOLOGY
End: 2025-07-02
Payer: MEDICARE

## 2025-07-02 DIAGNOSIS — H40.013: ICD-10-CM

## 2025-07-02 DIAGNOSIS — E11.9: ICD-10-CM

## 2025-07-02 DIAGNOSIS — H52.7: ICD-10-CM

## 2025-07-02 DIAGNOSIS — H25.13: ICD-10-CM

## 2025-07-02 DIAGNOSIS — H43.393: ICD-10-CM

## 2025-07-02 PROCEDURE — 92014 COMPRE OPH EXAM EST PT 1/>: CPT | Performed by: OPHTHALMOLOGY

## 2025-07-02 PROCEDURE — 92250 FUNDUS PHOTOGRAPHY W/I&R: CPT | Performed by: OPHTHALMOLOGY

## 2025-07-02 PROCEDURE — 92083 EXTENDED VISUAL FIELD XM: CPT | Performed by: OPHTHALMOLOGY

## 2025-07-02 ASSESSMENT — REFRACTION_MANIFEST
OS_ADD: +2.00
OD_CYLINDER: -2.50
OD_ADD: +2.00
OD_AXIS: 105
OD_VA1: 20/25+
OS_AXIS: 055
OS_SPHERE: -1.75
OS_VA1: 20/25
OS_CYLINDER: -0.50
OD_SPHERE: -1.00

## 2025-07-02 ASSESSMENT — REFRACTION_CURRENTRX
OD_VPRISM_DIRECTION: PROGS
OD_AXIS: 104
OS_AXIS: 81
OD_OVR_VA: 20/
OS_SPHERE: -2.25
OS_CYLINDER: -0.75
OS_ADD: +2.00
OD_ADD: +2.00
OD_SPHERE: -1.00
OS_OVR_VA: 20/
OD_CYLINDER: -2.00
OS_VPRISM_DIRECTION: PROGS

## 2025-07-02 ASSESSMENT — PACHYMETRY
OD_CT_UM: 513
OD_CT_CORRECTION: 2
OS_CT_CORRECTION: 3
OS_CT_UM: 509

## 2025-07-02 ASSESSMENT — REFRACTION_AUTOREFRACTION
OS_CYLINDER: -1.00
OD_AXIS: 105
OS_SPHERE: -1.75
OD_SPHERE: -1.25
OD_CYLINDER: -2.50
OS_AXIS: 54

## 2025-07-02 ASSESSMENT — CONFRONTATIONAL VISUAL FIELD TEST (CVF)
OS_FINDINGS: FULL
OD_FINDINGS: FULL

## 2025-07-02 ASSESSMENT — TONOMETRY
OD_IOP_MMHG: 15
OS_IOP_MMHG: 17

## 2025-07-02 ASSESSMENT — VISUAL ACUITY
OD_BCVA: 20/25
OS_BCVA: 20/20-2

## 2025-07-14 ENCOUNTER — APPOINTMENT (OUTPATIENT)
Dept: CARDIOLOGY | Facility: CLINIC | Age: 80
End: 2025-07-14

## 2025-07-21 ENCOUNTER — NON-APPOINTMENT (OUTPATIENT)
Age: 80
End: 2025-07-21

## 2025-07-21 ENCOUNTER — APPOINTMENT (OUTPATIENT)
Dept: CARDIOLOGY | Facility: CLINIC | Age: 80
End: 2025-07-21
Payer: MEDICARE

## 2025-07-21 PROCEDURE — 93296 REM INTERROG EVL PM/IDS: CPT

## 2025-07-21 PROCEDURE — 93294 REM INTERROG EVL PM/LDLS PM: CPT

## 2025-07-29 ENCOUNTER — RX RENEWAL (OUTPATIENT)
Age: 80
End: 2025-07-29

## 2025-08-29 ENCOUNTER — NON-APPOINTMENT (OUTPATIENT)
Age: 80
End: 2025-08-29

## 2025-09-12 ENCOUNTER — APPOINTMENT (OUTPATIENT)
Dept: CARDIOLOGY | Facility: CLINIC | Age: 80
End: 2025-09-12
Payer: MEDICARE

## 2025-09-12 VITALS
WEIGHT: 142.06 LBS | HEIGHT: 63 IN | BODY MASS INDEX: 25.17 KG/M2 | SYSTOLIC BLOOD PRESSURE: 169 MMHG | OXYGEN SATURATION: 97 % | HEART RATE: 63 BPM | DIASTOLIC BLOOD PRESSURE: 78 MMHG

## 2025-09-12 VITALS — DIASTOLIC BLOOD PRESSURE: 68 MMHG | SYSTOLIC BLOOD PRESSURE: 140 MMHG

## 2025-09-12 DIAGNOSIS — R06.00 DYSPNEA, UNSPECIFIED: ICD-10-CM

## 2025-09-12 DIAGNOSIS — I38 ENDOCARDITIS, VALVE UNSPECIFIED: ICD-10-CM

## 2025-09-12 PROCEDURE — 99214 OFFICE O/P EST MOD 30 MIN: CPT

## 2025-09-12 PROCEDURE — 93000 ELECTROCARDIOGRAM COMPLETE: CPT

## 2025-09-15 LAB
ALBUMIN SERPL ELPH-MCNC: 4.2 G/DL
ALP BLD-CCNC: 64 U/L
ALT SERPL-CCNC: 37 U/L
ANION GAP SERPL CALC-SCNC: 16 MMOL/L
AST SERPL-CCNC: 42 U/L
BASOPHILS # BLD AUTO: 0.06 K/UL
BASOPHILS NFR BLD AUTO: 0.7 %
BILIRUB SERPL-MCNC: 0.4 MG/DL
BUN SERPL-MCNC: 32 MG/DL
CALCIUM SERPL-MCNC: 10 MG/DL
CHLORIDE SERPL-SCNC: 106 MMOL/L
CO2 SERPL-SCNC: 23 MMOL/L
CREAT SERPL-MCNC: 1.36 MG/DL
EGFRCR SERPLBLD CKD-EPI 2021: 40 ML/MIN/1.73M2
EOSINOPHIL # BLD AUTO: 0.25 K/UL
EOSINOPHIL NFR BLD AUTO: 2.9 %
ESTIMATED AVERAGE GLUCOSE: 97 MG/DL
FERRITIN SERPL-MCNC: 36 NG/ML
GLUCOSE SERPL-MCNC: 107 MG/DL
HBA1C MFR BLD HPLC: 5 %
HCT VFR BLD CALC: 33.5 %
HGB BLD-MCNC: 10.6 G/DL
IMM GRANULOCYTES NFR BLD AUTO: 0.5 %
IRON SATN MFR SERPL: 10 %
IRON SERPL-MCNC: 40 UG/DL
LYMPHOCYTES # BLD AUTO: 1.2 K/UL
LYMPHOCYTES NFR BLD AUTO: 14.1 %
MAN DIFF?: NORMAL
MCHC RBC-ENTMCNC: 28.2 PG
MCHC RBC-ENTMCNC: 31.6 G/DL
MCV RBC AUTO: 89.1 FL
MONOCYTES # BLD AUTO: 0.88 K/UL
MONOCYTES NFR BLD AUTO: 10.4 %
NEUTROPHILS # BLD AUTO: 6.06 K/UL
NEUTROPHILS NFR BLD AUTO: 71.4 %
NT-PROBNP SERPL-MCNC: 1982 PG/ML
PLATELET # BLD AUTO: 292 K/UL
POTASSIUM SERPL-SCNC: 4.7 MMOL/L
PROT SERPL-MCNC: 7.6 G/DL
RBC # BLD: 3.76 M/UL
RBC # FLD: 15.4 %
SODIUM SERPL-SCNC: 145 MMOL/L
TIBC SERPL-MCNC: 406 UG/DL
TSH SERPL-ACNC: 0.91 UIU/ML
UIBC SERPL-MCNC: 366 UG/DL
WBC # FLD AUTO: 8.49 K/UL

## (undated) DEVICE — SYR ASEPTO

## (undated) DEVICE — VISITEC 4X4

## (undated) DEVICE — WOUND CLOSURE TRAY

## (undated) DEVICE — SNARE POLYP SENS 27MM 240CM

## (undated) DEVICE — GOWN TRIMAX LG

## (undated) DEVICE — SUT ETHIBOND 0 44" EN3

## (undated) DEVICE — SYR LUER SLIP TIP 30CC

## (undated) DEVICE — STEALTH CLAMP INSERT FIBRA/FIBRA 90MM

## (undated) DEVICE — POSITIONER FOAM EGG CRATE ULNAR 2PCS (PINK)

## (undated) DEVICE — SOL IRR POUR H2O 500ML

## (undated) DEVICE — ENDOCUFF VISION SZ 3 SM PRPL

## (undated) DEVICE — GLV 7 PROTEXIS (WHITE)

## (undated) DEVICE — DRAPE MAYO STAND 30"

## (undated) DEVICE — GOWN SLEEVES

## (undated) DEVICE — SUT SOFSILK 0 30" V-20

## (undated) DEVICE — FOLEY HOLDER STATLOCK 3 WAY ADULT

## (undated) DEVICE — DRAPE C ARM UNIVERSAL

## (undated) DEVICE — PACK IV START WITH CHG

## (undated) DEVICE — CANISTER SUCTION 1200CC 10/SL

## (undated) DEVICE — BITE BLOCK MAXI RUBBER STAMP

## (undated) DEVICE — TUBING IV SET GRAVITY 3Y 100" MACRO

## (undated) DEVICE — ENDOCUFF VISION SZ 2 LG GRN

## (undated) DEVICE — DRAPE INSTRUMENT POUCH 6.75" X 11"

## (undated) DEVICE — TOURNIQUET SET 12FR (1 RED, 1 BLUE, 1 SNARE) 7"

## (undated) DEVICE — SUT BLUNT SZ 5

## (undated) DEVICE — GOWN TRIMAX XXL

## (undated) DEVICE — PACK BASIN SPECIAL PROCEDURE

## (undated) DEVICE — LAP PAD 18 X 18"

## (undated) DEVICE — TUBING IV SET SECONDARY 34"

## (undated) DEVICE — SUT BIOSYN 4-0 18" P-12

## (undated) DEVICE — BRUSH CYTO ENDO

## (undated) DEVICE — TRAP SPECIMEN SPUTUM 40CC

## (undated) DEVICE — ELCTR BOVIE PENCIL HANDPIECE ROCKER SWITCH 15FT

## (undated) DEVICE — DRSG OPSITE 13.75 X 4"

## (undated) DEVICE — SOL IRR BAG H2O 1000ML

## (undated) DEVICE — TRAP QUICK CATCH  SINGL CHAMBER

## (undated) DEVICE — BLADE SCALPEL SAFETYLOCK #11

## (undated) DEVICE — SOL IRR POUR NS 0.9% 500ML

## (undated) DEVICE — SUT SILK 0 30" TIES

## (undated) DEVICE — SUT STAINLESS STEEL 5 18" SCC

## (undated) DEVICE — VALVE BIOPSY

## (undated) DEVICE — SUT PROLENE 5-0 30" RB-2

## (undated) DEVICE — DRSG TEGADERM 6 X 8"

## (undated) DEVICE — WARMING BLANKET DUO-THERM HYPER/HYPOTHERM ADULT

## (undated) DEVICE — Device

## (undated) DEVICE — FORCEP RADIAL JAW 4 JUMBO 2.8MM 3.2MM 240CM ORANGE DISP

## (undated) DEVICE — DRSG DERMABOND PRINEO 60CM

## (undated) DEVICE — FORCEP RADIAL JAW 4 W NDL 2.4MM 2.8MM 240CM ORANGE DISP

## (undated) DEVICE — ELCTR HEX BLADE

## (undated) DEVICE — DRAPE 3/4 SHEET W REINFORCEMENT 56X77"

## (undated) DEVICE — FOLEY TRAY 16FR 5CC LF LUBRISIL ADVANCE TEMP CLOSED

## (undated) DEVICE — SUT SOFSILK 0 18" TIES

## (undated) DEVICE — DRAPE IOBAN 23" X 23"

## (undated) DEVICE — PREP CHLORAPREP HI-LITE ORANGE 26ML

## (undated) DEVICE — DRSG CURITY GAUZE SPONGE 4 X 4" 12-PLY

## (undated) DEVICE — CATH IV SAFE BC 22G X 1" (BLUE)

## (undated) DEVICE — BLADE SCALPEL SAFETYLOCK #15

## (undated) DEVICE — DRSG OPSITE 2.5 X 2"

## (undated) DEVICE — BLADE SCALPEL SAFETYLOCK #10

## (undated) DEVICE — SLV STER PROGRAM WAND

## (undated) DEVICE — SYS PANNUS RETENTION W/ TWO PAD ONE STRAP

## (undated) DEVICE — CATH IV SAFE BC 20G X 1.16" (PINK)

## (undated) DEVICE — GLV 8.5 PROTEXIS (WHITE)

## (undated) DEVICE — SUT BIOSYN 3-0 18" P-12

## (undated) DEVICE — CATH ELECHMSTAT  INJ 7FR 210CM

## (undated) DEVICE — TUBING IV SET SECOND 34" W/O LOK-BLUNT

## (undated) DEVICE — SOL IRR POUR H2O 1000ML

## (undated) DEVICE — NDL HYPO REGULAR BEVEL 22G X 1.5" (TURQUOISE)

## (undated) DEVICE — SYR LUER LOK 10CC

## (undated) DEVICE — SYR LUER LOK 30CC

## (undated) DEVICE — SOL NORMOSOL-R PH7.4 1000ML

## (undated) DEVICE — TUBE O2 SUPL CRUSH RESIS CONN SOUTHSIDE ONLY

## (undated) DEVICE — SOL IRR POUR H2O 250ML

## (undated) DEVICE — SYR ORISE GEL SNGL PACK

## (undated) DEVICE — TUBING SUCTION CONN 6FT STERILE

## (undated) DEVICE — VESSEL LOOP MAXI-RED  0.120" X 16"

## (undated) DEVICE — DRAPE TOWEL BLUE 17" X 24"

## (undated) DEVICE — MASK OXYGEN PANORAMIC

## (undated) DEVICE — MEDICATION LABELS W MARKER

## (undated) DEVICE — SYR LUER SLIP TIP 50CC

## (undated) DEVICE — GLV 8 PROTEXIS (WHITE)

## (undated) DEVICE — CATH ELCTR GLIDE PRB 7FR

## (undated) DEVICE — SET IV PUMP BLOOD 1VALVE 180FILTER NON-DEHP

## (undated) DEVICE — MASK O2 NON REBREATH 3IN1 ADULT

## (undated) DEVICE — DRAPE 1/2 SHEET 40X57"

## (undated) DEVICE — ELCTR REM POLYHESIVE ADULT PT RETURN 15FT

## (undated) DEVICE — SUT POLYSORB 2-0 30" GS-21 UNDYED

## (undated) DEVICE — SUT HEWSON RETRIEVER

## (undated) DEVICE — SUCTION YANKAUER NO CONTROL VENT

## (undated) DEVICE — SUT DOUBLE 6 WIRE STERNAL

## (undated) DEVICE — GLV 8 PROTEGRITY

## (undated) DEVICE — RETRIEVER ROTH NET PLATINUM-UNIVERSAL

## (undated) DEVICE — MARKER ENDO SPOT EX

## (undated) DEVICE — DRAPE FEMORAL ANGIOGRAPHY W TROUGH

## (undated) DEVICE — DRAPE LIGHT HANDLE COVER (GREEN)

## (undated) DEVICE — CLAMP BX HOT RAD JAW 3

## (undated) DEVICE — BRUSH COLONOSCOPY CYTOLOGY

## (undated) DEVICE — SUT PLEDGET PRE PUNCH 4.8 X 9.5 X 1.5 MM

## (undated) DEVICE — SAW BLADE MICROAIRE STERNUM 1X34X9.4MM

## (undated) DEVICE — SUT SOFSILK 2-0 18" TIES

## (undated) DEVICE — SPIKE MINI STER

## (undated) DEVICE — SOL IRR POUR NS 0.9% 1000ML

## (undated) DEVICE — TUBE RECTAL 24FR

## (undated) DEVICE — STERIS DEFENDO 3-PIECE KIT (AIR/WATER, SUCTION & BIOPSY VALVES)

## (undated) DEVICE — SYR IV POSIFLUSH NS 3ML 30/TY

## (undated) DEVICE — PACK UNIVERSAL CARDIAC

## (undated) DEVICE — CONNECTOR STRAIGHT 3/8 X 1/2"

## (undated) DEVICE — TUBING ENDO EXT OLYMPUS 160 24HR USE

## (undated) DEVICE — ELCTR GROUNDING PAD ADULT COVIDIEN

## (undated) DEVICE — POLY TRAP ETRAP

## (undated) DEVICE — DRAIN RESERVOIR FOR JACKSON PRATT 100CC CARDINAL

## (undated) DEVICE — FORCEP RADIAL JAW 4 W NDL 2.2MM 2.8MM 160CM YELLOW DISP

## (undated) DEVICE — SUT POLYSORB 0 36" GS-25 UNDYED

## (undated) DEVICE — ELCTR BOVIE TIP BLADE VALLEYLAB 6.5"

## (undated) DEVICE — STEALTH CLAMP INSERT FIBRA/FIBRA 60MM

## (undated) DEVICE — SPECIMEN CONTAINER 100ML

## (undated) DEVICE — WARMING BLANKET FULL UNDERBODY

## (undated) DEVICE — FORMALIN CUPS 10% BUFFERED

## (undated) DEVICE — SAW BLADE MICROAIRE STERNUM 1.1X50X42MM

## (undated) DEVICE — SUCTION YANKAUER TAPERED BULBOUS NO VENT

## (undated) DEVICE — SUT PROLENE 4-0 36" BB

## (undated) DEVICE — GLV 7.5 PROTEXIS (WHITE)

## (undated) DEVICE — SUT SURGICAL STEEL 6 30" BP-1

## (undated) DEVICE — TUBING BRAT 2 SUCTION ASSEMBLY TWIST LOCK

## (undated) DEVICE — SUT PROLENE 5-0 36" RB-1

## (undated) DEVICE — SOL IRR NS 0.9% 250ML

## (undated) DEVICE — STAPLER SKIN VISI-STAT 35 WIDE

## (undated) DEVICE — TUBING CONTRAST DELIVERY 72IN

## (undated) DEVICE — SNARE LRG

## (undated) DEVICE — GLV 6.5 PROTEXIS (WHITE)

## (undated) DEVICE — NDL HYPO SAFE 25G X 5/8" (ORANGE)

## (undated) DEVICE — PACK UNIVERSAL CARDIAC SUPPLEMNTAL B

## (undated) DEVICE — NDL INJ SCLERO INTERJECT 23G

## (undated) DEVICE — SUT TICRON 5 30" KV-40

## (undated) DEVICE — SENSOR O2 FINGER XL ADULT 24/BX 6BX/CA

## (undated) DEVICE — VENODYNE/SCD SLEEVE CALF LARGE

## (undated) DEVICE — TUBING SUCTION 20FT

## (undated) DEVICE — DEFIBRILLATOR PAD PRE-CONNECT ADULT/CHILD

## (undated) DEVICE — ULTRASOUND TRANSDUCER COVER

## (undated) DEVICE — TUBING CANNULA SALTER LABS NASAL ADULT 7FT

## (undated) DEVICE — CONNECTOR STRAIGHT 3/8 X 3/8" W LUER LOCK